# Patient Record
Sex: FEMALE | Race: WHITE | NOT HISPANIC OR LATINO | Employment: OTHER | URBAN - NONMETROPOLITAN AREA
[De-identification: names, ages, dates, MRNs, and addresses within clinical notes are randomized per-mention and may not be internally consistent; named-entity substitution may affect disease eponyms.]

---

## 2017-09-15 ENCOUNTER — OFFICE VISIT (OUTPATIENT)
Dept: INTERNAL MEDICINE | Facility: CLINIC | Age: 72
End: 2017-09-15

## 2017-09-15 VITALS
TEMPERATURE: 98.5 F | HEIGHT: 60 IN | HEART RATE: 102 BPM | RESPIRATION RATE: 18 BRPM | SYSTOLIC BLOOD PRESSURE: 124 MMHG | DIASTOLIC BLOOD PRESSURE: 72 MMHG | WEIGHT: 178.19 LBS | BODY MASS INDEX: 34.99 KG/M2 | OXYGEN SATURATION: 93 %

## 2017-09-15 DIAGNOSIS — R42 DIZZINESS: ICD-10-CM

## 2017-09-15 DIAGNOSIS — Z76.89 ENCOUNTER TO ESTABLISH CARE: Primary | ICD-10-CM

## 2017-09-15 DIAGNOSIS — Z79.4 TYPE 2 DIABETES MELLITUS WITHOUT COMPLICATION, WITH LONG-TERM CURRENT USE OF INSULIN (HCC): ICD-10-CM

## 2017-09-15 DIAGNOSIS — Z86.39 H/O THYROID NODULE: ICD-10-CM

## 2017-09-15 DIAGNOSIS — R63.5 WEIGHT GAIN: ICD-10-CM

## 2017-09-15 DIAGNOSIS — E11.9 TYPE 2 DIABETES MELLITUS WITHOUT COMPLICATION, WITH LONG-TERM CURRENT USE OF INSULIN (HCC): ICD-10-CM

## 2017-09-15 PROCEDURE — 99203 OFFICE O/P NEW LOW 30 MIN: CPT | Performed by: NURSE PRACTITIONER

## 2017-09-15 RX ORDER — INSULIN ASPART 100 [IU]/ML
INJECTION, SUSPENSION SUBCUTANEOUS
Refills: 0 | COMMUNITY
Start: 2017-09-14 | End: 2018-04-21 | Stop reason: SDUPTHER

## 2017-09-15 RX ORDER — MECLIZINE HCL 12.5 MG/1
12.5 TABLET ORAL 3 TIMES DAILY PRN
Qty: 21 TABLET | Refills: 0 | Status: SHIPPED | OUTPATIENT
Start: 2017-09-15 | End: 2017-09-22

## 2017-09-15 RX ORDER — HYDROCHLOROTHIAZIDE 12.5 MG/1
TABLET ORAL
Refills: 0 | COMMUNITY
Start: 2017-07-26 | End: 2018-01-18 | Stop reason: SINTOL

## 2017-09-15 RX ORDER — MULTIVITAMIN WITH IRON
TABLET ORAL AS NEEDED
COMMUNITY
End: 2017-10-20

## 2017-09-15 RX ORDER — LEVOTHYROXINE SODIUM 0.1 MG/1
TABLET ORAL
Refills: 0 | COMMUNITY
Start: 2017-09-13 | End: 2017-10-23 | Stop reason: SDUPTHER

## 2017-09-15 RX ORDER — CLOBETASOL PROPIONATE 0.46 MG/ML
SOLUTION TOPICAL
Refills: 0 | COMMUNITY
Start: 2017-08-26 | End: 2018-04-20

## 2017-09-15 NOTE — PROGRESS NOTES
Chief Complaint / Reason:      Chief Complaint   Patient presents with   • Establish Care     near syncope episodes, cough, SOA-onset ongoing. Bronchitis?        Subjective     HPI  Patient presents today to establish care and to discuss near syncope episodes.  She states that she has had an ongoing cough and has been short of air has a history of bronchitis.  She denies currently smoking but has in the past. Denies fever or chest pain.  Patient was seeing Dr. Freeman at [facility] and indicates that her  wanted her to change providers as he was not happy with the care or the lack of.  Patient did not have any complaints about her previous provider.Denies any recent falls but does state that she comes close to falling at times and bumps into things.  Her last vision exam was 2016.  She reports that she has loss of hearing in her left ear which is not new.  History taken from: patient    PMH/FH/Social History were reviewed and updated appropriately in the electronic medical record.     Review of Systems:   Review of Systems   Constitutional: Positive for fatigue and unexpected weight change. Negative for fever.   HENT: Positive for hearing loss.    Eyes: Positive for pain and visual disturbance.   Respiratory: Positive for cough, shortness of breath and wheezing.    Cardiovascular: Positive for palpitations.   Gastrointestinal: Positive for constipation.   Musculoskeletal: Positive for arthralgias, back pain, joint swelling and myalgias.   Neurological: Positive for dizziness, syncope and weakness.   Hematological: Negative.    Psychiatric/Behavioral: Positive for sleep disturbance.     All other systems were reviewed and are negative.  Exceptions are noted in the subjective or above.      Objective     Vital Signs  Vitals:    09/15/17 1740   BP: 124/72   Pulse: 102   Resp: 18   Temp: 98.5 °F (36.9 °C)   SpO2: 93%       Body mass index is 34.8 kg/(m^2).    Physical Exam   Constitutional: She is oriented  "to person, place, and time. She appears well-developed and well-nourished. No distress.   HENT:   Head: Normocephalic and atraumatic.   Right Ear: External ear and ear canal normal. No tenderness. Tympanic membrane is bulging.   Left Ear: External ear and ear canal normal. No tenderness. Tympanic membrane is bulging.   Mouth/Throat: Mucous membranes are dry.   Eyes: Pupils are equal, round, and reactive to light.   Neck: Normal range of motion. Thyromegaly present.   Cardiovascular: Regular rhythm, normal heart sounds and intact distal pulses.  Tachycardia present.    Pulmonary/Chest: Effort normal and breath sounds normal. She has no wheezes. She exhibits no tenderness.   Abdominal: Soft. Bowel sounds are normal. She exhibits no distension. There is no tenderness.   Lymphadenopathy:     She has no cervical adenopathy.   Neurological: She is alert and oriented to person, place, and time.   Skin: Skin is warm and dry. No rash noted. No erythema. No pallor.   Psychiatric: She has a normal mood and affect. Her behavior is normal. Judgment and thought content normal.   Nursing note and vitals reviewed.       Medication Review:     Current Outpatient Prescriptions:   •  Magnesium 250 MG tablet, Take  by mouth As Needed., Disp: , Rfl:   •  MARY CONTOUR TEST test strip, 3 (Three) Times a Day., Disp: , Rfl: 0  •  BD INSULIN SYRINGE ULTRAFINE 31G X 15/64\" 0.5 ML misc, , Disp: , Rfl: 0  •  clobetasol (TEMOVATE) 0.05 % external solution, , Disp: , Rfl: 0  •  hydrochlorothiazide (HYDRODIURIL) 12.5 MG tablet, take 1 tablet by mouth once daily, Disp: , Rfl: 0  •  levothyroxine (SYNTHROID, LEVOTHROID) 100 MCG tablet, take 1 tablet by mouth once daily, Disp: , Rfl: 0  •  meclizine (ANTIVERT) 12.5 MG tablet, Take 1 tablet by mouth 3 (Three) Times a Day As Needed for dizziness for up to 7 days., Disp: 21 tablet, Rfl: 0  •  NOVOLOG MIX 70/30 (70-30) 100 UNIT/ML injection, inject 70 units subcutaneously twice a day, Disp: , Rfl: " 0    Assessment/Plan   Marielena was seen today for establish care.    Diagnoses and all orders for this visit:    Encounter to establish care    H/O thyroid nodule  -     US Thyroid    Weight gain  -     US Thyroid   discussed dietary modifications and advised patient to eat a low-sodium diet and weigh daily.    Dizziness  -     meclizine (ANTIVERT) 12.5 MG tablet; Take 1 tablet by mouth 3 (Three) Times a Day As Needed for dizziness for up to 7 days.  Advised patient to use caution when changing positions.  Recommend patient increase water intake.  Recommend patient closely monitor blood pressure and changes in blood pressure sitting or standing and lying  Type 2 diabetes mellitus without complication, with long-term current use of insulin  Stable   Discussed health maintenance with patient.   Follow-up in 4 weeks and as needed  Phoebe Sinclair, LETICIA  09/15/2017

## 2017-09-19 ENCOUNTER — HOSPITAL ENCOUNTER (OUTPATIENT)
Dept: ULTRASOUND IMAGING | Facility: HOSPITAL | Age: 72
Discharge: HOME | End: 2017-09-19
Attending: NURSE PRACTITIONER | Admitting: NURSE PRACTITIONER

## 2017-09-19 PROBLEM — M81.0 OSTEOPOROSIS: Status: ACTIVE | Noted: 2017-09-19

## 2017-09-19 PROBLEM — E11.9 DIABETES MELLITUS (HCC): Status: ACTIVE | Noted: 2017-09-19

## 2017-09-19 PROBLEM — I10 HYPERTENSION: Status: ACTIVE | Noted: 2017-09-19

## 2017-09-19 PROCEDURE — 76536 US EXAM OF HEAD AND NECK: CPT

## 2017-10-20 ENCOUNTER — OFFICE VISIT (OUTPATIENT)
Dept: INTERNAL MEDICINE | Facility: CLINIC | Age: 72
End: 2017-10-20

## 2017-10-20 VITALS
BODY MASS INDEX: 35.73 KG/M2 | HEART RATE: 88 BPM | DIASTOLIC BLOOD PRESSURE: 80 MMHG | OXYGEN SATURATION: 94 % | WEIGHT: 182 LBS | HEIGHT: 60 IN | SYSTOLIC BLOOD PRESSURE: 142 MMHG | RESPIRATION RATE: 12 BRPM | TEMPERATURE: 98.7 F

## 2017-10-20 DIAGNOSIS — E89.0 POSTOPERATIVE HYPOTHYROIDISM: ICD-10-CM

## 2017-10-20 DIAGNOSIS — Z11.59 ENCOUNTER FOR HEPATITIS C SCREENING TEST FOR LOW RISK PATIENT: ICD-10-CM

## 2017-10-20 DIAGNOSIS — E11.9 TYPE 2 DIABETES MELLITUS WITHOUT COMPLICATION, WITH LONG-TERM CURRENT USE OF INSULIN (HCC): Primary | Chronic | ICD-10-CM

## 2017-10-20 DIAGNOSIS — R53.82 CHRONIC FATIGUE: ICD-10-CM

## 2017-10-20 DIAGNOSIS — E55.9 VITAMIN D DEFICIENCY: ICD-10-CM

## 2017-10-20 DIAGNOSIS — R63.5 WEIGHT GAIN: ICD-10-CM

## 2017-10-20 DIAGNOSIS — Z79.4 TYPE 2 DIABETES MELLITUS WITHOUT COMPLICATION, WITH LONG-TERM CURRENT USE OF INSULIN (HCC): Primary | Chronic | ICD-10-CM

## 2017-10-20 DIAGNOSIS — I10 ESSENTIAL HYPERTENSION: Chronic | ICD-10-CM

## 2017-10-20 PROCEDURE — 99214 OFFICE O/P EST MOD 30 MIN: CPT | Performed by: FAMILY MEDICINE

## 2017-10-20 NOTE — PROGRESS NOTES
"Subjective    Marielena Moeller is a 71 y.o. female here for:  Chief Complaint   Patient presents with   • Diabetes   • Hypertension     Diabetes   She presents for her follow-up diabetic visit. She has type 2 diabetes mellitus. Pertinent negatives for diabetes include no chest pain and no weight loss. Pertinent negatives for diabetic complications include no CVA or heart disease. Risk factors for coronary artery disease include diabetes mellitus, obesity, stress, sedentary lifestyle, hypertension and post-menopausal. Current diabetic treatment includes insulin injections (Patient is satisfied with 70/30 at this time.). She is compliant with treatment all of the time. Her weight is increasing rapidly (Patient feels weight is climbing outside her control.). Meal planning includes avoidance of concentrated sweets. An ACE inhibitor/angiotensin II receptor blocker is not being taken.   Hypertension   This is a chronic problem. The current episode started more than 1 year ago. The problem is unchanged. The problem is controlled. Pertinent negatives include no chest pain. Agents associated with hypertension include thyroid hormones. Risk factors for coronary artery disease include diabetes mellitus, obesity, post-menopausal state, stress and sedentary lifestyle. Past treatments include diuretics. The current treatment provides significant improvement. Compliance problems include exercise and diet.  Hypertensive end-organ damage includes a thyroid problem. There is no history of CAD/MI or CVA.      She'd like to get her vitamin D level checked. She has a history of deficiency in this. She stays tired. No known issues with vitamin B12.     She gets \"real nervous inside.\" She feels like \"Jello shaking.\"  is sick (cancer, heart issues, etc.) Daughter told her to get hormones checked but she went through menopause in her 40s. She's gained a lot of weight. Used to walk but has joint pains that keep her from going now. " Had a lot of ankle pain on right side.     The following portions of the patient's history were reviewed and updated as appropriate: allergies, current medications, past family history, past medical history, past social history, past surgical history and problem list.    Review of Systems   Constitutional: Positive for unexpected weight change. Negative for weight loss.   Respiratory: Positive for cough.    Cardiovascular: Negative for chest pain.   Musculoskeletal: Positive for myalgias (cramping with walking).       Vitals:    10/20/17 1303   BP: 142/80   Pulse: 88   Resp: 12   Temp: 98.7 °F (37.1 °C)   SpO2: 94%       Objective   Physical Exam   Constitutional: She is oriented to person, place, and time. Vital signs are normal. She appears well-developed and well-nourished. She is active. She does not have a sickly appearance. She does not appear ill.   Appears stated age. Well groomed. obese.   HENT:   Head: Normocephalic and atraumatic. Hair is normal.   Right Ear: Hearing normal.   Left Ear: Hearing normal.   Nose: Nose normal.   Eyes: EOM and lids are normal. Pupils are equal, round, and reactive to light. No scleral icterus.   Neck: Phonation normal. Neck supple.   Cardiovascular: Normal rate, regular rhythm and normal heart sounds.  Exam reveals no gallop and no friction rub.    No murmur heard.  Pulmonary/Chest: Effort normal and breath sounds normal.   Musculoskeletal: She exhibits no deformity.   Neurological: She is alert and oriented to person, place, and time. She displays no tremor. No cranial nerve deficit. Gait normal.   Skin: Skin is warm. No rash noted. She is not diaphoretic. No cyanosis. Nails show no clubbing.   Psychiatric: She has a normal mood and affect. Her speech is normal and behavior is normal. Judgment and thought content normal. Cognition and memory are normal.   Nursing note and vitals reviewed.      Assessment/Plan   Marielena was seen today for diabetes and  hypertension.    Diagnoses and all orders for this visit:    Type 2 diabetes mellitus without complication, with long-term current use of insulin  -     Hemoglobin A1c  -     Comprehensive Metabolic Panel    Essential hypertension  Comments:  Continue HCTZ.    Chronic fatigue  -     CBC & Differential  -     Vitamin B12 & Folate    Weight gain    Postoperative hypothyroidism  -     TSH    Vitamin D deficiency  -     Vitamin D 25 Hydroxy    Encounter for hepatitis C screening test for low risk patient  -     Hepatitis C Antibody               Marychuy Starr MD

## 2017-10-21 LAB
25(OH)D3+25(OH)D2 SERPL-MCNC: 21.1 NG/ML
ALBUMIN SERPL-MCNC: 3.9 G/DL (ref 3.5–5)
ALBUMIN/GLOB SERPL: 1.4 G/DL (ref 1–2)
ALP SERPL-CCNC: 84 U/L (ref 38–126)
ALT SERPL-CCNC: 26 U/L (ref 13–69)
AST SERPL-CCNC: 20 U/L (ref 15–46)
BASOPHILS # BLD AUTO: 0.05 10*3/MM3 (ref 0–0.2)
BASOPHILS NFR BLD AUTO: 0.6 % (ref 0–2.5)
BILIRUB SERPL-MCNC: 0.3 MG/DL (ref 0.2–1.3)
BUN SERPL-MCNC: 13 MG/DL (ref 7–20)
BUN/CREAT SERPL: 18.6 (ref 7.1–23.5)
CALCIUM SERPL-MCNC: 9.5 MG/DL (ref 8.4–10.2)
CHLORIDE SERPL-SCNC: 99 MMOL/L (ref 98–107)
CO2 SERPL-SCNC: 31 MMOL/L (ref 26–30)
CREAT SERPL-MCNC: 0.7 MG/DL (ref 0.6–1.3)
EOSINOPHIL # BLD AUTO: 0.18 10*3/MM3 (ref 0–0.7)
EOSINOPHIL NFR BLD AUTO: 2.3 % (ref 0–7)
ERYTHROCYTE [DISTWIDTH] IN BLOOD BY AUTOMATED COUNT: 12.3 % (ref 11.5–14.5)
FOLATE SERPL-MCNC: 8.33 NG/ML
GFR SERPLBLD CREATININE-BSD FMLA CKD-EPI: 101 ML/MIN/1.73
GFR SERPLBLD CREATININE-BSD FMLA CKD-EPI: 83 ML/MIN/1.73
GLOBULIN SER CALC-MCNC: 2.7 GM/DL
GLUCOSE SERPL-MCNC: 169 MG/DL (ref 74–98)
HBA1C MFR BLD: 9.2 %
HCT VFR BLD AUTO: 47.6 % (ref 37–47)
HCV AB S/CO SERPL IA: 0.1 S/CO RATIO (ref 0–0.9)
HGB BLD-MCNC: 16.1 G/DL (ref 12–16)
IMM GRANULOCYTES # BLD: 0.03 10*3/MM3 (ref 0–0.06)
IMM GRANULOCYTES NFR BLD: 0.4 % (ref 0–0.6)
LYMPHOCYTES # BLD AUTO: 1.93 10*3/MM3 (ref 0.6–3.4)
LYMPHOCYTES NFR BLD AUTO: 24.5 % (ref 10–50)
MCH RBC QN AUTO: 32.5 PG (ref 27–31)
MCHC RBC AUTO-ENTMCNC: 33.8 G/DL (ref 30–37)
MCV RBC AUTO: 96 FL (ref 81–99)
MONOCYTES # BLD AUTO: 0.59 10*3/MM3 (ref 0–0.9)
MONOCYTES NFR BLD AUTO: 7.5 % (ref 0–12)
NEUTROPHILS # BLD AUTO: 5.1 10*3/MM3 (ref 2–6.9)
NEUTROPHILS NFR BLD AUTO: 64.7 % (ref 37–80)
NRBC BLD AUTO-RTO: 0 /100 WBC (ref 0–0)
PLATELET # BLD AUTO: 242 10*3/MM3 (ref 130–400)
POTASSIUM SERPL-SCNC: 4.2 MMOL/L (ref 3.5–5.1)
PROT SERPL-MCNC: 6.6 G/DL (ref 6.3–8.2)
RBC # BLD AUTO: 4.96 10*6/MM3 (ref 4.2–5.4)
SODIUM SERPL-SCNC: 141 MMOL/L (ref 137–145)
TSH SERPL DL<=0.005 MIU/L-ACNC: 1.74 MIU/ML (ref 0.47–4.68)
VIT B12 SERPL-MCNC: 681 PG/ML (ref 239–931)
WBC # BLD AUTO: 7.88 10*3/MM3 (ref 4.8–10.8)

## 2017-10-23 DIAGNOSIS — Z86.39 H/O THYROID NODULE: ICD-10-CM

## 2017-10-23 RX ORDER — LEVOTHYROXINE SODIUM 0.1 MG/1
100 TABLET ORAL DAILY
Qty: 90 TABLET | Refills: 3 | Status: SHIPPED | OUTPATIENT
Start: 2017-10-23 | End: 2017-12-11 | Stop reason: SDUPTHER

## 2017-11-17 ENCOUNTER — TELEPHONE (OUTPATIENT)
Dept: INTERNAL MEDICINE | Facility: CLINIC | Age: 72
End: 2017-11-17

## 2017-11-17 DIAGNOSIS — E11.9 TYPE 2 DIABETES MELLITUS WITHOUT COMPLICATION, WITH LONG-TERM CURRENT USE OF INSULIN (HCC): ICD-10-CM

## 2017-11-17 DIAGNOSIS — Z79.4 TYPE 2 DIABETES MELLITUS WITHOUT COMPLICATION, WITH LONG-TERM CURRENT USE OF INSULIN (HCC): ICD-10-CM

## 2017-12-11 ENCOUNTER — OFFICE VISIT (OUTPATIENT)
Dept: INTERNAL MEDICINE | Facility: CLINIC | Age: 72
End: 2017-12-11

## 2017-12-11 VITALS
WEIGHT: 183 LBS | BODY MASS INDEX: 35.93 KG/M2 | HEIGHT: 60 IN | HEART RATE: 86 BPM | OXYGEN SATURATION: 95 % | SYSTOLIC BLOOD PRESSURE: 134 MMHG | RESPIRATION RATE: 14 BRPM | DIASTOLIC BLOOD PRESSURE: 82 MMHG | TEMPERATURE: 98.3 F

## 2017-12-11 DIAGNOSIS — G47.62 NOCTURNAL LEG CRAMPS: ICD-10-CM

## 2017-12-11 DIAGNOSIS — Z78.0 POSTMENOPAUSAL: ICD-10-CM

## 2017-12-11 DIAGNOSIS — R51.9 FREQUENT HEADACHES: ICD-10-CM

## 2017-12-11 DIAGNOSIS — E89.0 POSTOPERATIVE HYPOTHYROIDISM: ICD-10-CM

## 2017-12-11 DIAGNOSIS — E66.9 CLASS 2 OBESITY WITH BODY MASS INDEX (BMI) OF 35.0 TO 35.9 IN ADULT, UNSPECIFIED OBESITY TYPE, UNSPECIFIED WHETHER SERIOUS COMORBIDITY PRESENT: ICD-10-CM

## 2017-12-11 DIAGNOSIS — Z79.4 TYPE 2 DIABETES MELLITUS WITH HYPERGLYCEMIA, WITH LONG-TERM CURRENT USE OF INSULIN (HCC): ICD-10-CM

## 2017-12-11 DIAGNOSIS — M81.0 AGE-RELATED OSTEOPOROSIS WITHOUT CURRENT PATHOLOGICAL FRACTURE: ICD-10-CM

## 2017-12-11 DIAGNOSIS — Z00.00 MEDICARE ANNUAL WELLNESS VISIT, SUBSEQUENT: Primary | ICD-10-CM

## 2017-12-11 DIAGNOSIS — I10 ESSENTIAL HYPERTENSION: Chronic | ICD-10-CM

## 2017-12-11 DIAGNOSIS — E11.65 TYPE 2 DIABETES MELLITUS WITH HYPERGLYCEMIA, WITH LONG-TERM CURRENT USE OF INSULIN (HCC): ICD-10-CM

## 2017-12-11 DIAGNOSIS — R39.9 URINARY SYMPTOM OR SIGN: ICD-10-CM

## 2017-12-11 LAB
BILIRUB BLD-MCNC: NEGATIVE MG/DL
CLARITY, POC: CLEAR
COLOR UR: YELLOW
GLUCOSE UR STRIP-MCNC: ABNORMAL MG/DL
KETONES UR QL: NEGATIVE
LEUKOCYTE EST, POC: NEGATIVE
NITRITE UR-MCNC: NEGATIVE MG/ML
PH UR: 5 [PH] (ref 5–8)
POC CREATININE URINE: 50
POC MICROALBUMIN URINE: 80
PROT UR STRIP-MCNC: NEGATIVE MG/DL
RBC # UR STRIP: NEGATIVE /UL
SP GR UR: 1.01 (ref 1–1.03)
UROBILINOGEN UR QL: NORMAL

## 2017-12-11 PROCEDURE — 82044 UR ALBUMIN SEMIQUANTITATIVE: CPT | Performed by: FAMILY MEDICINE

## 2017-12-11 PROCEDURE — 81003 URINALYSIS AUTO W/O SCOPE: CPT | Performed by: FAMILY MEDICINE

## 2017-12-11 PROCEDURE — G0439 PPPS, SUBSEQ VISIT: HCPCS | Performed by: FAMILY MEDICINE

## 2017-12-11 PROCEDURE — 99397 PER PM REEVAL EST PAT 65+ YR: CPT | Performed by: FAMILY MEDICINE

## 2017-12-11 PROCEDURE — 96160 PT-FOCUSED HLTH RISK ASSMT: CPT | Performed by: FAMILY MEDICINE

## 2017-12-11 RX ORDER — LEVOTHYROXINE SODIUM 0.1 MG/1
100 TABLET ORAL DAILY
Qty: 90 TABLET | Refills: 3 | Status: SHIPPED | OUTPATIENT
Start: 2017-12-11 | End: 2018-07-26 | Stop reason: SDUPTHER

## 2017-12-11 RX ORDER — LANOLIN ALCOHOL/MO/W.PET/CERES
400 CREAM (GRAM) TOPICAL
Qty: 90 TABLET | Refills: 3 | Status: SHIPPED | OUTPATIENT
Start: 2017-12-11 | End: 2018-09-10 | Stop reason: SDUPTHER

## 2017-12-11 RX ORDER — ASPIRIN 81 MG/1
81 TABLET ORAL DAILY
COMMUNITY
End: 2018-06-04

## 2017-12-11 NOTE — PROGRESS NOTES
QUICK REFERENCE INFORMATION:  The ABCs of the Annual Wellness Visit    Subsequent Medicare Wellness Visit    HEALTH RISK ASSESSMENT    1945    Recent Hospitalizations:  No hospitalization(s) within the last year..        Current Medical Providers:  Patient Care Team:  Marychuy Starr MD as PCP - General (Family Medicine)  Denzel Mclaughlin MD as Consulting Physician (Ophthalmology)        Smoking Status:  History   Smoking Status   • Former Smoker   Smokeless Tobacco   • Never Used       Alcohol Consumption:  History   Alcohol Use No       Depression Screen:   PHQ-2/PHQ-9 Depression Screening 12/11/2017   Little interest or pleasure in doing things 0   Feeling down, depressed, or hopeless 0   Total Score 0       Health Habits and Functional and Cognitive Screening:  Functional & Cognitive Status 12/11/2017   Do you have difficulty preparing food and eating? No   Do you have difficulty bathing yourself, getting dressed or grooming yourself? No   Do you have difficulty using the toilet? No   Do you have difficulty moving around from place to place? No   Do you have trouble with steps or getting out of a bed or a chair? No   In the past year have you fallen or experienced a near fall? No   Current Diet Well Balanced Diet   Dental Exam Up to date   Eye Exam Up to date   Exercise (times per week) 0 times per week   Current Exercise Activities Include None   Do you need help using the phone?  No   Are you deaf or do you have serious difficulty hearing?  Yes   Do you need help with transportation? No   Do you need help shopping? No   Do you need help preparing meals?  No   Do you need help with housework?  No   Do you need help with laundry? No   Do you need help taking your medications? No   Do you need help managing money? No   Have you felt unusual stress, anger or loneliness in the last month? Yes   Who do you live with? Spouse   If you need help, do you have trouble finding someone available to you? No   Have  you been bothered in the last four weeks by sexual problems? No   Do you have difficulty concentrating, remembering or making decisions? No           Does the patient have evidence of cognitive impairment? No    Aspirin use counseling: Taking ASA appropriately as indicated      Recent Lab Results:  CMP:  Lab Results   Component Value Date     (H) 10/20/2017    BUN 13 10/20/2017    CREATININE 0.70 10/20/2017    EGFRIFNONA 83 10/20/2017    EGFRIFAFRI 101 10/20/2017    BCR 18.6 10/20/2017     10/20/2017    K 4.2 10/20/2017    CO2 31.0 (H) 10/20/2017    CALCIUM 9.5 10/20/2017    PROTENTOTREF 6.6 10/20/2017    ALBUMIN 3.90 10/20/2017    LABGLOBREF 2.7 10/20/2017    LABIL2 1.4 10/20/2017    BILITOT 0.3 10/20/2017    ALKPHOS 84 10/20/2017    AST 20 10/20/2017    ALT 26 10/20/2017     Lipid Panel:     HbA1c:  Lab Results   Component Value Date    HGBA1C 9.20 10/20/2017     Lab Results   Component Value Date    TSH 1.740 10/20/2017         Visual Acuity:  No exam data present    Age-appropriate Screening Schedule:  Refer to the list below for future screening recommendations based on patient's age, sex and/or medical conditions. Orders for these recommended tests are listed in the plan section. The patient has been provided with a written plan.    Health Maintenance   Topic Date Due   • TDAP/TD VACCINES (1 - Tdap) 11/19/1966   • DXA SCAN  12/31/2017 (Originally 9/18/2017)   • PNEUMOCOCCAL VACCINES (65+ LOW/MEDIUM RISK) (1 of 2 - PCV13) 02/28/2018 (Originally 11/19/2012)   • DIABETIC EYE EXAM  02/28/2018 (Originally 9/19/2017)   • HEMOGLOBIN A1C  04/20/2018   • MAMMOGRAM  10/14/2018   • DIABETIC FOOT EXAM  12/11/2018   • LIPID PANEL  12/11/2018   • URINE MICROALBUMIN  12/11/2018   • COLONOSCOPY  03/02/2026   • INFLUENZA VACCINE  Completed   • ZOSTER VACCINE  Addressed        Subjective   History of Present Illness    Marielena Moeller is a 72 y.o. female who presents for an Subsequent Wellness Visit.    Also has  "had some burning with urination for two weeks. Has improved with use of over the counter azo. Also has increased water intake. No fevers.     She wants to start exercising. Has considered going to [place] to the pool or the track.  is sick and falls easily so she can't leave him. This morning at 530 sugar was 124. Last night it was 209. She's trying to cut down on bread intake.   has agreed to go on low carb diet with her.     The following portions of the patient's history were reviewed and updated as appropriate: allergies, current medications, past family history, past medical history, past social history, past surgical history and problem list.    Outpatient Medications Prior to Visit   Medication Sig Dispense Refill   • MARY CONTOUR TEST test strip USE AS DIRECTED TO CHECK GLUCOSE 3 TIMES DAILY FOR DM E11.9 300 each 3   • BD INSULIN SYRINGE ULTRAFINE 31G X 15/64\" 0.5 ML misc USE AS DIRECTED TO INJECT INSULIN FOR DM E11.9 300 each 3   • Cholecalciferol (VITAMIN D3) 5000 units capsule capsule Take 1 capsule by mouth Daily. 90 capsule 3   • clobetasol (TEMOVATE) 0.05 % external solution   0   • hydrochlorothiazide (HYDRODIURIL) 12.5 MG tablet take 1 tablet by mouth once daily  0   • levothyroxine (SYNTHROID, LEVOTHROID) 100 MCG tablet Take 1 tablet by mouth Daily. 90 tablet 3   • NOVOLOG MIX 70/30 (70-30) 100 UNIT/ML injection inject 70 units subcutaneously twice a day  0     No facility-administered medications prior to visit.        Patient Active Problem List   Diagnosis   • Diabetes mellitus   • Hypertension   • Osteoporosis       Advance Care Planning:  has NO advance directive - information provided to the patient today    Identification of Risk Factors:  Risk factors include: weight , unhealthy diet, inactivity, chronic pain, caretaker stress and financial stress.    Review of Systems   Constitutional: Positive for fatigue.   Respiratory: Negative for shortness of breath.    Cardiovascular: " Negative for chest pain.   Gastrointestinal: Negative for abdominal pain.   Musculoskeletal: Positive for arthralgias.   Neurological: Positive for headaches.   All other systems reviewed and are negative.      Compared to one year ago, the patient feels her physical health is the same.  Compared to one year ago, the patient feels her mental health is the same.    Objective     Physical Exam   Constitutional: She is oriented to person, place, and time. Vital signs are normal. She appears well-developed and well-nourished. She is active. She does not have a sickly appearance. She does not appear ill.   Appears stated age. Well groomed.    HENT:   Head: Normocephalic and atraumatic. Hair is normal.   Right Ear: Hearing, tympanic membrane, external ear and ear canal normal.   Left Ear: Hearing, tympanic membrane, external ear and ear canal normal.   Nose: Nose normal.   Mouth/Throat: Mucous membranes are normal. Mucous membranes are not dry. She does not have dentures. No oral lesions. No trismus in the jaw.   Eyes: EOM and lids are normal. Pupils are equal, round, and reactive to light. No scleral icterus.   Neck: Phonation normal. Neck supple.   Cardiovascular: Normal rate, regular rhythm and normal heart sounds.  Exam reveals no gallop and no friction rub.    No murmur heard.  Pulmonary/Chest: Effort normal and breath sounds normal.   Musculoskeletal: She exhibits no deformity.        Right foot: There is no tenderness and no deformity.        Left foot: There is no tenderness and no deformity.    Marielena had a diabetic foot exam performed today.   During the foot exam she had a monofilament test performed.    Neurological Sensory Findings -  Unaltered sharp/dull right ankle/foot discrimination and unaltered sharp/dull left ankle/foot discrimination. No right ankle/foot altered proprioception and no left ankle/foot altered proprioception    Vascular Status -  Her exam exhibits right foot vasculature abnormal and no  "right foot edema. Her exam exhibits left foot vasculature abnormal and no left foot edema.   Skin Integrity  -  She has right heel is dry and cracked.  She hasno right foot ulcer, no right foot blister and no right foot gangrenous changes.She has left heel dry and cracked. She has no left foot ulcer, no left foot blister and no left foot gangrenous changes..  Neurological: She is alert and oriented to person, place, and time. She displays no tremor. No cranial nerve deficit. Gait normal.   Skin: Skin is warm. No rash noted. She is not diaphoretic. No cyanosis. Nails show no clubbing.   Psychiatric: She has a normal mood and affect. Her speech is normal and behavior is normal. Judgment and thought content normal. Cognition and memory are normal.   Nursing note and vitals reviewed.      Vitals:    12/11/17 1106   BP: 134/82   Pulse: 86   Resp: 14   Temp: 98.3 °F (36.8 °C)   SpO2: 95%   Weight: 83 kg (183 lb)   Height: 152.4 cm (60\")       Body mass index is 35.74 kg/(m^2).  Discussed the patient's BMI with her. BMI is above normal parameters. Follow-up plan includes:  exercise counseling and nutrition counseling.    Assessment/Plan   Patient Self-Management and Personalized Health Advice  The patient has been provided with information about: diet, exercise, weight management, designing advance directives and mental health concerns and preventive services including:   · Advance directive, Bone densitometry screening, Exercise counseling provided, Nutrition counseling provided, Pneumococcal vaccine , discussed shingrix.    Visit Diagnoses:    ICD-10-CM ICD-9-CM   1. Medicare annual wellness visit, subsequent Z00.00 V70.0   2. Type 2 diabetes mellitus with hyperglycemia, with long-term current use of insulin E11.65 250.00    Z79.4 790.29     V58.67   3. Postoperative hypothyroidism E89.0 244.0   4. Urinary symptom or sign R39.9 788.99   5. Age-related osteoporosis without current pathological fracture M81.0 733.01   6. " "Postmenopausal Z78.0 V49.81   7. Essential hypertension I10 401.9   8. Nocturnal leg cramps G47.62 327.52   9. Frequent headaches R51 784.0   10. Class 2 obesity with body mass index (BMI) of 35.0 to 35.9 in adult, unspecified obesity type, unspecified whether serious comorbidity present E66.9 278.00    Z68.35 V85.35       Orders Placed This Encounter   Procedures   • DEXA Bone Density Axial     Order Specific Question:   Reason for Exam:     Answer:   postmenopausal   • POC Urinalysis Dipstick, Automated   • POC Microalbumin       Outpatient Encounter Prescriptions as of 12/11/2017   Medication Sig Dispense Refill   • aspirin 81 MG EC tablet Take 81 mg by mouth Daily.     • MARY CONTOUR TEST test strip USE AS DIRECTED TO CHECK GLUCOSE 3 TIMES DAILY FOR DM E11.9 300 each 3   • BD INSULIN SYRINGE ULTRAFINE 31G X 15/64\" 0.5 ML misc USE AS DIRECTED TO INJECT INSULIN FOR DM E11.9 300 each 3   • Cholecalciferol (VITAMIN D3) 5000 units capsule capsule Take 1 capsule by mouth Daily. 90 capsule 3   • clobetasol (TEMOVATE) 0.05 % external solution   0   • hydrochlorothiazide (HYDRODIURIL) 12.5 MG tablet take 1 tablet by mouth once daily  0   • levothyroxine (SYNTHROID, LEVOTHROID) 100 MCG tablet Take 1 tablet by mouth Daily. 90 tablet 3   • NOVOLOG MIX 70/30 (70-30) 100 UNIT/ML injection inject 70 units subcutaneously twice a day  0   • [DISCONTINUED] levothyroxine (SYNTHROID, LEVOTHROID) 100 MCG tablet Take 1 tablet by mouth Daily. 90 tablet 3   • Magnesium Oxide 400 (240 Mg) MG tablet Take 1 tablet by mouth every night at bedtime. 90 tablet 3     No facility-administered encounter medications on file as of 12/11/2017.        Reviewed use of high risk medication in the elderly: yes  Reviewed for potential of harmful drug interactions in the elderly: yes    Follow Up:  Return in about 7 weeks (around 1/31/2018) for Diabetes follow up; 1 yr wellness.     An After Visit Summary and PPPS with all of these plans were given to " the patient.      Cynthia Peguero was seen today for annual exam.    Diagnoses and all orders for this visit:    Medicare annual wellness visit, subsequent    Type 2 diabetes mellitus with hyperglycemia, with long-term current use of insulin  -     POC Microalbumin    Postoperative hypothyroidism  -     levothyroxine (SYNTHROID, LEVOTHROID) 100 MCG tablet; Take 1 tablet by mouth Daily.    Urinary symptom or sign  -     POC Urinalysis Dipstick, Automated    Age-related osteoporosis without current pathological fracture  Comments:  Continue vitamin D.    Postmenopausal  -     DEXA Bone Density Axial    Essential hypertension  Comments:  Borderline control. Continue HCTZ.    Nocturnal leg cramps  -     Magnesium Oxide 400 (240 Mg) MG tablet; Take 1 tablet by mouth every night at bedtime.    Frequent headaches  Comments:  May warrant recheck for FLORIAN.  Orders:  -     Magnesium Oxide 400 (240 Mg) MG tablet; Take 1 tablet by mouth every night at bedtime.    Class 2 obesity with body mass index (BMI) of 35.0 to 35.9 in adult, unspecified obesity type, unspecified whether serious comorbidity present          Patient's BMI is above normal parameters. Follow-up plan includes:  exercise counseling and nutrition counseling.

## 2017-12-11 NOTE — PATIENT INSTRUCTIONS
Menopause is a normal process in which your reproductive ability comes to an end. This process happens gradually over a span of months to years, usually between the ages of 48 and 55. Menopause is complete when you have missed 12 consecutive menstrual periods.  It is important to talk with your health care provider about some of the most common conditions that affect postmenopausal women, such as heart disease, cancer, and bone loss (osteoporosis). Adopting a healthy lifestyle and getting preventive care can help to promote your health and wellness. Those actions can also lower your chances of developing some of these common conditions.  WHAT SHOULD I KNOW ABOUT MENOPAUSE?  During menopause, you may experience a number of symptoms, such as:  · Moderate-to-severe hot flashes.  · Night sweats.  · Decrease in sex drive.  · Mood swings.  · Headaches.  · Tiredness.  · Irritability.  · Memory problems.  · Insomnia.  Choosing to treat or not to treat menopausal changes is an individual decision that you make with your health care provider.  WHAT SHOULD I KNOW ABOUT HORMONE REPLACEMENT THERAPY AND SUPPLEMENTS?  Hormone therapy products are effective for treating symptoms that are associated with menopause, such as hot flashes and night sweats. Hormone replacement carries certain risks, especially as you become older. If you are thinking about using estrogen or estrogen with progestin treatments, discuss the benefits and risks with your health care provider.  WHAT SHOULD I KNOW ABOUT HEART DISEASE AND STROKE?  Heart disease, heart attack, and stroke become more likely as you age. This may be due, in part, to the hormonal changes that your body experiences during menopause. These can affect how your body processes dietary fats, triglycerides, and cholesterol. Heart attack and stroke are both medical emergencies.  There are many things that you can do to help prevent heart disease and stroke:  · Have your blood pressure  checked at least every 1-2 years. High blood pressure causes heart disease and increases the risk of stroke.  · If you are 55-79 years old, ask your health care provider if you should take aspirin to prevent a heart attack or a stroke.  · Do not use any tobacco products, including cigarettes, chewing tobacco, or electronic cigarettes. If you need help quitting, ask your health care provider.  · It is important to eat a healthy diet and maintain a healthy weight.    Be sure to include plenty of vegetables, fruits, low-fat dairy products, and lean protein.    Avoid eating foods that are high in solid fats, added sugars, or salt (sodium).  · Get regular exercise. This is one of the most important things that you can do for your health.    Try to exercise for at least 150 minutes each week. The type of exercise that you do should increase your heart rate and make you sweat. This is known as moderate-intensity exercise.    Try to do strengthening exercises at least twice each week. Do these in addition to the moderate-intensity exercise.  · Know your numbers. Ask your health care provider to check your cholesterol and your blood glucose. Continue to have your blood tested as directed by your health care provider.  WHAT SHOULD I KNOW ABOUT CANCER SCREENING?  There are several types of cancer. Take the following steps to reduce your risk and to catch any cancer development as early as possible.  Breast Cancer  · Practice breast self-awareness.    This means understanding how your breasts normally appear and feel.    It also means doing regular breast self-exams. Let your health care provider know about any changes, no matter how small.  · If you are 40 or older, have a clinician do a breast exam (clinical breast exam or CBE) every year. Depending on your age, family history, and medical history, it may be recommended that you also have a yearly breast X-ray (mammogram).  · If you have a family history of breast cancer,  talk with your health care provider about genetic screening.  · If you are at high risk for breast cancer, talk with your health care provider about having an MRI and a mammogram every year.  · Breast cancer (BRCA) gene test is recommended for women who have family members with BRCA-related cancers. Results of the assessment will determine the need for genetic counseling and BRCA1 and for BRCA2 testing. BRCA-related cancers include these types:    Breast. This occurs in males or females.    Ovarian.    Tubal. This may also be called fallopian tube cancer.    Cancer of the abdominal or pelvic lining (peritoneal cancer).    Prostate.    Pancreatic.  Cervical, Uterine, and Ovarian Cancer  Your health care provider may recommend that you be screened regularly for cancer of the pelvic organs. These include your ovaries, uterus, and vagina. This screening involves a pelvic exam, which includes checking for microscopic changes to the surface of your cervix (Pap test).  · For women ages 21-65, health care providers may recommend a pelvic exam and a Pap test every three years. For women ages 30-65, they may recommend the Pap test and pelvic exam, combined with testing for human papilloma virus (HPV), every five years. Some types of HPV increase your risk of cervical cancer. Testing for HPV may also be done on women of any age who have unclear Pap test results.  · Other health care providers may not recommend any screening for nonpregnant women who are considered low risk for pelvic cancer and have no symptoms. Ask your health care provider if a screening pelvic exam is right for you.  · If you have had past treatment for cervical cancer or a condition that could lead to cancer, you need Pap tests and screening for cancer for at least 20 years after your treatment. If Pap tests have been discontinued for you, your risk factors (such as having a new sexual partner) need to be reassessed to determine if you should start having  screenings again. Some women have medical problems that increase the chance of getting cervical cancer. In these cases, your health care provider may recommend that you have screening and Pap tests more often.  · If you have a family history of uterine cancer or ovarian cancer, talk with your health care provider about genetic screening.  · If you have vaginal bleeding after reaching menopause, tell your health care provider.  · There are currently no reliable tests available to screen for ovarian cancer.  Lung Cancer  Lung cancer screening is recommended for adults 55-80 years old who are at high risk for lung cancer because of a history of smoking. A yearly low-dose CT scan of the lungs is recommended if you:  · Currently smoke.  · Have a history of at least 30 pack-years of smoking and you currently smoke or have quit within the past 15 years. A pack-year is smoking an average of one pack of cigarettes per day for one year.  Yearly screening should:  · Continue until it has been 15 years since you quit.  · Stop if you develop a health problem that would prevent you from having lung cancer treatment.  Colorectal Cancer  · This type of cancer can be detected and can often be prevented.  · Routine colorectal cancer screening usually begins at age 50 and continues through age 75.  · If you have risk factors for colon cancer, your health care provider may recommend that you be screened at an earlier age.  · If you have a family history of colorectal cancer, talk with your health care provider about genetic screening.  · Your health care provider may also recommend using home test kits to check for hidden blood in your stool.  · A small camera at the end of a tube can be used to examine your colon directly (sigmoidoscopy or colonoscopy). This is done to check for the earliest forms of colorectal cancer.  · Direct examination of the colon should be repeated every 5-10 years until age 75. However, if early forms of  precancerous polyps or small growths are found or if you have a family history or genetic risk for colorectal cancer, you may need to be screened more often.  Skin Cancer  · Check your skin from head to toe regularly.  · Monitor any moles. Be sure to tell your health care provider:    About any new moles or changes in moles, especially if there is a change in a mole's shape or color.    If you have a mole that is larger than the size of a pencil eraser.  · If any of your family members has a history of skin cancer, especially at a young age, talk with your health care provider about genetic screening.  · Always use sunscreen. Apply sunscreen liberally and repeatedly throughout the day.  · Whenever you are outside, protect yourself by wearing long sleeves, pants, a wide-brimmed hat, and sunglasses.  WHAT SHOULD I KNOW ABOUT OSTEOPOROSIS?  Osteoporosis is a condition in which bone destruction happens more quickly than new bone creation. After menopause, you may be at an increased risk for osteoporosis. To help prevent osteoporosis or the bone fractures that can happen because of osteoporosis, the following is recommended:  · If you are 19-50 years old, get at least 1,000 mg of calcium and at least 600 mg of vitamin D per day.  · If you are older than age 50 but younger than age 70, get at least 1,200 mg of calcium and at least 600 mg of vitamin D per day.  · If you are older than age 70, get at least 1,200 mg of calcium and at least 800 mg of vitamin D per day.  Smoking and excessive alcohol intake increase the risk of osteoporosis. Eat foods that are rich in calcium and vitamin D, and do weight-bearing exercises several times each week as directed by your health care provider.  WHAT SHOULD I KNOW ABOUT HOW MENOPAUSE AFFECTS MY MENTAL HEALTH?  Depression may occur at any age, but it is more common as you become older. Common symptoms of depression include:  · Low or sad mood.  · Changes in sleep patterns.  · Changes  in appetite or eating patterns.  · Feeling an overall lack of motivation or enjoyment of activities that you previously enjoyed.  · Frequent crying spells.  Talk with your health care provider if you think that you are experiencing depression.  WHAT SHOULD I KNOW ABOUT IMMUNIZATIONS?  It is important that you get and maintain your immunizations. These include:  · Tetanus, diphtheria, and pertussis (Tdap) booster vaccine.  · Influenza every year before the flu season begins.  · Pneumonia vaccine.  · Shingles vaccine.  Your health care provider may also recommend other immunizations.     This information is not intended to replace advice given to you by your health care provider. Make sure you discuss any questions you have with your health care provider.     Document Released: 2007 Document Revised: 2016 Document Reviewed: 2015  [employer] Interactive Patient Education © [employer]    Medicare Wellness  Personal Prevention Plan of Service     Date of Office Visit:  2017  Encounter Provider:  Marychuy Starr MD  Place of Service:  [facility]  Patient Name: Marielena Moeller  :  1945    As part of the Medicare Wellness portion of your visit today, we are providing you with this personalized preventive plan of services (PPPS). This plan is based upon recommendations of the [place] Preventive Services Task Force (USPSTF) and the Advisory Committee on Immunization Practices (ACIP).    This lists the preventive care services that should be considered, and provides dates of when you are due. Items listed as completed are up-to-date and do not require any further intervention.    Health Maintenance   Topic Date Due   • TDAP/TD VACCINES (1 - Tdap) 1966   • DXA SCAN  2017   • DIABETIC FOOT EXAM  2017   • URINE MICROALBUMIN  2017   • PNEUMOCOCCAL VACCINES (65+ LOW/MEDIUM RISK) (1 of 2 - PCV13) 2018 (Originally  11/19/2012)   • DIABETIC EYE EXAM  02/28/2018 (Originally 9/19/2017)   • HEMOGLOBIN A1C  04/20/2018   • MAMMOGRAM  10/14/2018   • MEDICARE ANNUAL WELLNESS  12/11/2018   • LIPID PANEL  12/11/2018   • COLONOSCOPY  03/02/2026   • HEPATITIS C SCREENING  Completed   • INFLUENZA VACCINE  Completed   • ZOSTER VACCINE  Addressed       Orders Placed This Encounter   Procedures   • DEXA Bone Density Axial     Order Specific Question:   Reason for Exam:     Answer:   postmenopausal   • POC Urinalysis Dipstick, Automated   • POC Microalbumin       Return in about 7 weeks (around 1/31/2018) for Diabetes follow up; 1 yr wellness.          Obesity, Adult  Obesity is the condition of having too much total body fat. Being overweight or obese means that your weight is greater than what is considered healthy for your body size. Obesity is determined by a measurement called BMI. BMI is an estimate of body fat and is calculated from height and weight. For adults, a BMI of 30 or higher is considered obese.  Obesity can eventually lead to other health concerns and major illnesses, including:  · Stroke.  · Coronary artery disease (CAD).  · Type 2 diabetes.  · Some types of cancer, including cancers of the colon, breast, uterus, and gallbladder.  · Osteoarthritis.  · High blood pressure (hypertension).  · High cholesterol.  · Sleep apnea.  · Gallbladder stones.  · Infertility problems.   CAUSES  The main cause of obesity is taking in (consuming) more calories than your body uses for energy. Other factors that contribute to this condition may include:  · Being born with genes that make you more likely to become obese.  · Having a medical condition that causes obesity. These conditions include:    Hypothyroidism.    Polycystic ovarian syndrome (PCOS).    Binge-eating disorder.    Cushing syndrome.  · Taking certain medicines, such as steroids, antidepressants, and seizure medicines.  · Not being physically active (sedentary  lifestyle).  · Living where there are limited places to exercise safely or buy healthy foods.  · Not getting enough sleep.  RISK FACTORS  The following factors may increase your risk of this condition:  · Having a family history of obesity.  · Being a woman of -American descent.  · Being a man of  descent.  SYMPTOMS  Having excessive body fat is the main symptom of this condition.  DIAGNOSIS  This condition may be diagnosed based on:  · Your symptoms.  · Your medical history.  · A physical exam. Your health care provider may measure:    Your BMI. If you are an adult with a BMI between 25 and less than 30, you are considered overweight. If you are an adult with a BMI of 30 or higher, you are considered obese.    The distances around your hips and your waist (circumferences). These may be compared to each other to help diagnose your condition.    Your skinfold thickness. Your health care provider may gently pinch a fold of your skin and measure it.  TREATMENT  Treatment for this condition often includes changing your lifestyle. Treatment may include some or all of the following:  · Dietary changes. Work with your health care provider and a dietitian to set a weight-loss goal that is healthy and reasonable for you. Dietary changes may include eating:    Smaller portions. A portion size is the amount of a particular food that is healthy for you to eat at one time. This varies from person to person.    Low-calorie or low-fat options.    More whole grains, fruits, and vegetables.  · Regular physical activity. This may include aerobic activity (cardio) and strength training.  · Medicine to help you lose weight. Your health care provider may prescribe medicine if you are unable to lose 1 pound a week after 6 weeks of eating more healthily and doing more physical activity.  · Surgery. Surgical options may include gastric banding and gastric bypass. Surgery may be done if:    Other treatments have not helped to  improve your condition.    You have a BMI of 40 or higher.    You have life-threatening health problems related to obesity.  HOME CARE INSTRUCTIONS  Eating and Drinking  · Follow recommendations from your health care provider about what you eat and drink. Your health care provider may advise you to:    Limit fast foods, sweets, and processed snack foods.    Choose low-fat options, such as low-fat milk instead of whole milk.    Eat 5 or more servings of fruits or vegetables every day.    Eat at home more often. This gives you more control over what you eat.    Choose healthy foods when you eat out.    Learn what a healthy portion size is.    Keep low-fat snacks on hand.    Avoid sugary drinks, such as soda, fruit juice, iced tea sweetened with sugar, and flavored milk.    Eat a healthy breakfast.  · Drink enough water to keep your urine clear or pale yellow.  · Do not go without eating for long periods of time (do not fast) or follow a fad diet. Fasting and fad diets can be unhealthy and even dangerous.  Physical Activity  · Exercise regularly, as told by your health care provider. Ask your health care provider what types of exercise are safe for you and how often you should exercise.  · Warm up and stretch before being active.  · Cool down and stretch after being active.  · Rest between periods of activity.  Lifestyle  · Limit the time that you spend in front of your TV, computer, or video game system.  · Find ways to reward yourself that do not involve food.  · Limit alcohol intake to no more than 1 drink a day for nonpregnant women and 2 drinks a day for men. One drink equals 12 oz of beer, 5 oz of wine, or 1½ oz of hard liquor.  General Instructions  · Keep a weight loss journal to keep track of the food you eat and how much you exercise you get.  · Take over-the-counter and prescription medicines only as told by your health care provider.  · Take vitamins and supplements only as told by your health care  provider.  · Consider joining a support group. Your health care provider may be able to recommend a support group.  · Keep all follow-up visits as told by your health care provider. This is important.  SEEK MEDICAL CARE IF:  · You are unable to meet your weight loss goal after 6 weeks of dietary and lifestyle changes.     This information is not intended to replace advice given to you by your health care provider. Make sure you discuss any questions you have with your health care provider.     Document Released: 01/25/2006 Document Revised: 04/10/2017 Document Reviewed: 10/05/2016  [employer] Interactive Patient Education ©2017 [employer]

## 2017-12-15 ENCOUNTER — APPOINTMENT (OUTPATIENT)
Dept: BONE DENSITY | Facility: HOSPITAL | Age: 72
End: 2017-12-15

## 2017-12-15 PROCEDURE — 77080 DXA BONE DENSITY AXIAL: CPT

## 2018-01-16 ENCOUNTER — APPOINTMENT (OUTPATIENT)
Dept: CT IMAGING | Facility: HOSPITAL | Age: 73
End: 2018-01-16

## 2018-01-16 ENCOUNTER — TELEPHONE (OUTPATIENT)
Dept: INTERNAL MEDICINE | Facility: CLINIC | Age: 73
End: 2018-01-16

## 2018-01-16 ENCOUNTER — HOSPITAL ENCOUNTER (EMERGENCY)
Facility: HOSPITAL | Age: 73
Discharge: HOME | End: 2018-01-16
Attending: EMERGENCY MEDICINE | Admitting: EMERGENCY MEDICINE

## 2018-01-16 VITALS
TEMPERATURE: 97.7 F | WEIGHT: 180 LBS | HEIGHT: 60 IN | SYSTOLIC BLOOD PRESSURE: 130 MMHG | BODY MASS INDEX: 35.34 KG/M2 | DIASTOLIC BLOOD PRESSURE: 80 MMHG | RESPIRATION RATE: 18 BRPM | HEART RATE: 92 BPM | OXYGEN SATURATION: 93 %

## 2018-01-16 DIAGNOSIS — R11.2 NON-INTRACTABLE VOMITING WITH NAUSEA, UNSPECIFIED VOMITING TYPE: ICD-10-CM

## 2018-01-16 DIAGNOSIS — R10.13 EPIGASTRIC PAIN: Primary | ICD-10-CM

## 2018-01-16 DIAGNOSIS — J18.9 PNEUMONIA OF BOTH LOWER LOBES DUE TO INFECTIOUS ORGANISM: ICD-10-CM

## 2018-01-16 LAB
ALBUMIN SERPL-MCNC: 4.2 G/DL (ref 3.5–5)
ALBUMIN/GLOB SERPL: 1.4 G/DL (ref 1–2)
ALP SERPL-CCNC: 98 U/L (ref 38–126)
ALT SERPL W P-5'-P-CCNC: 43 U/L (ref 13–69)
ANION GAP SERPL CALCULATED.3IONS-SCNC: 12 MMOL/L
AST SERPL-CCNC: 28 U/L (ref 15–46)
BASOPHILS # BLD AUTO: 0.02 10*3/MM3 (ref 0–0.2)
BASOPHILS NFR BLD AUTO: 0.2 % (ref 0–2.5)
BILIRUB SERPL-MCNC: 0.8 MG/DL (ref 0.2–1.3)
BUN BLD-MCNC: 10 MG/DL (ref 7–20)
BUN/CREAT SERPL: 20 (ref 7.1–23.5)
CALCIUM SPEC-SCNC: 9.2 MG/DL (ref 8.4–10.2)
CHLORIDE SERPL-SCNC: 100 MMOL/L (ref 98–107)
CO2 SERPL-SCNC: 31 MMOL/L (ref 26–30)
CREAT BLD-MCNC: 0.5 MG/DL (ref 0.6–1.3)
DEPRECATED RDW RBC AUTO: 41.8 FL (ref 37–54)
EOSINOPHIL # BLD AUTO: 0.04 10*3/MM3 (ref 0–0.7)
EOSINOPHIL NFR BLD AUTO: 0.4 % (ref 0–7)
ERYTHROCYTE [DISTWIDTH] IN BLOOD BY AUTOMATED COUNT: 11.9 % (ref 11.5–14.5)
GFR SERPL CREATININE-BSD FRML MDRD: 122 ML/MIN/1.73
GLOBULIN UR ELPH-MCNC: 3 GM/DL
GLUCOSE BLD-MCNC: 258 MG/DL (ref 74–98)
HCT VFR BLD AUTO: 44.5 % (ref 37–47)
HGB BLD-MCNC: 15 G/DL (ref 12–16)
HOLD SPECIMEN: NORMAL
HOLD SPECIMEN: NORMAL
IMM GRANULOCYTES # BLD: 0.05 10*3/MM3 (ref 0–0.06)
IMM GRANULOCYTES NFR BLD: 0.4 % (ref 0–0.6)
LIPASE SERPL-CCNC: 73 U/L (ref 23–300)
LYMPHOCYTES # BLD AUTO: 0.36 10*3/MM3 (ref 0.6–3.4)
LYMPHOCYTES NFR BLD AUTO: 3.2 % (ref 10–50)
MCH RBC QN AUTO: 31.9 PG (ref 27–31)
MCHC RBC AUTO-ENTMCNC: 33.7 G/DL (ref 30–37)
MCV RBC AUTO: 94.7 FL (ref 81–99)
MONOCYTES # BLD AUTO: 0.3 10*3/MM3 (ref 0–0.9)
MONOCYTES NFR BLD AUTO: 2.7 % (ref 0–12)
NEUTROPHILS # BLD AUTO: 10.46 10*3/MM3 (ref 2–6.9)
NEUTROPHILS NFR BLD AUTO: 93.1 % (ref 37–80)
NRBC BLD MANUAL-RTO: 0 /100 WBC (ref 0–0)
PLATELET # BLD AUTO: 217 10*3/MM3 (ref 130–400)
PMV BLD AUTO: 10.3 FL (ref 6–12)
POTASSIUM BLD-SCNC: 4 MMOL/L (ref 3.5–5.1)
PROT SERPL-MCNC: 7.2 G/DL (ref 6.3–8.2)
RBC # BLD AUTO: 4.7 10*6/MM3 (ref 4.2–5.4)
SODIUM BLD-SCNC: 139 MMOL/L (ref 137–145)
TROPONIN I SERPL-MCNC: <0.012 NG/ML (ref 0–0.03)
WBC NRBC COR # BLD: 11.23 10*3/MM3 (ref 4.8–10.8)
WHOLE BLOOD HOLD SPECIMEN: NORMAL
WHOLE BLOOD HOLD SPECIMEN: NORMAL

## 2018-01-16 PROCEDURE — 74176 CT ABD & PELVIS W/O CONTRAST: CPT

## 2018-01-16 PROCEDURE — 96375 TX/PRO/DX INJ NEW DRUG ADDON: CPT

## 2018-01-16 PROCEDURE — 83690 ASSAY OF LIPASE: CPT | Performed by: EMERGENCY MEDICINE

## 2018-01-16 PROCEDURE — 93005 ELECTROCARDIOGRAM TRACING: CPT | Performed by: EMERGENCY MEDICINE

## 2018-01-16 PROCEDURE — 25010000002 ONDANSETRON PER 1 MG: Performed by: EMERGENCY MEDICINE

## 2018-01-16 PROCEDURE — 84484 ASSAY OF TROPONIN QUANT: CPT | Performed by: EMERGENCY MEDICINE

## 2018-01-16 PROCEDURE — 85025 COMPLETE CBC W/AUTO DIFF WBC: CPT | Performed by: EMERGENCY MEDICINE

## 2018-01-16 PROCEDURE — 99284 EMERGENCY DEPT VISIT MOD MDM: CPT

## 2018-01-16 PROCEDURE — 96374 THER/PROPH/DIAG INJ IV PUSH: CPT

## 2018-01-16 PROCEDURE — 80053 COMPREHEN METABOLIC PANEL: CPT | Performed by: EMERGENCY MEDICINE

## 2018-01-16 PROCEDURE — 96361 HYDRATE IV INFUSION ADD-ON: CPT

## 2018-01-16 PROCEDURE — 25010000002 MORPHINE PER 10 MG: Performed by: EMERGENCY MEDICINE

## 2018-01-16 RX ORDER — MORPHINE SULFATE 2 MG/ML
2 INJECTION, SOLUTION INTRAMUSCULAR; INTRAVENOUS ONCE
Status: COMPLETED | OUTPATIENT
Start: 2018-01-16 | End: 2018-01-16

## 2018-01-16 RX ORDER — ONDANSETRON 2 MG/ML
4 INJECTION INTRAMUSCULAR; INTRAVENOUS ONCE
Status: COMPLETED | OUTPATIENT
Start: 2018-01-16 | End: 2018-01-16

## 2018-01-16 RX ORDER — ONDANSETRON 4 MG/1
4 TABLET, FILM COATED ORAL EVERY 6 HOURS PRN
Qty: 10 TABLET | Refills: 0 | Status: SHIPPED | OUTPATIENT
Start: 2018-01-16 | End: 2018-04-21

## 2018-01-16 RX ORDER — DOXYCYCLINE 100 MG/1
100 CAPSULE ORAL 2 TIMES DAILY
Qty: 14 CAPSULE | Refills: 0 | Status: SHIPPED | OUTPATIENT
Start: 2018-01-16 | End: 2018-01-23

## 2018-01-16 RX ORDER — SODIUM CHLORIDE 0.9 % (FLUSH) 0.9 %
10 SYRINGE (ML) INJECTION AS NEEDED
Status: DISCONTINUED | OUTPATIENT
Start: 2018-01-16 | End: 2018-01-16 | Stop reason: HOSPADM

## 2018-01-16 RX ORDER — MORPHINE SULFATE 2 MG/ML
6 INJECTION, SOLUTION INTRAMUSCULAR; INTRAVENOUS ONCE
Status: DISCONTINUED | OUTPATIENT
Start: 2018-01-16 | End: 2018-01-16

## 2018-01-16 RX ORDER — HYDROCODONE BITARTRATE AND ACETAMINOPHEN 5; 325 MG/1; MG/1
1 TABLET ORAL EVERY 6 HOURS PRN
Qty: 10 TABLET | Refills: 0 | Status: SHIPPED | OUTPATIENT
Start: 2018-01-16 | End: 2018-04-21

## 2018-01-16 RX ADMIN — MORPHINE SULFATE 2 MG: 2 INJECTION, SOLUTION INTRAMUSCULAR; INTRAVENOUS at 08:48

## 2018-01-16 RX ADMIN — SODIUM CHLORIDE 1000 ML: 9 INJECTION, SOLUTION INTRAVENOUS at 08:38

## 2018-01-16 RX ADMIN — ONDANSETRON 4 MG: 2 INJECTION INTRAMUSCULAR; INTRAVENOUS at 08:41

## 2018-01-16 NOTE — TELEPHONE ENCOUNTER
PATIENT CALLED STATING THAT SHE IS NEEDING TO BE SEEN Thursday IF POSSIBLE, SHE WAS SEEN IN ER TODAY FOR BILATERAL PNEUMONIA. SHE ALSO HAS VOMITING AND ABDOMINAL PAIN.

## 2018-01-16 NOTE — ED NOTES
Patient placed on 2 liters nasal cannula. Dr. Szymanski notified.     Kasey Cook RN  01/16/18 0836

## 2018-01-16 NOTE — ED PROVIDER NOTES
Subjective   HPI Comments: 72-year-old female presenting with abdominal pain.  She states that 6 days ago she began having epigastric abdominal pain, nausea/vomiting.  Was seen the following day at [facility] and transferred to [facility] for pancreatitis.  Was discharged 3 days ago.  2 days ago began again to have nausea, vomiting and epigastric pain.  She denies any fevers, chest pain, diarrhea or other complaints.  She was trying to follow-up with her primary doctor but they were closed.      Review of Systems   Constitutional: Negative for chills and fever.   HENT: Negative for congestion, rhinorrhea and sore throat.    Eyes: Negative for pain.   Respiratory: Negative for cough and shortness of breath.    Cardiovascular: Negative for chest pain, palpitations and leg swelling.   Gastrointestinal: Positive for abdominal pain, nausea and vomiting. Negative for diarrhea.   Genitourinary: Negative for dysuria.   Musculoskeletal: Negative for arthralgias.   Skin: Negative for rash.   Neurological: Negative for weakness and numbness.   Psychiatric/Behavioral: Negative for behavioral problems.       Past Medical History:   Diagnosis Date   • Arthritis    • Bronchitis    • Colon polyp    • Diabetes mellitus    • Diverticulitis    • Hyperlipidemia    • Hypertension    • Osteoporosis    • Pancreatitis    • Thyroid disease        Allergies   Allergen Reactions   • Ciprofloxacin    • Contrast Dye    • Erythromycin    • Prednisone        Past Surgical History:   Procedure Laterality Date   • APPENDECTOMY     •  SECTION     • CHOLECYSTECTOMY     • THYROIDECTOMY, PARTIAL     • TUBAL ABDOMINAL LIGATION         Family History   Problem Relation Age of Onset   • Arthritis Mother    • Diabetes Mother    • Osteoporosis Mother    • Arthritis Father    • Diabetes Father        Social History     Social History   • Marital status:      Spouse name: N/A   • Number of children: N/A   • Years of education: N/A      Social History Main Topics   • Smoking status: Former Smoker   • Smokeless tobacco: Never Used   • Alcohol use No   • Drug use: No   • Sexual activity: Defer     Other Topics Concern   • None     Social History Narrative           Objective   Physical Exam   Constitutional: She is oriented to person, place, and time. No distress.   Chronically ill-appearing   HENT:   Head: Normocephalic and atraumatic.   Right Ear: External ear normal.   Left Ear: External ear normal.   Nose: Nose normal.   Mouth/Throat: Oropharynx is clear and moist.   Eyes: Conjunctivae and EOM are normal. Pupils are equal, round, and reactive to light.   Neck: Normal range of motion. Neck supple.   Cardiovascular: Normal rate, regular rhythm, normal heart sounds and intact distal pulses.    Pulmonary/Chest: Effort normal and breath sounds normal. No respiratory distress.   Abdominal: Soft. Bowel sounds are normal. She exhibits no distension. There is no rebound and no guarding.   Mild upper abdominal tenderness   Musculoskeletal: Normal range of motion. She exhibits no edema, tenderness or deformity.   Neurological: She is alert and oriented to person, place, and time.   Skin: Skin is warm and dry. No rash noted.   Psychiatric: She has a normal mood and affect. Her behavior is normal.   Nursing note and vitals reviewed.      Procedures         ED Course  ED Course                  MDM  Number of Diagnoses or Management Options  Epigastric pain:   Non-intractable vomiting with nausea, unspecified vomiting type:   Pneumonia of both lower lobes due to infectious organism:   Diagnosis management comments: 72-year-old female with abdominal pain.  Chronically ill-appearing obese elderly female in no distress with normal vital signs and exam as above notable for some upper abdominal tenderness.  We'll check repeat labs, CT scan.  We'll treat symptomatically.  Disposition pending workup.    DDX: Pancreatitis, obstruction, dehydration, chronic  pain    EKG: Sinus rhythm, normal rate, no acute ST/T changes    Lab work is notable for mild leukocytosis.  Her lipase is normal.  CT scan of the abdomen/pelvis is without acute findings.  They did note some patchy bibasilar opacities, could be pneumonia.  We'll treat as such.  She has remained stable throughout her stay here, she is feeling better, I think she is safe for discharge home at this time.  Encouraged her to follow up closely with her primary doctor.      Final diagnoses:   Epigastric pain   Non-intractable vomiting with nausea, unspecified vomiting type   Pneumonia of both lower lobes due to infectious organism            Dejuan Szymanski MD  01/16/18 1526

## 2018-01-16 NOTE — DISCHARGE INSTRUCTIONS
Community-Acquired Pneumonia, Adult  Pneumonia is an infection of the lungs. There are different types of pneumonia. One type can develop while a person is in a hospital. A different type, called community-acquired pneumonia, develops in people who are not, or have not recently been, in the hospital or other health care facility.  What are the causes?  Pneumonia may be caused by bacteria, viruses, or funguses. Community-acquired pneumonia is often caused by Streptococcus pneumonia bacteria. These bacteria are often passed from one person to another by breathing in droplets from the cough or sneeze of an infected person.  What increases the risk?  The condition is more likely to develop in:  · People who have chronic diseases, such as chronic obstructive pulmonary disease (COPD), asthma, congestive heart failure, cystic fibrosis, diabetes, or kidney disease.  · People who have early-stage or late-stage HIV.  · People who have sickle cell disease.  · People who have had their spleen removed (splenectomy).  · People who have poor dental hygiene.  · People who have medical conditions that increase the risk of breathing in (aspirating) secretions their own mouth and nose.  · People who have a weakened immune system (immunocompromised).  · People who smoke.  · People who travel to areas where pneumonia-causing germs commonly exist.  · People who are around animal habitats or animals that have pneumonia-causing germs, including birds, bats, rabbits, cats, and farm animals.  What are the signs or symptoms?  Symptoms of this condition include:  · A dry cough.  · A wet (productive) cough.  · Fever.  · Sweating.  · Chest pain, especially when breathing deeply or coughing.  · Rapid breathing or difficulty breathing.  · Shortness of breath.  · Shaking chills.  · Fatigue.  · Muscle aches.  How is this diagnosed?  Your health care provider will take a medical history and perform a physical exam. You may also have other tests,  including:  · Imaging studies of your chest, including X-rays.  · Tests to check your blood oxygen level and other blood gases.  · Other tests on blood, mucus (sputum), fluid around your lungs (pleural fluid), and urine.  If your pneumonia is severe, other tests may be done to identify the specific cause of your illness.  How is this treated?  The type of treatment that you receive depends on many factors, such as the cause of your pneumonia, the medicines you take, and other medical conditions that you have. For most adults, treatment and recovery from pneumonia may occur at home. In some cases, treatment must happen in a hospital. Treatment may include:  · Antibiotic medicines, if the pneumonia was caused by bacteria.  · Antiviral medicines, if the pneumonia was caused by a virus.  · Medicines that are given by mouth or through an IV tube.  · Oxygen.  · Respiratory therapy.  Although rare, treating severe pneumonia may include:  · Mechanical ventilation. This is done if you are not breathing well on your own and you cannot maintain a safe blood oxygen level.  · Thoracentesis. This procedure removes fluid around one lung or both lungs to help you breathe better.  Follow these instructions at home:  · Take over-the-counter and prescription medicines only as told by your health care provider.  ¨ Only take cough medicine if you are losing sleep. Understand that cough medicine can prevent your body’s natural ability to remove mucus from your lungs.  ¨ If you were prescribed an antibiotic medicine, take it as told by your health care provider. Do not stop taking the antibiotic even if you start to feel better.  · Sleep in a semi-upright position at night. Try sleeping in a reclining chair, or place a few pillows under your head.  · Do not use tobacco products, including cigarettes, chewing tobacco, and e-cigarettes. If you need help quitting, ask your health care provider.  · Drink enough water to keep your urine clear  or pale yellow. This will help to thin out mucus secretions in your lungs.  How is this prevented?  There are ways that you can decrease your risk of developing community-acquired pneumonia. Consider getting a pneumococcal vaccine if:  · You are older than 65 years of age.  · You are older than 19 years of age and are undergoing cancer treatment, have chronic lung disease, or have other medical conditions that affect your immune system. Ask your health care provider if this applies to you.  There are different types and schedules of pneumococcal vaccines. Ask your health care provider which vaccination option is best for you.  You may also prevent community-acquired pneumonia if you take these actions:  · Get an influenza vaccine every year. Ask your health care provider which type of influenza vaccine is best for you.  · Go to the dentist on a regular basis.  · Wash your hands often. Use hand  if soap and water are not available.  Contact a health care provider if:  · You have a fever.  · You are losing sleep because you cannot control your cough with cough medicine.  Get help right away if:  · You have worsening shortness of breath.  · You have increased chest pain.  · Your sickness becomes worse, especially if you are an older adult or have a weakened immune system.  · You cough up blood.  This information is not intended to replace advice given to you by your health care provider. Make sure you discuss any questions you have with your health care provider.  Document Released: 12/18/2006 Document Revised: 04/27/2017 Document Reviewed: 04/13/2016  [employer] Interactive Patient Education © 2017 [employer]      Abdominal Pain, Adult  Many things can cause belly (abdominal) pain. Most times, belly pain is not dangerous. Many cases of belly pain can be watched and treated at home. Sometimes belly pain is serious, though. Your doctor will try to find the cause of your belly pain.  Follow these instructions  at home:  · Take over-the-counter and prescription medicines only as told by your doctor. Do not take medicines that help you poop (laxatives) unless told to by your doctor.  · Drink enough fluid to keep your pee (urine) clear or pale yellow.  · Watch your belly pain for any changes.  · Keep all follow-up visits as told by your doctor. This is important.  Contact a doctor if:  · Your belly pain changes or gets worse.  · You are not hungry, or you lose weight without trying.  · You are having trouble pooping (constipated) or have watery poop (diarrhea) for more than 2-3 days.  · You have pain when you pee or poop.  · Your belly pain wakes you up at night.  · Your pain gets worse with meals, after eating, or with certain foods.  · You are throwing up and cannot keep anything down.  · You have a fever.  Get help right away if:  · Your pain does not go away as soon as your doctor says it should.  · You cannot stop throwing up.  · Your pain is only in areas of your belly, such as the right side or the left lower part of the belly.  · You have bloody or black poop, or poop that looks like tar.  · You have very bad pain, cramping, or bloating in your belly.  · You have signs of not having enough fluid or water in your body (dehydration), such as:  ¨ Dark pee, very little pee, or no pee.  ¨ Cracked lips.  ¨ Dry mouth.  ¨ Sunken eyes.  ¨ Sleepiness.  ¨ Weakness.  This information is not intended to replace advice given to you by your health care provider. Make sure you discuss any questions you have with your health care provider.  Document Released: 06/05/2009 Document Revised: 07/07/2017 Document Reviewed: 05/31/2017  [employer] Interactive Patient Education © 2017 [employer]

## 2018-01-18 ENCOUNTER — OFFICE VISIT (OUTPATIENT)
Dept: INTERNAL MEDICINE | Facility: CLINIC | Age: 73
End: 2018-01-18

## 2018-01-18 ENCOUNTER — HOSPITAL ENCOUNTER (OUTPATIENT)
Dept: GENERAL RADIOLOGY | Facility: HOSPITAL | Age: 73
Discharge: HOME | End: 2018-01-18
Attending: FAMILY MEDICINE | Admitting: FAMILY MEDICINE

## 2018-01-18 VITALS
DIASTOLIC BLOOD PRESSURE: 70 MMHG | HEART RATE: 90 BPM | HEIGHT: 60 IN | WEIGHT: 189 LBS | RESPIRATION RATE: 14 BRPM | BODY MASS INDEX: 37.11 KG/M2 | TEMPERATURE: 98.6 F | SYSTOLIC BLOOD PRESSURE: 122 MMHG | OXYGEN SATURATION: 94 %

## 2018-01-18 DIAGNOSIS — Z79.4 TYPE 2 DIABETES MELLITUS WITH HYPERGLYCEMIA, WITH LONG-TERM CURRENT USE OF INSULIN (HCC): ICD-10-CM

## 2018-01-18 DIAGNOSIS — R60.9 PERIPHERAL EDEMA: Primary | ICD-10-CM

## 2018-01-18 DIAGNOSIS — Z87.19 HISTORY OF PANCREATITIS: ICD-10-CM

## 2018-01-18 DIAGNOSIS — Z87.01 HISTORY OF PNEUMONIA: ICD-10-CM

## 2018-01-18 DIAGNOSIS — I10 ESSENTIAL HYPERTENSION: Chronic | ICD-10-CM

## 2018-01-18 DIAGNOSIS — R06.02 SOB (SHORTNESS OF BREATH): ICD-10-CM

## 2018-01-18 DIAGNOSIS — E11.65 TYPE 2 DIABETES MELLITUS WITH HYPERGLYCEMIA, WITH LONG-TERM CURRENT USE OF INSULIN (HCC): ICD-10-CM

## 2018-01-18 LAB — HBA1C MFR BLD: 8.7 %

## 2018-01-18 PROCEDURE — 71046 X-RAY EXAM CHEST 2 VIEWS: CPT

## 2018-01-18 PROCEDURE — 83036 HEMOGLOBIN GLYCOSYLATED A1C: CPT | Performed by: FAMILY MEDICINE

## 2018-01-18 PROCEDURE — 99214 OFFICE O/P EST MOD 30 MIN: CPT | Performed by: FAMILY MEDICINE

## 2018-01-18 RX ORDER — SPIRONOLACTONE 25 MG/1
25 TABLET ORAL DAILY
Qty: 30 TABLET | Refills: 2 | Status: SHIPPED | OUTPATIENT
Start: 2018-01-18 | End: 2018-07-24 | Stop reason: SDUPTHER

## 2018-01-18 NOTE — PROGRESS NOTES
Subjective    Marielena Moeller is a 72 y.o. female here for:  Chief Complaint   Patient presents with   • Follow-up     Follow up after ER visit dx pneumonia, has also been to ER at [facility] in [place], was sent to [facility] from there   • Pneumonia     Diabetes   She presents for her follow-up diabetic visit. She has type 2 diabetes mellitus. Associated symptoms include fatigue. Risk factors for coronary artery disease include dyslipidemia, obesity, post-menopausal and sedentary lifestyle. Current diabetic treatment includes insulin injections and diet. She is compliant with treatment most of the time. Meal planning includes avoidance of concentrated sweets.        Recently admitted due to pneumonia and pancreatitis. Was treated for pancreatitis, says lipase was >8000. Transferred from [facility] to [facility]. Treated with antibiotics for pneumonia. Breathing slowly improving. Appetite is still down. Still has some abdominal pain. No vomiting. Slightly shortness of breath at this time but better than before. No fevers since discharge. HCTZ was stopped due to possible etiology of pancreatitis. Now having more swelling. Was told to follow up here for another water pill. Has essential hypertension.         The following portions of the patient's history were reviewed and updated as appropriate: allergies, current medications, past family history, past medical history, past social history, past surgical history and problem list.    Review of Systems   Constitutional: Positive for fatigue.   Respiratory: Positive for cough.    Cardiovascular: Positive for leg swelling.   Gastrointestinal: Positive for abdominal pain.       Vitals:    01/18/18 1047   BP: 122/70   Pulse: 90   Resp: 14   Temp: 98.6 °F (37 °C)   SpO2: 94%         Objective   Physical Exam   Constitutional: She is oriented to person, place, and time. Vital signs are normal. She appears well-developed and well-nourished. She is active. She does not have  a sickly appearance. She does not appear ill.   Appears stated age. Well groomed.    HENT:   Head: Normocephalic and atraumatic. Hair is normal.   Right Ear: Hearing normal.   Left Ear: Hearing normal.   Nose: Nose normal.   Eyes: EOM and lids are normal. Pupils are equal, round, and reactive to light. No scleral icterus.   Neck: Phonation normal. Neck supple.   Cardiovascular: Normal rate, regular rhythm and normal heart sounds.  Exam reveals no gallop and no friction rub.    No murmur heard.  Pulmonary/Chest: Effort normal and breath sounds normal. Rales: questionable faint rales at bases bilaterally.   Abdominal: Soft. There is tenderness in the left upper quadrant. There is no rigidity and no rebound.   Musculoskeletal: She exhibits edema (lower extremities mildly bilaterally). She exhibits no deformity.   Neurological: She is alert and oriented to person, place, and time. She displays no tremor. No cranial nerve deficit. Gait normal.   Skin: Skin is warm. No rash noted. She is not diaphoretic. No cyanosis. Nails show no clubbing.   Psychiatric: She has a normal mood and affect. Her speech is normal and behavior is normal. Judgment and thought content normal. Cognition and memory are normal.   Nursing note and vitals reviewed.    Lab Results   Component Value Date    HGBA1C 8.7 01/18/2018       Assessment/Plan       Marielena was seen today for follow-up and pneumonia.    Diagnoses and all orders for this visit:    Peripheral edema  -     XR Chest 2 View  -     spironolactone (ALDACTONE) 25 MG tablet; Take 1 tablet by mouth Daily.    SOB (shortness of breath)  -     XR Chest 2 View    Type 2 diabetes mellitus with hyperglycemia, with long-term current use of insulin  -     POC Glycosylated Hemoglobin (Hb A1C)    Essential hypertension  -     spironolactone (ALDACTONE) 25 MG tablet; Take 1 tablet by mouth Daily.    History of pancreatitis    History of pneumonia        · When appetite improves can eat ad francisco, until  then needs to stay hydrated.  · Reviewed side effects of different diuretics, trial of spironolactone. Will have to watch potassium levels  · Encouraged compliance with diet, insulin.      Marychuy Starr MD    Please note that portions of this note were completed with a voice recognition program. Efforts were made to edit dictation, but occasionally words are mistranscribed.

## 2018-01-21 RX ORDER — TRAMADOL HYDROCHLORIDE 50 MG/1
TABLET ORAL
Refills: 0 | COMMUNITY
Start: 2018-01-14 | End: 2018-04-20

## 2018-01-25 DIAGNOSIS — IMO0002 UNCONTROLLED TYPE 2 DIABETES MELLITUS WITH COMPLICATION, WITH LONG-TERM CURRENT USE OF INSULIN: Primary | ICD-10-CM

## 2018-01-26 RX ORDER — BLOOD-GLUCOSE METER
EACH MISCELLANEOUS
Qty: 1 KIT | Refills: 0 | Status: SHIPPED | OUTPATIENT
Start: 2018-01-26 | End: 2018-04-21 | Stop reason: SDUPTHER

## 2018-01-26 RX ORDER — BLOOD-GLUCOSE METER
EACH MISCELLANEOUS
Qty: 1 KIT | Refills: 0 | Status: SHIPPED | OUTPATIENT
Start: 2018-01-26 | End: 2018-01-26 | Stop reason: SDUPTHER

## 2018-02-23 ENCOUNTER — OFFICE VISIT (OUTPATIENT)
Dept: INTERNAL MEDICINE | Facility: CLINIC | Age: 73
End: 2018-02-23

## 2018-02-23 VITALS
RESPIRATION RATE: 14 BRPM | DIASTOLIC BLOOD PRESSURE: 78 MMHG | SYSTOLIC BLOOD PRESSURE: 124 MMHG | HEIGHT: 60 IN | TEMPERATURE: 98.2 F | OXYGEN SATURATION: 93 % | HEART RATE: 73 BPM | BODY MASS INDEX: 35.14 KG/M2 | WEIGHT: 179 LBS

## 2018-02-23 DIAGNOSIS — R91.1 LUNG NODULE: ICD-10-CM

## 2018-02-23 DIAGNOSIS — E11.65 TYPE 2 DIABETES MELLITUS WITH HYPERGLYCEMIA, WITH LONG-TERM CURRENT USE OF INSULIN (HCC): ICD-10-CM

## 2018-02-23 DIAGNOSIS — IMO0002 UNCONTROLLED TYPE 2 DIABETES MELLITUS WITH COMPLICATION, WITH LONG-TERM CURRENT USE OF INSULIN: ICD-10-CM

## 2018-02-23 DIAGNOSIS — Z79.4 TYPE 2 DIABETES MELLITUS WITH HYPERGLYCEMIA, WITH LONG-TERM CURRENT USE OF INSULIN (HCC): ICD-10-CM

## 2018-02-23 DIAGNOSIS — I10 ESSENTIAL HYPERTENSION: Chronic | ICD-10-CM

## 2018-02-23 DIAGNOSIS — R06.02 SOB (SHORTNESS OF BREATH): Primary | ICD-10-CM

## 2018-02-23 LAB
BUN SERPL-MCNC: 9 MG/DL (ref 7–20)
BUN/CREAT SERPL: 15 (ref 7.1–23.5)
CALCIUM SERPL-MCNC: 9.8 MG/DL (ref 8.4–10.2)
CHLORIDE SERPL-SCNC: 100 MMOL/L (ref 98–107)
CO2 SERPL-SCNC: 31 MMOL/L (ref 26–30)
CREAT SERPL-MCNC: 0.6 MG/DL (ref 0.6–1.3)
GFR SERPLBLD CREATININE-BSD FMLA CKD-EPI: 120 ML/MIN/1.73
GFR SERPLBLD CREATININE-BSD FMLA CKD-EPI: 99 ML/MIN/1.73
GLUCOSE SERPL-MCNC: 124 MG/DL (ref 74–98)
POTASSIUM SERPL-SCNC: 4.4 MMOL/L (ref 3.5–5.1)
SODIUM SERPL-SCNC: 140 MMOL/L (ref 137–145)

## 2018-02-23 PROCEDURE — 99214 OFFICE O/P EST MOD 30 MIN: CPT | Performed by: FAMILY MEDICINE

## 2018-02-23 NOTE — PROGRESS NOTES
Subjective    Marielena Moeller is a 72 y.o. female here for:  Chief Complaint   Patient presents with   • Follow-up     2 week follow up shortness of breath and edema   • Shortness of Breath     History of Present Illness     She feels edema and shortness of breath have improved some since last visit. She continues to have a cough and sometimes it's productive. Was treated for pneumonia with doxycycline but it caused abdominal pain more severe than pancreatitis, she refuses to take that again. Has been taking spironolactone since discharge (as her previous diuretic likely contributed to pancreatitis) and it seems to be working.     The following portions of the patient's history were reviewed and updated as appropriate: allergies, current medications, past family history, past medical history, past social history, past surgical history and problem list.    Review of Systems    Vitals:    02/23/18 1127   BP: 124/78   Pulse: 73   Resp: 14   Temp: 98.2 °F (36.8 °C)   SpO2: 93%         Objective   Physical Exam   Constitutional: She is oriented to person, place, and time. Vital signs are normal. She appears well-developed and well-nourished. She is active.  Non-toxic appearance. She does not appear ill.   Appears stated age. Well groomed. Obese.   HENT:   Head: Normocephalic and atraumatic.   Right Ear: Hearing normal.   Left Ear: Hearing normal.   Eyes: EOM are normal. Pupils are equal, round, and reactive to light. No scleral icterus.   Neck: Phonation normal. Neck supple.   Cardiovascular: Normal rate and regular rhythm.    Pulmonary/Chest: Effort normal. She has no wheezes. She has rales (dry rales RUL) in the right upper field.   Neurological: She is alert and oriented to person, place, and time. Gait normal.   Skin: Skin is warm. She is not diaphoretic. No cyanosis. No pallor.   Psychiatric: She has a normal mood and affect. Her speech is normal and behavior is normal. Judgment normal.   Nursing note and vitals  reviewed.      Results for orders placed during the hospital encounter of 01/16/18   CT Abdomen Pelvis Without Contrast    Narrative   ABDOMEN: There are patchy groundglass and nodular opacities in the lung  bases which may be infectious or inflammatory in nature. The heart size  is normal. The limited noncontrast images of the liver are normal. The  spleen is normal. No adrenal masses are seen.  The pancreas has an  unremarkable unenhanced appearance.. The aorta is normal in caliber.  There is no significant free fluid or adenopathy.  There is no  nephrolithiasis. There is no hydronephrosis. Limited noncontrast images  of the bowel are unremarkable.     PELVIS: The appendix is not identified. There are colonic diverticula  without CT evidence of diverticulitis. The urinary bladder is  unremarkable. There is no significant fluid or adenopathy.           Impression 1. Unremarkable unenhanced CT of the abdomen and pelvis.  2. Patchy groundglass and nodular opacities in the lung bases which may  be infectious or inflammatory in nature. Follow-up with a noncontrast  chest CT in 3 months is recommended to ensure resolution.     This report was finalized on 1/16/2018 9:04 AM by Bryan Beltran M.D..           Assessment/Plan       Marielena was seen today for follow-up and shortness of breath.    Diagnoses and all orders for this visit:    SOB (shortness of breath)  -     CT Chest Without Contrast    Lung nodule  -     CT Chest Without Contrast    Essential hypertension  -     Basic Metabolic Panel    Type 2 diabetes mellitus with hyperglycemia, with long-term current use of insulin  -     glucose blood (ACCU-CHEK CHIO PLUS) test strip; USE AS DIRECTED TO CHECK GLUCOSE 3 TIMES DAILY FOR DM E11.9    Uncontrolled type 2 diabetes mellitus with complication, with long-term current use of insulin  -     glucose blood (ACCU-CHEK CHIO PLUS) test strip; USE AS DIRECTED TO CHECK GLUCOSE 3 TIMES DAILY FOR DM E11.9        · Due  to symptoms will not wait 3 months for scan. Patient was educated on ct lung scans and why she does not qualify (quit smoking about 40 years ago).  · Blood pressure controlled, checking potassium on labs    Return in about 8 weeks (around 4/23/2018) for Diabetes follow up.    Marychuy Starr MD    Please note that portions of this note may have been completed with a voice recognition program. Efforts were made to edit dictation, but occasionally words are mistranscribed.

## 2018-03-01 ENCOUNTER — HOSPITAL ENCOUNTER (OUTPATIENT)
Dept: CT IMAGING | Facility: HOSPITAL | Age: 73
Discharge: HOME | End: 2018-03-01
Attending: FAMILY MEDICINE | Admitting: FAMILY MEDICINE

## 2018-03-01 PROCEDURE — 71250 CT THORAX DX C-: CPT

## 2018-03-02 DIAGNOSIS — R93.89 ABNORMAL CHEST CT: Primary | ICD-10-CM

## 2018-03-02 DIAGNOSIS — R91.8 MULTIPLE LUNG NODULES ON CT: ICD-10-CM

## 2018-03-02 DIAGNOSIS — R06.02 SOB (SHORTNESS OF BREATH): ICD-10-CM

## 2018-03-09 ENCOUNTER — OFFICE VISIT (OUTPATIENT)
Dept: INTERNAL MEDICINE | Facility: CLINIC | Age: 73
End: 2018-03-09

## 2018-03-09 VITALS
WEIGHT: 179 LBS | TEMPERATURE: 97.6 F | OXYGEN SATURATION: 95 % | SYSTOLIC BLOOD PRESSURE: 124 MMHG | RESPIRATION RATE: 12 BRPM | DIASTOLIC BLOOD PRESSURE: 70 MMHG | HEIGHT: 60 IN | BODY MASS INDEX: 35.14 KG/M2 | HEART RATE: 74 BPM

## 2018-03-09 DIAGNOSIS — R93.89 ABNORMAL CHEST CT: ICD-10-CM

## 2018-03-09 DIAGNOSIS — R05.3 COUGH, PERSISTENT: ICD-10-CM

## 2018-03-09 DIAGNOSIS — R91.8 MULTIPLE LUNG NODULES ON CT: Primary | ICD-10-CM

## 2018-03-09 PROCEDURE — 99214 OFFICE O/P EST MOD 30 MIN: CPT | Performed by: FAMILY MEDICINE

## 2018-03-09 NOTE — PROGRESS NOTES
Subjective    Marielena Moeller is a 72 y.o. female here for:  Chief Complaint   Patient presents with   • discuss CT results   • Cough     History of Present Illness     Patient in today to follow up on abnormal CT scan of the lung. She'd initially asked for a lung cancer screen scan at her last visit but she quit smoking >15 years ago. Upon chart review, I found mention of a lung nodule that needed follow up hence the CT of the chest. She continues to have a cough and some shortness of breath. She denies night sweats, hemoptysis, known TB exposure, and she's not losing weight. Of note, she's down some from January but she's around her baseline on weight, she was not aware of the loss. [place] and she has had a cough most of her life. She currently lives in a 69 yo home with known mold contamination. She dreads going to basement to do laundry as it causes her to have coughing fits being down there. She wants to buy a modular home but  is hesitant. Patient is scheduled to see Dr. Sanchez in May.     The following portions of the patient's history were reviewed and updated as appropriate: allergies, current medications, past family history, past medical history, past social history, past surgical history and problem list.    Review of Systems   Constitutional: Positive for fatigue.   Respiratory: Positive for cough and shortness of breath.        Vitals:    03/09/18 1144   BP: 124/70   Pulse: 74   Resp: 12   Temp: 97.6 °F (36.4 °C)   SpO2: 95%         Objective   Physical Exam   Constitutional: She is oriented to person, place, and time. Vital signs are normal. She appears well-developed and well-nourished. She is active. She does not have a sickly appearance. She does not appear ill.   Appears stated age. Well groomed.    HENT:   Head: Normocephalic and atraumatic. Hair is normal.   Right Ear: Hearing normal.   Left Ear: Hearing normal.   Nose: Nose normal.   Eyes: EOM  and lids are normal. Pupils are equal, round, and reactive to light. No scleral icterus.   Neck: Phonation normal. Neck supple.   Cardiovascular: Normal rate, regular rhythm and normal heart sounds.  Exam reveals no gallop and no friction rub.    No murmur heard.  Pulmonary/Chest: Effort normal and breath sounds normal.   Musculoskeletal: She exhibits no deformity.   Neurological: She is alert and oriented to person, place, and time. She displays no tremor. No cranial nerve deficit. Gait normal.   Skin: Skin is warm. No rash noted. She is not diaphoretic. No cyanosis. Nails show no clubbing.   Psychiatric: She has a normal mood and affect. Her speech is normal and behavior is normal. Judgment and thought content normal. Cognition and memory are normal.   Nursing note and vitals reviewed.    Ct Abdomen Pelvis Without Contrast    Result Date: 1/16/2018  1. Unremarkable unenhanced CT of the abdomen and pelvis. 2. Patchy groundglass and nodular opacities in the lung bases which may be infectious or inflammatory in nature. Follow-up with a noncontrast chest CT in 3 months is recommended to ensure resolution.       757.25 mGy.cm. 16.7 mGy  This study was performed with techniques to keep radiation doses as low as reasonably achievable (ALARA). Individualized dose reduction techniques using automated exposure control or adjustment of mA and/or kV according to the patient size were employed.   This report was finalized on 1/16/2018 9:04 AM by Bryan Beltran M.D..    Xr Chest 2 View    Result Date: 1/18/2018  No acute disease.  D:  01/18/2018 E:  01/18/2018  This report was finalized on 1/18/2018 2:30 PM by Dr. Chris Olivares MD.      Ct Chest Without Contrast    Result Date: 3/1/2018   Markedly abnormal pulmonary parenchymal pattern is noted of multiple subcentimeter scattered nodules which are literally too numerous to count. There is no evidence of a dominant mass but the nodules are noncalcified and they are scattered  diffusely. Although this could represent a miliary postinflammatory post granulomatous or post tuberculosis pattern, early metastatic disease throughout the pulmonary parenchyma with scattered hematogenous nodules cannot be excluded.  If the patient has had an outside facility CT data set, it is essential that this be acquired for comparison.  Otherwise, there is no pleural effusion, no consolidation, and no evidence of bulky central adenopathy in the mediastinum or hilar areas.  DICTATED:     03/01/2018 EDITED    :     03/01/2018  This report was finalized on 3/1/2018 5:04 PM by Dr. Noah Harper MD.        Assessment/Plan       Marielena was seen today for discuss ct results and cough.    Diagnoses and all orders for this visit:    Multiple lung nodules on CT    Abnormal chest CT  -     QuantiFERON TB Gold  -     Uric Acid  -     Lactate Dehydrogenase    Cough, persistent        · Has been referred to Dr. Sanchez for further evaluation. I discussed differential with her which includes TB (though I have low suspicion), metastatic disease, etc.     Return for As scheduled previously.    Marychuy Starr MD    Please note that portions of this note may have been completed with a voice recognition program. Efforts were made to edit dictation, but occasionally words are mistranscribed.

## 2018-03-13 LAB
ANNOTATION COMMENT IMP: NORMAL
GAMMA INTERFERON BACKGROUND BLD IA-ACNC: 0.02 IU/ML
LDH SERPL-CCNC: 196 IU/L (ref 119–226)
M TB IFN-G BLD-IMP: NEGATIVE
M TB IFN-G CD4+ BCKGRND COR BLD-ACNC: 0.06 IU/ML
M TB IFN-G CD4+ T-CELLS BLD-ACNC: 0.08 IU/ML
MITOGEN IGNF BLD-ACNC: >10 IU/ML
QUANTIFERON INCUBATION: NORMAL
SERVICE CMNT-IMP: NORMAL
URATE SERPL-MCNC: 4.2 MG/DL (ref 2.5–7.1)

## 2018-03-24 ENCOUNTER — HOSPITAL ENCOUNTER (EMERGENCY)
Facility: HOSPITAL | Age: 73
Discharge: HOME | End: 2018-03-24
Attending: EMERGENCY MEDICINE | Admitting: EMERGENCY MEDICINE

## 2018-03-24 VITALS
BODY MASS INDEX: 34.95 KG/M2 | WEIGHT: 178 LBS | HEART RATE: 95 BPM | DIASTOLIC BLOOD PRESSURE: 78 MMHG | HEIGHT: 60 IN | TEMPERATURE: 96.6 F | RESPIRATION RATE: 18 BRPM | SYSTOLIC BLOOD PRESSURE: 164 MMHG | OXYGEN SATURATION: 94 %

## 2018-03-24 DIAGNOSIS — H43.393 VITREOUS FLOATERS OF BOTH EYES: Primary | ICD-10-CM

## 2018-03-24 PROCEDURE — 99282 EMERGENCY DEPT VISIT SF MDM: CPT

## 2018-03-24 NOTE — ED PROVIDER NOTES
"Subjective   72-year-old female presenting with floaters.  She states that prior to arrival she had onset of one heart rate floaters in both eyes.  The left was larger than the right, this lasted about 20 minutes.  Her vision was not affected.  She is asymptomatic at this time.  She denies any numbness, weakness, nausea, vomiting, headache, trouble swallowing or difficulty with speech.            Review of Systems   Constitutional: Negative for chills and fever.   HENT: Negative for congestion, rhinorrhea and sore throat.    Eyes: Positive for visual disturbance. Negative for pain.   Respiratory: Negative for cough and shortness of breath.    Cardiovascular: Negative for chest pain, palpitations and leg swelling.   Gastrointestinal: Negative for abdominal pain, diarrhea, nausea and vomiting.   Genitourinary: Negative for dysuria.   Musculoskeletal: Negative for arthralgias.   Skin: Negative for rash.   Neurological: Negative for weakness and numbness.   Psychiatric/Behavioral: Negative for behavioral problems.       Past Medical History:   Diagnosis Date   • Arthritis    • Bronchitis    • Colon polyp    • Diabetes mellitus    • Diverticulitis    • Hyperlipidemia    • Hypertension    • Osteoporosis    • Pancreatitis    • Thyroid disease        Allergies   Allergen Reactions   • Ciprofloxacin    • Contrast Dye    • Doxycycline Nausea And Vomiting     \"Pain worse than pancreatitis\"   • Erythromycin    • Prednisone        Past Surgical History:   Procedure Laterality Date   • APPENDECTOMY     •  SECTION     • CHOLECYSTECTOMY     • THYROIDECTOMY, PARTIAL     • TUBAL ABDOMINAL LIGATION         Family History   Problem Relation Age of Onset   • Arthritis Mother    • Diabetes Mother    • Osteoporosis Mother    • Arthritis Father    • Diabetes Father        Social History     Social History   • Marital status:      Social History Main Topics   • Smoking status: Former Smoker   • Smokeless tobacco: Never Used "   • Alcohol use No   • Drug use: No   • Sexual activity: Defer     Other Topics Concern   • Not on file           Objective   Physical Exam   Constitutional: She is oriented to person, place, and time. She appears well-developed and well-nourished. No distress.   HENT:   Head: Normocephalic and atraumatic.   Right Ear: External ear normal.   Left Ear: External ear normal.   Nose: Nose normal.   Mouth/Throat: Oropharynx is clear and moist.   Eyes: Conjunctivae and EOM are normal. Pupils are equal, round, and reactive to light. Right eye exhibits no discharge. Left eye exhibits no discharge. No scleral icterus.   Pupils equal, round and reactive, funduscopic exam grossly normal bilaterally   Neck: Normal range of motion. Neck supple.   Cardiovascular: Normal rate, regular rhythm, normal heart sounds and intact distal pulses.    Pulmonary/Chest: Effort normal and breath sounds normal. No respiratory distress.   Abdominal: Soft. Bowel sounds are normal. She exhibits no distension. There is no tenderness. There is no rebound and no guarding.   Musculoskeletal: Normal range of motion. She exhibits no edema, tenderness or deformity.   Neurological: She is alert and oriented to person, place, and time.   Normal strength and sensation bilateral upper and lower extremities, cranial nerves intact   Skin: Skin is warm and dry. No rash noted.   Psychiatric: She has a normal mood and affect. Her behavior is normal.   Nursing note and vitals reviewed.      Procedures         ED Course  ED Course                  MDM  Number of Diagnoses or Management Options  Vitreous floaters of both eyes:   Diagnosis management comments: 72-year-old female with transient I floaters.  Well-developed, well-nourished female in no distress with normal vital signs and exam as above.  Sounds like she had a floaters without any red flag signs or symptoms.  Her symptoms have resolved at this time.  We discussed very strict return precautions, otherwise  encouraged to follow-up with ophthalmology in the next week.  Patient and  are comfortable with and understanding of the plan.    DDX: Floaters      Final diagnoses:   Vitreous floaters of both eyes            Dejuan Szymanski MD  03/24/18 0144

## 2018-04-20 ENCOUNTER — OFFICE VISIT (OUTPATIENT)
Dept: INTERNAL MEDICINE | Facility: CLINIC | Age: 73
End: 2018-04-20

## 2018-04-20 VITALS
BODY MASS INDEX: 35.53 KG/M2 | WEIGHT: 181 LBS | OXYGEN SATURATION: 95 % | TEMPERATURE: 99 F | HEIGHT: 60 IN | HEART RATE: 84 BPM | SYSTOLIC BLOOD PRESSURE: 124 MMHG | DIASTOLIC BLOOD PRESSURE: 78 MMHG | RESPIRATION RATE: 14 BRPM

## 2018-04-20 DIAGNOSIS — Z79.4 TYPE 2 DIABETES MELLITUS WITH HYPERGLYCEMIA, WITH LONG-TERM CURRENT USE OF INSULIN (HCC): Primary | ICD-10-CM

## 2018-04-20 DIAGNOSIS — E66.9 CLASS 2 OBESITY WITH BODY MASS INDEX (BMI) OF 35.0 TO 35.9 IN ADULT, UNSPECIFIED OBESITY TYPE, UNSPECIFIED WHETHER SERIOUS COMORBIDITY PRESENT: ICD-10-CM

## 2018-04-20 DIAGNOSIS — J34.89 NASAL LESION: ICD-10-CM

## 2018-04-20 DIAGNOSIS — R91.8 MULTIPLE LUNG NODULES ON CT: ICD-10-CM

## 2018-04-20 DIAGNOSIS — R10.84 ABDOMINAL PAIN, GENERALIZED: ICD-10-CM

## 2018-04-20 DIAGNOSIS — E11.65 TYPE 2 DIABETES MELLITUS WITH HYPERGLYCEMIA, WITH LONG-TERM CURRENT USE OF INSULIN (HCC): Primary | ICD-10-CM

## 2018-04-20 DIAGNOSIS — I10 ESSENTIAL HYPERTENSION: Chronic | ICD-10-CM

## 2018-04-20 LAB — HBA1C MFR BLD: 9.2 %

## 2018-04-20 PROCEDURE — 83036 HEMOGLOBIN GLYCOSYLATED A1C: CPT | Performed by: FAMILY MEDICINE

## 2018-04-20 PROCEDURE — 99214 OFFICE O/P EST MOD 30 MIN: CPT | Performed by: FAMILY MEDICINE

## 2018-04-20 NOTE — PROGRESS NOTES
Subjective    Marielena Moeller is a 72 y.o. female here for:  Chief Complaint   Patient presents with   • Diabetes   • Hypertension     History of Present Illness   Having abdominal and back pain  On both sides of lower back, more so on right. Waxes and wanes, has happened for years but more frequent. She has a spot on the anterior left abdomen, that when she turns a certain way, she'd feel a catch. Massage to the area improved discomfort.  Again, present for years. She feels abdomen is enlarging, family members have had abdominal tumors (grandmother and father). She feels shortness of breath is worse at times due to abdomen.     She continues to cough. She feels aching under the shoulder blades. Motrin and aleve at times for discomfort. She's scheduled to see pulmonary in May. Former smoker, diffuse abnormalities on previous CT scan lungs. Cough is non-productive. She's read that inhalers can worsen sugars so she's hesitant to try those. Knows steroids can elevate sugars. Has been treated with antibiotics previously.    Has cut down on trips to grocery due to breathing and back pain. Decreased activity overall.    Diabetes mellitus, chronic, not controlled. Insulin dependent. She's not as active and she feels that's a big contributed to her elevated A1C. She wishes to work on lifestyle before adjusting insulin. Patient has had pancreatitis earlier this year, Januvia or similar not a good option for her.     She also has a sore on the inside of her nose on right. She's putting triple antibiotic on it but it seems not not go away. Has been present for a year at least.     The following portions of the patient's history were reviewed and updated as appropriate: allergies, current medications, past family history, past medical history, past social history, past surgical history and problem list.    Review of Systems   Constitutional: Positive for activity change, fatigue and unexpected weight gain.   Respiratory:  "Positive for cough and shortness of breath.    Gastrointestinal: Positive for abdominal pain. Negative for blood in stool.   Musculoskeletal: Positive for arthralgias and back pain.       Vitals:    04/20/18 1109   BP: 124/78   Pulse: 84   Resp: 14   Temp: 99 °F (37.2 °C)   SpO2: 95%   Weight: 82.1 kg (181 lb)   Height: 152.4 cm (60\")         Objective   Physical Exam   Constitutional: She is oriented to person, place, and time. Vital signs are normal. She appears well-developed and well-nourished. She is active.  Non-toxic appearance. She does not appear ill. No distress. She is obese.  HENT:   Head: Normocephalic and atraumatic.   Right Ear: Hearing normal.   Left Ear: Hearing normal.   Nose:       Mouth/Throat: Mucous membranes are not dry.   Eyes: EOM and lids are normal. No scleral icterus.   Neck: Phonation normal. Neck supple.   Cardiovascular: Normal rate, regular rhythm and normal heart sounds.    Pulmonary/Chest: Effort normal. She has wheezes. She has no rhonchi. She has no rales.   Abdominal: Soft. Bowel sounds are normal. She exhibits no distension and no mass. There is no hepatosplenomegaly. There is generalized tenderness. There is no rigidity, no rebound and no guarding.   Diastasis recti   Neurological: She is alert and oriented to person, place, and time. No cranial nerve deficit.   Skin: Skin is warm. Turgor is normal. No rash noted. She is not diaphoretic. No cyanosis.   Psychiatric: She has a normal mood and affect. Her speech is normal and behavior is normal. Judgment and thought content normal. Cognition and memory are normal.   Nursing note and vitals reviewed.       Lab Results   Component Value Date    HGBA1C 9.2 04/20/2018   A1C was 8.7 on 1/18/18, 9.2 on 10/20/17    I reviewed her CT scan abdomen/pelvis from January 2018. I called radiology at [facility] and discussed imaging with radiologist. Portion of stomach is located under liver on some of the cuts, appears like a " "cystic area but is normal per radiology, secondary to cholecystectomy status.     Assessment/Plan     Problem List Items Addressed This Visit        Cardiovascular and Mediastinum    Essential hypertension (Chronic)    Overview     · No HCTZ due to pancreatitis early 2018.         Current Assessment & Plan     Hypertension is improving with treatment.  Continue current treatment regimen.  Dietary sodium restriction.  Weight loss.  Blood pressure will be reassessed at the next regular appointment.            Digestive    Class 2 obesity with body mass index (BMI) of 35.0 to 35.9 in adult    Current Assessment & Plan     Patient's Body mass index is 35.35 kg/m². BMI is above normal parameters. Follow-up plan includes:  exercise counseling.              Endocrine    Type 2 diabetes mellitus with hyperglycemia, with long-term current use of insulin - Primary    Current Assessment & Plan     Diabetes is worsening.   Dietary recommendations for ADA diet.  Regular aerobic exercise.  Reminded to get yearly retinal exam.  Diabetes will be reassessed in 3 months.         Relevant Medications    BD INSULIN SYRINGE ULTRAFINE 31G X 15/64\" 0.5 ML misc    NOVOLOG MIX 70/30 (70-30) 100 UNIT/ML injection    glucose blood (ACCU-CHEK CHIO PLUS) test strip    Blood Glucose Monitoring Suppl (ACCU-CHEK CHIO PLUS) w/Device kit    Other Relevant Orders    POC Glycosylated Hemoglobin (Hb A1C) (Completed)      Other Visit Diagnoses     Nasal lesion        Monitor. Try Vaseline as barrier. Likely will need ENT referral but will wait until lungs have been evaluated.    Abdominal pain, generalized        Monitor. Work on weight loss with exercise as tolerated, diet.     Multiple lung nodules on CT        Follow up with pulmonary as scheduled. Likely will have repeat imaging ordered.              ·     Return in about 3 months (around 7/20/2018) for Diabetes follow up.    Marychuy Starr MD    Please note that portions of this note may " have been completed with a voice recognition program. Efforts were made to edit dictation, but occasionally words are mistranscribed.

## 2018-04-21 PROBLEM — E66.9 CLASS 2 OBESITY WITH BODY MASS INDEX (BMI) OF 35.0 TO 35.9 IN ADULT: Status: ACTIVE | Noted: 2018-04-21

## 2018-04-21 PROBLEM — E66.812 CLASS 2 OBESITY WITH BODY MASS INDEX (BMI) OF 35.0 TO 35.9 IN ADULT: Status: ACTIVE | Noted: 2018-04-21

## 2018-04-21 RX ORDER — INSULIN ASPART 100 [IU]/ML
70 INJECTION, SUSPENSION SUBCUTANEOUS 2 TIMES DAILY WITH MEALS
Qty: 126 ML | Refills: 0
Start: 2018-04-21 | End: 2018-07-09 | Stop reason: SDUPTHER

## 2018-04-21 RX ORDER — BLOOD-GLUCOSE METER
EACH MISCELLANEOUS
Qty: 1 KIT | Refills: 0
Start: 2018-04-21

## 2018-04-21 NOTE — ASSESSMENT & PLAN NOTE
Diabetes is worsening.   Dietary recommendations for ADA diet.  Regular aerobic exercise.  Reminded to get yearly retinal exam.  Diabetes will be reassessed in 3 months.

## 2018-04-21 NOTE — ASSESSMENT & PLAN NOTE
Hypertension is improving with treatment.  Continue current treatment regimen.  Dietary sodium restriction.  Weight loss.  Blood pressure will be reassessed at the next regular appointment.

## 2018-04-21 NOTE — ASSESSMENT & PLAN NOTE
Patient's Body mass index is 35.35 kg/m². BMI is above normal parameters. Follow-up plan includes:  exercise counseling.

## 2018-05-07 ENCOUNTER — TELEPHONE (OUTPATIENT)
Dept: INTERNAL MEDICINE | Facility: CLINIC | Age: 73
End: 2018-05-07

## 2018-05-09 ENCOUNTER — OFFICE VISIT (OUTPATIENT)
Dept: PULMONOLOGY | Facility: CLINIC | Age: 73
End: 2018-05-09

## 2018-05-09 VITALS
RESPIRATION RATE: 17 BRPM | WEIGHT: 180 LBS | BODY MASS INDEX: 35.34 KG/M2 | HEART RATE: 89 BPM | DIASTOLIC BLOOD PRESSURE: 90 MMHG | HEIGHT: 60 IN | OXYGEN SATURATION: 93 % | SYSTOLIC BLOOD PRESSURE: 150 MMHG

## 2018-05-09 DIAGNOSIS — J45.40 MODERATE PERSISTENT ASTHMA WITHOUT COMPLICATION: ICD-10-CM

## 2018-05-09 DIAGNOSIS — J30.89 OTHER ALLERGIC RHINITIS: ICD-10-CM

## 2018-05-09 DIAGNOSIS — G47.33 OBSTRUCTIVE SLEEP APNEA: ICD-10-CM

## 2018-05-09 DIAGNOSIS — R06.02 SHORTNESS OF BREATH: Primary | ICD-10-CM

## 2018-05-09 DIAGNOSIS — R06.83 SNORING: ICD-10-CM

## 2018-05-09 DIAGNOSIS — R09.02 HYPOXIA: ICD-10-CM

## 2018-05-09 DIAGNOSIS — G47.19 EXCESSIVE DAYTIME SLEEPINESS: ICD-10-CM

## 2018-05-09 DIAGNOSIS — Z87.891 PERSONAL HISTORY OF TOBACCO USE, PRESENTING HAZARDS TO HEALTH: ICD-10-CM

## 2018-05-09 DIAGNOSIS — R06.02 SOB (SHORTNESS OF BREATH): Primary | ICD-10-CM

## 2018-05-09 PROCEDURE — 94060 EVALUATION OF WHEEZING: CPT | Performed by: INTERNAL MEDICINE

## 2018-05-09 PROCEDURE — 99205 OFFICE O/P NEW HI 60 MIN: CPT | Performed by: INTERNAL MEDICINE

## 2018-05-09 PROCEDURE — 94726 PLETHYSMOGRAPHY LUNG VOLUMES: CPT | Performed by: INTERNAL MEDICINE

## 2018-05-09 PROCEDURE — 95012 NITRIC OXIDE EXP GAS DETER: CPT | Performed by: INTERNAL MEDICINE

## 2018-05-09 PROCEDURE — 94729 DIFFUSING CAPACITY: CPT | Performed by: INTERNAL MEDICINE

## 2018-05-09 RX ORDER — ALBUTEROL SULFATE 90 UG/1
2 AEROSOL, METERED RESPIRATORY (INHALATION) EVERY 4 HOURS PRN
Qty: 1 INHALER | Refills: 5 | Status: SHIPPED | OUTPATIENT
Start: 2018-05-09 | End: 2019-11-11 | Stop reason: SDUPTHER

## 2018-05-09 NOTE — PROGRESS NOTES
CONSULT NOTE    Requested by:   Marychuy Starr MD Tabitha Dawn Culver, MD      Chief Complaint   Patient presents with   • Consult     Abnormal CT, Lung Nodules       Subjective   Marielena Moeller is a 72 y.o. female.     History of Present Illness   Patient comes in today for consultation on shortness of breath and abnormal CT of the chest.    The patient says that she's had shortness of breath for years which apparently has worsened over the past 2 years.  The patient reports cough and wheezing with exertion and also has had multiple episodes at night where she wakes up wheezing and coughing.    The patient does have a history of smoking but quit more than 40 years ago.  When she was a smoker, she used to smoke one to 2 packs per day for 15 years.    The patient does have a positive family history of lung cancer and COPD in an uncle.    The patient also mentions the fact that she used to raise chickens and about 2 years ago and has found significant mold in the house.  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    Upon questioning she is also complaining of sleep disturbance. Patient says that for the past few years, she has had trouble with snoring. Patient says that she feels tired in the morning after waking up. She is also complaining of occasionally falling asleep while watching TV and sometimes while reading a book.  She feels that her sleep is restless mostly because of pain.  Upon further questioning she mentions a positive family history of sleep apnea and her son.    She consumes at least 2-3 caffeinated drinks per day    The following portions of the patient's history were reviewed and updated as appropriate: allergies, current medications, past family history, past medical history, past social history and past surgical history.    Review of Systems   Constitutional: Positive for fatigue. Negative for chills and fever.   HENT: Positive for rhinorrhea. Negative for sinus  "pressure, sneezing, sore throat, trouble swallowing and voice change.    Respiratory: Positive for cough, shortness of breath and wheezing. Negative for chest tightness.    Cardiovascular: Positive for chest pain, palpitations and leg swelling.   Psychiatric/Behavioral: Positive for sleep disturbance.   All other systems reviewed and are negative.      Past Medical History:   Diagnosis Date   • Arthritis    • Bronchitis    • Colon polyp    • Diabetes mellitus    • Diverticulitis    • Hyperlipidemia    • Hypertension    • Osteoporosis    • Pancreatitis    • Thyroid disease        Social History   Substance Use Topics   • Smoking status: Former Smoker     Packs/day: 2.00     Years: 15.00     Types: Cigarettes   • Smokeless tobacco: Never Used   • Alcohol use No         Objective   Visit Vitals  /90   Pulse 89   Resp 17   Ht 152.4 cm (60\")   Wt 81.6 kg (180 lb)   SpO2 93% Comment: RESTING ON ROOM AIR   BMI 35.15 kg/m²       Physical Exam   Constitutional: She is oriented to person, place, and time. She appears well-developed and well-nourished.   HENT:   Head: Atraumatic.   Crowded Oropharynx.   Nasal erythema noted.    Eyes: EOM are normal. Pupils are equal, round, and reactive to light.   Neck: No JVD present. No tracheal deviation present. No thyromegaly present.   Increased adipose tissue.    Cardiovascular: Normal rate and regular rhythm.    Pulmonary/Chest: Effort normal. No respiratory distress. She has wheezes.   Musculoskeletal: Normal range of motion. She exhibits no edema.   Neurological: She is alert and oriented to person, place, and time.   Skin: Skin is warm and dry.   Psychiatric: She has a normal mood and affect. Her behavior is normal.   Vitals reviewed.        Assessment/Plan   Marielena was seen today for consult.    Diagnoses and all orders for this visit:    Shortness of breath  -     Pulmonary Function Test  -     Alpha - 1 - Antitrypsin Phenotype; Future    Moderate persistent asthma without " complication  -     Pulmonary Function Test  -     Nitric Oxide  -     Alpha - 1 - Antitrypsin Phenotype; Future    Obstructive sleep apnea  -     Polysomnography 4 or More Parameters; Future    Snoring  -     Polysomnography 4 or More Parameters; Future    Personal history of tobacco use, presenting hazards to health    Excessive daytime sleepiness  -     Polysomnography 4 or More Parameters; Future    Hypoxia  -     Polysomnography 4 or More Parameters; Future    Other allergic rhinitis  -     IgE; Future  -     Allergens, Zone 8; Future    Other orders  -     Fluticasone Furoate-Vilanterol (BREO ELLIPTA) 200-25 MCG/INH aerosol powder ; Inhale 1 puff Daily. Rinse mouth with water after use.  -     albuterol (VENTOLIN HFA) 108 (90 Base) MCG/ACT inhaler; Inhale 2 puffs Every 4 (Four) Hours As Needed for Wheezing or Shortness of Air.           Return in about 10 weeks (around 7/18/2018) for Recheck, Labs, PFTs, Sleep study, Give pt sleep questionairre.    DISCUSSION(if any):  I have reviewed the patient's imaging studies and shared them with her.  Her CT of the chest performed on 1 March 2018 showed markedly abnormal pulmonary parenchyma with multiple nodular areas.  There was no significant consolidation noted.    Laboratory workup was also reviewed which showed hemoglobin level of 15 end carbon dioxide level 31.    FeNO level was 6    PFTs were reviewed with the patient.  They're suggestive of severe COPD.    ===========================  ===========================    Orders as above.    I have told the patient, that in my view, shortness of breath is most likely from underlying chronic obstructive lung disease.     I will initially start her on Breo given the fact that some of her symptoms are suggestive of chronic bronchitis and possibly hypersensitivity pneumonitis.    I told the patient that she has any significant issues with agreeable, then we can consider Qvar and Spiriva.    Patient was given education and  demonstration on how to use the medicine.     Side effects, of prescribed medicines, discussed.    Patient was also instructed on compliance and adherence with instructions.     Patient was given reading material about COPD.     Patient was asked to call with any concerns.     Sleep questionnaire was provided to the patient    The pathophysiology of sleep apnea was discussed, with the patient.     We will encourage the patient to schedule the sleep study soon.     The patient was made aware of the limitation of the home sleep study, whereby it may underestimate the true AHI and also carries a low sensitivity.  The patient was also told that even if the home sleep study is negative, we may suggest an in lab sleep study to completely and definitively rule out/in sleep apnea.  The patient has understood.  This was communicated to the patient, in case home study is to be requested.    The patient is agreeable to try CPAP/BiPAP, if needed.     Patient was educated on good sleep hygiene measures and voiced understanding of the same.     Patient was given reading material regarding sleep apnea    Patient will be followed clinically to assess for response to treatment and further recommendations will be made, based on response.    Dictated utilizing Dragon dictation.    This document was electronically signed by Oniel Sanchez MD May 9, 2018  11:55 AM

## 2018-05-12 ENCOUNTER — HOSPITAL ENCOUNTER (EMERGENCY)
Facility: HOSPITAL | Age: 73
Discharge: HOME | End: 2018-05-12
Attending: EMERGENCY MEDICINE | Admitting: EMERGENCY MEDICINE

## 2018-05-12 ENCOUNTER — APPOINTMENT (OUTPATIENT)
Dept: CT IMAGING | Facility: HOSPITAL | Age: 73
End: 2018-05-12

## 2018-05-12 VITALS
SYSTOLIC BLOOD PRESSURE: 162 MMHG | WEIGHT: 181 LBS | RESPIRATION RATE: 18 BRPM | HEART RATE: 87 BPM | HEIGHT: 60 IN | DIASTOLIC BLOOD PRESSURE: 81 MMHG | OXYGEN SATURATION: 91 % | BODY MASS INDEX: 35.53 KG/M2 | TEMPERATURE: 98.8 F

## 2018-05-12 VITALS
RESPIRATION RATE: 22 BRPM | TEMPERATURE: 97.8 F | WEIGHT: 179.8 LBS | BODY MASS INDEX: 36.25 KG/M2 | OXYGEN SATURATION: 92 % | HEIGHT: 59 IN | DIASTOLIC BLOOD PRESSURE: 88 MMHG | SYSTOLIC BLOOD PRESSURE: 145 MMHG | HEART RATE: 95 BPM

## 2018-05-12 DIAGNOSIS — K57.93 DIVERTICULITIS OF INTESTINE WITH BLEEDING, UNSPECIFIED COMPLICATION STATUS, UNSPECIFIED PART OF INTESTINAL TRACT: ICD-10-CM

## 2018-05-12 DIAGNOSIS — K57.93 DIVERTICULITIS OF INTESTINE WITHOUT PERFORATION OR ABSCESS WITH BLEEDING, UNSPECIFIED PART OF INTESTINAL TRACT: Primary | ICD-10-CM

## 2018-05-12 DIAGNOSIS — K92.2 LOWER GI BLEED: Primary | ICD-10-CM

## 2018-05-12 LAB
ALBUMIN SERPL-MCNC: 3.9 G/DL (ref 3.5–5)
ALBUMIN SERPL-MCNC: 4.6 G/DL (ref 3.5–5)
ALBUMIN/GLOB SERPL: 1.3 G/DL (ref 1–2)
ALBUMIN/GLOB SERPL: 1.3 G/DL (ref 1–2)
ALP SERPL-CCNC: 77 U/L (ref 38–126)
ALP SERPL-CCNC: 89 U/L (ref 38–126)
ALT SERPL W P-5'-P-CCNC: 30 U/L (ref 13–69)
ALT SERPL W P-5'-P-CCNC: 31 U/L (ref 13–69)
ANION GAP SERPL CALCULATED.3IONS-SCNC: 14.4 MMOL/L (ref 10–20)
ANION GAP SERPL CALCULATED.3IONS-SCNC: 15.1 MMOL/L (ref 10–20)
APTT PPP: 29.9 SECONDS (ref 25–36)
AST SERPL-CCNC: 26 U/L (ref 15–46)
AST SERPL-CCNC: 30 U/L (ref 15–46)
BACTERIA UR QL AUTO: ABNORMAL /HPF
BASOPHILS # BLD AUTO: 0.04 10*3/MM3 (ref 0–0.2)
BASOPHILS # BLD AUTO: 0.06 10*3/MM3 (ref 0–0.2)
BASOPHILS NFR BLD AUTO: 0.5 % (ref 0–2.5)
BASOPHILS NFR BLD AUTO: 0.7 % (ref 0–2.5)
BILIRUB SERPL-MCNC: 0.4 MG/DL (ref 0.2–1.3)
BILIRUB SERPL-MCNC: 0.5 MG/DL (ref 0.2–1.3)
BILIRUB UR QL STRIP: NEGATIVE
BUN BLD-MCNC: 12 MG/DL (ref 7–20)
BUN BLD-MCNC: 16 MG/DL (ref 7–20)
BUN/CREAT SERPL: 20 (ref 7.1–23.5)
BUN/CREAT SERPL: 26.7 (ref 7.1–23.5)
CALCIUM SPEC-SCNC: 9 MG/DL (ref 8.4–10.2)
CALCIUM SPEC-SCNC: 9.8 MG/DL (ref 8.4–10.2)
CHLORIDE SERPL-SCNC: 102 MMOL/L (ref 98–107)
CHLORIDE SERPL-SCNC: 98 MMOL/L (ref 98–107)
CLARITY UR: CLEAR
CO2 SERPL-SCNC: 29 MMOL/L (ref 26–30)
CO2 SERPL-SCNC: 32 MMOL/L (ref 26–30)
COLOR UR: YELLOW
CREAT BLD-MCNC: 0.6 MG/DL (ref 0.6–1.3)
CREAT BLD-MCNC: 0.6 MG/DL (ref 0.6–1.3)
D-LACTATE SERPL-SCNC: 1.8 MMOL/L (ref 0.5–2)
DEPRECATED RDW RBC AUTO: 43.9 FL (ref 37–54)
DEPRECATED RDW RBC AUTO: 44.7 FL (ref 37–54)
EOSINOPHIL # BLD AUTO: 0.1 10*3/MM3 (ref 0–0.7)
EOSINOPHIL # BLD AUTO: 0.24 10*3/MM3 (ref 0–0.7)
EOSINOPHIL NFR BLD AUTO: 1.1 % (ref 0–7)
EOSINOPHIL NFR BLD AUTO: 2.9 % (ref 0–7)
ERYTHROCYTE [DISTWIDTH] IN BLOOD BY AUTOMATED COUNT: 12.7 % (ref 11.5–14.5)
ERYTHROCYTE [DISTWIDTH] IN BLOOD BY AUTOMATED COUNT: 12.7 % (ref 11.5–14.5)
GFR SERPL CREATININE-BSD FRML MDRD: 99 ML/MIN/1.73
GFR SERPL CREATININE-BSD FRML MDRD: 99 ML/MIN/1.73
GLOBULIN UR ELPH-MCNC: 3.1 GM/DL
GLOBULIN UR ELPH-MCNC: 3.6 GM/DL
GLUCOSE BLD-MCNC: 121 MG/DL (ref 74–98)
GLUCOSE BLD-MCNC: 228 MG/DL (ref 74–98)
GLUCOSE UR STRIP-MCNC: NEGATIVE MG/DL
HCT VFR BLD AUTO: 44.4 % (ref 37–47)
HCT VFR BLD AUTO: 48 % (ref 37–47)
HEMOCCULT STL QL: POSITIVE
HGB BLD-MCNC: 15.1 G/DL (ref 12–16)
HGB BLD-MCNC: 16.5 G/DL (ref 12–16)
HGB UR QL STRIP.AUTO: ABNORMAL
HOLD SPECIMEN: NORMAL
HOLD SPECIMEN: NORMAL
HYALINE CASTS UR QL AUTO: ABNORMAL /LPF
IMM GRANULOCYTES # BLD: 0.03 10*3/MM3 (ref 0–0.06)
IMM GRANULOCYTES # BLD: 0.05 10*3/MM3 (ref 0–0.06)
IMM GRANULOCYTES NFR BLD: 0.4 % (ref 0–0.6)
IMM GRANULOCYTES NFR BLD: 0.6 % (ref 0–0.6)
INR PPP: 0.96 (ref 0.9–1.1)
KETONES UR QL STRIP: NEGATIVE
LEUKOCYTE ESTERASE UR QL STRIP.AUTO: NEGATIVE
LIPASE SERPL-CCNC: 53 U/L (ref 23–300)
LYMPHOCYTES # BLD AUTO: 1.36 10*3/MM3 (ref 0.6–3.4)
LYMPHOCYTES # BLD AUTO: 1.87 10*3/MM3 (ref 0.6–3.4)
LYMPHOCYTES NFR BLD AUTO: 15.6 % (ref 10–50)
LYMPHOCYTES NFR BLD AUTO: 22.9 % (ref 10–50)
MCH RBC QN AUTO: 32.3 PG (ref 27–31)
MCH RBC QN AUTO: 32.9 PG (ref 27–31)
MCHC RBC AUTO-ENTMCNC: 34 G/DL (ref 30–37)
MCHC RBC AUTO-ENTMCNC: 34.4 G/DL (ref 30–37)
MCV RBC AUTO: 94.9 FL (ref 81–99)
MCV RBC AUTO: 95.8 FL (ref 81–99)
MONOCYTES # BLD AUTO: 0.47 10*3/MM3 (ref 0–0.9)
MONOCYTES # BLD AUTO: 0.61 10*3/MM3 (ref 0–0.9)
MONOCYTES NFR BLD AUTO: 5.4 % (ref 0–12)
MONOCYTES NFR BLD AUTO: 7.5 % (ref 0–12)
NEUTROPHILS # BLD AUTO: 5.34 10*3/MM3 (ref 2–6.9)
NEUTROPHILS # BLD AUTO: 6.71 10*3/MM3 (ref 2–6.9)
NEUTROPHILS NFR BLD AUTO: 65.6 % (ref 37–80)
NEUTROPHILS NFR BLD AUTO: 76.8 % (ref 37–80)
NITRITE UR QL STRIP: NEGATIVE
NRBC BLD MANUAL-RTO: 0 /100 WBC (ref 0–0)
NRBC BLD MANUAL-RTO: 0 /100 WBC (ref 0–0)
PH UR STRIP.AUTO: 6.5 [PH] (ref 5–8)
PLATELET # BLD AUTO: 241 10*3/MM3 (ref 130–400)
PLATELET # BLD AUTO: 255 10*3/MM3 (ref 130–400)
PMV BLD AUTO: 10 FL (ref 6–12)
PMV BLD AUTO: 9.8 FL (ref 6–12)
POTASSIUM BLD-SCNC: 4.1 MMOL/L (ref 3.5–5.1)
POTASSIUM BLD-SCNC: 4.4 MMOL/L (ref 3.5–5.1)
PROT SERPL-MCNC: 7 G/DL (ref 6.3–8.2)
PROT SERPL-MCNC: 8.2 G/DL (ref 6.3–8.2)
PROT UR QL STRIP: ABNORMAL
PROTHROMBIN TIME: 10.7 SECONDS (ref 9.3–12.1)
RBC # BLD AUTO: 4.68 10*6/MM3 (ref 4.2–5.4)
RBC # BLD AUTO: 5.01 10*6/MM3 (ref 4.2–5.4)
RBC # UR: ABNORMAL /HPF
REF LAB TEST METHOD: ABNORMAL
SODIUM BLD-SCNC: 141 MMOL/L (ref 137–145)
SODIUM BLD-SCNC: 141 MMOL/L (ref 137–145)
SP GR UR STRIP: 1.01 (ref 1–1.03)
SQUAMOUS #/AREA URNS HPF: ABNORMAL /HPF
UROBILINOGEN UR QL STRIP: ABNORMAL
WBC NRBC COR # BLD: 8.15 10*3/MM3 (ref 4.8–10.8)
WBC NRBC COR # BLD: 8.73 10*3/MM3 (ref 4.8–10.8)
WBC UR QL AUTO: ABNORMAL /HPF
WHOLE BLOOD HOLD SPECIMEN: NORMAL
WHOLE BLOOD HOLD SPECIMEN: NORMAL

## 2018-05-12 PROCEDURE — 80053 COMPREHEN METABOLIC PANEL: CPT | Performed by: EMERGENCY MEDICINE

## 2018-05-12 PROCEDURE — 99284 EMERGENCY DEPT VISIT MOD MDM: CPT

## 2018-05-12 PROCEDURE — 85025 COMPLETE CBC W/AUTO DIFF WBC: CPT | Performed by: EMERGENCY MEDICINE

## 2018-05-12 PROCEDURE — 74176 CT ABD & PELVIS W/O CONTRAST: CPT

## 2018-05-12 PROCEDURE — 96360 HYDRATION IV INFUSION INIT: CPT

## 2018-05-12 PROCEDURE — 83690 ASSAY OF LIPASE: CPT | Performed by: EMERGENCY MEDICINE

## 2018-05-12 PROCEDURE — 81001 URINALYSIS AUTO W/SCOPE: CPT | Performed by: EMERGENCY MEDICINE

## 2018-05-12 PROCEDURE — 82272 OCCULT BLD FECES 1-3 TESTS: CPT | Performed by: EMERGENCY MEDICINE

## 2018-05-12 PROCEDURE — 83605 ASSAY OF LACTIC ACID: CPT | Performed by: EMERGENCY MEDICINE

## 2018-05-12 PROCEDURE — 85610 PROTHROMBIN TIME: CPT | Performed by: EMERGENCY MEDICINE

## 2018-05-12 PROCEDURE — 85730 THROMBOPLASTIN TIME PARTIAL: CPT | Performed by: EMERGENCY MEDICINE

## 2018-05-12 RX ORDER — PANTOPRAZOLE SODIUM 40 MG/1
40 TABLET, DELAYED RELEASE ORAL ONCE
Status: COMPLETED | OUTPATIENT
Start: 2018-05-12 | End: 2018-05-12

## 2018-05-12 RX ORDER — AMOXICILLIN AND CLAVULANATE POTASSIUM 250; 125 MG/1; MG/1
1 TABLET, FILM COATED ORAL ONCE
Status: COMPLETED | OUTPATIENT
Start: 2018-05-12 | End: 2018-05-12

## 2018-05-12 RX ORDER — METRONIDAZOLE 500 MG/1
500 TABLET ORAL 3 TIMES DAILY
Qty: 24 TABLET | Refills: 0 | Status: SHIPPED | OUTPATIENT
Start: 2018-05-12 | End: 2018-05-20

## 2018-05-12 RX ORDER — SODIUM CHLORIDE 0.9 % (FLUSH) 0.9 %
10 SYRINGE (ML) INJECTION AS NEEDED
Status: DISCONTINUED | OUTPATIENT
Start: 2018-05-12 | End: 2018-05-12 | Stop reason: HOSPADM

## 2018-05-12 RX ORDER — METRONIDAZOLE 500 MG/1
500 TABLET ORAL ONCE
Status: COMPLETED | OUTPATIENT
Start: 2018-05-12 | End: 2018-05-12

## 2018-05-12 RX ORDER — AMOXICILLIN AND CLAVULANATE POTASSIUM 875; 125 MG/1; MG/1
1 TABLET, FILM COATED ORAL 2 TIMES DAILY
Qty: 16 TABLET | Refills: 0 | Status: SHIPPED | OUTPATIENT
Start: 2018-05-12 | End: 2018-05-20

## 2018-05-12 RX ADMIN — METRONIDAZOLE 500 MG: 500 TABLET ORAL at 02:09

## 2018-05-12 RX ADMIN — SODIUM CHLORIDE 1000 ML: 9 INJECTION, SOLUTION INTRAVENOUS at 01:11

## 2018-05-12 RX ADMIN — PANTOPRAZOLE SODIUM 40 MG: 40 TABLET, DELAYED RELEASE ORAL at 00:55

## 2018-05-12 RX ADMIN — AMOXICILLIN AND CLAVULANATE POTASSIUM 1 TABLET: 250; 125 TABLET, FILM COATED ORAL at 02:09

## 2018-05-12 NOTE — ED PROVIDER NOTES
Subjective   72-year-old female presents emergency Department with complaints of rectal bleeding.  Patient states that she was just seen in the emergency department a few hours ago for similar complaint.  Patient states that she had a full workup done and was diagnosed with diverticulitis.  She was started on Augmentin and Flagyl.  Patient states that she did take her first dose of antibiotics this morning.  She states that she is continuing to have rectal bleeding and is concerned.  Patient states that she has significant bleeding when she goes to the bathroom.  She states that otherwise there is no bleeding.  Patient states that she has had episodes of diverticulitis in the past with similar rectal bleeding.  Patient denies being on any blood thinners.  She denies any nausea, vomiting, diarrhea, constipation, abdominal pain.        History provided by:  Patient   used: No    GI Bleeding   Location:  Rectal bleeding  Quality:  Bright red blood  Severity:  Moderate  Onset quality:  Sudden  Duration:  1 day  Timing:  Intermittent  Progression:  Unchanged  Chronicity:  Recurrent  Context:  Bright red blood with defecation  Relieved by:  None  Worsened by:  None  Ineffective treatments:  None  Associated symptoms: no abdominal pain, no chest pain, no congestion, no cough, no diarrhea, no fever, no myalgias, no nausea, no rash, no rhinorrhea, no shortness of breath, no sore throat, no vomiting and no wheezing        Review of Systems   Constitutional: Negative for chills and fever.   HENT: Negative for congestion, rhinorrhea, sore throat and trouble swallowing.    Eyes: Negative for discharge and visual disturbance.   Respiratory: Negative for cough, chest tightness, shortness of breath and wheezing.    Cardiovascular: Negative for chest pain, palpitations and leg swelling.   Gastrointestinal: Positive for blood in stool. Negative for abdominal pain, constipation, diarrhea, nausea and vomiting.  "  Genitourinary: Negative for dysuria, flank pain and hematuria.   Musculoskeletal: Negative for back pain, myalgias and neck pain.   Skin: Negative for color change and rash.   Neurological: Negative for dizziness, weakness and numbness.   Psychiatric/Behavioral: Negative for self-injury and suicidal ideas.       Past Medical History:   Diagnosis Date   • Arthritis    • Bronchitis    • Colon polyp    • Diabetes mellitus    • Diverticulitis    • Hyperlipidemia    • Hypertension    • Osteoporosis    • Pancreatitis    • Thyroid disease        Allergies   Allergen Reactions   • Ciprofloxacin    • Contrast Dye    • Doxycycline Nausea And Vomiting     \"Pain worse than pancreatitis\"   • Erythromycin    • Prednisone        Past Surgical History:   Procedure Laterality Date   • APPENDECTOMY     •  SECTION     • CHOLECYSTECTOMY     • THYROIDECTOMY, PARTIAL     • TUBAL ABDOMINAL LIGATION         Family History   Problem Relation Age of Onset   • Arthritis Mother    • Diabetes Mother    • Osteoporosis Mother    • Arthritis Father    • Diabetes Father        Social History     Social History   • Marital status:      Social History Main Topics   • Smoking status: Former Smoker     Packs/day: 2.00     Years: 15.00     Types: Cigarettes   • Smokeless tobacco: Never Used   • Alcohol use No   • Drug use: No   • Sexual activity: Defer     Other Topics Concern   • Not on file           Objective   Physical Exam   Constitutional: She is oriented to person, place, and time. She appears well-developed and well-nourished.   HENT:   Head: Normocephalic and atraumatic.   Nose: Nose normal.   Mouth/Throat: Oropharynx is clear and moist.   Eyes: Conjunctivae and EOM are normal. Pupils are equal, round, and reactive to light.   Neck: Normal range of motion. Neck supple.   Cardiovascular: Normal rate, regular rhythm, normal heart sounds and intact distal pulses.    Pulmonary/Chest: Effort normal and breath sounds normal. No " respiratory distress. She has no wheezes. She exhibits no tenderness.   Abdominal: Soft. Bowel sounds are normal. There is no tenderness. There is no rebound and no guarding.   Genitourinary: Rectal exam shows guaiac positive stool.   Genitourinary Comments: No hemorrhoid or anal fissure. Bright red blood appreciated on exam.    Musculoskeletal: Normal range of motion. She exhibits no edema, tenderness or deformity.   Neurological: She is alert and oriented to person, place, and time. No cranial nerve deficit. Coordination normal.   Skin: Skin is warm and dry. No rash noted. No erythema. No pallor.   Psychiatric: She has a normal mood and affect. Her behavior is normal. Judgment and thought content normal.   Nursing note and vitals reviewed.      Procedures           ED Course  ED Course                  MDM  Number of Diagnoses or Management Options  Diverticulitis of intestine with bleeding, unspecified complication status, unspecified part of intestinal tract:   Lower GI bleed:   Diagnosis management comments: Previous CT shows diverticulosis with possible inflammation to suggest diverticulitis. Patient denies abdominal pain, nausea, vomiting, lightheadedness, chest pain.  Labs obtained. Hemoglobin dropped from 16.5 to 15.1 in the last 7 hours.  Discussed transfer with the patient as we do not have GI coverage. Told her that her blood levels will need to be checked to see if she continues dropping. Patient refusing transfer. Says that she will just follow up with her PCP on Monday and have lab work done at that time. Discussed risks of leaving with the patient. I told her her condition may worsen, she may loose too much blood, she may die. Patient understands the risks of refusing transfer for continued care. Patient told to return immediately if she has lightheadedness, dizziness, chest pain, abdominal pain, worsening bleeding, or she decides she does want transfer to [place] for further evaluation.         Amount and/or Complexity of Data Reviewed  Clinical lab tests: reviewed          Final diagnoses:   Lower GI bleed   Diverticulitis of intestine with bleeding, unspecified complication status, unspecified part of intestinal tract            Moon Morris MD  05/12/18 8284

## 2018-05-12 NOTE — ED PROVIDER NOTES
"Subjective   History of Present Illness  TRIAGE CHIEF COMPLAINT:   Chief Complaint   Patient presents with   • Rectal Bleeding         HPI: Marielena Moeller   is a 72 y.o. female   who presents to the emergency department complaining of Rectal bleeding.  Patient with a history of diabetes, hypertension, hypothyroidism, dyslipidemia, cholecystectomy, appendectomy, diverticulitis.  Patient describes onset of bright red blood per rectum earlier this evening.  Has some associated abdominal cramping.  Patient states the last time she expressed rectal bleeding she believes she was diagnosed with diverticulitis however she is uncertain.  Denies any recent illness or trauma.  Denies fever, chills, chest pain, cough, nausea, vomiting, dysuria.  She denies constipation or diarrhea and states she is mostly passing blood which she sits on the toilet.  She does not take blood thinners.           Review of Systems   All other systems reviewed and are negative.      Past Medical History:   Diagnosis Date   • Arthritis    • Bronchitis    • Colon polyp    • Diabetes mellitus    • Diverticulitis    • Hyperlipidemia    • Hypertension    • Osteoporosis    • Pancreatitis    • Thyroid disease        Allergies   Allergen Reactions   • Ciprofloxacin    • Contrast Dye    • Doxycycline Nausea And Vomiting     \"Pain worse than pancreatitis\"   • Erythromycin    • Prednisone        Past Surgical History:   Procedure Laterality Date   • APPENDECTOMY     •  SECTION     • CHOLECYSTECTOMY     • THYROIDECTOMY, PARTIAL     • TUBAL ABDOMINAL LIGATION         Family History   Problem Relation Age of Onset   • Arthritis Mother    • Diabetes Mother    • Osteoporosis Mother    • Arthritis Father    • Diabetes Father        Social History     Social History   • Marital status:      Social History Main Topics   • Smoking status: Former Smoker     Packs/day: 2.00     Years: 15.00     Types: Cigarettes   • Smokeless tobacco: Never Used   • " Alcohol use No   • Drug use: No   • Sexual activity: Defer     Other Topics Concern   • Not on file           Objective   Physical Exam          CONSTITUTIONAL: Awake, oriented, appears non-toxic   HENT: Atraumatic, normocephalic, oral mucosa pink and moist, airway patent. Nares patent without drainage. External ears normal.   EYES: Conjunctiva clear, EOMI, PERRL   NECK: Trachea midline, non-tender, supple   CARDIOVASCULAR: Normal heart rate, Normal rhythm, No murmurs, rubs, gallops   PULMONARY/CHEST: Clear to auscultation, no rhonchi, wheezes, or rales. Symmetrical breath sounds. Non-tender.   ABDOMINAL: Non-distended, soft, mild to moderate diffuse abdominal tenderness - no rebound or guarding. BS normal.  Rectal exam noteworthy for bright red blood   NEUROLOGIC: Non-focal, moving all four extremities, no gross sensory or motor deficits.   EXTREMITIES: No clubbing, cyanosis, or edema   SKIN: Warm, Dry, No erythema, No rash     CT Abdomen Pelvis Without Contrast    (Results Pending)           EKG:         Procedures           ED Course  ED Course        ED COURSE / MEDICAL DECISION MAKING:   Nursing notes reviewed.    Workup demonstrates acute diverticulitis with associated rectal bleeding.  Patient is nontoxic and would like to go home.  States she is unable to tolerate fluoroquinolones but has been treated with Augmentin for her diverticulitis in the past.  Plan to supplement this with Flagyl.  Close PCP follow-up on Monday versus return if worse.    DECISION TO DISCHARGE/ADMIT: see ED care timeline       Electronically signed by: Juan Antonio Aivla MD, 5/12/2018 2:10 AM                [facility]  Final diagnoses:   Diverticulitis of intestine without perforation or abscess with bleeding, unspecified part of intestinal tract            Juan Antonio Avila MD  05/12/18 0211

## 2018-05-14 ENCOUNTER — APPOINTMENT (OUTPATIENT)
Dept: LAB | Facility: HOSPITAL | Age: 73
End: 2018-05-14
Attending: INTERNAL MEDICINE

## 2018-05-14 ENCOUNTER — OFFICE VISIT (OUTPATIENT)
Dept: INTERNAL MEDICINE | Facility: CLINIC | Age: 73
End: 2018-05-14

## 2018-05-14 ENCOUNTER — RESULTS ENCOUNTER (OUTPATIENT)
Dept: PULMONOLOGY | Facility: CLINIC | Age: 73
End: 2018-05-14

## 2018-05-14 VITALS
BODY MASS INDEX: 35.68 KG/M2 | WEIGHT: 177 LBS | HEIGHT: 59 IN | DIASTOLIC BLOOD PRESSURE: 80 MMHG | OXYGEN SATURATION: 86 % | TEMPERATURE: 97.9 F | SYSTOLIC BLOOD PRESSURE: 140 MMHG | HEART RATE: 95 BPM

## 2018-05-14 DIAGNOSIS — K62.5 RECTAL BLEED: Primary | ICD-10-CM

## 2018-05-14 DIAGNOSIS — J30.89 OTHER ALLERGIC RHINITIS: ICD-10-CM

## 2018-05-14 DIAGNOSIS — R91.8 LUNG NODULES: ICD-10-CM

## 2018-05-14 DIAGNOSIS — R06.02 SHORTNESS OF BREATH: ICD-10-CM

## 2018-05-14 DIAGNOSIS — J45.40 MODERATE PERSISTENT ASTHMA WITHOUT COMPLICATION: ICD-10-CM

## 2018-05-14 PROBLEM — K57.92 DIVERTICULITIS: Status: ACTIVE | Noted: 2018-05-14

## 2018-05-14 PROCEDURE — 86003 ALLG SPEC IGE CRUDE XTRC EA: CPT | Performed by: INTERNAL MEDICINE

## 2018-05-14 PROCEDURE — 82785 ASSAY OF IGE: CPT | Performed by: INTERNAL MEDICINE

## 2018-05-14 PROCEDURE — 99214 OFFICE O/P EST MOD 30 MIN: CPT | Performed by: INTERNAL MEDICINE

## 2018-05-14 PROCEDURE — 82103 ALPHA-1-ANTITRYPSIN TOTAL: CPT | Performed by: INTERNAL MEDICINE

## 2018-05-14 PROCEDURE — 82104 ALPHA-1-ANTITRYPSIN PHENO: CPT | Performed by: INTERNAL MEDICINE

## 2018-05-14 PROCEDURE — 36415 COLL VENOUS BLD VENIPUNCTURE: CPT | Performed by: INTERNAL MEDICINE

## 2018-05-14 NOTE — PROGRESS NOTES
Subjective  Marielena Moeller is a 72 y.o. female    HPI coming in for evaluation recent ER visit for rectal bleeding on 3 separate occasions quantifies it as small amounts of up to 10 mL at a time. No melena. Denies associated lightheadedness dizziness. Workup included a CT of her abdomen and pelvis with evidence of mild colitis. She was placed empirically on Augmentin along with Flagyl. She is doing somewhat better has not had further bleeding denies significant abdominal pain no fever or chills.  Incidental finding noted on CT of her abdomen is the presence of lung nodules. Has had a history of tobacco use in the past no recent hemoptysis denies change in her respiratory status    The following portions of the patient's history were reviewed and updated as appropriate: allergies, current medications, past family history, past medical history, past social history, past surgical history, and problem list.     Review of Systems   Constitutional: Positive for fatigue. Negative for activity change, appetite change and fever.   HENT: Negative for congestion, ear discharge, ear pain and trouble swallowing.    Eyes: Negative for photophobia and visual disturbance.   Respiratory: Positive for shortness of breath. Negative for cough.    Cardiovascular: Negative for chest pain and palpitations.   Gastrointestinal: Positive for blood in stool. Negative for abdominal distention, abdominal pain, constipation, diarrhea, nausea and vomiting.   Endocrine: Negative.    Genitourinary: Negative for dysuria, hematuria and urgency.   Musculoskeletal: Positive for arthralgias. Negative for back pain, joint swelling and myalgias.   Skin: Negative for color change and rash.   Allergic/Immunologic: Negative.    Neurological: Negative for dizziness, weakness, light-headedness and headaches.   Hematological: Negative for adenopathy. Does not bruise/bleed easily.   Psychiatric/Behavioral: Negative for agitation, confusion and dysphoric  "mood. The patient is not nervous/anxious.        Visit Vitals  /80   Pulse 95   Temp 97.9 °F (36.6 °C)   Ht 149.9 cm (59\")   Wt 80.3 kg (177 lb)   SpO2 (!) 86% Comment: WALKING 2 MIN   BMI 35.75 kg/m²       Objective  Physical Exam   Constitutional: She is oriented to person, place, and time. She appears well-developed and well-nourished. No distress.   HENT:   Nose: Nose normal.   Mouth/Throat: Oropharynx is clear and moist.   Eyes: Conjunctivae and EOM are normal. No scleral icterus.   Neck: No tracheal deviation present. No thyromegaly present.   Cardiovascular: Normal rate and regular rhythm.  Exam reveals no friction rub.    No murmur heard.  Pulmonary/Chest: No respiratory distress. She has no wheezes. She has no rales.   Abdominal: Soft. She exhibits no distension and no mass. There is no tenderness. There is no guarding.   Ventral hernia   Musculoskeletal: Normal range of motion. She exhibits no deformity.   Lymphadenopathy:     She has no cervical adenopathy.   Neurological: She is alert and oriented to person, place, and time. She has normal reflexes. No cranial nerve deficit. Coordination normal.   Skin: Skin is warm and dry. No rash noted. No erythema.   Psychiatric: She has a normal mood and affect. Her behavior is normal. Judgment and thought content normal.       Diagnoses and all orders for this visit:    Rectal bleed appears to have resolved over the last 3 days. On antibiotic therapy for suspected colitis. Advised to complete course of her medication. We will need colonoscopy once her symptoms resolve and she is stable. Reviewed lab work from ER appeared to be stable.  Will set up referral with gastroenterology  Pulmonary nodules. Reviewed prior CT presence of nodules noted then also. She is following up with pulmonary medicine.      "

## 2018-05-17 ENCOUNTER — TELEPHONE (OUTPATIENT)
Dept: PULMONOLOGY | Facility: CLINIC | Age: 73
End: 2018-05-17

## 2018-05-17 NOTE — TELEPHONE ENCOUNTER
CALLED AND LEFT MESSAGE FOR PATIENT IF SHE HAD ANY QUESTIONS TO GIVE US A CALL BACK.    ----- Message from Oniel Sanchez MD sent at 5/17/2018 12:17 PM EDT -----  As of now, she DOES NOT qualify for oxygen.    If needed, we may do a walk test when she comes back.     Thanks.     ----- Message -----  From: Carolee Zavala MA  Sent: 5/14/2018   1:30 PM  To: Oniel Sanchez MD    PATIENT SAID THAT SHE CANNOT TAKE ANY STEROIDS AND THAT SHE HAS FELT BETTER NOT TAKING THE BREO. SHE IS ALSO PRETTY ADAMENT THAT YOU WANTED HER TO HAVE OXYGEN, SHE SAID THAT YOU HAD MENTIONED IT AND THAT SHE NEEDED IT.  THANKS   ----- Message -----  From: Oniel Sanchez MD  Sent: 5/12/2018  12:11 PM  To: Carolee Zavala MA    Tell her to NOT to take Breo and we will see.     ----- Message -----  From: Carolee Zavala MA  Sent: 5/10/2018   1:58 PM  To: Oniel Sanchez MD    SHE WAS SEEN YESTERDAY SHE CALLED STATING THAT SHE HAS HAD A HEADACHE AND HAD JERKING MOVEMENTS IN HER HANDS FROM SHE SAID SHE JUST TOOK THE ONE DOSE OF HER BREO 200 AND IT CAUSED THESE SHE SAID SHE WANTS ANOTHER INHALER SHE SAID SHE NEEDS SOMETHING OF THE LOWEST DOSE TO TAKE AS SHE DOES NOT HAVE NORMAL REACTIONS TO MEDICATIONS.    THANKS

## 2018-05-18 LAB
A ALTERNATA IGE QN: <0.1 KU/L
A FUMIGATUS IGE QN: <0.1 KU/L
A1AT SERPL-MCNC: 97 MG/DL (ref 90–200)
AMER ROACH IGE QN: 0.25 KU/L
BAHIA GRASS IGE QN: <0.1 KU/L
BERMUDA GRASS IGE QN: <0.1 KU/L
BOXELDER IGE QN: <0.1 KU/L
C HERBARUM IGE QN: <0.1 KU/L
CAT DANDER IGG QN: <0.1 KU/L
CMN PIGWEED IGE QN: <0.1 KU/L
COMMON RAGWEED IGE QN: <0.1 KU/L
CONV CLASS DESCRIPTION: ABNORMAL
D FARINAE IGE QN: 0.26 KU/L
D PTERONYSS IGE QN: 0.33 KU/L
DOG DANDER IGE QN: <0.1 KU/L
ENGL PLANTAIN IGE QN: <0.1 KU/L
HAZELNUT POLN IGE QN: <0.1 KU/L
JOHNSON GRASS IGE QN: <0.1 KU/L
KENT BLUE GRASS IGE QN: <0.1 KU/L
M RACEMOSUS IGE QN: <0.1 KU/L
MT JUNIPER IGE QN: 0.11 KU/L
MUGWORT IGE QN: <0.1 KU/L
NETTLE IGE QN: <0.1 KU/L
P NOTATUM IGE QN: <0.1 KU/L
PHENOTYPE: NORMAL
S BOTRYOSUM IGE QN: <0.1 KU/L
SHEEP SORREL IGE QN: <0.1 KU/L
SWEET GUM IGE QN: <0.1 KU/L
T011-IGE MAPLE LEAF SYCAMORE: <0.1 KU/L
WHITE ELM IGE QN: <0.1 KU/L
WHITE HICKORY IGE QN: <0.1 KU/L
WHITE MULBERRY IGE QN: <0.1 KU/L
WHITE OAK IGE QN: <0.1 KU/L

## 2018-05-19 LAB — TOTAL IGE SMQN RAST: 26 IU/ML (ref 0–100)

## 2018-05-31 ENCOUNTER — APPOINTMENT (OUTPATIENT)
Dept: SLEEP MEDICINE | Facility: HOSPITAL | Age: 73
End: 2018-05-31
Attending: INTERNAL MEDICINE

## 2018-06-04 ENCOUNTER — OFFICE VISIT (OUTPATIENT)
Dept: INTERNAL MEDICINE | Facility: CLINIC | Age: 73
End: 2018-06-04

## 2018-06-04 VITALS
TEMPERATURE: 98.9 F | HEART RATE: 82 BPM | HEIGHT: 59 IN | SYSTOLIC BLOOD PRESSURE: 122 MMHG | DIASTOLIC BLOOD PRESSURE: 74 MMHG | BODY MASS INDEX: 36.08 KG/M2 | WEIGHT: 179 LBS | OXYGEN SATURATION: 94 % | RESPIRATION RATE: 14 BRPM

## 2018-06-04 DIAGNOSIS — M25.551 PAIN OF RIGHT HIP JOINT: ICD-10-CM

## 2018-06-04 DIAGNOSIS — R06.09 DYSPNEA ON EXERTION: ICD-10-CM

## 2018-06-04 DIAGNOSIS — I10 ESSENTIAL HYPERTENSION: Chronic | ICD-10-CM

## 2018-06-04 DIAGNOSIS — R09.02 HYPOXIA: ICD-10-CM

## 2018-06-04 DIAGNOSIS — R91.8 LUNG NODULES: ICD-10-CM

## 2018-06-04 DIAGNOSIS — K62.5 BRBPR (BRIGHT RED BLOOD PER RECTUM): Primary | ICD-10-CM

## 2018-06-04 PROCEDURE — 99214 OFFICE O/P EST MOD 30 MIN: CPT | Performed by: FAMILY MEDICINE

## 2018-06-04 NOTE — PROGRESS NOTES
"Subjective    Marielena Moeller is a 72 y.o. female here for:  Chief Complaint   Patient presents with   • Back Pain   • Hip Pain   • Discuss test results     History of Present Illness   Seen by Dr. Sanchez. Recommended she get out of house with mold. He mentioned oxygen use 24/7. She took a breathing treatment, she felt \"sick inside\" until later that evening after the treatment. Jittery, sick feeing. She felt the same with Breo. She's not tried the rescue inhaler. She continues to be winded easily with little exertion. She was walked in office last time and oxygen dropped to 86%. Unfortunately, they did not put oxygen on after walking.    Has had gastrointestinal bleeding since last visit. Has had two ER visits for bright red blood per rectum that was pouring out. She could \"feel it coming out\".  It continued after the ER visits but tapered off. Currently no bleeding. She had a colonoscopy in 2014 with Dr. Urban, two polyps removed. Scheduled to see GI on 7/5/18.    Having more pain in back and hip. \"Killing me\". She previously got Toradol shots and it helped greatly. She was not aware NSAIDs could increase bleeding risk. Taking aleve and tylenol at home.     Blood pressure remains controlled on current medicine, therapy changed after bout of pancreatitis due to HCTZ.    The following portions of the patient's history were reviewed and updated as appropriate: allergies, current medications, past family history, past medical history, past social history, past surgical history and problem list.    Review of Systems   Constitutional: Positive for fatigue.   Respiratory: Positive for shortness of breath.    Musculoskeletal: Positive for arthralgias and gait problem.       Vitals:    06/04/18 1453   BP: 122/74   Pulse: 82   Resp: 14   Temp: 98.9 °F (37.2 °C)   SpO2: 94%   Weight: 81.2 kg (179 lb)   Height: 149.9 cm (59\")         Objective   Physical Exam   Constitutional: She is oriented to person, place, and time. " Vital signs are normal. She appears well-developed and well-nourished. She is active.  Non-toxic appearance. She does not appear ill. No distress. She is obese.  HENT:   Head: Normocephalic and atraumatic.   Right Ear: Hearing normal.   Left Ear: Hearing normal.   Mouth/Throat: Mucous membranes are not dry.   Eyes: EOM are normal. No scleral icterus.   Neck: Neck supple.   Pulmonary/Chest: Effort normal.   Neurological: She is alert and oriented to person, place, and time. No cranial nerve deficit.   Skin: Skin is warm. She is not diaphoretic.   Psychiatric: She has a normal mood and affect. Her behavior is normal.   Nursing note and vitals reviewed.      Reviewed ct abdomen/pelvis report 5/12/18 as well as labs. Hgb normal range last check.    Assessment/Plan     Problem List Items Addressed This Visit        Cardiovascular and Mediastinum    Essential hypertension (Chronic)    Overview     · No HCTZ due to pancreatitis early 2018.         Current Assessment & Plan     Hypertension is improving with treatment.  Continue current treatment regimen.  Blood pressure will be reassessed at the next regular appointment.           Other Visit Diagnoses     BRBPR (bright red blood per rectum)    -  Primary    Hypoxia        Relevant Orders    Oxygen Therapy    Dyspnea on exertion        Relevant Orders    Oxygen Therapy    Lung nodules        Relevant Orders    Oxygen Therapy    Pain of right hip joint              · Follow up with GI as scheduled, likely needs repeat colonoscopy and/or EGD.   · Follow up with pulmonary as well, discussed inhalers. She declines further inhalers at this time  · 6 minute walk completed  Oxygen Saturation at rest on room air 94%, initially, then 91% prior to walking (had been sitting in room at rest)  Walked on room air 2.5  minutes oxygen saturation dropped to 88%  Applied oxygen at 2L/min   At  2 minutes with oxygen at 2L/min oxygen saturation up to 98%. She felt better with oxygen therapy  and clearly would benefit from supplemental oxygen.  · Need to consider repeat labs if bleeding resumes  · Discouraged use of NSAIDs at this time due to bleeding risk    Return for As scheduled previously.    Marychuy Starr MD    Please note that portions of this note may have been completed with a voice recognition program. Efforts were made to edit dictation, but occasionally words are mistranscribed.

## 2018-07-05 ENCOUNTER — RESULTS ENCOUNTER (OUTPATIENT)
Dept: GASTROENTEROLOGY | Facility: CLINIC | Age: 73
End: 2018-07-05

## 2018-07-05 ENCOUNTER — OFFICE VISIT (OUTPATIENT)
Dept: GASTROENTEROLOGY | Facility: CLINIC | Age: 73
End: 2018-07-05

## 2018-07-05 ENCOUNTER — PREP FOR SURGERY (OUTPATIENT)
Dept: OTHER | Facility: HOSPITAL | Age: 73
End: 2018-07-05

## 2018-07-05 VITALS
RESPIRATION RATE: 18 BRPM | HEART RATE: 89 BPM | DIASTOLIC BLOOD PRESSURE: 84 MMHG | HEIGHT: 59 IN | SYSTOLIC BLOOD PRESSURE: 161 MMHG | TEMPERATURE: 98.6 F | BODY MASS INDEX: 35.48 KG/M2 | WEIGHT: 176 LBS

## 2018-07-05 DIAGNOSIS — K92.1 HEMATOCHEZIA: ICD-10-CM

## 2018-07-05 DIAGNOSIS — R10.13 EPIGASTRIC PAIN: ICD-10-CM

## 2018-07-05 DIAGNOSIS — K59.00 CONSTIPATION: ICD-10-CM

## 2018-07-05 DIAGNOSIS — Z86.010 HISTORY OF COLON POLYPS: ICD-10-CM

## 2018-07-05 DIAGNOSIS — Z12.11 COLON CANCER SCREENING: ICD-10-CM

## 2018-07-05 DIAGNOSIS — K62.5 BRBPR (BRIGHT RED BLOOD PER RECTUM): Primary | ICD-10-CM

## 2018-07-05 DIAGNOSIS — K43.9 VENTRAL HERNIA WITHOUT OBSTRUCTION OR GANGRENE: ICD-10-CM

## 2018-07-05 DIAGNOSIS — K92.1 HEMATOCHEZIA: Primary | ICD-10-CM

## 2018-07-05 PROCEDURE — 99204 OFFICE O/P NEW MOD 45 MIN: CPT | Performed by: INTERNAL MEDICINE

## 2018-07-05 NOTE — PATIENT INSTRUCTIONS
Check CBC.  Colonoscopy: Description of the procedure, risks benefits alternatives and options including non-operative options were discussed with the patient in detail.  The patient understands and wishes to proceed.  Upper endoscopy (EGD) counseling:  Description of the procedure, risks, benefits, alternatives and options including nonoperative options were discussed with the patient in detail.  The patient understands and wishes to proceed.

## 2018-07-05 NOTE — PROGRESS NOTES
"Chief Complaint   Patient presents with   • Hematochezia     History of Present Illness     The patient has history of bright red blood per rectum in May 2018.  This is described as severe.  Quantity being a 1/2 a cup at a time.  Frequency being several times a day. The symptoms lasted for about 2 days. There is no history of associated dizziness.  The patient denies anorectal pain.  There was no associated abdominal pain. The patient presented to [facility] emergency room. However later the patient was discharged. She denies recurrence of bright red blood per rectum. The patient denies associated diarrhea. She denies taking blood thinners.  The patient has a history of constipation off and on for the last 3-4 years. Severity is mild. This is described as firm stools perhaps one bowel movement every other day. The patient takes occasional stool softeners and laxatives to have a bowel movement. There is no associated rectal pain.    There is history of epigastric abdominal pain off and on for the last 2-3 months.  The pain is gradual in sudden in onset when she touches the patient encounter against the abdominal wall,  pain is mild to moderate moderate in severity and aching and at times sharp  in character.  Frequency being 2-3 times a day.  The pain may last for several minutes.  There is no radiation of abdominal pain.  Eating worsens the abdominal pain.  No definite relieving factors of abdominal pain.  There is no associated nausea or vomiting.  The patient denies hematemesis or melena. There is no reflux. She denies dysphagia or odynophagia. She denies recent weight loss.  The patient has history of \"diverticulitis off and on in the past over several years\".  The patient had a colonoscopy in 2014 by Dr. Urban from surgical service. She claims that she was found to have couple of polyps.     There is no family history of colon cancer, liver or inflammatory bowel disease.  There is history of acute " pancreatitis in 2018. She was told that cause being hydrochlorothiazide. This was discontinued.     Review of Systems   Constitutional: Negative for appetite change, chills, fatigue, fever and unexpected weight change.   HENT: Negative for mouth sores, nosebleeds and trouble swallowing.    Eyes: Negative for discharge and redness.   Respiratory: Negative for apnea, cough and shortness of breath.    Cardiovascular: Positive for palpitations. Negative for chest pain and leg swelling.   Gastrointestinal: Positive for blood in stool and constipation. Negative for abdominal distention, abdominal pain, anal bleeding, diarrhea, nausea and vomiting.   Endocrine: Negative for cold intolerance, heat intolerance and polydipsia.   Genitourinary: Negative for dysuria, hematuria and urgency.   Musculoskeletal: Positive for arthralgias and back pain. Negative for joint swelling and myalgias.   Skin: Negative for rash.   Allergic/Immunologic: Negative for food allergies and immunocompromised state.   Neurological: Positive for headaches. Negative for dizziness, seizures and syncope.   Hematological: Negative for adenopathy. Bruises/bleeds easily.   Psychiatric/Behavioral: Negative for dysphoric mood. The patient is not nervous/anxious and is not hyperactive.      Patient Active Problem List   Diagnosis   • Type 2 diabetes mellitus with hyperglycemia, with long-term current use of insulin (CMS/[facility])   • Essential hypertension   • Osteoporosis   • Class 2 obesity with body mass index (BMI) of 35.0 to 35.9 in adult   • Diverticulitis     Past Medical History:   Diagnosis Date   • Arthritis    • Asthma    • Bronchitis    • Colon polyp    • Diabetes mellitus (CMS/HCC)    • Diverticulitis    • Hyperlipidemia    • Hypertension    • Leaky heart valve    • Osteoporosis    • Palpitations    • Pancreatitis    • Thyroid disease      Past Surgical History:   Procedure Laterality Date   • APPENDECTOMY     •  SECTION     •  "CHOLECYSTECTOMY  2004   • THYROIDECTOMY, PARTIAL Left 2011   • TUBAL ABDOMINAL LIGATION  1980     Family History   Problem Relation Age of Onset   • Arthritis Mother    • Diabetes Mother    • Osteoporosis Mother    • Arthritis Father    • Diabetes Father      Social History   Substance Use Topics   • Smoking status: Former Smoker     Packs/day: 2.00     Years: 15.00     Types: Cigarettes     Quit date: 1975   • Smokeless tobacco: Never Used   • Alcohol use No       Current Outpatient Prescriptions:   •  albuterol (VENTOLIN HFA) 108 (90 Base) MCG/ACT inhaler, Inhale 2 puffs Every 4 (Four) Hours As Needed for Wheezing or Shortness of Air., Disp: 1 inhaler, Rfl: 5  •  aspirin 81 MG tablet, Take 81 mg by mouth. Every other day, Disp: , Rfl:   •  BD INSULIN SYRINGE ULTRAFINE 31G X 15/64\" 0.5 ML misc, USE AS DIRECTED TO INJECT INSULIN FOR DM E11.9, Disp: 300 each, Rfl: 3  •  Blood Glucose Monitoring Suppl (ACCU-CHEK CHIO PLUS) w/Device kit, USE AS DIRECTED TO CHECK GLUCOSE 3 TIMES DAILY FOR DM E11.65, Disp: 1 kit, Rfl: 0  •  Cholecalciferol (VITAMIN D3) 5000 units capsule capsule, Take 1 capsule by mouth Daily., Disp: 90 capsule, Rfl: 3  •  glucose blood (ACCU-CHEK CHIO PLUS) test strip, USE AS DIRECTED TO CHECK GLUCOSE 3 TIMES DAILY FOR DM E11.9, Disp: 100 each, Rfl: 12  •  levothyroxine (SYNTHROID, LEVOTHROID) 100 MCG tablet, Take 1 tablet by mouth Daily., Disp: 90 tablet, Rfl: 3  •  Magnesium Oxide 400 (240 Mg) MG tablet, Take 1 tablet by mouth every night at bedtime., Disp: 90 tablet, Rfl: 3  •  NOVOLOG MIX 70/30 (70-30) 100 UNIT/ML injection, Inject 70 Units under the skin 2 (Two) Times a Day With Meals., Disp: 126 mL, Rfl: 0  •  O2 (OXYGEN), Inhale 2 L/min As Needed., Disp: , Rfl:   •  spironolactone (ALDACTONE) 25 MG tablet, Take 1 tablet by mouth Daily., Disp: 30 tablet, Rfl: 2    Allergies   Allergen Reactions   • Ciprofloxacin Hives   • Contrast Dye Unknown (See Comments)     Don't remember-been a long time " "  • Doxycycline Nausea And Vomiting     \"Pain worse than pancreatitis\"   • Prednisone Mental Status Change   • Erythromycin Hives and Rash       Blood pressure 161/84, pulse 89, temperature 98.6 °F (37 °C), resp. rate 18, height 149.9 cm (59.02\"), weight 79.8 kg (176 lb).    Physical Exam   Constitutional: She is oriented to person, place, and time. She appears well-developed and well-nourished. No distress.   HENT:   Head: Normocephalic and atraumatic.   Right Ear: Hearing and external ear normal.   Left Ear: Hearing and external ear normal.   Nose: Nose normal.   Mouth/Throat: Oropharynx is clear and moist and mucous membranes are normal. Mucous membranes are not pale, not dry and not cyanotic. No oral lesions. No oropharyngeal exudate.   Eyes: Conjunctivae and EOM are normal. Right eye exhibits no discharge. Left eye exhibits no discharge. No scleral icterus.   Neck: Trachea normal. Neck supple. No JVD present. No edema present. No thyroid mass and no thyromegaly present.   Cardiovascular: Normal rate, regular rhythm, S2 normal and normal heart sounds.  Exam reveals no gallop, no S3 and no friction rub.    No murmur heard.  Pulmonary/Chest: Effort normal and breath sounds normal. No respiratory distress. She has no wheezes. She has no rales. She exhibits no tenderness.   Abdominal: Soft. Normal appearance and bowel sounds are normal. She exhibits no distension, no ascites and no mass. There is no splenomegaly or hepatomegaly. There is tenderness in the epigastric area. There is no rigidity, no rebound and no guarding. A hernia is present. Hernia confirmed positive in the ventral area.   Musculoskeletal: She exhibits no tenderness or deformity.     Vascular Status -  Her right foot exhibits no edema. Her left foot exhibits no edema.  Lymphadenopathy:     She has no cervical adenopathy.        Left: No supraclavicular adenopathy present.   Neurological: She is alert and oriented to person, place, and time. She has " normal strength. No cranial nerve deficit or sensory deficit. She exhibits normal muscle tone. Coordination normal.   Skin: No rash noted. She is not diaphoretic. No cyanosis. No pallor. Nails show no clubbing.   Psychiatric: She has a normal mood and affect. Her behavior is normal. Judgment and thought content normal.   Nursing note and vitals reviewed.  Stigmata of chronic liver disease:  None.  Asterixis:  None.    Assessment:      ICD-10-CM ICD-9-CM   1. Hematochezia K92.1 578.1   2. Epigastric pain R10.13 789.06   3. Ventral hernia without obstruction or gangrene K43.9 553.20         Discussion:  1. Differential includes diverticular bleed, bleeding from vascular ectasia and less likely ischemic colitis. Concerns regarding underlying colonic neoplastic disease remain.    Plan/  Patient Instructions   Check CBC.  Colonoscopy: Description of the procedure, risks benefits alternatives and options including non-operative options were discussed with the patient in detail.  The patient understands and wishes to proceed.  Upper endoscopy (EGD) counseling:  Description of the procedure, risks, benefits, alternatives and options including nonoperative options were discussed with the patient in detail.  The patient understands and wishes to proceed.       Glenn Modi MD

## 2018-07-09 ENCOUNTER — APPOINTMENT (OUTPATIENT)
Dept: LAB | Facility: HOSPITAL | Age: 73
End: 2018-07-09
Attending: INTERNAL MEDICINE

## 2018-07-09 DIAGNOSIS — E11.65 TYPE 2 DIABETES MELLITUS WITH HYPERGLYCEMIA, WITH LONG-TERM CURRENT USE OF INSULIN (HCC): ICD-10-CM

## 2018-07-09 DIAGNOSIS — Z79.4 TYPE 2 DIABETES MELLITUS WITH HYPERGLYCEMIA, WITH LONG-TERM CURRENT USE OF INSULIN (HCC): ICD-10-CM

## 2018-07-09 LAB
BASOPHILS # BLD AUTO: 0.05 10*3/MM3 (ref 0–0.2)
BASOPHILS NFR BLD AUTO: 0.6 % (ref 0–2.5)
DEPRECATED RDW RBC AUTO: 42.1 FL (ref 37–54)
EOSINOPHIL # BLD AUTO: 0.15 10*3/MM3 (ref 0–0.7)
EOSINOPHIL NFR BLD AUTO: 1.9 % (ref 0–7)
ERYTHROCYTE [DISTWIDTH] IN BLOOD BY AUTOMATED COUNT: 11.9 % (ref 11.5–14.5)
HCT VFR BLD AUTO: 45.6 % (ref 37–47)
HGB BLD-MCNC: 15.2 G/DL (ref 12–16)
IMM GRANULOCYTES # BLD: 0.05 10*3/MM3 (ref 0–0.06)
IMM GRANULOCYTES NFR BLD: 0.6 % (ref 0–0.6)
LYMPHOCYTES # BLD AUTO: 1.48 10*3/MM3 (ref 0.6–3.4)
LYMPHOCYTES NFR BLD AUTO: 18.5 % (ref 10–50)
MCH RBC QN AUTO: 31.9 PG (ref 27–31)
MCHC RBC AUTO-ENTMCNC: 33.3 G/DL (ref 30–37)
MCV RBC AUTO: 95.6 FL (ref 81–99)
MONOCYTES # BLD AUTO: 0.48 10*3/MM3 (ref 0–0.9)
MONOCYTES NFR BLD AUTO: 6 % (ref 0–12)
NEUTROPHILS # BLD AUTO: 5.78 10*3/MM3 (ref 2–6.9)
NEUTROPHILS NFR BLD AUTO: 72.4 % (ref 37–80)
NRBC BLD MANUAL-RTO: 0 /100 WBC (ref 0–0)
PLATELET # BLD AUTO: 244 10*3/MM3 (ref 130–400)
PMV BLD AUTO: 10.2 FL (ref 6–12)
RBC # BLD AUTO: 4.77 10*6/MM3 (ref 4.2–5.4)
WBC NRBC COR # BLD: 7.99 10*3/MM3 (ref 4.8–10.8)

## 2018-07-09 PROCEDURE — 36415 COLL VENOUS BLD VENIPUNCTURE: CPT | Performed by: INTERNAL MEDICINE

## 2018-07-09 PROCEDURE — 85025 COMPLETE CBC W/AUTO DIFF WBC: CPT | Performed by: INTERNAL MEDICINE

## 2018-07-09 RX ORDER — INSULIN ASPART 100 [IU]/ML
70 INJECTION, SUSPENSION SUBCUTANEOUS 2 TIMES DAILY WITH MEALS
Qty: 126 ML | Refills: 3 | Status: SHIPPED | OUTPATIENT
Start: 2018-07-09 | End: 2018-09-10 | Stop reason: SDUPTHER

## 2018-07-17 ENCOUNTER — TELEPHONE (OUTPATIENT)
Dept: INTERNAL MEDICINE | Facility: CLINIC | Age: 73
End: 2018-07-17

## 2018-07-24 ENCOUNTER — OFFICE VISIT (OUTPATIENT)
Dept: INTERNAL MEDICINE | Facility: CLINIC | Age: 73
End: 2018-07-24

## 2018-07-24 VITALS
SYSTOLIC BLOOD PRESSURE: 130 MMHG | OXYGEN SATURATION: 95 % | BODY MASS INDEX: 35.58 KG/M2 | HEART RATE: 78 BPM | TEMPERATURE: 98 F | DIASTOLIC BLOOD PRESSURE: 80 MMHG | HEIGHT: 59 IN | WEIGHT: 176.5 LBS

## 2018-07-24 DIAGNOSIS — E03.9 ACQUIRED HYPOTHYROIDISM: Primary | ICD-10-CM

## 2018-07-24 DIAGNOSIS — R60.9 PERIPHERAL EDEMA: ICD-10-CM

## 2018-07-24 DIAGNOSIS — Z79.4 TYPE 2 DIABETES MELLITUS WITH HYPERGLYCEMIA, WITH LONG-TERM CURRENT USE OF INSULIN (HCC): ICD-10-CM

## 2018-07-24 DIAGNOSIS — R06.09 DYSPNEA ON EXERTION: ICD-10-CM

## 2018-07-24 DIAGNOSIS — I10 ESSENTIAL HYPERTENSION: Chronic | ICD-10-CM

## 2018-07-24 DIAGNOSIS — E55.9 VITAMIN D DEFICIENCY: ICD-10-CM

## 2018-07-24 DIAGNOSIS — E11.65 TYPE 2 DIABETES MELLITUS WITH HYPERGLYCEMIA, WITH LONG-TERM CURRENT USE OF INSULIN (HCC): ICD-10-CM

## 2018-07-24 LAB
25(OH)D3+25(OH)D2 SERPL-MCNC: 32.7 NG/ML
ALBUMIN SERPL-MCNC: 4 G/DL (ref 3.5–5)
ALBUMIN/GLOB SERPL: 1.4 G/DL (ref 1–2)
ALP SERPL-CCNC: 84 U/L (ref 38–126)
ALT SERPL-CCNC: 29 U/L (ref 13–69)
AST SERPL-CCNC: 23 U/L (ref 15–46)
BASOPHILS # BLD AUTO: 0.06 10*3/MM3 (ref 0–0.2)
BASOPHILS NFR BLD AUTO: 0.7 % (ref 0–2.5)
BILIRUB SERPL-MCNC: 0.3 MG/DL (ref 0.2–1.3)
BUN SERPL-MCNC: 15 MG/DL (ref 7–20)
BUN/CREAT SERPL: 21.4 (ref 7.1–23.5)
CALCIUM SERPL-MCNC: 10.3 MG/DL (ref 8.4–10.2)
CHLORIDE SERPL-SCNC: 99 MMOL/L (ref 98–107)
CO2 SERPL-SCNC: 34 MMOL/L (ref 26–30)
CREAT SERPL-MCNC: 0.7 MG/DL (ref 0.6–1.3)
EOSINOPHIL # BLD AUTO: 0.14 10*3/MM3 (ref 0–0.7)
EOSINOPHIL NFR BLD AUTO: 1.6 % (ref 0–7)
ERYTHROCYTE [DISTWIDTH] IN BLOOD BY AUTOMATED COUNT: 12.1 % (ref 11.5–14.5)
GLOBULIN SER CALC-MCNC: 2.9 GM/DL
GLUCOSE SERPL-MCNC: 220 MG/DL (ref 74–98)
HBA1C MFR BLD: 8.9 %
HCT VFR BLD AUTO: 46.1 % (ref 37–47)
HGB BLD-MCNC: 15.4 G/DL (ref 12–16)
IMM GRANULOCYTES # BLD: 0.05 10*3/MM3 (ref 0–0.06)
IMM GRANULOCYTES NFR BLD: 0.6 % (ref 0–0.6)
LYMPHOCYTES # BLD AUTO: 1.95 10*3/MM3 (ref 0.6–3.4)
LYMPHOCYTES NFR BLD AUTO: 21.8 % (ref 10–50)
MCH RBC QN AUTO: 31.9 PG (ref 27–31)
MCHC RBC AUTO-ENTMCNC: 33.4 G/DL (ref 30–37)
MCV RBC AUTO: 95.4 FL (ref 81–99)
MONOCYTES # BLD AUTO: 0.53 10*3/MM3 (ref 0–0.9)
MONOCYTES NFR BLD AUTO: 5.9 % (ref 0–12)
NEUTROPHILS # BLD AUTO: 6.22 10*3/MM3 (ref 2–6.9)
NEUTROPHILS NFR BLD AUTO: 69.4 % (ref 37–80)
NRBC BLD AUTO-RTO: 0 /100 WBC (ref 0–0)
PLATELET # BLD AUTO: 246 10*3/MM3 (ref 130–400)
POTASSIUM SERPL-SCNC: 4.4 MMOL/L (ref 3.5–5.1)
PROT SERPL-MCNC: 6.9 G/DL (ref 6.3–8.2)
RBC # BLD AUTO: 4.83 10*6/MM3 (ref 4.2–5.4)
SODIUM SERPL-SCNC: 138 MMOL/L (ref 137–145)
T4 FREE SERPL-MCNC: 1.45 NG/DL (ref 0.78–2.19)
TSH SERPL DL<=0.005 MIU/L-ACNC: 2.53 MIU/ML (ref 0.47–4.68)
WBC # BLD AUTO: 8.95 10*3/MM3 (ref 4.8–10.8)

## 2018-07-24 PROCEDURE — 99214 OFFICE O/P EST MOD 30 MIN: CPT | Performed by: FAMILY MEDICINE

## 2018-07-24 RX ORDER — SPIRONOLACTONE 25 MG/1
25 TABLET ORAL DAILY
Qty: 90 TABLET | Refills: 3 | Status: SHIPPED | OUTPATIENT
Start: 2018-07-24 | End: 2019-08-02 | Stop reason: SINTOL

## 2018-07-24 NOTE — PROGRESS NOTES
Subjective    Marielena Moeller is a 72 y.o. female here for:  Chief Complaint   Patient presents with   • Diabetes     3 month follow up for DM and HTN   • Hypertension     Diabetes   She presents for her follow-up diabetic visit. She has type 2 diabetes mellitus. Her disease course has been fluctuating. Associated symptoms include fatigue. Pertinent negatives for diabetes include no chest pain. Pertinent negatives for diabetic complications include no CVA. Risk factors for coronary artery disease include stress, post-menopausal, sedentary lifestyle, hypertension, obesity and diabetes mellitus. Current diabetic treatment includes insulin injections. She is compliant with treatment most of the time. An ACE inhibitor/angiotensin II receptor blocker is not being taken.   Hypertension   This is a chronic problem. The current episode started more than 1 year ago. The problem is unchanged. The problem is controlled. Associated symptoms include peripheral edema and shortness of breath. Pertinent negatives include no chest pain. Agents associated with hypertension include thyroid hormones. Risk factors for coronary artery disease include stress, sedentary lifestyle, smoking/tobacco exposure, obesity, post-menopausal state and diabetes mellitus. Past treatments include diuretics. Current antihypertension treatment includes diuretics (Changed to spironolactone after pancreatitis bout likely stemmed from HCTZ.). The current treatment provides significant improvement. Compliance problems include psychosocial issues and exercise.  There is no history of CAD/MI or CVA. Identifiable causes of hypertension include a thyroid problem.   Hypothyroidism   This is a chronic problem. The current episode started more than 1 year ago. The problem occurs daily. Associated symptoms include fatigue. Pertinent negatives include no chest pain. Treatments tried: levothyoxine. The treatment provided significant relief.   Shortness of Breath  "  This is a recurrent problem. The current episode started more than 1 month ago. The problem occurs daily. The problem has been waxing and waning. Associated symptoms include leg swelling. Pertinent negatives include no chest pain. The symptoms are aggravated by any activity. She has tried beta agonist inhalers and rest (oxygen supplementation) for the symptoms. The treatment provided significant relief. Her past medical history is significant for chronic lung disease. There is no history of PE or a recent surgery.      Scheduled for colonoscopy and eye doctor.     Has not yet moved.  Working on it with .         The following portions of the patient's history were reviewed and updated as appropriate: allergies, current medications, past family history, past medical history, past social history, past surgical history and problem list.    Review of Systems   Constitutional: Positive for fatigue. Negative for activity change.   Respiratory: Positive for shortness of breath.    Cardiovascular: Positive for leg swelling. Negative for chest pain.   Skin: Positive for dry skin (scalp rash, she's going to contact dermatology).       Vitals:    07/24/18 1133   BP: 130/80   Pulse: 78   Temp: 98 °F (36.7 °C)   SpO2: 95%   Weight: 80.1 kg (176 lb 8 oz)   Height: 149.9 cm (59.02\")         Objective   Physical Exam   Constitutional: She is oriented to person, place, and time. Vital signs are normal. She appears well-developed and well-nourished. She is active.  Non-toxic appearance. She does not appear ill. No distress. She is obese.  HENT:   Head: Normocephalic and atraumatic.   Right Ear: Hearing normal.   Left Ear: Hearing normal.   Mouth/Throat: Mucous membranes are not dry.   Eyes: EOM are normal. No scleral icterus.   Neck: Neck supple.   Cardiovascular: Normal rate and regular rhythm.    Pulmonary/Chest: Effort normal and breath sounds normal.   Neurological: She is alert and oriented to person, place, and time. " No cranial nerve deficit.   Skin: Skin is warm. She is not diaphoretic.   Psychiatric: She has a normal mood and affect. Her behavior is normal.   Nursing note and vitals reviewed.      Lab Results   Component Value Date    HGBA1C 8.90 07/24/2018   last A1C was 9.2 on 4/20/18      Assessment/Plan     Problem List Items Addressed This Visit        Cardiovascular and Mediastinum    Essential hypertension (Chronic)    Overview     · No HCTZ due to pancreatitis early 2018.         Current Assessment & Plan     Hypertension is improving with treatment.  Continue current treatment regimen.  Dietary sodium restriction.  Weight loss.  Blood pressure will be reassessed at the next regular appointment.         Relevant Medications    spironolactone (ALDACTONE) 25 MG tablet    Other Relevant Orders    CBC & Differential (Completed)       Digestive    Vitamin D deficiency    Relevant Orders    Vitamin D 25 Hydroxy (Completed)       Endocrine    Type 2 diabetes mellitus with hyperglycemia, with long-term current use of insulin (CMS/[facility])    Current Assessment & Plan     Diabetes is improving with treatment.   Continue current treatment regimen.  Reminded to get yearly retinal exam.  Diabetes will be reassessed in 3 months.         Relevant Orders    Comprehensive Metabolic Panel (Completed)    Hemoglobin A1c (Completed)    Acquired hypothyroidism - Primary    Relevant Orders    TSH (Completed)    T4, Free (Completed)      Other Visit Diagnoses     Peripheral edema        Relevant Medications    spironolactone (ALDACTONE) 25 MG tablet    Dyspnea on exertion              · See pulmonology as previously recommended.    Return in about 15 weeks (around 11/6/2018) for Diabetes follow up.    Marychuy Starr MD    Please note that portions of this note may have been completed with a voice recognition program. Efforts were made to edit dictation, but occasionally words are mistranscribed.

## 2018-07-26 DIAGNOSIS — E89.0 POSTOPERATIVE HYPOTHYROIDISM: ICD-10-CM

## 2018-07-26 RX ORDER — SODIUM CHLORIDE 9 MG/ML
70 INJECTION, SOLUTION INTRAVENOUS CONTINUOUS PRN
Status: CANCELLED | OUTPATIENT
Start: 2018-07-26 | End: 2018-08-09

## 2018-07-26 RX ORDER — LEVOTHYROXINE SODIUM 0.1 MG/1
100 TABLET ORAL DAILY
Qty: 90 TABLET | Refills: 3 | Status: SHIPPED | OUTPATIENT
Start: 2018-07-26 | End: 2018-07-27 | Stop reason: SDUPTHER

## 2018-07-26 RX ORDER — LEVOTHYROXINE SODIUM 0.1 MG/1
100 TABLET ORAL DAILY
Qty: 90 TABLET | Refills: 3 | Status: SHIPPED | OUTPATIENT
Start: 2018-07-26 | End: 2018-07-26 | Stop reason: SDUPTHER

## 2018-07-26 NOTE — ASSESSMENT & PLAN NOTE
Diabetes is improving with treatment.   Continue current treatment regimen.  Reminded to get yearly retinal exam.  Diabetes will be reassessed in 3 months.

## 2018-07-27 DIAGNOSIS — E89.0 POSTOPERATIVE HYPOTHYROIDISM: ICD-10-CM

## 2018-07-27 PROBLEM — K92.1 HEMATOCHEZIA: Status: ACTIVE | Noted: 2018-07-27

## 2018-07-27 PROBLEM — Z86.010 HISTORY OF COLON POLYPS: Status: ACTIVE | Noted: 2018-07-27

## 2018-07-27 PROBLEM — Z12.11 COLON CANCER SCREENING: Status: ACTIVE | Noted: 2018-07-27

## 2018-07-27 PROBLEM — Z86.0100 HISTORY OF COLON POLYPS: Status: ACTIVE | Noted: 2018-07-27

## 2018-07-27 PROBLEM — K59.00 CONSTIPATION: Status: ACTIVE | Noted: 2018-07-27

## 2018-07-27 PROBLEM — K62.5 BRBPR (BRIGHT RED BLOOD PER RECTUM): Status: ACTIVE | Noted: 2018-07-27

## 2018-07-27 PROBLEM — R10.13 EPIGASTRIC PAIN: Status: ACTIVE | Noted: 2018-07-27

## 2018-07-27 RX ORDER — LEVOTHYROXINE SODIUM 0.1 MG/1
100 TABLET ORAL DAILY
Qty: 90 TABLET | Refills: 3 | Status: SHIPPED | OUTPATIENT
Start: 2018-07-27 | End: 2019-07-19 | Stop reason: DRUGHIGH

## 2018-08-10 ENCOUNTER — ANESTHESIA EVENT (OUTPATIENT)
Dept: GASTROENTEROLOGY | Facility: HOSPITAL | Age: 73
End: 2018-08-10

## 2018-08-10 ENCOUNTER — HOSPITAL ENCOUNTER (OUTPATIENT)
Facility: HOSPITAL | Age: 73
Setting detail: HOSPITAL OUTPATIENT SURGERY
Discharge: HOME | End: 2018-08-10
Attending: INTERNAL MEDICINE | Admitting: INTERNAL MEDICINE

## 2018-08-10 ENCOUNTER — ANESTHESIA (OUTPATIENT)
Dept: GASTROENTEROLOGY | Facility: HOSPITAL | Age: 73
End: 2018-08-10

## 2018-08-10 VITALS
WEIGHT: 176 LBS | TEMPERATURE: 97.3 F | HEART RATE: 63 BPM | OXYGEN SATURATION: 96 % | RESPIRATION RATE: 18 BRPM | BODY MASS INDEX: 35.48 KG/M2 | SYSTOLIC BLOOD PRESSURE: 112 MMHG | DIASTOLIC BLOOD PRESSURE: 61 MMHG | HEIGHT: 59 IN

## 2018-08-10 DIAGNOSIS — Z86.010 HISTORY OF COLON POLYPS: ICD-10-CM

## 2018-08-10 DIAGNOSIS — K62.5 BRBPR (BRIGHT RED BLOOD PER RECTUM): ICD-10-CM

## 2018-08-10 DIAGNOSIS — R10.13 EPIGASTRIC PAIN: ICD-10-CM

## 2018-08-10 DIAGNOSIS — Z12.11 COLON CANCER SCREENING: ICD-10-CM

## 2018-08-10 DIAGNOSIS — K92.1 HEMATOCHEZIA: ICD-10-CM

## 2018-08-10 DIAGNOSIS — K59.00 CONSTIPATION: ICD-10-CM

## 2018-08-10 LAB — GLUCOSE BLDC GLUCOMTR-MCNC: 220 MG/DL (ref 70–130)

## 2018-08-10 PROCEDURE — 45380 COLONOSCOPY AND BIOPSY: CPT | Performed by: INTERNAL MEDICINE

## 2018-08-10 PROCEDURE — 25010000002 PROPOFOL 10 MG/ML EMULSION: Performed by: NURSE ANESTHETIST, CERTIFIED REGISTERED

## 2018-08-10 PROCEDURE — 45385 COLONOSCOPY W/LESION REMOVAL: CPT | Performed by: INTERNAL MEDICINE

## 2018-08-10 PROCEDURE — 25010000002 ONDANSETRON PER 1 MG: Performed by: NURSE ANESTHETIST, CERTIFIED REGISTERED

## 2018-08-10 PROCEDURE — 25010000002 PROPOFOL 1000 MG/ML EMULSION: Performed by: NURSE ANESTHETIST, CERTIFIED REGISTERED

## 2018-08-10 PROCEDURE — 82962 GLUCOSE BLOOD TEST: CPT

## 2018-08-10 PROCEDURE — S0260 H&P FOR SURGERY: HCPCS | Performed by: INTERNAL MEDICINE

## 2018-08-10 DEVICE — CLIPPING DEVICE
Type: IMPLANTABLE DEVICE | Site: TRANSVERSE COLON | Status: FUNCTIONAL
Brand: RESOLUTION CLIP

## 2018-08-10 RX ORDER — POLYETHYLENE GLYCOL 3350 17 G/17G
POWDER, FOR SOLUTION ORAL
Qty: 510 G | Refills: 1 | Status: SHIPPED | OUTPATIENT
Start: 2018-08-10 | End: 2020-07-30 | Stop reason: SDUPTHER

## 2018-08-10 RX ORDER — PROPOFOL 10 MG/ML
VIAL (ML) INTRAVENOUS AS NEEDED
Status: DISCONTINUED | OUTPATIENT
Start: 2018-08-10 | End: 2018-08-10 | Stop reason: SURG

## 2018-08-10 RX ORDER — ONDANSETRON 2 MG/ML
INJECTION INTRAMUSCULAR; INTRAVENOUS AS NEEDED
Status: DISCONTINUED | OUTPATIENT
Start: 2018-08-10 | End: 2018-08-10 | Stop reason: SURG

## 2018-08-10 RX ORDER — SODIUM CHLORIDE 9 MG/ML
70 INJECTION, SOLUTION INTRAVENOUS CONTINUOUS PRN
Status: DISCONTINUED | OUTPATIENT
Start: 2018-08-10 | End: 2018-08-10 | Stop reason: HOSPADM

## 2018-08-10 RX ADMIN — SODIUM CHLORIDE 70 ML/HR: 9 INJECTION, SOLUTION INTRAVENOUS at 08:54

## 2018-08-10 RX ADMIN — LIDOCAINE HYDROCHLORIDE 80 MG: 20 INJECTION, SOLUTION INTRAVENOUS at 09:40

## 2018-08-10 RX ADMIN — ONDANSETRON 4 MG: 2 INJECTION INTRAMUSCULAR; INTRAVENOUS at 09:22

## 2018-08-10 RX ADMIN — PROPOFOL 50 MG: 10 INJECTION, EMULSION INTRAVENOUS at 10:34

## 2018-08-10 RX ADMIN — PROPOFOL 40 MG: 10 INJECTION, EMULSION INTRAVENOUS at 09:40

## 2018-08-10 RX ADMIN — SODIUM CHLORIDE: 9 INJECTION, SOLUTION INTRAVENOUS at 09:19

## 2018-08-10 RX ADMIN — PROPOFOL 75 MCG/KG/MIN: 10 INJECTION, EMULSION INTRAVENOUS at 09:41

## 2018-08-10 NOTE — ANESTHESIA PREPROCEDURE EVALUATION
Anesthesia Evaluation     Patient summary reviewed and Nursing notes reviewed   no history of anesthetic complications:  NPO Solid Status: > 8 hours  NPO Liquid Status: > 8 hours           Airway   Mallampati: II  TM distance: <3 FB  Neck ROM: full  Possible difficult intubation  Dental - normal exam     Pulmonary - normal exam   (+) a smoker Former, asthma,     ROS comment: Home O2 HS and PRN  Cardiovascular - normal exam    PT is on anticoagulation therapy  Rhythm: regular  Rate: normal    (+) hypertension well controlled,       Neuro/Psych  (+) headaches,     GI/Hepatic/Renal/Endo    (+) obesity,  GERD well controlled,  diabetes mellitus type 2 using insulin, hypothyroidism,     Musculoskeletal     (+) back pain, chronic pain,   Abdominal  - normal exam  (+) obese,     Abdomen: soft.  Bowel sounds: normal.   Substance History - negative use     OB/GYN negative ob/gyn ROS         Other   (+) arthritis                     Anesthesia Plan    ASA 3     MAC   (Risks and benefits discussed including risk of aspiration, recall and dental damage. All patient questions answered. Will continue with POC.)  intravenous induction   Anesthetic plan and risks discussed with patient.

## 2018-08-10 NOTE — DISCHARGE INSTRUCTIONS
To assist you in voiding:  Drink plenty of fluids  Listen to running water while attempting to void.    If you are unable to urinate and you have an uncomfortable urge to void or it has been   6 hours since you were discharged, return to the Emergency Room    Postprocedure instructions:  Nothing by mouth total fully alert.  Bedrest until fully alert.  Once fully alert may have clear liquids.  Avoid soda beverages.  Advance diet as tolerated.  Vital signs as routine.    Diet:   Low Fat Diet.   [place]'s All Bran-Bran Buds 1/4 cup daily.  May add Honey Bunches of Oats with Almonds 1/4 cup for taste.  Use skim, 1, 2 % or soy milk.  Drink 5-6 glasses of water daily.    Blood Thinner Directions:     Avoid Aspirin & other NSAIDS for _7__ days.  Tylenol is okay.    Treatments:     MiraLAX powder (polyethylene glycol).  Add 17 g-1 capful in a glass of water and take orally once a day daily.    Call [facility] at [phone] or come to the Emergency Department if you experience the following: Chest pain, abdominal pain, bleeding (vomiting of blood or coffee colored material, black stools or ed blood in stools), fever/chills, nausea and vomiting or dizziness.      Follow-up:  DR. ROSY DOCKERY in 4 weeks.Office phone # [phone].    Notes to the patient and family:    Findings:    1. Pan-diverticulosis more pronounced in the left colon as compared to the transverse colon and right colon.  2. Colon polyps. 8 were removed 3-10 mm in size.  3. Internal hemorrhoids.  4.   No colon cancer.    RECOMMENDATIONS:     1. Dietary instructions.  2. Follow biopsies.    3. Follow-up:  3-4 weeks.    4. Followup colonoscopy:  3 years with extended preparation in view of low prep score.

## 2018-08-10 NOTE — H&P
Chief complaint:  BRBPR    History of present illness: Colon Cancer Screen, Last Colonoscopy , H/O Colon polyps, Constipation, BRBPR, Epigastric Abdominal Pain       Past medical history:   Past Medical History:   Diagnosis Date   • Abdominal hernia    • Abdominal pain    • Arthritis    • Asthma    • Blood in stool    • Body piercing     EARS ONLY   • Bronchitis    • Bulging of lumbar intervertebral disc    • Colon polyp    • Cough    • Deaf, left    • Diabetes mellitus (CMS/HCC)    • Diverticulitis    • GERD (gastroesophageal reflux disease)    • Headache    • Hyperlipidemia    • Hypertension    • Leaky heart valve    • Lung nodules    • Osteoporosis    • Palpitations    • Pancreatitis    • Supplemental oxygen dependent     NIGHTLY AND WHEN EXERTED   • Thyroid disease     LEFT SIDE REMOVED   • Tinnitus, left        Surgical history:    Past Surgical History:   Procedure Laterality Date   • APPENDECTOMY     •  SECTION     • CHOLECYSTECTOMY     • THYROIDECTOMY, PARTIAL Left    • TUBAL ABDOMINAL LIGATION         Social history:  Social History     Social History   • Marital status:      Spouse name: N/A   • Number of children: N/A   • Years of education: N/A     Occupational History   • Not on file.     Social History Main Topics   • Smoking status: Former Smoker     Packs/day: 2.00     Years: 15.00     Types: Cigarettes     Quit date:    • Smokeless tobacco: Never Used   • Alcohol use No   • Drug use: No   • Sexual activity: Defer     Other Topics Concern   • Not on file     Social History Narrative   • No narrative on file       Allergies:  Doxycycline; Ciprofloxacin; Contrast dye; Erythromycin; and Prednisone  Latex allergy: None  Contrast allergy: YES    Medications:  Prescriptions Prior to Admission   Medication Sig Dispense Refill Last Dose   • albuterol (VENTOLIN HFA) 108 (90 Base) MCG/ACT inhaler Inhale 2 puffs Every 4 (Four) Hours As Needed for Wheezing or Shortness of  "Air. 1 inhaler 5 Taking   • aspirin 81 MG tablet Take 81 mg by mouth. Every other day   8/8/2018   • Cholecalciferol (VITAMIN D3) 5000 units capsule capsule Take 1 capsule by mouth Daily. 90 capsule 3 8/9/2018 at 1100   • levothyroxine (SYNTHROID, LEVOTHROID) 100 MCG tablet Take 1 tablet by mouth Daily. 90 tablet 3 8/10/2018 at 0700   • Magnesium Oxide 400 (240 Mg) MG tablet Take 1 tablet by mouth every night at bedtime. (Patient taking differently: Take 400 mg by mouth 2 (Two) Times a Day.) 90 tablet 3 8/9/2018 at 2000   • NOVOLOG MIX 70/30 (70-30) 100 UNIT/ML injection Inject 70 Units under the skin 2 (Two) Times a Day With Meals. (Patient taking differently: Inject 70 Units under the skin into the appropriate area as directed 2 (Two) Times a Day With Meals. IF BLOOD SUGAR IS LOWER THAN 200 ONLY TAKES ABOUT 20 UNITS) 126 mL 3 8/10/2018 at 0700   • spironolactone (ALDACTONE) 25 MG tablet Take 1 tablet by mouth Daily. 90 tablet 3 8/9/2018 at 1100   • BD INSULIN SYRINGE ULTRAFINE 31G X 15/64\" 0.5 ML misc USE AS DIRECTED TO INJECT INSULIN FOR DM E11.9 300 each 3 Unknown at Unknown time   • Blood Glucose Monitoring Suppl (ACCU-CHEK CHIO PLUS) w/Device kit USE AS DIRECTED TO CHECK GLUCOSE 3 TIMES DAILY FOR DM E11.65 1 kit 0 Unknown at Unknown time   • glucose blood (ACCU-CHEK CHIO PLUS) test strip USE AS DIRECTED TO CHECK GLUCOSE 3 TIMES DAILY FOR DM E11.9 100 each 12 Unknown at Unknown time   • O2 (OXYGEN) Inhale 2 L/min As Needed.   Unknown at Unknown time       Review of systems:   Constitutional: No recent: Fever, Weight loss  Respiratory: No recent: SOB, Cough  Cardiovascular: No recent: Chest Pains, congestive heart failure or arrhythmias.   Neurological: No recent: Seizures, CVA, TIA.   Genitourinary: No recent: Renal Failure, UTI.  Endocrine: No recent: Worsening of diabetes or thyroid disease.  Musculoskeletal: No recent: Joint swelling.  Hem. Oncology: No recent: Anemia or bleeding.  Psychiatric: No recent: " "Worsening of depression or anxiety.     VITAL SIGNS:    Blood pressure 159/84, pulse 87, temperature 98.6 °F (37 °C), temperature source Temporal Artery , resp. rate 20, height 149.9 cm (59\"), weight 79.8 kg (176 lb), SpO2 95 %.    PHYSICAL EXAMINATION:   HEENT: Normal.   Lungs: Clear to auscultation.  Heart: No S3, no murmur.    Abdomen: Soft.  BS+ ND, NT  Extremities: No edema.  No cyanosis.  Neuro: Alert X 3. No focal deficit.    Assessment: Colon Cancer Screen, Last Colonoscopy 2014, H/O Colon polyps, Constipation, BRBPR, Epigastric Abdominal Pain       Plan:   Colonoscopy    Risks/Benefits:  The potential benefits, risk and/or side effects of the procedure and alternatives have been discussed with the patient/authorized representative and questions were answered.    "

## 2018-08-10 NOTE — OP NOTE
PROCEDURE:  Colonoscopy to the terminal ileum with 3 hot snare, 1 cold snare and 4 cold biopsy polypectomies as well as placement of one resolution clip to coapt the margins.     DATE OF PROCEDURE:  August 10, 2018    REFERRING PROVIDER:  Marychuy Starr MD     INSTRUMENT USED:  Olympus PCF H 190 DL videocolonoscope.    INDICATIONS OF THE PROCEDURE:   This is a 72-year-old white female with history of bright red blood per rectum, and constipation. Currently undergoing colonoscopy for further evaluation and colon cancer screening. There is no family history of colon cancer.    PREVIOUS COLONOSCOPY: 2014. Polyps.    BIOPSIES:  Ascending colon: 3 hot snare and 1 cold snare polypectomies. Transverse colon: 2 cold biopsy polypectomies. Descending colon: 1 cold biopsy polypectomy. Rectum: 1 cold biopsy polypectomy.      PHOTOGRAPHS:  Photographs were included in the medical records.     MEDICATIONS:  MAC.       CONSENT/PREPROCEDURE EVALUATION:  Risks, benefits, alternatives and options of the procedure including risks of sedation/anesthesia were discussed with the patient and informed consent was obtained prior to the procedure.  History and physical examination were performed and nothing precluded the test.      REPORT:  The patient was placed in left lateral decubitus position and a digital examination was performed.  Once under the influence of IV sedation, the instrument was inserted into the rectum and advanced under direct vision to cecum which was identified by the ileocecal valve, triradiate folds and appendiceal orifice. The scope was then maneuvered into the terminal ileum.        FINDINGS:      Digital rectal examination:  Good anal tone.  No perianal pathology.  No mass.        Terminal ileum:  7-8 cm.  Normal.     Cecum and ascending colon: Diverticulosis. 3, 6-10 mm sessile polyps were noted in the ascending colon. These were removed with hot snare. One 5 mm sessile polyp in the ascending colon was  removed with cold snare. A larger polypectomy site margins were coapt using a resolution clip.       Hepatic flexure, transverse colon, splenic flexure:  Diagnosis was noted. 2, 3 mm sessile polyps in the transverse colon were removed with cold biopsy forceps.         Descending colon, sigmoid colon and rectum:  Diverticulosis. A 3 mm sessile polyp in the rectum was removed with cold biopsy forceps. A retroflex examination within the rectum revealed internal hemorrhoids.        The scope was then straightened, the lower GI tract was decompressed, and the scope was pulled out of the patient.  The patient tolerated the procedure well.  There were no immediate complications and the patient was transferred in stable condition for post procedure observation.      TECHNICAL DATA:   1. Weld prep score: 5 (2+2+1).  The patient had extended preparation and received enema in the same-day surgery as well. Extensive irrigation of the colon was undertaken using warm normal saline.   2. Anus to cecal time: 11 minutes.  3. Difficulty of examination:  Average.    4. Withdrawal time: 20 minutes.  5. Procedure time: 53 minutes  6. Retroflex examination in right colon: Yes.    7. Second look Rectum to cecum with decompression: Yes.    DIAGNOSES:    1. Pandiverticulosis more pronounced in the left colon as compared to the transverse colon and right colon.  2. Colon polyps. 8 were removed 3-10 mm in size.  3. Internal hemorrhoids.      RECOMMENDATIONS:     1. Dietary instructions.  2. Follow biopsies.    3. Follow-up:  3-4 weeks.    4. Followup colonoscopy:  3 years with extended preparation in view of low prep score.          Thank you very much for letting me participate in the care of this patient. Please do not hesitate to call me if you have any questions.

## 2018-08-10 NOTE — ANESTHESIA POSTPROCEDURE EVALUATION
Patient: Marielena Moeller    Procedure Summary     Date:  08/10/18 Room / Location:  [facility] ENDOSCOPY 2 / [facility] ENDOSCOPY    Anesthesia Start:  0919 Anesthesia Stop:      Procedure:  COLONOSCOPY WITH HOT SNARE POLYPECTOMY X 3; COLD SNARE POLYPECTOMY, COLD BIOPSY POLYPECTOMY X 4; CLIP PLACEMENT X 1 (N/A Anus) Diagnosis:       Hematochezia      Epigastric pain      Constipation      BRBPR (bright red blood per rectum)      Colon cancer screening      History of colon polyps      (Hematochezia [K92.1])      (Epigastric pain [R10.13])      (Constipation [K59.00])      (BRBPR (bright red blood per rectum) [K62.5])      (Colon cancer screening [Z12.11])      (History of colon polyps [Z86.010])    Surgeon:  Glenn Modi MD Provider:  Naseem Graham CRNA    Anesthesia Type:  MAC ASA Status:  3          Anesthesia Type: MAC  Last vitals  BP   100/54   Temp   98   Pulse   78   Resp   20     SpO2   96     Post Anesthesia Care and Evaluation    Patient location during evaluation: bedside  Patient participation: complete - patient participated  Level of consciousness: awake and alert  Pain score: 0  Pain management: adequate  Airway patency: patent  Anesthetic complications: No anesthetic complications  PONV Status: none  Cardiovascular status: acceptable  Respiratory status: acceptable and nasal cannula  Hydration status: acceptable

## 2018-08-15 LAB
LAB AP CASE REPORT: NORMAL
PATH REPORT.FINAL DX SPEC: NORMAL

## 2018-09-10 DIAGNOSIS — Z79.4 TYPE 2 DIABETES MELLITUS WITH HYPERGLYCEMIA, WITH LONG-TERM CURRENT USE OF INSULIN (HCC): ICD-10-CM

## 2018-09-10 DIAGNOSIS — E11.65 TYPE 2 DIABETES MELLITUS WITH HYPERGLYCEMIA, WITH LONG-TERM CURRENT USE OF INSULIN (HCC): ICD-10-CM

## 2018-09-10 DIAGNOSIS — G47.62 NOCTURNAL LEG CRAMPS: ICD-10-CM

## 2018-09-10 DIAGNOSIS — R51.9 FREQUENT HEADACHES: ICD-10-CM

## 2018-09-10 RX ORDER — INSULIN ASPART 100 [IU]/ML
70 INJECTION, SUSPENSION SUBCUTANEOUS 2 TIMES DAILY WITH MEALS
Qty: 126 ML | Refills: 3 | Status: SHIPPED | OUTPATIENT
Start: 2018-09-10 | End: 2019-11-17 | Stop reason: SDUPTHER

## 2018-09-10 RX ORDER — LANOLIN ALCOHOL/MO/W.PET/CERES
400 CREAM (GRAM) TOPICAL
Qty: 90 TABLET | Refills: 3 | Status: SHIPPED | OUTPATIENT
Start: 2018-09-10 | End: 2019-01-24

## 2018-10-01 ENCOUNTER — OFFICE VISIT (OUTPATIENT)
Dept: GASTROENTEROLOGY | Facility: CLINIC | Age: 73
End: 2018-10-01

## 2018-10-01 VITALS — WEIGHT: 177 LBS | HEIGHT: 59 IN | RESPIRATION RATE: 18 BRPM | BODY MASS INDEX: 35.68 KG/M2 | TEMPERATURE: 97.7 F

## 2018-10-01 DIAGNOSIS — K57.30 DIVERTICULOSIS OF COLON WITHOUT HEMORRHAGE: ICD-10-CM

## 2018-10-01 DIAGNOSIS — D12.2 ADENOMATOUS POLYP OF ASCENDING COLON: ICD-10-CM

## 2018-10-01 DIAGNOSIS — R10.10 PAIN OF UPPER ABDOMEN: Primary | ICD-10-CM

## 2018-10-01 DIAGNOSIS — K59.00 CONSTIPATION, UNSPECIFIED CONSTIPATION TYPE: ICD-10-CM

## 2018-10-01 PROCEDURE — 99214 OFFICE O/P EST MOD 30 MIN: CPT | Performed by: INTERNAL MEDICINE

## 2018-10-01 NOTE — PATIENT INSTRUCTIONS
1. Low-fat high-fiber diet with liberal water intake.  2. MiraLAX powder (polyethylene glycol).  Add 17 g-1 capful in a glass of water and take orally once a day daily.  3. Weight reduction.  4. Follow-up colonoscopy in August 2021(tubular adenomata. Low prep score).  5. Discussed with the patient in detail. Opportunity was given to ask questions.

## 2018-10-01 NOTE — PROGRESS NOTES
Chief Complaint   Patient presents with   • Abdominal Pain     History of Present Illness     The patient has some abdominal discomfort in the left and right lower ribs as an adjacent areas for the last several weeks. The pain is mild in intensity, aching and at times cramping in nature. Frequency being 3-4 times a week. The abdominal discomfort may last for a few minutes. Abdominal discomfort is generally associated with bowel movement. The patient has bloating and gassy feeling at times.    Her epigastric abdominal pain has improved. She denies further bright red blood per rectum. There is no history of nausea or vomiting. Her constipation has improved. Currently the patient has been having one bowel movement on daily basis and occasionally every other day. She denies reflux. There is no dysphagia or odynophagia. There is no history of recent weight loss.     Review of Systems   Constitutional: Negative for appetite change, chills, fatigue, fever and unexpected weight change.   HENT: Negative for mouth sores, nosebleeds and trouble swallowing.    Eyes: Negative for discharge and redness.   Respiratory: Negative for apnea, cough and shortness of breath.    Cardiovascular: Positive for palpitations. Negative for chest pain and leg swelling.   Gastrointestinal: Negative for abdominal distention, abdominal pain, anal bleeding, blood in stool, constipation, diarrhea, nausea and vomiting.   Endocrine: Negative for cold intolerance, heat intolerance and polydipsia.   Genitourinary: Negative for dysuria, hematuria and urgency.   Musculoskeletal: Positive for arthralgias. Negative for joint swelling and myalgias.   Skin: Negative for rash.   Allergic/Immunologic: Negative for food allergies and immunocompromised state.   Neurological: Negative for dizziness, seizures, syncope and headaches.   Hematological: Negative for adenopathy. Bruises/bleeds easily.   Psychiatric/Behavioral: Negative for dysphoric mood. The patient is  not nervous/anxious and is not hyperactive.      Patient Active Problem List   Diagnosis   • Type 2 diabetes mellitus with hyperglycemia, with long-term current use of insulin (CMS/HCC)   • Essential hypertension   • Osteoporosis   • Class 2 obesity with body mass index (BMI) of 35.0 to 35.9 in adult   • Diverticulitis   • Acquired hypothyroidism   • Vitamin D deficiency   • Hematochezia   • Epigastric pain   • Constipation   • BRBPR (bright red blood per rectum)   • Colon cancer screening   • History of colon polyps     Past Medical History:   Diagnosis Date   • Abdominal hernia    • Abdominal pain    • Arthritis    • Asthma    • Blood in stool    • Body piercing     EARS ONLY   • Bronchitis    • Bulging of lumbar intervertebral disc    • Colon polyp    • Cough    • Deaf, left    • Diabetes mellitus (CMS/HCC)    • Diverticulitis    • GERD (gastroesophageal reflux disease)    • Headache    • Hyperlipidemia    • Hypertension    • Leaky heart valve    • Lung nodules    • Osteoporosis    • Palpitations    • Pancreatitis    • Supplemental oxygen dependent     NIGHTLY AND WHEN EXERTED   • Thyroid disease     LEFT SIDE REMOVED   • Tinnitus, left      Past Surgical History:   Procedure Laterality Date   • APPENDECTOMY     •  SECTION     • CHOLECYSTECTOMY     • COLONOSCOPY N/A 8/10/2018    Procedure: COLONOSCOPY WITH HOT SNARE POLYPECTOMY X 3; COLD SNARE POLYPECTOMY, COLD BIOPSY POLYPECTOMY X 4; CLIP PLACEMENT X 1;  Surgeon: Glenn Modi MD;  Location: [facility] ENDOSCOPY;  Service: Gastroenterology   • THYROIDECTOMY, PARTIAL Left    • TUBAL ABDOMINAL LIGATION       Family History   Problem Relation Age of Onset   • Arthritis Mother    • Diabetes Mother    • Osteoporosis Mother    • Arthritis Father    • Diabetes Father      Social History   Substance Use Topics   • Smoking status: Former Smoker     Packs/day: 2.00     Years: 15.00     Types: Cigarettes     Quit date:    • Smokeless tobacco:  "Never Used   • Alcohol use No       Current Outpatient Prescriptions:   •  albuterol (VENTOLIN HFA) 108 (90 Base) MCG/ACT inhaler, Inhale 2 puffs Every 4 (Four) Hours As Needed for Wheezing or Shortness of Air., Disp: 1 inhaler, Rfl: 5  •  aspirin 81 MG tablet, Take 81 mg by mouth. Every other day, Disp: , Rfl:   •  BD INSULIN SYRINGE ULTRAFINE 31G X 15/64\" 0.5 ML misc, USE AS DIRECTED TO INJECT INSULIN FOR DM E11.9, Disp: 300 each, Rfl: 3  •  Blood Glucose Monitoring Suppl (ACCU-CHEK CHIO PLUS) w/Device kit, USE AS DIRECTED TO CHECK GLUCOSE 3 TIMES DAILY FOR DM E11.65, Disp: 1 kit, Rfl: 0  •  Cholecalciferol (VITAMIN D3) 5000 units capsule capsule, Take 1 capsule by mouth Daily., Disp: 90 capsule, Rfl: 3  •  glucose blood (ACCU-CHEK CHIO PLUS) test strip, USE AS DIRECTED TO CHECK GLUCOSE 3 TIMES DAILY FOR DM E11.9, Disp: 100 each, Rfl: 12  •  levothyroxine (SYNTHROID, LEVOTHROID) 100 MCG tablet, Take 1 tablet by mouth Daily., Disp: 90 tablet, Rfl: 3  •  Magnesium Oxide 400 (240 Mg) MG tablet, Take 1 tablet by mouth every night at bedtime., Disp: 90 tablet, Rfl: 3  •  NOVOLOG MIX 70/30 (70-30) 100 UNIT/ML injection, Inject 70 Units under the skin into the appropriate area as directed 2 (Two) Times a Day With Meals., Disp: 126 mL, Rfl: 3  •  O2 (OXYGEN), Inhale 2 L/min As Needed., Disp: , Rfl:   •  polyethylene glycol (MIRALAX) powder, Mix 17 g (1 cap full) in 8 oz. Of non-carbonated beverage and drink daily., Disp: 510 g, Rfl: 1  •  spironolactone (ALDACTONE) 25 MG tablet, Take 1 tablet by mouth Daily., Disp: 90 tablet, Rfl: 3    Allergies   Allergen Reactions   • Doxycycline Nausea And Vomiting     \"Pain worse than pancreatitis\"   • Ciprofloxacin Hives   • Contrast Dye Unknown (See Comments)     Don't remember-been a long time   • Erythromycin Hives and Rash   • Prednisone Mental Status Change     ITCHING AND NERVOUS       Temperature 97.7 °F (36.5 °C), resp. rate 18, height 149.9 cm (59.02\"), weight 80.3 kg (177 " lb).    Physical Exam   Constitutional: She is oriented to person, place, and time. She appears well-developed and well-nourished. No distress.   HENT:   Head: Normocephalic and atraumatic.   Right Ear: Hearing and external ear normal.   Left Ear: Hearing and external ear normal.   Nose: Nose normal.   Mouth/Throat: Oropharynx is clear and moist and mucous membranes are normal. Mucous membranes are not pale, not dry and not cyanotic. No oral lesions. No oropharyngeal exudate.   Eyes: Conjunctivae and EOM are normal. Right eye exhibits no discharge. Left eye exhibits no discharge. No scleral icterus.   Neck: Trachea normal. Neck supple. No JVD present. No edema present. No thyroid mass and no thyromegaly present.   Cardiovascular: Normal rate, regular rhythm, S2 normal and normal heart sounds.  Exam reveals no gallop, no S3 and no friction rub.    No murmur heard.  Pulmonary/Chest: Effort normal and breath sounds normal. No respiratory distress. She has no wheezes. She has no rales. She exhibits no tenderness.   Abdominal: Soft. Normal appearance and bowel sounds are normal. She exhibits no distension, no ascites and no mass. There is no splenomegaly or hepatomegaly. There is no tenderness. There is no rigidity, no rebound and no guarding. A hernia is present. Hernia confirmed positive in the ventral area.   Musculoskeletal: She exhibits no tenderness or deformity.     Vascular Status -  Her right foot exhibits no edema. Her left foot exhibits no edema.  Lymphadenopathy:     She has no cervical adenopathy.        Left: No supraclavicular adenopathy present.   Neurological: She is alert and oriented to person, place, and time. She has normal strength. No cranial nerve deficit or sensory deficit. She exhibits normal muscle tone. Coordination normal.   Skin: No rash noted. She is not diaphoretic. No cyanosis. No pallor. Nails show no clubbing.   Psychiatric: She has a normal mood and affect. Her behavior is normal.  Judgment and thought content normal.   Nursing note and vitals reviewed.  Stigmata of chronic liver disease:  None.  Asterixis:  None.    Laboratory Testing:  Upon review of medical records:      Dated January 16, 2018 sodium 139 potassium 4.0 chloride 100 CO2 31 BUN 10 serum creatinine 0.50 glucose 258.  Calcium 9.2.  Total protein 7.2.  Albumin 4.20.  T bili 0.8 AST 28 ALT 43 alkaline phosphatase 98.  Lipase 73.  WBC 11.23 hemoglobin 15.0 hematocrit 44.5 MCV 94.7 and platelet count 217.    Dated May 12, 2018 at 0110 am: Sodium 141 potassium 4.1 chloride 98 CO2 32 BUN 16 serum creatinine 0.60 glucose 121.  Calcium 9.8.  Total protein 8.2.  Albumin 4.60.  T bili 0.4 AST 30 ALT 30 alkaline phosphatase 89. Lipase 53. PT 10.7.  INR 0.96.  PTT 29.9.  WBC 8.15 hemoglobin 16.5 hematocrit 40.0 MCV 95.8 platelet count 255.    Dated July 24, 2018 sodium 138 potassium 4.4 chloride 99 CO2 34 BUN 15 serum creatinine 0.70 glucose 220.  Calcium 10.3.  Total protein 6.9.  Albumin 4.00.  T bili 0.3 AST 23 ALT 29 alkaline phosphatase 84.  TSH 2.530.  Free T4 level 1.45.  WBC 8.95 hemoglobin 15.4 hematocrit 46.1 MCV 95.4 platelet count 246.    Abdominal Imaging:  Upon review of records:    Dated May 12, 2018 the patient underwent a CT of the abdomen and pelvis without contrast which revealed: Scattered subcentimeter nodular densities seen within the lung bases, they may be potentially infectious or inflammatory.  Follow-up CT chest is recommended.  Limited noncontrast images of the liver are normal.  Gallbladder is absent.  Spleen is normal.  No adrenal masses are seen.  Pancreas has an unremarkable unenhanced appearance.  No evidence of an obstructive uropathy.  No hydronephrosis.  Aorta is atherosclerotic but normal in caliber.  No significant free fluid or adenopathy.  No evidence of bowel obstruction.  Diverticulosis is noted of the sigmoid colon.  Questionable mild mucosal thickening, this could represent an inflammatory  component.  No perforation or abscess.  Appendix is not identified.  Urinary bladder is unremarkable.  No significant fluid or adenopathy.  The osseous structures are intact.    Procedures:  Upon review of medical records:      Dated August 10, 2018 the patient underwent a colonoscopy to the terminal ileum which revealed: Pandiverticulosis more pronounced in the left colon as compared to the transverse colon and right colon.  Colon polyps.  8 were removed 3-10 MM in size.  Internal hemorrhoids.  Ascending colon polyps, biopsy revealed tubular adenoma.  Fragments of benign colonic mucosa with mild trauma/prolapse-related changes.  Negative for high-grade dysplasia or malignancy.  Descending colon polyp, biopsy revealed tubular adenoma.  Negative for high-grade dysplasia or malignancy.  Ascending colon polyp, jar #2, biopsy revealed tubular adenoma with trauma/prolapse related changes.  Negative for high-grade dysplasia or malignancy.  Rectal polyp, biopsy revealed hyperplastic polyp.  Negative for dysplasia or malignancy.  Transverse colon polyps, biopsy revealed benign colonic mucosa with an intramucosal lymphoid aggregate and trauma/prolapse related changes.  Negative for dysplasia or malignancy.    Assessment:      ICD-10-CM ICD-9-CM   1. Pain of upper abdomen R10.10 789.09   2. Constipation, unspecified constipation type K59.00 564.00   3. Adenomatous polyp of ascending colon D12.2 211.3   4. Diverticulosis of colon without hemorrhage K57.30 562.10         Discussion: The patient has an element of underlying adhesions.    Plan/  Patient Instructions   1. Low-fat high-fiber diet with liberal water intake.  2. MiraLAX powder (polyethylene glycol).  Add 17 g-1 capful in a glass of water and take orally once a day daily.  3. Weight reduction.  4. Follow-up colonoscopy in August 2021(tubular adenomata. Low prep score).  5. Discussed with the patient in detail. Opportunity was given to ask questions.         Glenn RANDOLPH  MD Rian

## 2018-11-06 ENCOUNTER — OFFICE VISIT (OUTPATIENT)
Dept: INTERNAL MEDICINE | Facility: CLINIC | Age: 73
End: 2018-11-06

## 2018-11-06 VITALS
HEART RATE: 83 BPM | SYSTOLIC BLOOD PRESSURE: 130 MMHG | WEIGHT: 180 LBS | HEIGHT: 59 IN | OXYGEN SATURATION: 90 % | DIASTOLIC BLOOD PRESSURE: 80 MMHG | BODY MASS INDEX: 36.29 KG/M2 | TEMPERATURE: 98.2 F

## 2018-11-06 DIAGNOSIS — E11.65 TYPE 2 DIABETES MELLITUS WITH HYPERGLYCEMIA, WITH LONG-TERM CURRENT USE OF INSULIN (HCC): Primary | ICD-10-CM

## 2018-11-06 DIAGNOSIS — Z28.21 PNEUMOCOCCAL VACCINATION DECLINED BY PATIENT: ICD-10-CM

## 2018-11-06 DIAGNOSIS — I10 ESSENTIAL HYPERTENSION: Chronic | ICD-10-CM

## 2018-11-06 DIAGNOSIS — J44.9 CHRONIC OBSTRUCTIVE PULMONARY DISEASE, UNSPECIFIED COPD TYPE (HCC): ICD-10-CM

## 2018-11-06 DIAGNOSIS — E03.9 ACQUIRED HYPOTHYROIDISM: ICD-10-CM

## 2018-11-06 DIAGNOSIS — Z79.4 TYPE 2 DIABETES MELLITUS WITH HYPERGLYCEMIA, WITH LONG-TERM CURRENT USE OF INSULIN (HCC): Primary | ICD-10-CM

## 2018-11-06 LAB — HBA1C MFR BLD: 8.6 %

## 2018-11-06 PROCEDURE — 99214 OFFICE O/P EST MOD 30 MIN: CPT | Performed by: FAMILY MEDICINE

## 2018-11-06 PROCEDURE — 83036 HEMOGLOBIN GLYCOSYLATED A1C: CPT | Performed by: FAMILY MEDICINE

## 2018-11-06 RX ORDER — FLASH GLUCOSE SENSOR
1 KIT MISCELLANEOUS
Qty: 2 EACH | Refills: 6 | Status: SHIPPED | OUTPATIENT
Start: 2018-11-06 | End: 2019-01-24

## 2018-11-06 RX ORDER — FLASH GLUCOSE SENSOR
1 KIT MISCELLANEOUS ONCE
Qty: 1 EACH | Refills: 0 | Status: SHIPPED | OUTPATIENT
Start: 2018-11-06 | End: 2018-11-06

## 2018-11-06 NOTE — PROGRESS NOTES
Subjective    Marielena Moeller is a 72 y.o. female here for:  Chief Complaint   Patient presents with   • Diabetes     3 month follow up for diabetes. Patient would like to discuss getting an order for the freestyle asif sensor and reader.     Diabetes   She presents for her follow-up diabetic visit. She has type 2 diabetes mellitus. Her disease course has been fluctuating. Associated symptoms include fatigue. Pertinent negatives for diabetes include no chest pain. Pertinent negatives for diabetic complications include no CVA. Risk factors for coronary artery disease include stress, post-menopausal, sedentary lifestyle, hypertension, obesity and diabetes mellitus. Current diabetic treatment includes insulin injections. She is compliant with treatment most of the time. Meal planning includes avoidance of concentrated sweets. An ACE inhibitor/angiotensin II receptor blocker is not being taken.   Hypertension   This is a chronic problem. The current episode started more than 1 year ago. The problem is controlled. Associated symptoms include peripheral edema and shortness of breath. Pertinent negatives include no chest pain. Agents associated with hypertension include thyroid hormones. Risk factors for coronary artery disease include stress, sedentary lifestyle, smoking/tobacco exposure, obesity, post-menopausal state and diabetes mellitus. Past treatments include diuretics. Current antihypertension treatment includes diuretics (Changed to spironolactone after pancreatitis bout likely stemmed from HCTZ.). The current treatment provides significant improvement. Compliance problems include psychosocial issues and exercise.  There is no history of CAD/MI or CVA. Identifiable causes of hypertension include sleep apnea and a thyroid problem.   Hypothyroidism   This is a chronic problem. The current episode started more than 1 year ago. The problem occurs daily. Associated symptoms include arthralgias and fatigue.  "Pertinent negatives include no chest pain. Treatments tried: levothyoxine. The treatment provided significant relief.   Shortness of Breath   This is a recurrent problem. The current episode started more than 1 month ago. The problem occurs daily. The problem has been waxing and waning. Associated symptoms include leg swelling. Pertinent negatives include no chest pain. The symptoms are aggravated by any activity. She has tried beta agonist inhalers and rest (oxygen supplementation) for the symptoms. The treatment provided significant relief. Her past medical history is significant for chronic lung disease (followed by Dr. Sanchez, pulmonology). There is no history of PE or a recent surgery.          The following portions of the patient's history were reviewed and updated as appropriate: allergies, current medications, past family history, past medical history, past social history, past surgical history and problem list.    Review of Systems   Constitutional: Positive for fatigue.   Respiratory: Positive for shortness of breath.    Cardiovascular: Positive for leg swelling. Negative for chest pain.   Musculoskeletal: Positive for arthralgias and back pain.   Psychiatric/Behavioral: Positive for sleep disturbance.       Vitals:    11/06/18 1112   BP: 130/80   Pulse: 83   Temp: 98.2 °F (36.8 °C)   SpO2: 90%   Weight: 81.6 kg (180 lb)   Height: 149.9 cm (59.02\")         Objective   Physical Exam   Constitutional: She is oriented to person, place, and time. Vital signs are normal. She appears well-developed and well-nourished. She is active.  Non-toxic appearance. She does not appear ill. No distress. She is obese.  HENT:   Head: Normocephalic and atraumatic.   Right Ear: Hearing normal.   Left Ear: Hearing normal.   Mouth/Throat: Mucous membranes are not dry.   Eyes: EOM are normal. No scleral icterus.   Neck: Phonation normal. Neck supple.   Cardiovascular: Normal rate and regular rhythm.    Pulmonary/Chest: Effort " normal and breath sounds normal. No stridor.   Neurological: She is alert and oriented to person, place, and time. No cranial nerve deficit.   Skin: Skin is warm. No rash noted. She is not diaphoretic. No cyanosis.   Psychiatric: She has a normal mood and affect. Her behavior is normal.   Nursing note and vitals reviewed.      Lab Results   Component Value Date    HGBA1C 8.6 11/06/2018     Last A1C was 8.9 on 7/24/18    Assessment/Plan     Problem List Items Addressed This Visit        Cardiovascular and Mediastinum    Essential hypertension (Chronic)    Overview     · No HCTZ due to pancreatitis early 2018.         Current Assessment & Plan     Hypertension is improving with treatment.  Continue current treatment regimen.  Dietary sodium restriction.  Weight loss.  Blood pressure will be reassessed at the next regular appointment.            Respiratory    COPD (chronic obstructive pulmonary disease) (CMS/[facility])    Current Assessment & Plan     COPD is unchanged.  Follow up with pulmonary.                Endocrine    Type 2 diabetes mellitus with hyperglycemia, with long-term current use of insulin (CMS/[facility]) - Primary    Overview     · History of pancreatitis         Current Assessment & Plan     Diabetes is improving with treatment. Slight improvement in A1C.  Continue current treatment regimen.  Diabetic diet.  Diabetes will be reassessed in 3 months.         Relevant Medications    Continuous Blood Gluc Sensor (FREESTYLE WENDY SENSOR SYSTEM) misc    Other Relevant Orders    POC Glycosylated Hemoglobin (Hb A1C) (Completed)    Acquired hypothyroidism    Current Assessment & Plan     Lab Results   Component Value Date    TSH 2.530 07/24/2018     Will likely check TSH next visit. Continue synthroid at current dose.             Other    Pneumococcal vaccination declined by patient          ·     Return in about 3 months (around 2/6/2019) for Diabetes follow up.    Marychuy Starr MD

## 2018-11-08 PROBLEM — J44.9 COPD (CHRONIC OBSTRUCTIVE PULMONARY DISEASE): Status: ACTIVE | Noted: 2018-11-08

## 2018-11-08 PROBLEM — Z12.11 COLON CANCER SCREENING: Status: RESOLVED | Noted: 2018-07-27 | Resolved: 2018-11-08

## 2018-11-08 PROBLEM — Z28.21 PNEUMOCOCCAL VACCINATION DECLINED BY PATIENT: Status: ACTIVE | Noted: 2018-11-08

## 2018-11-09 NOTE — ASSESSMENT & PLAN NOTE
Diabetes is improving with treatment. Slight improvement in A1C.  Continue current treatment regimen.  Diabetic diet.  Diabetes will be reassessed in 3 months.

## 2018-11-09 NOTE — ASSESSMENT & PLAN NOTE
Lab Results   Component Value Date    TSH 2.530 07/24/2018     Will likely check TSH next visit. Continue synthroid at current dose.

## 2018-11-14 ENCOUNTER — TELEPHONE (OUTPATIENT)
Dept: INTERNAL MEDICINE | Facility: CLINIC | Age: 73
End: 2018-11-14

## 2018-11-14 NOTE — TELEPHONE ENCOUNTER
Dejuan from [facility] [employer] portable oxygen called regarding Mrs. Moeller. He states that he faxed over paperwork on the patient for portable oxygen, but have not received the paperwork back.    Do you have anything for this patient?

## 2018-11-16 ENCOUNTER — TELEPHONE (OUTPATIENT)
Dept: INTERNAL MEDICINE | Facility: CLINIC | Age: 73
End: 2018-11-16

## 2018-11-16 NOTE — TELEPHONE ENCOUNTER
Dejuan called again and states he needs this patients oxygen order before he leaves today. He states that the pt has already paid for it. His phone number is [phone].

## 2018-11-16 NOTE — TELEPHONE ENCOUNTER
[facility]juan from [facility] called stating that he has faxed over paperwork for the doctor to sign. He states that he has been trying to get the paperwork back for a week now and has been unable to reach anyone. He is requesting that that be sent back asap or if he could get a call back at [phone]. Please advise.

## 2018-11-16 NOTE — TELEPHONE ENCOUNTER
Attempted to contact loi. We do not have any paperwork regarding orders for portable oxygen for the patient.

## 2018-12-13 ENCOUNTER — OFFICE VISIT (OUTPATIENT)
Dept: INTERNAL MEDICINE | Facility: CLINIC | Age: 73
End: 2018-12-13

## 2018-12-13 VITALS
HEART RATE: 91 BPM | BODY MASS INDEX: 36.13 KG/M2 | RESPIRATION RATE: 16 BRPM | DIASTOLIC BLOOD PRESSURE: 85 MMHG | SYSTOLIC BLOOD PRESSURE: 158 MMHG | WEIGHT: 179 LBS | OXYGEN SATURATION: 98 % | TEMPERATURE: 98 F

## 2018-12-13 DIAGNOSIS — Z79.4 TYPE 2 DIABETES MELLITUS WITH HYPERGLYCEMIA, WITH LONG-TERM CURRENT USE OF INSULIN (HCC): ICD-10-CM

## 2018-12-13 DIAGNOSIS — E11.65 TYPE 2 DIABETES MELLITUS WITH HYPERGLYCEMIA, WITH LONG-TERM CURRENT USE OF INSULIN (HCC): ICD-10-CM

## 2018-12-13 DIAGNOSIS — R30.0 DYSURIA: Primary | ICD-10-CM

## 2018-12-13 DIAGNOSIS — I10 ESSENTIAL HYPERTENSION: ICD-10-CM

## 2018-12-13 LAB
BILIRUB BLD-MCNC: NEGATIVE MG/DL
CLARITY, POC: ABNORMAL
COLOR UR: YELLOW
GLUCOSE UR STRIP-MCNC: ABNORMAL MG/DL
KETONES UR QL: ABNORMAL
LEUKOCYTE EST, POC: ABNORMAL
NITRITE UR-MCNC: NEGATIVE MG/ML
PH UR: 5 [PH] (ref 5–8)
POC CREATININE URINE: 100
POC MICROALBUMIN URINE: 30
PROT UR STRIP-MCNC: ABNORMAL MG/DL
RBC # UR STRIP: NEGATIVE /UL
SP GR UR: 1.01 (ref 1–1.03)
UROBILINOGEN UR QL: NORMAL

## 2018-12-13 PROCEDURE — 82044 UR ALBUMIN SEMIQUANTITATIVE: CPT | Performed by: NURSE PRACTITIONER

## 2018-12-13 PROCEDURE — 99214 OFFICE O/P EST MOD 30 MIN: CPT | Performed by: NURSE PRACTITIONER

## 2018-12-13 PROCEDURE — 81003 URINALYSIS AUTO W/O SCOPE: CPT | Performed by: NURSE PRACTITIONER

## 2018-12-13 PROCEDURE — 82570 ASSAY OF URINE CREATININE: CPT | Performed by: NURSE PRACTITIONER

## 2018-12-16 LAB
BACTERIA UR CULT: NORMAL
BACTERIA UR CULT: NORMAL

## 2019-01-24 ENCOUNTER — OFFICE VISIT (OUTPATIENT)
Dept: INTERNAL MEDICINE | Facility: CLINIC | Age: 74
End: 2019-01-24

## 2019-01-24 ENCOUNTER — HOSPITAL ENCOUNTER (OUTPATIENT)
Dept: GENERAL RADIOLOGY | Facility: HOSPITAL | Age: 74
Discharge: HOME | End: 2019-01-24
Attending: FAMILY MEDICINE | Admitting: FAMILY MEDICINE

## 2019-01-24 VITALS
RESPIRATION RATE: 16 BRPM | DIASTOLIC BLOOD PRESSURE: 80 MMHG | TEMPERATURE: 98.8 F | OXYGEN SATURATION: 91 % | HEART RATE: 82 BPM | BODY MASS INDEX: 35.96 KG/M2 | WEIGHT: 178.38 LBS | HEIGHT: 59 IN | SYSTOLIC BLOOD PRESSURE: 150 MMHG

## 2019-01-24 DIAGNOSIS — J44.9 CHRONIC OBSTRUCTIVE PULMONARY DISEASE, UNSPECIFIED COPD TYPE (HCC): ICD-10-CM

## 2019-01-24 DIAGNOSIS — G47.62 NOCTURNAL LEG CRAMPS: ICD-10-CM

## 2019-01-24 DIAGNOSIS — Z79.4 TYPE 2 DIABETES MELLITUS WITH HYPERGLYCEMIA, WITH LONG-TERM CURRENT USE OF INSULIN (HCC): ICD-10-CM

## 2019-01-24 DIAGNOSIS — Z87.19 HISTORY OF ACUTE PANCREATITIS: ICD-10-CM

## 2019-01-24 DIAGNOSIS — Z87.440 HISTORY OF URINARY TRACT INFECTION: ICD-10-CM

## 2019-01-24 DIAGNOSIS — R25.1 SHAKING: Primary | ICD-10-CM

## 2019-01-24 DIAGNOSIS — R05.8 PRODUCTIVE COUGH: ICD-10-CM

## 2019-01-24 DIAGNOSIS — I10 ESSENTIAL HYPERTENSION: Chronic | ICD-10-CM

## 2019-01-24 DIAGNOSIS — E11.65 TYPE 2 DIABETES MELLITUS WITH HYPERGLYCEMIA, WITH LONG-TERM CURRENT USE OF INSULIN (HCC): ICD-10-CM

## 2019-01-24 DIAGNOSIS — R06.02 SHORTNESS OF BREATH: ICD-10-CM

## 2019-01-24 DIAGNOSIS — E03.9 ACQUIRED HYPOTHYROIDISM: ICD-10-CM

## 2019-01-24 DIAGNOSIS — R82.90 ABNORMAL FINDING IN URINE: ICD-10-CM

## 2019-01-24 PROCEDURE — 71046 X-RAY EXAM CHEST 2 VIEWS: CPT

## 2019-01-24 PROCEDURE — 99214 OFFICE O/P EST MOD 30 MIN: CPT | Performed by: FAMILY MEDICINE

## 2019-01-24 NOTE — PROGRESS NOTES
"Subjective    Marielena Moeller is a 73 y.o. female here for:  Chief Complaint   Patient presents with   • Diabetes     f/u    • Hypertension     f/u    • Difficulty Urinating     f/u    • Hypothyroidism     f/u    • Shaking     \"shaking\" inside her daughter states it is usually in the morning. it comes and goes        Diabetes   She presents for her follow-up diabetic visit. She has type 2 diabetes mellitus. Her disease course has been fluctuating. Associated symptoms include fatigue. Pertinent negatives for diabetes include no chest pain. Pertinent negatives for diabetic complications include no CVA. Risk factors for coronary artery disease include stress, post-menopausal, sedentary lifestyle, hypertension, obesity and diabetes mellitus. Current diabetic treatment includes insulin injections. She is compliant with treatment most of the time. Meal planning includes avoidance of concentrated sweets. Her overall blood glucose range is 140-180 mg/dl. An ACE inhibitor/angiotensin II receptor blocker is not being taken.   Hypertension   This is a chronic problem. The current episode started more than 1 year ago. The problem is controlled. Associated symptoms include peripheral edema and shortness of breath. Pertinent negatives include no chest pain. Agents associated with hypertension include thyroid hormones. Risk factors for coronary artery disease include stress, sedentary lifestyle, smoking/tobacco exposure, obesity, post-menopausal state and diabetes mellitus. Past treatments include diuretics. Current antihypertension treatment includes diuretics (Changed to spironolactone after pancreatitis bout likely stemmed from HCTZ.). The current treatment provides significant improvement. Compliance problems include psychosocial issues and exercise.  There is no history of CAD/MI or CVA. Identifiable causes of hypertension include sleep apnea and a thyroid problem.   Hypothyroidism   This is a chronic problem. The " "current episode started more than 1 year ago. The problem occurs daily. Associated symptoms include arthralgias and fatigue. Pertinent negatives include no chest pain. Treatments tried: levothyoxine. The treatment provided moderate (she does not feel brand synthroid has made much of a difference from generic.) relief.   Shortness of Breath   This is a recurrent problem. The current episode started more than 1 month ago. The problem occurs daily. The problem has been gradually worsening. Associated symptoms include leg swelling. Pertinent negatives include no chest pain. The symptoms are aggravated by any activity. She has tried beta agonist inhalers and rest (oxygen supplementation) for the symptoms. The treatment provided mild relief. Her past medical history is significant for chronic lung disease (followed by Dr. Sanchez, pulmonology). There is no history of PE or a recent surgery.        and was buried yesterday.    She notes she continues to feel shaky inside. Not \"vomity\" sick but she feels her inside is quivering. Daughter hs been staying with her this week, seems to have this in the mornings and sugar and blood pressure are good when these episodes happen. Sometimes happens in the evening, sometimes wakes her up. Does not seem to correlate with stress, worry.     Finished treatment for urinary tract infection. Unsure if cleared completely.    The following portions of the patient's history were reviewed and updated as appropriate: allergies, current medications, past family history, past medical history, past social history, past surgical history and problem list.    Health Maintenance   Topic Date Due   • TDAP/TD VACCINES (1 - Tdap) 1966   • PNEUMOCOCCAL VACCINES (65+ LOW/MEDIUM RISK) (1 of 2 - PCV13) 2012   • ZOSTER VACCINE (2 of 2) 2018   • MEDICARE ANNUAL WELLNESS  2018   • DIABETIC FOOT EXAM  2018   • LIPID PANEL  2018   • HEMOGLOBIN A1C  2019   • " "DIABETIC EYE EXAM  11/06/2019   • URINE MICROALBUMIN  12/13/2019   • DXA SCAN  12/15/2019   • MAMMOGRAM  11/06/2020   • COLONOSCOPY  08/10/2028   • HEPATITIS C SCREENING  Completed   • INFLUENZA VACCINE  Completed       Review of Systems   Constitutional: Positive for fatigue.   Respiratory: Positive for shortness of breath.    Cardiovascular: Positive for leg swelling. Negative for chest pain.   Endocrine:        No night sweats.   Genitourinary: Positive for dysuria.   Musculoskeletal: Positive for arthralgias.   Psychiatric/Behavioral: Positive for stress.       Vitals:    01/24/19 0919   BP: 150/80   Pulse: 82   Resp: 16   Temp: 98.8 °F (37.1 °C)   TempSrc: Temporal   SpO2: 91%   Weight: 80.9 kg (178 lb 6 oz)   Height: 149.9 cm (59.02\")         Objective   Physical Exam   Constitutional: She is oriented to person, place, and time. Vital signs are normal. She appears well-developed and well-nourished. She is active.  Non-toxic appearance. She does not appear ill. No distress. She is obese.  HENT:   Head: Normocephalic and atraumatic.   Right Ear: Hearing normal.   Left Ear: Hearing normal.   Mouth/Throat: Mucous membranes are not dry.   Eyes: EOM are normal. No scleral icterus.   Neck: Phonation normal. Neck supple.   Pulmonary/Chest: Effort normal. No stridor. She has decreased breath sounds.   Neurological: She is alert and oriented to person, place, and time. She displays no tremor. No cranial nerve deficit.   Skin: Skin is warm. No rash noted. She is not diaphoretic. No pallor.   Psychiatric: Her speech is normal and behavior is normal. Judgment and thought content normal. Cognition and memory are normal. She exhibits a depressed mood.   Nursing note and vitals reviewed.    Lab Results   Component Value Date    HGBA1C 8.6 11/06/2018     Lab Results   Component Value Date    TSH 2.530 07/24/2018     Lab Results   Component Value Date    FREET4 1.45 07/24/2018         Assessment/Plan     Problem List Items " Addressed This Visit        Cardiovascular and Mediastinum    Essential hypertension (Chronic)    Overview     · No HCTZ due to pancreatitis early 2018.         Relevant Orders    T4, Free    TSH    CBC & Differential    Magnesium       Respiratory    COPD (chronic obstructive pulmonary disease) (CMS/[facility])    Relevant Orders    TSH       Endocrine    Type 2 diabetes mellitus with hyperglycemia, with long-term current use of insulin (CMS/[facility])    Overview     · History of pancreatitis         Relevant Orders    Comprehensive Metabolic Panel    Acquired hypothyroidism    Relevant Orders    T4, Free    TSH      Other Visit Diagnoses     Shaking    -  Primary    Relevant Orders    Cortisol - AM    ACTH    Metanephrines, Pheochromocyt - Urine, Clean Catch    Urine Culture - Urine, Urine, Clean Catch    Shortness of breath        Relevant Orders    CBC & Differential    XR Chest PA & Lateral (Completed)    History of urinary tract infection        Relevant Orders    Urine Culture - Urine, Urine, Clean Catch    Productive cough        Relevant Orders    CBC & Differential    XR Chest PA & Lateral (Completed)    History of acute pancreatitis        Relevant Orders    Lipase    Abnormal finding in urine         Relevant Orders    Urine Culture - Urine, Urine, Clean Catch    Nocturnal leg cramps        Relevant Medications    magnesium gluconate (MAGONATE) 30 MG tablet    Other Relevant Orders    Magnesium          · Borderline blood pressure control, but under stress. Monitor at home. Continue current medicine  · Not yet due for A1C for her diabetes mellitus  · Follow up with pulmonology regarding breathing issues  · Trial different magnesium at family member's request.    Return for As scheduled previously.    Marychuy Starr MD

## 2019-01-30 LAB
ACTH PLAS-MCNC: 14.5 PG/ML (ref 7.2–63.3)
ALBUMIN SERPL-MCNC: 3.9 G/DL (ref 3.5–5)
ALBUMIN/GLOB SERPL: 1.5 G/DL (ref 1–2)
ALP SERPL-CCNC: 73 U/L (ref 38–126)
ALT SERPL-CCNC: 30 U/L (ref 13–69)
AST SERPL-CCNC: 20 U/L (ref 15–46)
BACTERIA UR CULT: NORMAL
BACTERIA UR CULT: NORMAL
BASOPHILS # BLD AUTO: 0.05 10*3/MM3 (ref 0–0.2)
BASOPHILS NFR BLD AUTO: 0.8 % (ref 0–2.5)
BILIRUB SERPL-MCNC: 0.5 MG/DL (ref 0.2–1.3)
BUN SERPL-MCNC: 10 MG/DL (ref 7–20)
BUN/CREAT SERPL: 20 (ref 7.1–23.5)
CALCIUM SERPL-MCNC: 9.4 MG/DL (ref 8.4–10.2)
CHLORIDE SERPL-SCNC: 98 MMOL/L (ref 98–107)
CO2 SERPL-SCNC: 32 MMOL/L (ref 26–30)
CORTIS AM PEAK SERPL-MCNC: 10.9 UG/DL (ref 6.2–19.4)
CREAT SERPL-MCNC: 0.5 MG/DL (ref 0.6–1.3)
CREAT UR-MCNC: 72.7 MG/DL
EOSINOPHIL # BLD AUTO: 0.17 10*3/MM3 (ref 0–0.7)
EOSINOPHIL NFR BLD AUTO: 2.9 % (ref 0–7)
ERYTHROCYTE [DISTWIDTH] IN BLOOD BY AUTOMATED COUNT: 12.3 % (ref 11.5–14.5)
GLOBULIN SER CALC-MCNC: 2.6 GM/DL
GLUCOSE SERPL-MCNC: 240 MG/DL (ref 74–98)
HCT VFR BLD AUTO: 42.4 % (ref 37–47)
HGB BLD-MCNC: 13.7 G/DL (ref 12–16)
IMM GRANULOCYTES # BLD AUTO: 0.01 10*3/MM3 (ref 0–0.06)
IMM GRANULOCYTES NFR BLD AUTO: 0.2 % (ref 0–0.6)
LIPASE SERPL-CCNC: 96 U/L (ref 23–300)
LYMPHOCYTES # BLD AUTO: 1.16 10*3/MM3 (ref 0.6–3.4)
LYMPHOCYTES NFR BLD AUTO: 19.7 % (ref 10–50)
MAGNESIUM SERPL-MCNC: 1.8 MG/DL (ref 1.6–2.3)
MCH RBC QN AUTO: 32.2 PG (ref 27–31)
MCHC RBC AUTO-ENTMCNC: 32.3 G/DL (ref 30–37)
MCV RBC AUTO: 99.5 FL (ref 81–99)
METANEPHS 24H UR-MCNC: 54 UG/L
METANEPHS/CREAT UR: 0.3 UG/MG CREAT (ref 0–1)
MONOCYTES # BLD AUTO: 0.42 10*3/MM3 (ref 0–0.9)
MONOCYTES NFR BLD AUTO: 7.1 % (ref 0–12)
NEUTROPHILS # BLD AUTO: 4.09 10*3/MM3 (ref 2–6.9)
NEUTROPHILS NFR BLD AUTO: 69.3 % (ref 37–80)
NORMETANEPHRINE 24H UR-MCNC: 142 UG/L
NRBC BLD AUTO-RTO: 0 /100 WBC (ref 0–0)
PLATELET # BLD AUTO: 240 10*3/MM3 (ref 130–400)
POTASSIUM SERPL-SCNC: 4.9 MMOL/L (ref 3.5–5.1)
PROT SERPL-MCNC: 6.5 G/DL (ref 6.3–8.2)
RBC # BLD AUTO: 4.26 10*6/MM3 (ref 4.2–5.4)
SODIUM SERPL-SCNC: 136 MMOL/L (ref 137–145)
T4 FREE SERPL-MCNC: 1.7 NG/DL (ref 0.78–2.19)
TSH SERPL DL<=0.005 MIU/L-ACNC: 1.19 MIU/ML (ref 0.47–4.68)
WBC # BLD AUTO: 5.9 10*3/MM3 (ref 4.8–10.8)

## 2019-01-31 ENCOUNTER — TELEPHONE (OUTPATIENT)
Dept: INTERNAL MEDICINE | Facility: CLINIC | Age: 74
End: 2019-01-31

## 2019-02-06 ENCOUNTER — OFFICE VISIT (OUTPATIENT)
Dept: INTERNAL MEDICINE | Facility: CLINIC | Age: 74
End: 2019-02-06

## 2019-02-06 VITALS
TEMPERATURE: 98.9 F | HEIGHT: 59 IN | DIASTOLIC BLOOD PRESSURE: 80 MMHG | BODY MASS INDEX: 35.58 KG/M2 | SYSTOLIC BLOOD PRESSURE: 158 MMHG | HEART RATE: 89 BPM | WEIGHT: 176.5 LBS | RESPIRATION RATE: 16 BRPM | OXYGEN SATURATION: 97 %

## 2019-02-06 DIAGNOSIS — K43.9 ABDOMINAL WALL HERNIA: Primary | ICD-10-CM

## 2019-02-06 DIAGNOSIS — R10.13 EPIGASTRIC PAIN: ICD-10-CM

## 2019-02-06 DIAGNOSIS — E11.65 TYPE 2 DIABETES MELLITUS WITH HYPERGLYCEMIA, WITH LONG-TERM CURRENT USE OF INSULIN (HCC): ICD-10-CM

## 2019-02-06 DIAGNOSIS — I10 ESSENTIAL HYPERTENSION: Chronic | ICD-10-CM

## 2019-02-06 DIAGNOSIS — J44.9 CHRONIC OBSTRUCTIVE PULMONARY DISEASE, UNSPECIFIED COPD TYPE (HCC): ICD-10-CM

## 2019-02-06 DIAGNOSIS — Z79.4 TYPE 2 DIABETES MELLITUS WITH HYPERGLYCEMIA, WITH LONG-TERM CURRENT USE OF INSULIN (HCC): ICD-10-CM

## 2019-02-06 DIAGNOSIS — E03.9 ACQUIRED HYPOTHYROIDISM: ICD-10-CM

## 2019-02-06 DIAGNOSIS — E66.01 CLASS 2 SEVERE OBESITY DUE TO EXCESS CALORIES WITH SERIOUS COMORBIDITY AND BODY MASS INDEX (BMI) OF 35.0 TO 35.9 IN ADULT (HCC): ICD-10-CM

## 2019-02-06 DIAGNOSIS — F43.21 MOURNING: ICD-10-CM

## 2019-02-06 LAB — HBA1C MFR BLD: 8.6 %

## 2019-02-06 PROCEDURE — 99214 OFFICE O/P EST MOD 30 MIN: CPT | Performed by: FAMILY MEDICINE

## 2019-02-06 PROCEDURE — 83036 HEMOGLOBIN GLYCOSYLATED A1C: CPT | Performed by: FAMILY MEDICINE

## 2019-02-06 NOTE — ASSESSMENT & PLAN NOTE
COPD is unchanged.  continue current treatments including oxygen supplementation, follow up pulmonary

## 2019-02-06 NOTE — PROGRESS NOTES
Subjective    Marielena Moeller is a 73 y.o. female here for:  Chief Complaint   Patient presents with   • Diabetes     3 mo f/u    • Hypertension     3 mo f/u    • Hypothyroidism     3 mo f/u    • COPD     3 mo f/u    • Hernia     would like to discuss hernia      Diabetes   She presents for her follow-up diabetic visit. She has type 2 diabetes mellitus. Her disease course has been fluctuating. Associated symptoms include fatigue. Pertinent negatives for diabetes include no chest pain. Pertinent negatives for diabetic complications include no CVA. Risk factors for coronary artery disease include stress, post-menopausal, sedentary lifestyle, hypertension, obesity and diabetes mellitus. Current diabetic treatment includes insulin injections. She is compliant with treatment most of the time. Meal planning includes avoidance of concentrated sweets. An ACE inhibitor/angiotensin II receptor blocker is not being taken.   Hypertension   This is a chronic problem. The current episode started more than 1 year ago. The problem is uncontrolled. Associated symptoms include peripheral edema and shortness of breath. Pertinent negatives include no chest pain. Agents associated with hypertension include thyroid hormones. Risk factors for coronary artery disease include stress, sedentary lifestyle, smoking/tobacco exposure, obesity, post-menopausal state and diabetes mellitus. Past treatments include diuretics. Current antihypertension treatment includes diuretics (Changed to spironolactone after pancreatitis bout likely stemmed from HCTZ.). The current treatment provides significant improvement. Compliance problems include psychosocial issues and exercise.  There is no history of CAD/MI or CVA. Identifiable causes of hypertension include sleep apnea and a thyroid problem.   Hypothyroidism   This is a chronic problem. The current episode started more than 1 year ago. The problem occurs daily. Associated symptoms include  "arthralgias and fatigue. Pertinent negatives include no chest pain. Treatments tried: levothyoxine. The treatment provided moderate (she does not feel brand synthroid has made much of a difference from generic.) relief.   Shortness of Breath   This is a recurrent problem. The current episode started more than 1 month ago. The problem occurs daily. The problem has been gradually worsening. Associated symptoms include leg swelling. Pertinent negatives include no chest pain. The symptoms are aggravated by any activity. She has tried beta agonist inhalers and rest (oxygen supplementation) for the symptoms. The treatment provided mild relief. Her past medical history is significant for chronic lung disease (followed by Dr. Sanchez, pulmonology). There is no history of PE or a recent surgery.      Reports having abdominal wall hernia which has become tender with pressure.  She discussed it previously with her gastroenterologist.  She has not tried an abdominal binder though she has looked into those.    Patient suspects A1c will be higher due to the stress in her life the last 3 months with health issues, living, and her 's passing.    The following portions of the patient's history were reviewed and updated as appropriate: allergies, current medications, past family history, past medical history, past social history, past surgical history and problem list.    Review of Systems   Constitutional: Positive for fatigue.   Respiratory: Positive for shortness of breath.    Cardiovascular: Positive for leg swelling. Negative for chest pain.   Musculoskeletal: Positive for arthralgias.       Vitals:    02/06/19 1115   BP: 158/80   Pulse: 89   Resp: 16   Temp: 98.9 °F (37.2 °C)   TempSrc: Temporal   SpO2: 97%   Weight: 80.1 kg (176 lb 8 oz)   Height: 149.9 cm (59.02\")         Objective   Physical Exam   Constitutional: She is oriented to person, place, and time. Vital signs are normal. She appears well-developed and " well-nourished. She is active.  Non-toxic appearance. She does not appear ill. No distress. She is obese.  HENT:   Head: Normocephalic and atraumatic.   Right Ear: Hearing normal.   Left Ear: Hearing normal.   Mouth/Throat: Mucous membranes are not dry.   Eyes: EOM are normal. No scleral icterus.   Neck: Phonation normal. Neck supple.   Pulmonary/Chest: Effort normal. No stridor. She has decreased breath sounds.   Neurological: She is alert and oriented to person, place, and time. She displays no tremor. No cranial nerve deficit.   Skin: Skin is warm. No rash noted. She is not diaphoretic. No pallor.   Psychiatric: Her speech is normal and behavior is normal. Judgment and thought content normal. Cognition and memory are normal. She exhibits a depressed mood.   Nursing note and vitals reviewed.      Lab Results   Component Value Date    HGBA1C 8.6 02/06/2019           Assessment/Plan     Problem List Items Addressed This Visit        Cardiovascular and Mediastinum    Essential hypertension (Chronic)    Overview     · No HCTZ due to pancreatitis early 2018.            Respiratory    COPD (chronic obstructive pulmonary disease) (CMS/[facility])    Current Assessment & Plan     COPD is unchanged.  continue current treatments including oxygen supplementation, follow up pulmonary                Digestive    Class 2 obesity with body mass index (BMI) of 35.0 to 35.9 in adult       Endocrine    Type 2 diabetes mellitus with hyperglycemia, with long-term current use of insulin (CMS/[facility])    Overview     · History of pancreatitis         Current Assessment & Plan     Diabetes is unchanged.   Continue current treatment regimen.  Diabetes will be reassessed in 3 months.         Relevant Orders    POC Glycosylated Hemoglobin (Hb A1C) (Completed)    Acquired hypothyroidism    Current Assessment & Plan     Stable, continue levothyroxine            Nervous and Auditory    Epigastric pain    Overview     Added automatically from request for  surgery [id number]           Other Visit Diagnoses     Abdominal wall hernia    -  Primary    Mourning              · Discussed need to work on weight, weight loss.  Surgeons generally recommend weight loss prior to any surgery for hernias.  · I reviewed the CT images of the abdomen pelvis from May 2018 which show her abdominal wall hernia.  Likely not a surgical candidate.  We discussed an abdominal binder.  · Follow up with all specialists as scheduled.  · Suspect A1c will be better no neck 3 months as this last 3 months has been very hard on her.  · Patient's Body mass index is 35.63 kg/m². BMI is above normal parameters. Recommendations include: nutrition counseling.  ·     Return in about 3 months (around 5/13/2019) for Medicare Wellness, Diabetes follow up.    Marychuy Starr MD

## 2019-02-06 NOTE — ASSESSMENT & PLAN NOTE
Diabetes is unchanged.   Continue current treatment regimen.  Diabetes will be reassessed in 3 months.

## 2019-04-24 DIAGNOSIS — E11.65 TYPE 2 DIABETES MELLITUS WITH HYPERGLYCEMIA, WITH LONG-TERM CURRENT USE OF INSULIN (HCC): ICD-10-CM

## 2019-04-24 DIAGNOSIS — Z79.4 TYPE 2 DIABETES MELLITUS WITH HYPERGLYCEMIA, WITH LONG-TERM CURRENT USE OF INSULIN (HCC): ICD-10-CM

## 2019-05-08 ENCOUNTER — OFFICE VISIT (OUTPATIENT)
Dept: INTERNAL MEDICINE | Facility: CLINIC | Age: 74
End: 2019-05-08

## 2019-05-08 VITALS
HEART RATE: 92 BPM | OXYGEN SATURATION: 95 % | SYSTOLIC BLOOD PRESSURE: 164 MMHG | BODY MASS INDEX: 35.53 KG/M2 | WEIGHT: 176.25 LBS | TEMPERATURE: 98.9 F | RESPIRATION RATE: 16 BRPM | DIASTOLIC BLOOD PRESSURE: 76 MMHG | HEIGHT: 59 IN

## 2019-05-08 DIAGNOSIS — F41.9 ANXIETY: ICD-10-CM

## 2019-05-08 DIAGNOSIS — M54.2 NECK PAIN: ICD-10-CM

## 2019-05-08 DIAGNOSIS — Z00.00 MEDICARE ANNUAL WELLNESS VISIT, SUBSEQUENT: Primary | ICD-10-CM

## 2019-05-08 DIAGNOSIS — I38 HEART VALVE DISORDER: ICD-10-CM

## 2019-05-08 DIAGNOSIS — E11.65 TYPE 2 DIABETES MELLITUS WITH HYPERGLYCEMIA, WITH LONG-TERM CURRENT USE OF INSULIN (HCC): ICD-10-CM

## 2019-05-08 DIAGNOSIS — Z28.21 PNEUMOCOCCAL VACCINATION DECLINED BY PATIENT: ICD-10-CM

## 2019-05-08 DIAGNOSIS — J44.9 CHRONIC OBSTRUCTIVE PULMONARY DISEASE, UNSPECIFIED COPD TYPE (HCC): ICD-10-CM

## 2019-05-08 DIAGNOSIS — E03.9 ACQUIRED HYPOTHYROIDISM: ICD-10-CM

## 2019-05-08 DIAGNOSIS — Z79.4 TYPE 2 DIABETES MELLITUS WITH HYPERGLYCEMIA, WITH LONG-TERM CURRENT USE OF INSULIN (HCC): ICD-10-CM

## 2019-05-08 DIAGNOSIS — Z79.4 TYPE 2 DIABETES MELLITUS WITH OTHER CIRCULATORY COMPLICATION, WITH LONG-TERM CURRENT USE OF INSULIN (HCC): ICD-10-CM

## 2019-05-08 DIAGNOSIS — E11.59 TYPE 2 DIABETES MELLITUS WITH OTHER CIRCULATORY COMPLICATION, WITH LONG-TERM CURRENT USE OF INSULIN (HCC): ICD-10-CM

## 2019-05-08 DIAGNOSIS — E11.59 HYPERTENSION ASSOCIATED WITH DIABETES (HCC): ICD-10-CM

## 2019-05-08 DIAGNOSIS — E66.01 CLASS 2 SEVERE OBESITY DUE TO EXCESS CALORIES WITH SERIOUS COMORBIDITY AND BODY MASS INDEX (BMI) OF 35.0 TO 35.9 IN ADULT (HCC): ICD-10-CM

## 2019-05-08 DIAGNOSIS — I15.2 HYPERTENSION ASSOCIATED WITH DIABETES (HCC): ICD-10-CM

## 2019-05-08 PROBLEM — K57.92 DIVERTICULITIS: Status: RESOLVED | Noted: 2018-05-14 | Resolved: 2019-05-08

## 2019-05-08 PROBLEM — R10.13 EPIGASTRIC PAIN: Status: RESOLVED | Noted: 2018-07-27 | Resolved: 2019-05-08

## 2019-05-08 LAB — HBA1C MFR BLD: 8.8 %

## 2019-05-08 PROCEDURE — 96160 PT-FOCUSED HLTH RISK ASSMT: CPT | Performed by: FAMILY MEDICINE

## 2019-05-08 PROCEDURE — 83036 HEMOGLOBIN GLYCOSYLATED A1C: CPT | Performed by: FAMILY MEDICINE

## 2019-05-08 PROCEDURE — 99397 PER PM REEVAL EST PAT 65+ YR: CPT | Performed by: FAMILY MEDICINE

## 2019-05-08 PROCEDURE — G0439 PPPS, SUBSEQ VISIT: HCPCS | Performed by: FAMILY MEDICINE

## 2019-05-08 RX ORDER — LORAZEPAM 0.5 MG/1
0.5 TABLET ORAL EVERY 8 HOURS PRN
Qty: 45 TABLET | Refills: 1 | Status: SHIPPED | OUTPATIENT
Start: 2019-05-08 | End: 2019-12-16 | Stop reason: SDUPTHER

## 2019-05-08 NOTE — PROGRESS NOTES
Subsequent Medicare Wellness Visit   The ABC's of the Annual Wellness Visit    Chief Complaint   Patient presents with   • Medicare Wellness-subsequent     following up on DM and HTN. pt states that she is having a lot of anxiety since her  passed away        HPI:  Marielena Moeller, -1945, is a 73 y.o. female who presents for a Subsequent Medicare Wellness Visit.    Blood pressure 140/70 or so. Associated with diabetes mellitus, insulin dependent. Uncontrolled, elevated A1C.     Anxiety worsened, would like prn medicine.  passed, she's moving.    Worries thyroid is getting bad again, pain in lower neck. She had a cyst pushing on trachea in the past. Would like to have thyroid imaged.    Using oxygen for COPD, dyspnea upon exertion. Moving out of home with mold to new double wide.     Recent Hospitalizations:  No hospitalization(s) within the last year..    Current Medical Providers:  Patient Care Team:  Marychuy Starr MD as PCP - General (Family Medicine)    Health Habits and Functional and Cognitive Screening and Depression Screening:  Functional & Cognitive Status 2019   Do you have difficulty preparing food and eating? No   Do you have difficulty bathing yourself, getting dressed or grooming yourself? No   Do you have difficulty using the toilet? Yes   Do you have difficulty moving around from place to place? Yes   Do you have trouble with steps or getting out of a bed or a chair? Yes   In the past year have you fallen or experienced a near fall? Yes   Current Diet Well Balanced Diet   Dental Exam Up to date   Eye Exam Up to date   Exercise (times per week) 0 times per week   Current Exercise Activities Include None   Do you need help using the phone?  No   Are you deaf or do you have serious difficulty hearing?  No   Do you need help with transportation? Yes   Do you need help shopping? Yes   Do you need help preparing meals?  No   Do you need help with housework?  Yes   Do  "you need help with laundry? Yes   Do you need help taking your medications? No   Do you need help managing money? No   Do you ever drive or ride in a car without wearing a seat belt? No   Have you felt unusual stress, anger or loneliness in the last month? -   Who do you live with? -   If you need help, do you have trouble finding someone available to you? -   Have you been bothered in the last four weeks by sexual problems? -   Do you have difficulty concentrating, remembering or making decisions? -       Compared to one year ago, the patient feels her physical health is the same and her mental health is worse.    Depression Screen:  PHQ-2/PHQ-9 Depression Screening 5/8/2019   Little interest or pleasure in doing things 0   Feeling down, depressed, or hopeless 1   Total Score 1         Past Medical/Family/Social History:  The following portions of the patient's history were reviewed and updated as appropriate: allergies, current medications, past family history, past medical history, past social history, past surgical history and problem list.    Allergies   Allergen Reactions   • Doxycycline Nausea And Vomiting     \"Pain worse than pancreatitis\"   • Ciprofloxacin Hives   • Contrast Dye Unknown (See Comments)     Don't remember-been a long time   • Erythromycin Hives and Rash   • Prednisone Mental Status Change     ITCHING AND NERVOUS         Current Outpatient Medications:   •  albuterol (VENTOLIN HFA) 108 (90 Base) MCG/ACT inhaler, Inhale 2 puffs Every 4 (Four) Hours As Needed for Wheezing or Shortness of Air., Disp: 1 inhaler, Rfl: 5  •  aspirin 81 MG tablet, Take 81 mg by mouth. Every other day, Disp: , Rfl:   •  BD INSULIN SYRINGE ULTRAFINE 31G X 15/64\" 0.5 ML misc, USE AS DIRECTED TO INJECT INSULIN FOR DM E11.9, Disp: 300 each, Rfl: 3  •  Blood Glucose Monitoring Suppl (ACCU-CHEK CHIO PLUS) w/Device kit, USE AS DIRECTED TO CHECK GLUCOSE 3 TIMES DAILY FOR DM E11.65, Disp: 1 kit, Rfl: 0  •  Cholecalciferol " (VITAMIN D3) 5000 units capsule capsule, Take 1 capsule by mouth Daily., Disp: 90 capsule, Rfl: 3  •  glucose blood (ACCU-CHEK CHIO PLUS) test strip, USE AS DIRECTED TO CHECK GLUCOSE 3 TIMES DAILY FOR DM E11.9, Disp: 100 each, Rfl: 3  •  levothyroxine (SYNTHROID, LEVOTHROID) 100 MCG tablet, Take 1 tablet by mouth Daily., Disp: 90 tablet, Rfl: 3  •  magnesium gluconate (MAGONATE) 30 MG tablet, Take 1 tablet by mouth 2 (Two) Times a Day., Disp: 180 tablet, Rfl: 4  •  mupirocin (BACTROBAN) 2 % ointment, Apply  topically to the appropriate area as directed 2 (Two) Times a Day., Disp: 15 g, Rfl: 0  •  NOVOLOG MIX 70/30 (70-30) 100 UNIT/ML injection, Inject 70 Units under the skin into the appropriate area as directed 2 (Two) Times a Day With Meals., Disp: 126 mL, Rfl: 3  •  O2 (OXYGEN), Inhale 2 L/min As Needed., Disp: , Rfl:   •  polyethylene glycol (MIRALAX) powder, Mix 17 g (1 cap full) in 8 oz. Of non-carbonated beverage and drink daily., Disp: 510 g, Rfl: 1  •  spironolactone (ALDACTONE) 25 MG tablet, Take 1 tablet by mouth Daily. (Patient taking differently: Take 12.5 mg by mouth Daily.), Disp: 90 tablet, Rfl: 3  •  LORazepam (ATIVAN) 0.5 MG tablet, Take 1 tablet by mouth Every 8 (Eight) Hours As Needed for Anxiety., Disp: 45 tablet, Rfl: 1    Aspirin use counseling: Taking ASA appropriately as indicated    Current medication list contains no high risk medications.  No harmful drug interactions have been identified.     Family History   Problem Relation Age of Onset   • Arthritis Mother    • Diabetes Mother    • Osteoporosis Mother    • Arthritis Father    • Diabetes Father        Social History     Tobacco Use   • Smoking status: Former Smoker     Packs/day: 2.00     Years: 15.00     Pack years: 30.00     Types: Cigarettes     Last attempt to quit: 1975     Years since quittin.3   • Smokeless tobacco: Never Used   Substance Use Topics   • Alcohol use: No       Past Surgical History:   Procedure Laterality  "Date   • APPENDECTOMY     •  SECTION     • CHOLECYSTECTOMY     • COLONOSCOPY N/A 8/10/2018    Procedure: COLONOSCOPY WITH HOT SNARE POLYPECTOMY X 3; COLD SNARE POLYPECTOMY, COLD BIOPSY POLYPECTOMY X 4; CLIP PLACEMENT X 1;  Surgeon: Glenn Modi MD;  Location: [facility] ENDOSCOPY;  Service: Gastroenterology   • THYROIDECTOMY, PARTIAL Left    • TUBAL ABDOMINAL LIGATION         Patient Active Problem List   Diagnosis   • Type 2 diabetes mellitus with hyperglycemia, with long-term current use of insulin (CMS/[facility])   • Hypertension associated with diabetes (CMS/[facility])   • Osteoporosis   • Class 2 obesity with body mass index (BMI) of 35.0 to 35.9 in adult   • Acquired hypothyroidism   • Vitamin D deficiency   • Hematochezia   • Constipation   • BRBPR (bright red blood per rectum)   • History of colon polyps   • COPD (chronic obstructive pulmonary disease) (CMS/[facility])   • Pneumococcal vaccination declined by patient   • Anxiety   • Type 2 diabetes mellitus with circulatory disorder, with long-term current use of insulin (CMS/[facility])       Review of Systems   Constitutional: Positive for activity change and fatigue.   HENT: Positive for trouble swallowing.    Respiratory: Positive for shortness of breath.    Gastrointestinal:        Indigestion   Musculoskeletal: Positive for neck pain.   Psychiatric/Behavioral: The patient is nervous/anxious.    All other systems reviewed and are negative.      Objective     Vitals:    19 1050   BP: 164/76   Pulse: 92   Resp: 16   Temp: 98.9 °F (37.2 °C)   TempSrc: Temporal   SpO2: 95%   Weight: 79.9 kg (176 lb 4 oz)   Height: 149.9 cm (59.02\")   PainSc:   6   PainLoc: Back       Patient's Body mass index is 35.58 kg/m². BMI is above normal parameters. Recommendations include: educational material.      No exam data present    The patient has no evidence of cognitve impairment.     Physical Exam   Constitutional: She is oriented to person, place, and time. She " appears well-developed and well-nourished. She is active.  Non-toxic appearance. She has a sickly appearance. She does not appear ill. No distress. She is obese.  HENT:   Head: Normocephalic and atraumatic.   Right Ear: Hearing, tympanic membrane, external ear and ear canal normal.   Left Ear: Hearing, tympanic membrane, external ear and ear canal normal.   Nose: Nose normal.   Mouth/Throat: Oropharynx is clear and moist. Mucous membranes are not dry.   Eyes: Conjunctivae, EOM and lids are normal. No scleral icterus.   Neck: Phonation normal. Neck supple. No neck rigidity.   Cardiovascular: Normal rate and regular rhythm.   Murmur heard.  Pulmonary/Chest: Effort normal. No stridor. She has decreased breath sounds.   Wearing supplemental oxygen   Abdominal: Soft. Bowel sounds are normal. There is no tenderness.   Neurological: She is alert and oriented to person, place, and time. She displays no tremor. No cranial nerve deficit. Gait abnormal.   Skin: Skin is warm and intact. No rash noted. She is not diaphoretic. No cyanosis. No pallor.   Psychiatric: Her speech is normal and behavior is normal. Judgment and thought content normal. Cognition and memory are normal. She exhibits a depressed mood.   Nursing note and vitals reviewed.      Recent Lab Results:  Lab Results   Component Value Date     (H) 01/28/2019     Lab Results   Component Value Date    HGBA1C 8.8 05/08/2019    HGBA1C 8.6 02/06/2019    HGBA1C 8.6 11/06/2018     Lab Results   Component Value Date    TSH 1.190 01/28/2019     Lab Results   Component Value Date    FREET4 1.70 01/28/2019       Chemistry        Component Value Date/Time     (L) 01/28/2019 0822     05/12/2018 0836    K 4.9 01/28/2019 0822    K 4.4 05/12/2018 0836    CL 98 01/28/2019 0822     05/12/2018 0836    CO2 32.0 (H) 01/28/2019 0822    CO2 29.0 05/12/2018 0836    BUN 10 01/28/2019 0822    BUN 12 05/12/2018 0836    CREATININE 0.50 (L) 01/28/2019 0822    CREATININE  0.60 05/12/2018 0836     (H) 01/28/2019 0822        Component Value Date/Time    CALCIUM 9.4 01/28/2019 0822    CALCIUM 9.0 05/12/2018 0836    ALKPHOS 73 01/28/2019 0822    ALKPHOS 77 05/12/2018 0836    AST 20 01/28/2019 0822    AST 26 05/12/2018 0836    ALT 30 01/28/2019 0822    ALT 31 05/12/2018 0836    BILITOT 0.5 01/28/2019 0822    BILITOT 0.5 05/12/2018 0836               Assessment/Plan   Age-appropriate Screening Schedule:  Refer to the list below for future screening recommendations based on patient's age, sex and/or medical conditions.      Health Maintenance   Topic Date Due   • TDAP/TD VACCINES (1 - Tdap) 11/19/1966   • PNEUMOCOCCAL VACCINES (65+ LOW/MEDIUM RISK) (1 of 2 - PCV13) 11/19/2012   • DIABETIC FOOT EXAM  12/11/2018   • LIPID PANEL  12/11/2018   • ZOSTER VACCINE (2 of 2) 05/16/2020 (Originally 2/5/2018)   • INFLUENZA VACCINE  08/01/2019   • DIABETIC EYE EXAM  11/06/2019   • HEMOGLOBIN A1C  11/08/2019   • URINE MICROALBUMIN  12/13/2019   • DXA SCAN  12/15/2019   • MAMMOGRAM  11/06/2020   • COLONOSCOPY  08/10/2028       Medicare Risks and Personalized Health Plan:  weight , cardiovascular risk, inactivity and depression      CMS-Preventive Services Quick Reference  Medicare Preventive Services Addressed:  Advance directive, Diabetes screening, see lab orders, shingrix    Advance Care Planning:  Patient does not have an advance directive - information provided to the patient today    Diagnoses and all orders for this visit:    1. Medicare annual wellness visit, subsequent (Primary)    2. Type 2 diabetes mellitus with hyperglycemia, with long-term current use of insulin (CMS/[facility])  -     POC Glycosylated Hemoglobin (Hb A1C)    3. Type 2 diabetes mellitus with other circulatory complication, with long-term current use of insulin (CMS/[facility])    4. Hypertension associated with diabetes (CMS/[facility])    5. Chronic obstructive pulmonary disease, unspecified COPD type (CMS/[facility])    6. Class 2 severe obesity  due to excess calories with serious comorbidity and body mass index (BMI) of 35.0 to 35.9 in adult (CMS/HCC)    7. Acquired hypothyroidism  -     US Thyroid    8. Neck pain  -     US Thyroid    9. Anxiety  -     LORazepam (ATIVAN) 0.5 MG tablet; Take 1 tablet by mouth Every 8 (Eight) Hours As Needed for Anxiety.  Dispense: 45 tablet; Refill: 1    10. Heart valve disorder  -     Adult Transthoracic Echo Complete W/ Cont if Necessary Per Protocol    11. Pneumococcal vaccination declined by patient      No other medicine changes at this time. Follow up with pulmonary as scheduled.     An After Visit Summary and PPPS with all of these plans were given to the patient.      Follow Up:  Return in about 3 months (around 8/19/2019) for Diabetes follow up.

## 2019-05-10 ENCOUNTER — HOSPITAL ENCOUNTER (OUTPATIENT)
Dept: ULTRASOUND IMAGING | Facility: HOSPITAL | Age: 74
Discharge: HOME | End: 2019-05-10
Admitting: FAMILY MEDICINE

## 2019-05-10 PROCEDURE — 76536 US EXAM OF HEAD AND NECK: CPT

## 2019-05-13 PROBLEM — Z79.4 TYPE 2 DIABETES MELLITUS WITH CIRCULATORY DISORDER, WITH LONG-TERM CURRENT USE OF INSULIN (HCC): Status: ACTIVE | Noted: 2019-05-13

## 2019-05-13 PROBLEM — E11.59 TYPE 2 DIABETES MELLITUS WITH CIRCULATORY DISORDER, WITH LONG-TERM CURRENT USE OF INSULIN (HCC): Status: ACTIVE | Noted: 2019-05-13

## 2019-05-13 NOTE — PATIENT INSTRUCTIONS
Medicare Wellness  Personal Prevention Plan of Service     Date of Office Visit:  2019  Encounter Provider:  Marychuy Starr MD  Place of Service:  [facility] PRIMARY CARE  Patient Name: Marielena Moeller  :  1945    As part of the Medicare Wellness portion of your visit today, we are providing you with this personalized preventive plan of services (PPPS). This plan is based upon recommendations of the [place] Preventive Services Task Force (USPSTF) and the Advisory Committee on Immunization Practices (ACIP).    This lists the preventive care services that should be considered, and provides dates of when you are due. Items listed as completed are up-to-date and do not require any further intervention.    Health Maintenance   Topic Date Due   • TDAP/TD VACCINES (1 - Tdap) 1966   • PNEUMOCOCCAL VACCINES (65+ LOW/MEDIUM RISK) (1 of 2 - PCV13) 2012   • DIABETIC FOOT EXAM  2018   • LIPID PANEL  2018   • ZOSTER VACCINE (2 of 2) 2020 (Originally 2018)   • INFLUENZA VACCINE  2019   • DIABETIC EYE EXAM  2019   • HEMOGLOBIN A1C  2019   • URINE MICROALBUMIN  2019   • DXA SCAN  12/15/2019   • MEDICARE ANNUAL WELLNESS  2020   • MAMMOGRAM  2020   • COLONOSCOPY  08/10/2028   • HEPATITIS C SCREENING  Completed       Orders Placed This Encounter   Procedures   • US Thyroid     Order Specific Question:   Reason for Exam:     Answer:   neck pain, history thyroid lesion   • POC Glycosylated Hemoglobin (Hb A1C)   • Adult Transthoracic Echo Complete W/ Cont if Necessary Per Protocol     Order Specific Question:   Reason for exam?     Answer:   Murmur or Click     Order Specific Question:   Murmur or Click specification?     Answer:   Suspicion of Valvular Heart Disease       Return in about 3 months (around 2019) for Diabetes follow up.

## 2019-05-20 ENCOUNTER — HOSPITAL ENCOUNTER (OUTPATIENT)
Dept: CARDIOLOGY | Facility: HOSPITAL | Age: 74
Discharge: HOME | End: 2019-05-20
Admitting: FAMILY MEDICINE

## 2019-05-20 VITALS
BODY MASS INDEX: 35.11 KG/M2 | HEIGHT: 59 IN | SYSTOLIC BLOOD PRESSURE: 160 MMHG | WEIGHT: 174.16 LBS | DIASTOLIC BLOOD PRESSURE: 74 MMHG

## 2019-05-20 LAB
BH CV ECHO MEAS - BSA(HAYCOCK): 1.9 M^2
BH CV ECHO MEAS - BSA: 1.7 M^2
BH CV ECHO MEAS - BZI_BMI: 35.5 KILOGRAMS/M^2
BH CV ECHO MEAS - BZI_METRIC_HEIGHT: 149.9 CM
BH CV ECHO MEAS - BZI_METRIC_WEIGHT: 79.8 KG
BH CV ECHO MEAS - EDV(CUBED): 57.5 ML
BH CV ECHO MEAS - EDV(MOD-SP2): 47 ML
BH CV ECHO MEAS - EDV(MOD-SP4): 63 ML
BH CV ECHO MEAS - EDV(TEICH): 64.3 ML
BH CV ECHO MEAS - EF(CUBED): 59.3 %
BH CV ECHO MEAS - EF(MOD-BP): 55 %
BH CV ECHO MEAS - EF(MOD-SP2): 55.3 %
BH CV ECHO MEAS - EF(MOD-SP4): 55.6 %
BH CV ECHO MEAS - EF(TEICH): 51.6 %
BH CV ECHO MEAS - ESV(CUBED): 23.4 ML
BH CV ECHO MEAS - ESV(MOD-SP2): 21 ML
BH CV ECHO MEAS - ESV(MOD-SP4): 28 ML
BH CV ECHO MEAS - ESV(TEICH): 31.1 ML
BH CV ECHO MEAS - FS: 25.9 %
BH CV ECHO MEAS - IVS/LVPW: 1
BH CV ECHO MEAS - IVSD: 0.86 CM
BH CV ECHO MEAS - LA DIMENSION: 3.1 CM
BH CV ECHO MEAS - LAD MAJOR: 4.4 CM
BH CV ECHO MEAS - LAT PEAK E' VEL: 6.8 CM/SEC
BH CV ECHO MEAS - LATERAL E/E' RATIO: 9.6
BH CV ECHO MEAS - LV DIASTOLIC VOL/BSA (35-75): 36.1 ML/M^2
BH CV ECHO MEAS - LV MASS(C)D: 97.2 GRAMS
BH CV ECHO MEAS - LV MASS(C)DI: 55.7 GRAMS/M^2
BH CV ECHO MEAS - LV MAX PG: 2.7 MMHG
BH CV ECHO MEAS - LV MEAN PG: 1 MMHG
BH CV ECHO MEAS - LV SYSTOLIC VOL/BSA (12-30): 16 ML/M^2
BH CV ECHO MEAS - LV V1 MAX: 82 CM/SEC
BH CV ECHO MEAS - LV V1 MEAN: 50.3 CM/SEC
BH CV ECHO MEAS - LV V1 VTI: 15.8 CM
BH CV ECHO MEAS - LVIDD: 3.9 CM
BH CV ECHO MEAS - LVIDS: 2.9 CM
BH CV ECHO MEAS - LVLD AP2: 5.9 CM
BH CV ECHO MEAS - LVLD AP4: 6.3 CM
BH CV ECHO MEAS - LVLS AP2: 4.8 CM
BH CV ECHO MEAS - LVLS AP4: 4.5 CM
BH CV ECHO MEAS - LVOT AREA (M): 2.5 CM^2
BH CV ECHO MEAS - LVOT AREA: 2.5 CM^2
BH CV ECHO MEAS - LVOT DIAM: 1.8 CM
BH CV ECHO MEAS - LVPWD: 0.86 CM
BH CV ECHO MEAS - MED PEAK E' VEL: 6.2 CM/SEC
BH CV ECHO MEAS - MEDIAL E/E' RATIO: 10.5
BH CV ECHO MEAS - MV A MAX VEL: 102 CM/SEC
BH CV ECHO MEAS - MV DEC SLOPE: 263 CM/SEC^2
BH CV ECHO MEAS - MV DEC TIME: 0.26 SEC
BH CV ECHO MEAS - MV E MAX VEL: 65.2 CM/SEC
BH CV ECHO MEAS - MV E/A: 0.64
BH CV ECHO MEAS - MV P1/2T MAX VEL: 76 CM/SEC
BH CV ECHO MEAS - MV P1/2T: 84.6 MSEC
BH CV ECHO MEAS - MVA P1/2T LCG: 2.9 CM^2
BH CV ECHO MEAS - MVA(P1/2T): 2.6 CM^2
BH CV ECHO MEAS - RAP SYSTOLE: 3 MMHG
BH CV ECHO MEAS - RVSP: 8 MMHG
BH CV ECHO MEAS - SI(CUBED): 19.5 ML/M^2
BH CV ECHO MEAS - SI(LVOT): 23 ML/M^2
BH CV ECHO MEAS - SI(MOD-SP2): 14.9 ML/M^2
BH CV ECHO MEAS - SI(MOD-SP4): 20 ML/M^2
BH CV ECHO MEAS - SI(TEICH): 19 ML/M^2
BH CV ECHO MEAS - SV(CUBED): 34.1 ML
BH CV ECHO MEAS - SV(LVOT): 40.2 ML
BH CV ECHO MEAS - SV(MOD-SP2): 26 ML
BH CV ECHO MEAS - SV(MOD-SP4): 35 ML
BH CV ECHO MEAS - SV(TEICH): 33.2 ML
BH CV ECHO MEAS - TR MAX PG: 5 MMHG
BH CV ECHO MEAS - TR MAX VEL: 117 CM/SEC
BH CV ECHO MEASUREMENTS AVERAGE E/E' RATIO: 10.03
BH CV VAS BP LEFT ARM: NORMAL MMHG
BH CV XLRA - RV BASE: 2.9 CM
BH CV XLRA - RV LENGTH: 5.3 CM
BH CV XLRA - RV MID: 2.6 CM
LEFT ATRIUM VOLUME INDEX: 24 ML/M^2
LEFT ATRIUM VOLUME: 42 ML
LV EF 2D ECHO EST: 55 %

## 2019-05-20 PROCEDURE — 93306 TTE W/DOPPLER COMPLETE: CPT

## 2019-05-20 PROCEDURE — 93306 TTE W/DOPPLER COMPLETE: CPT | Performed by: INTERNAL MEDICINE

## 2019-05-30 DIAGNOSIS — E11.65 TYPE 2 DIABETES MELLITUS WITH HYPERGLYCEMIA, WITH LONG-TERM CURRENT USE OF INSULIN (HCC): ICD-10-CM

## 2019-05-30 DIAGNOSIS — Z79.4 TYPE 2 DIABETES MELLITUS WITH HYPERGLYCEMIA, WITH LONG-TERM CURRENT USE OF INSULIN (HCC): ICD-10-CM

## 2019-05-31 RX ORDER — SYRING-NEEDL,DISP,INSUL,0.3 ML 31GX15/64"
SYRINGE, EMPTY DISPOSABLE MISCELLANEOUS
Qty: 100 EACH | Refills: 2 | Status: SHIPPED | OUTPATIENT
Start: 2019-05-31 | End: 2019-10-28 | Stop reason: SDUPTHER

## 2019-06-06 ENCOUNTER — TELEPHONE (OUTPATIENT)
Dept: INTERNAL MEDICINE | Facility: CLINIC | Age: 74
End: 2019-06-06

## 2019-06-06 NOTE — TELEPHONE ENCOUNTER
[facility] ([phone] in [place] to verify pt's last diabetic eye exam - Sept 2018 - records requested.

## 2019-07-05 ENCOUNTER — HOSPITAL ENCOUNTER (EMERGENCY)
Facility: HOSPITAL | Age: 74
Discharge: HOME | End: 2019-07-05
Attending: EMERGENCY MEDICINE | Admitting: EMERGENCY MEDICINE

## 2019-07-05 ENCOUNTER — APPOINTMENT (OUTPATIENT)
Dept: CT IMAGING | Facility: HOSPITAL | Age: 74
End: 2019-07-05

## 2019-07-05 ENCOUNTER — APPOINTMENT (OUTPATIENT)
Dept: GENERAL RADIOLOGY | Facility: HOSPITAL | Age: 74
End: 2019-07-05

## 2019-07-05 VITALS
HEIGHT: 60 IN | OXYGEN SATURATION: 96 % | HEART RATE: 80 BPM | RESPIRATION RATE: 16 BRPM | SYSTOLIC BLOOD PRESSURE: 145 MMHG | TEMPERATURE: 97.8 F | WEIGHT: 180 LBS | BODY MASS INDEX: 35.34 KG/M2 | DIASTOLIC BLOOD PRESSURE: 78 MMHG

## 2019-07-05 DIAGNOSIS — K57.92 DIVERTICULITIS: Primary | ICD-10-CM

## 2019-07-05 LAB
ALBUMIN SERPL-MCNC: 4.2 G/DL (ref 3.5–5)
ALBUMIN/GLOB SERPL: 1.3 G/DL (ref 1–2)
ALP SERPL-CCNC: 89 U/L (ref 38–126)
ALT SERPL W P-5'-P-CCNC: 30 U/L (ref 13–69)
ANION GAP SERPL CALCULATED.3IONS-SCNC: 10.3 MMOL/L (ref 10–20)
AST SERPL-CCNC: 25 U/L (ref 15–46)
BACTERIA UR QL AUTO: ABNORMAL /HPF
BASOPHILS # BLD AUTO: 0.05 10*3/MM3 (ref 0–0.2)
BASOPHILS NFR BLD AUTO: 0.5 % (ref 0–1.5)
BILIRUB SERPL-MCNC: 0.6 MG/DL (ref 0.2–1.3)
BILIRUB UR QL STRIP: NEGATIVE
BUN BLD-MCNC: 12 MG/DL (ref 7–20)
BUN/CREAT SERPL: 24 (ref 7.1–23.5)
CALCIUM SPEC-SCNC: 9.4 MG/DL (ref 8.4–10.2)
CHLORIDE SERPL-SCNC: 94 MMOL/L (ref 98–107)
CLARITY UR: CLEAR
CO2 SERPL-SCNC: 35 MMOL/L (ref 26–30)
COLOR UR: YELLOW
CREAT BLD-MCNC: 0.5 MG/DL (ref 0.6–1.3)
DEPRECATED RDW RBC AUTO: 42 FL (ref 37–54)
EOSINOPHIL # BLD AUTO: 0.08 10*3/MM3 (ref 0–0.4)
EOSINOPHIL NFR BLD AUTO: 0.7 % (ref 0.3–6.2)
ERYTHROCYTE [DISTWIDTH] IN BLOOD BY AUTOMATED COUNT: 12.1 % (ref 12.3–15.4)
GFR SERPL CREATININE-BSD FRML MDRD: 122 ML/MIN/1.73
GLOBULIN UR ELPH-MCNC: 3.3 GM/DL
GLUCOSE BLD-MCNC: 277 MG/DL (ref 74–98)
GLUCOSE UR STRIP-MCNC: ABNORMAL MG/DL
HCT VFR BLD AUTO: 40.3 % (ref 34–46.6)
HGB BLD-MCNC: 13.6 G/DL (ref 12–15.9)
HGB UR QL STRIP.AUTO: NEGATIVE
HYALINE CASTS UR QL AUTO: ABNORMAL /LPF
IMM GRANULOCYTES # BLD AUTO: 0.05 10*3/MM3 (ref 0–0.05)
IMM GRANULOCYTES NFR BLD AUTO: 0.5 % (ref 0–0.5)
KETONES UR QL STRIP: ABNORMAL
LEUKOCYTE ESTERASE UR QL STRIP.AUTO: ABNORMAL
LIPASE SERPL-CCNC: 46 U/L (ref 23–300)
LYMPHOCYTES # BLD AUTO: 1.19 10*3/MM3 (ref 0.7–3.1)
LYMPHOCYTES NFR BLD AUTO: 10.9 % (ref 19.6–45.3)
MCH RBC QN AUTO: 31.7 PG (ref 26.6–33)
MCHC RBC AUTO-ENTMCNC: 33.7 G/DL (ref 31.5–35.7)
MCV RBC AUTO: 93.9 FL (ref 79–97)
MONOCYTES # BLD AUTO: 0.7 10*3/MM3 (ref 0.1–0.9)
MONOCYTES NFR BLD AUTO: 6.4 % (ref 5–12)
NEUTROPHILS # BLD AUTO: 8.84 10*3/MM3 (ref 1.7–7)
NEUTROPHILS NFR BLD AUTO: 81 % (ref 42.7–76)
NITRITE UR QL STRIP: NEGATIVE
NRBC BLD AUTO-RTO: 0 /100 WBC (ref 0–0.2)
PH UR STRIP.AUTO: 6 [PH] (ref 5–8)
PLATELET # BLD AUTO: 218 10*3/MM3 (ref 140–450)
PMV BLD AUTO: 9.8 FL (ref 6–12)
POTASSIUM BLD-SCNC: 4.3 MMOL/L (ref 3.5–5.1)
PROT SERPL-MCNC: 7.5 G/DL (ref 6.3–8.2)
PROT UR QL STRIP: NEGATIVE
RBC # BLD AUTO: 4.29 10*6/MM3 (ref 3.77–5.28)
RBC # UR: ABNORMAL /HPF
REF LAB TEST METHOD: ABNORMAL
SODIUM BLD-SCNC: 135 MMOL/L (ref 137–145)
SP GR UR STRIP: 1.02 (ref 1–1.03)
SQUAMOUS #/AREA URNS HPF: ABNORMAL /HPF
TROPONIN I SERPL-MCNC: <0.012 NG/ML (ref 0–0.03)
UROBILINOGEN UR QL STRIP: ABNORMAL
WBC NRBC COR # BLD: 10.91 10*3/MM3 (ref 3.4–10.8)
WBC UR QL AUTO: ABNORMAL /HPF

## 2019-07-05 PROCEDURE — 99283 EMERGENCY DEPT VISIT LOW MDM: CPT

## 2019-07-05 PROCEDURE — 81001 URINALYSIS AUTO W/SCOPE: CPT | Performed by: PHYSICIAN ASSISTANT

## 2019-07-05 PROCEDURE — 85025 COMPLETE CBC W/AUTO DIFF WBC: CPT | Performed by: PHYSICIAN ASSISTANT

## 2019-07-05 PROCEDURE — 74176 CT ABD & PELVIS W/O CONTRAST: CPT

## 2019-07-05 PROCEDURE — 25010000002 ONDANSETRON PER 1 MG: Performed by: EMERGENCY MEDICINE

## 2019-07-05 PROCEDURE — 93005 ELECTROCARDIOGRAM TRACING: CPT | Performed by: PHYSICIAN ASSISTANT

## 2019-07-05 PROCEDURE — 84484 ASSAY OF TROPONIN QUANT: CPT | Performed by: PHYSICIAN ASSISTANT

## 2019-07-05 PROCEDURE — 80053 COMPREHEN METABOLIC PANEL: CPT | Performed by: PHYSICIAN ASSISTANT

## 2019-07-05 PROCEDURE — 96375 TX/PRO/DX INJ NEW DRUG ADDON: CPT

## 2019-07-05 PROCEDURE — 71045 X-RAY EXAM CHEST 1 VIEW: CPT

## 2019-07-05 PROCEDURE — 96365 THER/PROPH/DIAG IV INF INIT: CPT

## 2019-07-05 PROCEDURE — 83690 ASSAY OF LIPASE: CPT | Performed by: PHYSICIAN ASSISTANT

## 2019-07-05 PROCEDURE — 25010000002 PIPERACILLIN SOD-TAZOBACTAM PER 1 G: Performed by: PHYSICIAN ASSISTANT

## 2019-07-05 PROCEDURE — 25010000002 MORPHINE PER 10 MG: Performed by: EMERGENCY MEDICINE

## 2019-07-05 RX ORDER — ONDANSETRON 4 MG/1
4 TABLET, ORALLY DISINTEGRATING ORAL EVERY 8 HOURS PRN
Qty: 8 TABLET | Refills: 0 | Status: SHIPPED | OUTPATIENT
Start: 2019-07-05 | End: 2020-04-10 | Stop reason: SDUPTHER

## 2019-07-05 RX ORDER — ONDANSETRON 2 MG/ML
4 INJECTION INTRAMUSCULAR; INTRAVENOUS ONCE
Status: COMPLETED | OUTPATIENT
Start: 2019-07-05 | End: 2019-07-05

## 2019-07-05 RX ORDER — FAMOTIDINE 20 MG/1
20 TABLET, FILM COATED ORAL NIGHTLY
Qty: 10 TABLET | Refills: 0 | Status: SHIPPED | OUTPATIENT
Start: 2019-07-05 | End: 2019-10-22

## 2019-07-05 RX ORDER — AMOXICILLIN AND CLAVULANATE POTASSIUM 500; 125 MG/1; MG/1
1 TABLET, FILM COATED ORAL 2 TIMES DAILY
Qty: 14 TABLET | Refills: 0 | Status: SHIPPED | OUTPATIENT
Start: 2019-07-05 | End: 2019-07-12

## 2019-07-05 RX ORDER — SODIUM CHLORIDE 0.9 % (FLUSH) 0.9 %
10 SYRINGE (ML) INJECTION AS NEEDED
Status: DISCONTINUED | OUTPATIENT
Start: 2019-07-05 | End: 2019-07-05 | Stop reason: HOSPADM

## 2019-07-05 RX ORDER — MORPHINE SULFATE 2 MG/ML
2 INJECTION, SOLUTION INTRAMUSCULAR; INTRAVENOUS ONCE
Status: COMPLETED | OUTPATIENT
Start: 2019-07-05 | End: 2019-07-05

## 2019-07-05 RX ADMIN — SODIUM CHLORIDE 1000 ML: 9 INJECTION, SOLUTION INTRAVENOUS at 10:51

## 2019-07-05 RX ADMIN — ONDANSETRON 4 MG: 2 INJECTION INTRAMUSCULAR; INTRAVENOUS at 10:54

## 2019-07-05 RX ADMIN — MORPHINE SULFATE 2 MG: 2 INJECTION, SOLUTION INTRAMUSCULAR; INTRAVENOUS at 10:52

## 2019-07-05 RX ADMIN — TAZOBACTAM SODIUM AND PIPERACILLIN SODIUM 3.38 G: 375; 3 INJECTION, SOLUTION INTRAVENOUS at 11:44

## 2019-07-05 NOTE — ED PROVIDER NOTES
"Subjective   The patient is here with complaint of some pain left lower quadrant for the past 3 days approximately concerned she may have relapse of some diverticulitis which she states she has had in the past no fevers no chest pain no shortness of breath patient does wear oxygen, has history of some lung nodules, asthma arthritis abdominal hernia.. No nausea or vomiting or diarrhea reported last known bowel movement was yesterday normal presents here for further evaluation        History provided by:  Patient      Review of Systems   Constitutional: Negative.  Negative for chills and fever.   HENT: Negative.    Eyes: Negative.    Respiratory: Positive for cough.         Occasional cough chronic   Cardiovascular: Negative.  Negative for chest pain and leg swelling.   Gastrointestinal: Positive for abdominal pain. Negative for vomiting.   Genitourinary: Negative.    Musculoskeletal: Positive for arthralgias.   Skin: Negative.    Neurological: Negative.    Psychiatric/Behavioral: Negative.    All other systems reviewed and are negative.      Past Medical History:   Diagnosis Date   • Abdominal hernia    • Abdominal pain    • Arthritis    • Asthma    • Blood in stool    • Body piercing     EARS ONLY   • Bronchitis    • Bulging of lumbar intervertebral disc    • Colon polyp    • Cough    • Deaf, left    • Diabetes mellitus (CMS/HCC)    • Diverticulitis    • GERD (gastroesophageal reflux disease)    • Headache    • Hyperlipidemia    • Hypertension    • Leaky heart valve    • Lung nodules    • Osteoporosis    • Palpitations    • Pancreatitis    • Supplemental oxygen dependent     NIGHTLY AND WHEN EXERTED   • Thyroid disease     LEFT SIDE REMOVED   • Tinnitus, left        Allergies   Allergen Reactions   • Doxycycline Nausea And Vomiting     \"Pain worse than pancreatitis\"   • Ciprofloxacin Hives   • Contrast Dye Unknown (See Comments)     Don't remember-been a long time   • Erythromycin Hives and Rash   • Prednisone " Mental Status Change     ITCHING AND NERVOUS       Past Surgical History:   Procedure Laterality Date   • APPENDECTOMY     •  SECTION     • CHOLECYSTECTOMY     • COLONOSCOPY N/A 8/10/2018    Procedure: COLONOSCOPY WITH HOT SNARE POLYPECTOMY X 3; COLD SNARE POLYPECTOMY, COLD BIOPSY POLYPECTOMY X 4; CLIP PLACEMENT X 1;  Surgeon: Glenn Modi MD;  Location: [facility] ENDOSCOPY;  Service: Gastroenterology   • THYROIDECTOMY, PARTIAL Left    • TUBAL ABDOMINAL LIGATION         Family History   Problem Relation Age of Onset   • Arthritis Mother    • Diabetes Mother    • Osteoporosis Mother    • Arthritis Father    • Diabetes Father        Social History     Socioeconomic History   • Marital status:      Spouse name: Not on file   • Number of children: Not on file   • Years of education: Not on file   • Highest education level: Not on file   Tobacco Use   • Smoking status: Former Smoker     Packs/day: 2.00     Years: 15.00     Pack years: 30.00     Types: Cigarettes     Last attempt to quit:      Years since quittin.5   • Smokeless tobacco: Never Used   Substance and Sexual Activity   • Alcohol use: No   • Drug use: No   • Sexual activity: Defer           Objective   Physical Exam   Constitutional: She is oriented to person, place, and time. She appears well-developed and well-nourished.   HENT:   Head: Normocephalic and atraumatic.   Mouth/Throat: Oropharynx is clear and moist.   Eyes: Conjunctivae and EOM are normal. Pupils are equal, round, and reactive to light.   Neck: Normal range of motion. Neck supple.   Cardiovascular: Normal rate, regular rhythm and intact distal pulses.   Pulmonary/Chest: Effort normal and breath sounds normal.   Abdominal: Soft. Bowel sounds are normal. She exhibits no mass. There is tenderness. There is no rebound.   Mild tenderness left lower quadrant no rebound or guarding   Musculoskeletal: Normal range of motion.   Neurological: She is alert and  oriented to person, place, and time. No cranial nerve deficit or sensory deficit. She exhibits normal muscle tone. Coordination normal.   Skin: Skin is warm and dry. Capillary refill takes less than 2 seconds. No rash noted. No erythema.   Psychiatric: She has a normal mood and affect. Her behavior is normal. Judgment and thought content normal.   Nursing note and vitals reviewed.      Procedures           ED Course  ED Course as of Jul 05 1302   Fri Jul 05, 2019   1113 EKG interpreted by me.  Sinus rhythm.  Rate of 90.  No ST segment depression or elevation.  No T wave abnormalities.  Low voltage.  Normal EKG  [CG]   1115 Patient resting no acute distress  [SC]   1151 Patient case and management reviewed with Dr. Fox  [SC]   1205 She continues resting comfortably no distress  [SC]   1253 She states she feels better... no distress we will plan on discharge home follow-up with PCP, also recommend follow-up with Dr. Urban, strict return to care precautions any worsening symptoms pain fever vomiting to return here she is understanding and agreeable with plan  [SC]      ED Course User Index  [CG] Van Fox, DO  [SC] Luis Miguel Myers, AMELIE                  MDM  Number of Diagnoses or Management Options     Amount and/or Complexity of Data Reviewed  Clinical lab tests: ordered  Tests in the radiology section of CPT®: ordered  Tests in the medicine section of CPT®: ordered  Review and summarize past medical records: yes  Discuss the patient with other providers: yes    Risk of Complications, Morbidity, and/or Mortality  Presenting problems: moderate  Diagnostic procedures: low  Management options: moderate    Patient Progress  Patient progress: stable        Final diagnoses:   Diverticulitis            Luis Miguel Myers PA-C  07/05/19 1302

## 2019-07-19 ENCOUNTER — OFFICE VISIT (OUTPATIENT)
Dept: INTERNAL MEDICINE | Facility: CLINIC | Age: 74
End: 2019-07-19

## 2019-07-19 VITALS
OXYGEN SATURATION: 96 % | TEMPERATURE: 98.1 F | DIASTOLIC BLOOD PRESSURE: 92 MMHG | SYSTOLIC BLOOD PRESSURE: 160 MMHG | BODY MASS INDEX: 35.34 KG/M2 | HEART RATE: 101 BPM | HEIGHT: 60 IN | WEIGHT: 180 LBS

## 2019-07-19 DIAGNOSIS — E11.65 TYPE 2 DIABETES MELLITUS WITH HYPERGLYCEMIA, WITH LONG-TERM CURRENT USE OF INSULIN (HCC): ICD-10-CM

## 2019-07-19 DIAGNOSIS — I49.8 FLUTTERING HEART: ICD-10-CM

## 2019-07-19 DIAGNOSIS — Z79.4 TYPE 2 DIABETES MELLITUS WITH HYPERGLYCEMIA, WITH LONG-TERM CURRENT USE OF INSULIN (HCC): ICD-10-CM

## 2019-07-19 DIAGNOSIS — E83.42 HYPOMAGNESEMIA: ICD-10-CM

## 2019-07-19 DIAGNOSIS — R06.02 SHORTNESS OF BREATH: ICD-10-CM

## 2019-07-19 DIAGNOSIS — I15.2 HYPERTENSION ASSOCIATED WITH DIABETES (HCC): Primary | ICD-10-CM

## 2019-07-19 DIAGNOSIS — E03.9 ACQUIRED HYPOTHYROIDISM: ICD-10-CM

## 2019-07-19 DIAGNOSIS — K43.9 EPIGASTRIC HERNIA: ICD-10-CM

## 2019-07-19 DIAGNOSIS — E11.59 HYPERTENSION ASSOCIATED WITH DIABETES (HCC): Primary | ICD-10-CM

## 2019-07-19 PROCEDURE — 99214 OFFICE O/P EST MOD 30 MIN: CPT | Performed by: NURSE PRACTITIONER

## 2019-07-19 PROCEDURE — 93225 XTRNL ECG REC<48 HRS REC: CPT | Performed by: NURSE PRACTITIONER

## 2019-07-19 RX ORDER — LEVOTHYROXINE SODIUM 112 UG/1
112 TABLET ORAL DAILY
Qty: 90 TABLET | Refills: 1 | Status: SHIPPED | OUTPATIENT
Start: 2019-07-19 | End: 2020-01-23 | Stop reason: SDUPTHER

## 2019-07-19 NOTE — PROGRESS NOTES
"Date: 2019    Name: Marielena Moeller  : 1945    Chief Complaint:   Chief Complaint   Patient presents with   • Follow-up     ER       HPI:  Ms. Marielena Moeller is a 73 y.o. female presents for follow up of ED visit at [facility] on 19, after feeling her heart flutter with associated nausea.  She's been feeling her heart \"flutter\" x 2 years, off and on.  It occasionally is accompanied by SOA, dizziness, lightheadedness.  It is increasing in frequency and severity.  Upon admission to the ED, Marielena's BP was elevated at 195/97.  IVF were given, labs were drawn (see scanned documents), EKG was normal CXR indicated ambiguous interstitial infiltrates. Magnesium 1.6, TSH was 4.5, blood glucose 229. She received 2 gm magnesium IV.   BP at discharge was 119/86.  Heart rate remained consistent in 80's - 92.   Since discharge, she has felt heart flutter but occurs less frequently.  She feels it is related to a hernia that has grown in size in the past few months, and hurts when she leans over chairs, counters.    She monitors BP at home, reports BP within normal range.  Has not monitored blood glucose since discharge.      History:  The following portions of the patient's history were reviewed and updated as appropriate: allergies, current medications, past medical history, family history, surgical history, social history and problem list.      ROS:  Review of Systems   Constitutional: Positive for fatigue. Negative for appetite change, chills, fever and unexpected weight gain.   Respiratory: Positive for cough and wheezing.    Cardiovascular: Positive for palpitations and leg swelling. Negative for chest pain.   Gastrointestinal: Negative for constipation, diarrhea and nausea.   Neurological: Negative for syncope, speech difficulty, weakness and headache.       VS:  Vitals:    19 1602   BP: 160/92   Pulse: 101   Temp: 98.1 °F (36.7 °C)   TempSrc: Temporal   SpO2: 96%   Weight: 81.6 kg " "(180 lb)   Height: 152.4 cm (60\")       PE:  Physical Exam   Constitutional: She is oriented to person, place, and time. She appears well-developed and well-nourished. She is obese.  Eyes: Conjunctivae are normal.   Neck: Normal range of motion. Neck supple. No thyromegaly present.   Cardiovascular: Normal rate, regular rhythm and intact distal pulses.   Murmur heard.  Pulmonary/Chest: Effort normal. She has decreased breath sounds. She has no wheezes. She has no rhonchi. She has no rales.   On 2 L O2 per nasal cannula   Abdominal: Bowel sounds are normal.       Neurological: She is alert and oriented to person, place, and time.   Skin: Skin is warm. Capillary refill takes less than 2 seconds.       Assessment/Plan:  Marielena was seen today for follow-up.    Diagnoses and all orders for this visit:    Hypertension associated with diabetes (CMS/HCC)        - Monitor BP at home, keep logs and bring to next appointment        - Decrease sodium < 1500/day, less processed food        - Drink plenty of clear, decaffeinated fluids, as tolerated.    Fluttering heart  -     Cardiac Event Monitor  Shortness of breath  -     Cardiac Event Monitor  - Continue using O2 as prescribed  Acquired hypothyroidism  -     levothyroxine (SYNTHROID) 112 MCG tablet; Take 1 tablet by mouth Daily.  Epigastric hernia  -     Ambulatory Referral to General Surgery  Type 2 diabetes mellitus with hyperglycemia, with long-term current use of insulin (CMS/HCC)        - Monitor blood glucose once daily, keep logs, bring to next appointment         - Decrease carb intake        - Increase physical activity, as tolerated  Hypomagnesemia         - Will monitor with labs at next appointment      Return in about 2 weeks (around 8/2/2019) for Recheck.        "

## 2019-07-22 ENCOUNTER — CLINICAL SUPPORT (OUTPATIENT)
Dept: INTERNAL MEDICINE | Facility: CLINIC | Age: 74
End: 2019-07-22

## 2019-07-22 DIAGNOSIS — R00.2 PALPITATIONS: ICD-10-CM

## 2019-07-22 PROCEDURE — 93227 XTRNL ECG REC<48 HR R&I: CPT | Performed by: NURSE PRACTITIONER

## 2019-07-25 ENCOUNTER — TELEPHONE (OUTPATIENT)
Dept: INTERNAL MEDICINE | Facility: CLINIC | Age: 74
End: 2019-07-25

## 2019-07-25 DIAGNOSIS — I48.91 NEW ONSET ATRIAL FIBRILLATION (HCC): Primary | ICD-10-CM

## 2019-07-25 DIAGNOSIS — E03.9 ACQUIRED HYPOTHYROIDISM: ICD-10-CM

## 2019-07-25 LAB
ALBUMIN SERPL-MCNC: 3.9 G/DL (ref 3.5–5)
ALBUMIN/GLOB SERPL: 1.4 G/DL (ref 1–2)
ALP SERPL-CCNC: 78 U/L (ref 38–126)
ALT SERPL-CCNC: 29 U/L (ref 13–69)
AST SERPL-CCNC: 22 U/L (ref 15–46)
BASOPHILS # BLD AUTO: 0.04 10*3/MM3 (ref 0–0.2)
BASOPHILS NFR BLD AUTO: 0.5 % (ref 0–1.5)
BILIRUB SERPL-MCNC: 0.3 MG/DL (ref 0.2–1.3)
BUN SERPL-MCNC: 15 MG/DL (ref 7–20)
BUN/CREAT SERPL: 30 (ref 7.1–23.5)
CALCIUM SERPL-MCNC: 9.6 MG/DL (ref 8.4–10.2)
CHLORIDE SERPL-SCNC: 97 MMOL/L (ref 98–107)
CO2 SERPL-SCNC: 33 MMOL/L (ref 26–30)
CREAT SERPL-MCNC: 0.5 MG/DL (ref 0.6–1.3)
EOSINOPHIL # BLD AUTO: 0.16 10*3/MM3 (ref 0–0.4)
EOSINOPHIL NFR BLD AUTO: 2 % (ref 0.3–6.2)
ERYTHROCYTE [DISTWIDTH] IN BLOOD BY AUTOMATED COUNT: 12.2 % (ref 12.3–15.4)
GLOBULIN SER CALC-MCNC: 2.8 GM/DL
GLUCOSE SERPL-MCNC: 261 MG/DL (ref 74–98)
HCT VFR BLD AUTO: 41.1 % (ref 34–46.6)
HGB BLD-MCNC: 13.3 G/DL (ref 12–15.9)
IMM GRANULOCYTES # BLD AUTO: 0.03 10*3/MM3 (ref 0–0.05)
IMM GRANULOCYTES NFR BLD AUTO: 0.4 % (ref 0–0.5)
LYMPHOCYTES # BLD AUTO: 1.33 10*3/MM3 (ref 0.7–3.1)
LYMPHOCYTES NFR BLD AUTO: 16.5 % (ref 19.6–45.3)
MAGNESIUM SERPL-MCNC: 1.7 MG/DL (ref 1.6–2.3)
MCH RBC QN AUTO: 31 PG (ref 26.6–33)
MCHC RBC AUTO-ENTMCNC: 32.4 G/DL (ref 31.5–35.7)
MCV RBC AUTO: 95.8 FL (ref 79–97)
MONOCYTES # BLD AUTO: 0.51 10*3/MM3 (ref 0.1–0.9)
MONOCYTES NFR BLD AUTO: 6.3 % (ref 5–12)
NEUTROPHILS # BLD AUTO: 5.98 10*3/MM3 (ref 1.7–7)
NEUTROPHILS NFR BLD AUTO: 74.3 % (ref 42.7–76)
NRBC BLD AUTO-RTO: 0 /100 WBC (ref 0–0.2)
PLATELET # BLD AUTO: 250 10*3/MM3 (ref 140–450)
POTASSIUM SERPL-SCNC: 4.6 MMOL/L (ref 3.5–5.1)
PROT SERPL-MCNC: 6.7 G/DL (ref 6.3–8.2)
RBC # BLD AUTO: 4.29 10*6/MM3 (ref 3.77–5.28)
SODIUM SERPL-SCNC: 136 MMOL/L (ref 137–145)
T4 FREE SERPL-MCNC: 1.46 NG/DL (ref 0.78–2.19)
TSH SERPL DL<=0.005 MIU/L-ACNC: 1 MIU/ML (ref 0.47–4.68)
WBC # BLD AUTO: 8.05 10*3/MM3 (ref 3.4–10.8)

## 2019-07-29 ENCOUNTER — TELEPHONE (OUTPATIENT)
Dept: INTERNAL MEDICINE | Facility: CLINIC | Age: 74
End: 2019-07-29

## 2019-07-29 DIAGNOSIS — I48.91 NEW ONSET ATRIAL FIBRILLATION (HCC): Primary | ICD-10-CM

## 2019-07-29 NOTE — TELEPHONE ENCOUNTER
Patient called stating that she can not take the eliquis due to it causing her headaches since she started it. She was wondering if their were other options. Please advise

## 2019-07-30 NOTE — TELEPHONE ENCOUNTER
Patient called back and said she has done research and would like to be on coumadin instead because her  used to take it. I did tell her that she would have to come in monthly for PTINRs and she knew she would have to. I did also tell her that there was other medications safer and she refused. She states she has a hx of diverticulitis and internal hemorrhoids. She wishes to be switched to a low dose coumadin. Please advise. Patient does have appt with sadiq on Friday

## 2019-07-30 NOTE — TELEPHONE ENCOUNTER
Sent Xarelto which is 20 mg once a day. Stop Eliquis when she starts this. I've had other patients mention headache to me before with this medicine. Medicine is important to take, though, to lower risk of stroke from Afib. I can squeeze her in this week or even today if she really wants to be seen (rather than just new rx).

## 2019-07-31 ENCOUNTER — APPOINTMENT (OUTPATIENT)
Dept: CT IMAGING | Facility: HOSPITAL | Age: 74
End: 2019-07-31

## 2019-07-31 ENCOUNTER — APPOINTMENT (OUTPATIENT)
Dept: GENERAL RADIOLOGY | Facility: HOSPITAL | Age: 74
End: 2019-07-31

## 2019-07-31 ENCOUNTER — HOSPITAL ENCOUNTER (EMERGENCY)
Facility: HOSPITAL | Age: 74
Discharge: HOME | End: 2019-07-31
Attending: EMERGENCY MEDICINE | Admitting: EMERGENCY MEDICINE

## 2019-07-31 VITALS
HEIGHT: 60 IN | BODY MASS INDEX: 35.34 KG/M2 | WEIGHT: 180 LBS | DIASTOLIC BLOOD PRESSURE: 75 MMHG | HEART RATE: 86 BPM | SYSTOLIC BLOOD PRESSURE: 150 MMHG | RESPIRATION RATE: 21 BRPM | OXYGEN SATURATION: 98 % | TEMPERATURE: 97.3 F

## 2019-07-31 DIAGNOSIS — R10.10 PAIN OF UPPER ABDOMEN: Primary | ICD-10-CM

## 2019-07-31 LAB
ALBUMIN SERPL-MCNC: 4.3 G/DL (ref 3.5–5)
ALBUMIN/GLOB SERPL: 1.3 G/DL (ref 1–2)
ALP SERPL-CCNC: 67 U/L (ref 38–126)
ALT SERPL W P-5'-P-CCNC: 22 U/L (ref 13–69)
ANION GAP SERPL CALCULATED.3IONS-SCNC: 12.2 MMOL/L (ref 10–20)
AST SERPL-CCNC: 30 U/L (ref 15–46)
BASOPHILS # BLD AUTO: 0.05 10*3/MM3 (ref 0–0.2)
BASOPHILS NFR BLD AUTO: 0.6 % (ref 0–1.5)
BILIRUB SERPL-MCNC: 0.3 MG/DL (ref 0.2–1.3)
BILIRUB UR QL STRIP: NEGATIVE
BUN BLD-MCNC: 16 MG/DL (ref 7–20)
BUN/CREAT SERPL: 26.7 (ref 7.1–23.5)
CALCIUM SPEC-SCNC: 9.9 MG/DL (ref 8.4–10.2)
CHLORIDE SERPL-SCNC: 95 MMOL/L (ref 98–107)
CLARITY UR: CLEAR
CO2 SERPL-SCNC: 34 MMOL/L (ref 26–30)
COLOR UR: YELLOW
CREAT BLD-MCNC: 0.6 MG/DL (ref 0.6–1.3)
DEPRECATED RDW RBC AUTO: 41.9 FL (ref 37–54)
EOSINOPHIL # BLD AUTO: 0.17 10*3/MM3 (ref 0–0.4)
EOSINOPHIL NFR BLD AUTO: 2.2 % (ref 0.3–6.2)
ERYTHROCYTE [DISTWIDTH] IN BLOOD BY AUTOMATED COUNT: 11.9 % (ref 12.3–15.4)
GFR SERPL CREATININE-BSD FRML MDRD: 99 ML/MIN/1.73
GLOBULIN UR ELPH-MCNC: 3.2 GM/DL
GLUCOSE BLD-MCNC: 217 MG/DL (ref 74–98)
GLUCOSE UR STRIP-MCNC: ABNORMAL MG/DL
HCT VFR BLD AUTO: 41 % (ref 34–46.6)
HGB BLD-MCNC: 13.4 G/DL (ref 12–15.9)
HGB UR QL STRIP.AUTO: NEGATIVE
HOLD SPECIMEN: NORMAL
IMM GRANULOCYTES # BLD AUTO: 0.04 10*3/MM3 (ref 0–0.05)
IMM GRANULOCYTES NFR BLD AUTO: 0.5 % (ref 0–0.5)
KETONES UR QL STRIP: NEGATIVE
LEUKOCYTE ESTERASE UR QL STRIP.AUTO: NEGATIVE
LIPASE SERPL-CCNC: 77 U/L (ref 23–300)
LYMPHOCYTES # BLD AUTO: 1.52 10*3/MM3 (ref 0.7–3.1)
LYMPHOCYTES NFR BLD AUTO: 19.5 % (ref 19.6–45.3)
MCH RBC QN AUTO: 31.1 PG (ref 26.6–33)
MCHC RBC AUTO-ENTMCNC: 32.7 G/DL (ref 31.5–35.7)
MCV RBC AUTO: 95.1 FL (ref 79–97)
MONOCYTES # BLD AUTO: 0.59 10*3/MM3 (ref 0.1–0.9)
MONOCYTES NFR BLD AUTO: 7.6 % (ref 5–12)
NEUTROPHILS # BLD AUTO: 5.42 10*3/MM3 (ref 1.7–7)
NEUTROPHILS NFR BLD AUTO: 69.6 % (ref 42.7–76)
NITRITE UR QL STRIP: NEGATIVE
NRBC BLD AUTO-RTO: 0 /100 WBC (ref 0–0.2)
PH UR STRIP.AUTO: 7.5 [PH] (ref 5–8)
PLATELET # BLD AUTO: 215 10*3/MM3 (ref 140–450)
PMV BLD AUTO: 9.6 FL (ref 6–12)
POTASSIUM BLD-SCNC: 4.2 MMOL/L (ref 3.5–5.1)
PROT SERPL-MCNC: 7.5 G/DL (ref 6.3–8.2)
PROT UR QL STRIP: NEGATIVE
RBC # BLD AUTO: 4.31 10*6/MM3 (ref 3.77–5.28)
SODIUM BLD-SCNC: 137 MMOL/L (ref 137–145)
SP GR UR STRIP: 1.02 (ref 1–1.03)
TROPONIN I SERPL-MCNC: <0.012 NG/ML (ref 0–0.03)
UROBILINOGEN UR QL STRIP: ABNORMAL
WBC NRBC COR # BLD: 7.79 10*3/MM3 (ref 3.4–10.8)
WHOLE BLOOD HOLD SPECIMEN: NORMAL
WHOLE BLOOD HOLD SPECIMEN: NORMAL

## 2019-07-31 PROCEDURE — 84484 ASSAY OF TROPONIN QUANT: CPT | Performed by: EMERGENCY MEDICINE

## 2019-07-31 PROCEDURE — 25010000002 FENTANYL CITRATE (PF) 100 MCG/2ML SOLUTION: Performed by: NURSE PRACTITIONER

## 2019-07-31 PROCEDURE — 81003 URINALYSIS AUTO W/O SCOPE: CPT | Performed by: EMERGENCY MEDICINE

## 2019-07-31 PROCEDURE — 96375 TX/PRO/DX INJ NEW DRUG ADDON: CPT

## 2019-07-31 PROCEDURE — 71046 X-RAY EXAM CHEST 2 VIEWS: CPT

## 2019-07-31 PROCEDURE — 96374 THER/PROPH/DIAG INJ IV PUSH: CPT

## 2019-07-31 PROCEDURE — 93005 ELECTROCARDIOGRAM TRACING: CPT | Performed by: EMERGENCY MEDICINE

## 2019-07-31 PROCEDURE — 99283 EMERGENCY DEPT VISIT LOW MDM: CPT

## 2019-07-31 PROCEDURE — 83690 ASSAY OF LIPASE: CPT | Performed by: EMERGENCY MEDICINE

## 2019-07-31 PROCEDURE — 85025 COMPLETE CBC W/AUTO DIFF WBC: CPT | Performed by: EMERGENCY MEDICINE

## 2019-07-31 PROCEDURE — 25010000002 ONDANSETRON PER 1 MG: Performed by: NURSE PRACTITIONER

## 2019-07-31 PROCEDURE — 74176 CT ABD & PELVIS W/O CONTRAST: CPT

## 2019-07-31 PROCEDURE — 80053 COMPREHEN METABOLIC PANEL: CPT | Performed by: EMERGENCY MEDICINE

## 2019-07-31 RX ORDER — ONDANSETRON 2 MG/ML
4 INJECTION INTRAMUSCULAR; INTRAVENOUS ONCE
Status: COMPLETED | OUTPATIENT
Start: 2019-07-31 | End: 2019-07-31

## 2019-07-31 RX ORDER — SODIUM CHLORIDE 0.9 % (FLUSH) 0.9 %
10 SYRINGE (ML) INJECTION AS NEEDED
Status: DISCONTINUED | OUTPATIENT
Start: 2019-07-31 | End: 2019-07-31 | Stop reason: HOSPADM

## 2019-07-31 RX ORDER — FENTANYL CITRATE 50 UG/ML
25 INJECTION, SOLUTION INTRAMUSCULAR; INTRAVENOUS ONCE
Status: COMPLETED | OUTPATIENT
Start: 2019-07-31 | End: 2019-07-31

## 2019-07-31 RX ADMIN — SODIUM CHLORIDE 1000 ML: 9 INJECTION, SOLUTION INTRAVENOUS at 20:42

## 2019-07-31 RX ADMIN — FENTANYL CITRATE 25 MCG: 50 INJECTION INTRAMUSCULAR; INTRAVENOUS at 20:45

## 2019-07-31 RX ADMIN — ONDANSETRON 4 MG: 2 INJECTION INTRAMUSCULAR; INTRAVENOUS at 20:43

## 2019-07-31 NOTE — TELEPHONE ENCOUNTER
Patient states she does not feel comfortable taking new medications and was wondering if she could just take the aspirin 81mg until her appt with sadiq if she cannot get the coumadin

## 2019-07-31 NOTE — TELEPHONE ENCOUNTER
Can she take the other medicine until she comes to see sadiq? If she wants to do warfarin she'll have to start with Lovenox injections as well. Overall studies show the new medicines to be safer than warfarin, and I would suggest using them instead. Can discuss at her visit Friday.

## 2019-08-01 NOTE — ED PROVIDER NOTES
"Subjective   History of Present Illness  Is a 73-year-old female who comes in today complaining of abdominal pain that started this morning and is progressively gotten worse.  She states that the pain is more on her left upper quadrant.  She reports she has a history of pancreatitis and also a history of diverticulitis.  She also reports a history of an abdominal hernia that she is concerned that may be incarcerated.  She complains of nausea without vomiting.  She denies any fever chills or diarrhea.  Review of Systems   Constitutional: Negative.    HENT: Negative.    Eyes: Negative.    Respiratory: Negative.    Cardiovascular: Negative.    Gastrointestinal: Positive for abdominal pain and nausea.   Endocrine: Negative.    Genitourinary: Negative.    Musculoskeletal: Negative.    Skin: Negative.    Allergic/Immunologic: Negative.    Neurological: Negative.    Hematological: Negative.    Psychiatric/Behavioral: Negative.    All other systems reviewed and are negative.      Past Medical History:   Diagnosis Date   • Abdominal hernia    • Abdominal pain    • Arthritis    • Asthma    • Blood in stool    • Body piercing     EARS ONLY   • Bronchitis    • Bulging of lumbar intervertebral disc    • Colon polyp    • Cough    • Deaf, left    • Diabetes mellitus (CMS/HCC)    • Diverticulitis    • GERD (gastroesophageal reflux disease)    • Headache    • Hyperlipidemia    • Hypertension    • Leaky heart valve    • Lung nodules    • Osteoporosis    • Palpitations    • Pancreatitis    • Supplemental oxygen dependent     NIGHTLY AND WHEN EXERTED   • Thyroid disease     LEFT SIDE REMOVED   • Tinnitus, left        Allergies   Allergen Reactions   • Doxycycline Nausea And Vomiting     \"Pain worse than pancreatitis\"   • Ciprofloxacin Hives   • Contrast Dye Unknown (See Comments)     Don't remember-been a long time   • Erythromycin Hives and Rash   • Prednisone Mental Status Change     ITCHING AND NERVOUS       Past Surgical History: "   Procedure Laterality Date   • APPENDECTOMY     •  SECTION     • CHOLECYSTECTOMY     • COLONOSCOPY N/A 8/10/2018    Procedure: COLONOSCOPY WITH HOT SNARE POLYPECTOMY X 3; COLD SNARE POLYPECTOMY, COLD BIOPSY POLYPECTOMY X 4; CLIP PLACEMENT X 1;  Surgeon: Glenn Modi MD;  Location: [facility] ENDOSCOPY;  Service: Gastroenterology   • THYROIDECTOMY, PARTIAL Left    • TUBAL ABDOMINAL LIGATION         Family History   Problem Relation Age of Onset   • Arthritis Mother    • Diabetes Mother    • Osteoporosis Mother    • Arthritis Father    • Diabetes Father        Social History     Socioeconomic History   • Marital status:      Spouse name: Not on file   • Number of children: Not on file   • Years of education: Not on file   • Highest education level: Not on file   Tobacco Use   • Smoking status: Former Smoker     Packs/day: 2.00     Years: 15.00     Pack years: 30.00     Types: Cigarettes     Last attempt to quit:      Years since quittin.6   • Smokeless tobacco: Never Used   Substance and Sexual Activity   • Alcohol use: No   • Drug use: No   • Sexual activity: Defer           Objective   Physical Exam   Constitutional: She appears well-developed and well-nourished.   Nursing note and vitals reviewed.  GEN: No acute distress  Head: Normocephalic, atraumatic  Eyes: Pupils equal round reactive to light  ENT: Posterior pharynx normal in appearance, oral mucosa is moist  Chest: Nontender to palpation  Cardiovascular: Regular rate  Lungs: Clear to auscultation bilaterally  Abdomen: Soft, tender left upper quadrant and left lower quadrant, nondistended, no peritoneal signs  Extremities: No edema, normal appearance  Neuro: GCS 15  Psych: Mood and affect are appropriate      Procedures           ED Course  ED Course as of  EKG interpreted by me reveals sinus rhythm rate of 81 with ectopy or obvious ischemia.  Similar morphology compared to 2019.   [PF]   2115 Feeling better after medications.  I have advised the patient to follow-up with her GI specialist for further evaluation.  Have also advised her to follow-up with her primary care provider.  I given her strict return to care instructions and she is agreeable with this plan of care.  [TW]   2121 She reports she has medications at home for nausea and can not take pain meds.   [TW]      ED Course User Index  [PF] Ervin Osei W, DO  [TW] Jessa Morales, APRN                  MDM  Number of Diagnoses or Management Options     Amount and/or Complexity of Data Reviewed  Clinical lab tests: reviewed and ordered  Tests in the radiology section of CPT®: ordered and reviewed  Review and summarize past medical records: yes  Discuss the patient with other providers: yes  Independent visualization of images, tracings, or specimens: yes    Risk of Complications, Morbidity, and/or Mortality  Presenting problems: moderate  Diagnostic procedures: moderate  Management options: moderate          Final diagnoses:   Pain of upper abdomen            Jessa Morales APRN  07/31/19 2122

## 2019-08-02 ENCOUNTER — OFFICE VISIT (OUTPATIENT)
Dept: INTERNAL MEDICINE | Facility: CLINIC | Age: 74
End: 2019-08-02

## 2019-08-02 VITALS
HEIGHT: 60 IN | TEMPERATURE: 97.8 F | WEIGHT: 180 LBS | SYSTOLIC BLOOD PRESSURE: 130 MMHG | HEART RATE: 93 BPM | DIASTOLIC BLOOD PRESSURE: 76 MMHG | OXYGEN SATURATION: 98 % | BODY MASS INDEX: 35.34 KG/M2

## 2019-08-02 DIAGNOSIS — I48.91 NEW ONSET ATRIAL FIBRILLATION (HCC): Primary | ICD-10-CM

## 2019-08-02 LAB — INR PPP: 1 (ref 0.9–1.1)

## 2019-08-02 PROCEDURE — 99213 OFFICE O/P EST LOW 20 MIN: CPT | Performed by: NURSE PRACTITIONER

## 2019-08-02 PROCEDURE — 85610 PROTHROMBIN TIME: CPT | Performed by: NURSE PRACTITIONER

## 2019-08-02 RX ORDER — WARFARIN SODIUM 2 MG/1
TABLET ORAL
Qty: 10 TABLET | Refills: 0 | Status: SHIPPED | OUTPATIENT
Start: 2019-08-02 | End: 2019-08-06

## 2019-08-02 NOTE — PROGRESS NOTES
"Date: 2019    Name: Marielena Moeller  : 1945    Chief Complaint:   Chief Complaint   Patient presents with   • Atrial Fibrillation       HPI:Ms. Marielena Moeller is a 73 y.o. female, presents for follow up of new onset A Fib, reported by cardiac event monitor 19. Prior to diagnosis, she was seen in ED at [facility] on 19, admitted with palpitations and nausea; magnesium low (1.6).  She has stopped taking spironolactone, as she was told it might be causing low magnesium.  She is hoping this will resolve a fib; initially, no palpitations after discontinuing spironolactone.  In the past week, she has felt a few \"heart flutters\".  When she was contacted about abnormal events on cardiac monitor, she was prescribed Eliquis.  She was unable to take it due to intolerable GI side effects.  Xarelto was prescribed, she has not taken it.  She is now declining anything other than coumadin to thin blood, as she is concerned about reversing medication effects and medications are expensive.  Denies syncope, nausea, diaphoresis, chest pain in the past week.       History:  The following portions of the patient's history were reviewed and updated as appropriate: allergies, current medications, past medical history, family history, surgical history, social history and problem list.      ROS:  Review of Systems   Constitutional: Positive for fatigue. Negative for appetite change and unexpected weight gain.   Respiratory: Positive for shortness of breath. Negative for cough and wheezing.    Cardiovascular: Negative for leg swelling.   Neurological: Negative for dizziness and headache.       VS:  Vitals:    19 1506   BP: 130/76   Pulse: 93   Temp: 97.8 °F (36.6 °C)   TempSrc: Temporal   SpO2: 98%   Weight: 81.6 kg (180 lb)   Height: 152.4 cm (60\")       PE:  Physical Exam   Constitutional: She is oriented to person, place, and time. She appears well-developed. She is obese.  HENT:   Mouth/Throat: " Oropharynx is clear and moist.   Eyes: Conjunctivae are normal.   Cardiovascular: Normal rate, regular rhythm, normal heart sounds and intact distal pulses.   No murmur heard.  Pulmonary/Chest: Effort normal. She has wheezes.   2L O2 per nasal cannula   Neurological: She is alert and oriented to person, place, and time.   Psychiatric: She has a normal mood and affect. Her behavior is normal.       Results Review:   Office Visit on 08/02/2019   Component Date Value Ref Range Status   • INR 08/02/2019 1.00  0.9 - 1.1 Final       Assessment/Plan:  Marielena was seen today for atrial fibrillation.    Diagnoses and all orders for this visit:    New onset atrial fibrillation (CMS/HCC)  -     Ambulatory Referral to Cardiology  -     POC INR  -     Protime-INR; Standing  -     enoxaparin (LOVENOX) 100 MG/ML solution syringe; Inject 0.8 mL under the skin into the appropriate area as directed Every 12 (Twelve) Hours for 7 days. Advised she will have to take this for 2 days after INR is within desired range  - Initially refused lovenox; was prescribed coumadin 2 mg PO daily; called and advised patient she should not take coumadin without first taking lovenox, due to increased coagulopathy risk; she stopped coumadin and started lovenox the next day.    Hypertension associated with diabetes         Follow heart healthy/low salt diet.  Avoid processed foods.         Monitor blood pressure as discussed.  Exercise as tolerated up to 30 minutes 5 days per week.  Take medications as prescribed.             Follow up on 8/26/19.

## 2019-08-05 ENCOUNTER — TELEPHONE (OUTPATIENT)
Dept: INTERNAL MEDICINE | Facility: CLINIC | Age: 74
End: 2019-08-05

## 2019-08-05 NOTE — TELEPHONE ENCOUNTER
Called pt and informed her to  Lovenox injections today and start them. She has had warfarin today. I told her to take them twice a day, and to come back Thursday to have PTINR drawn. I will cancel her appointment on Friday.

## 2019-08-06 ENCOUNTER — RESULTS ENCOUNTER (OUTPATIENT)
Dept: INTERNAL MEDICINE | Facility: CLINIC | Age: 74
End: 2019-08-06

## 2019-08-06 DIAGNOSIS — I48.91 NEW ONSET ATRIAL FIBRILLATION (HCC): ICD-10-CM

## 2019-08-09 ENCOUNTER — RESULTS ENCOUNTER (OUTPATIENT)
Dept: INTERNAL MEDICINE | Facility: CLINIC | Age: 74
End: 2019-08-09

## 2019-08-09 DIAGNOSIS — I48.91 NEW ONSET ATRIAL FIBRILLATION (HCC): ICD-10-CM

## 2019-08-09 LAB
INR PPP: 0.91 (ref 0.9–1.1)
INR PPP: 0.91 (ref 2–3)
PROTHROMBIN TIME: 12.5 SECONDS (ref 12–15.1)

## 2019-08-13 ENCOUNTER — TELEPHONE (OUTPATIENT)
Dept: INTERNAL MEDICINE | Facility: CLINIC | Age: 74
End: 2019-08-13

## 2019-08-13 ENCOUNTER — RESULTS ENCOUNTER (OUTPATIENT)
Dept: INTERNAL MEDICINE | Facility: CLINIC | Age: 74
End: 2019-08-13

## 2019-08-13 DIAGNOSIS — I48.91 NEW ONSET ATRIAL FIBRILLATION (HCC): ICD-10-CM

## 2019-08-13 NOTE — TELEPHONE ENCOUNTER
Patient saw Leslye last week but she did call and state that she was finished with her Lovenox injections and was wondering what her next step was and if she needed to start her coumadin and if she did, she needed a refill. Please advise

## 2019-08-14 ENCOUNTER — ANTICOAGULATION VISIT (OUTPATIENT)
Dept: INTERNAL MEDICINE | Facility: CLINIC | Age: 74
End: 2019-08-14

## 2019-08-14 ENCOUNTER — TELEPHONE (OUTPATIENT)
Dept: INTERNAL MEDICINE | Facility: CLINIC | Age: 74
End: 2019-08-14

## 2019-08-14 DIAGNOSIS — I49.8 FLUTTERING HEART: ICD-10-CM

## 2019-08-14 DIAGNOSIS — I48.91 NEW ONSET ATRIAL FIBRILLATION (HCC): Primary | ICD-10-CM

## 2019-08-14 PROCEDURE — 85610 PROTHROMBIN TIME: CPT | Performed by: NURSE PRACTITIONER

## 2019-08-14 NOTE — TELEPHONE ENCOUNTER
After consultation with Dr Starr, I called Mrs. Moeller back.  She, again, declines medication other than Coumadin for anticoagulation due to concern about cost.  Advised her lab costs and adjustments to coumadin dose can be comparable to medications that don't require lab monitoring and titration; she verbalized understanding.  I will send prescription for Lovenox, she has 2 mg coumadin at home.  Instructions given to take coumadin while taking lovenox; INR to be drawn every 2-3 days until therapeutic level reached.  She repeated the instructions back to me on the phone, no questions about instructions at this time.  She previously verbalized the dietary restrictions associated with warfarin, the need to stop it taking prior to procedures, s/s of overanticoagulation/bleeding; verbalized them again today.   Encouraged to call office with any questions immediately.  Advised urgent questions will be addressed quickly; non-urgent questions may take up to 72 hours for reply.  We did discuss using [employer] to send messages, but she doesn't feel comfortable using the tata, states the tata and website are hard for her to see well.

## 2019-08-14 NOTE — TELEPHONE ENCOUNTER
Patient saw Leslye last week and  is confused on what to do next now that the Lovenox is finished, and was she supposed to stay on the Coumadin while she was on the Lovenox?   Please advise. Phone number verified.  Thank you.

## 2019-08-14 NOTE — TELEPHONE ENCOUNTER
Called Mrs Moeller back. She is very confused about her coumadin and lovenox.  She stopped taking the coumadin while she was taking the lovenox.  Last lovenox dose was yesterday, and she wants to know what the next step is for her coumadin.  She is very upset no one called her back yesterday.  I told her I would discuss this with Dr Starr, and get back to her this afternoon.  She does not want to return to the office today.

## 2019-08-16 ENCOUNTER — RESULTS ENCOUNTER (OUTPATIENT)
Dept: INTERNAL MEDICINE | Facility: CLINIC | Age: 74
End: 2019-08-16

## 2019-08-16 DIAGNOSIS — Z79.4 TYPE 2 DIABETES MELLITUS WITH HYPERGLYCEMIA, WITH LONG-TERM CURRENT USE OF INSULIN (HCC): ICD-10-CM

## 2019-08-16 DIAGNOSIS — I48.91 NEW ONSET ATRIAL FIBRILLATION (HCC): ICD-10-CM

## 2019-08-16 DIAGNOSIS — E11.65 TYPE 2 DIABETES MELLITUS WITH HYPERGLYCEMIA, WITH LONG-TERM CURRENT USE OF INSULIN (HCC): ICD-10-CM

## 2019-08-16 LAB
INR PPP: 1 (ref 0.9–1.1)
PROTHROMBIN TIME: 13.5 SECONDS (ref 12–15.1)

## 2019-08-20 ENCOUNTER — CONSULT (OUTPATIENT)
Dept: CARDIOLOGY | Facility: CLINIC | Age: 74
End: 2019-08-20

## 2019-08-20 ENCOUNTER — RESULTS ENCOUNTER (OUTPATIENT)
Dept: INTERNAL MEDICINE | Facility: CLINIC | Age: 74
End: 2019-08-20

## 2019-08-20 VITALS
OXYGEN SATURATION: 98 % | WEIGHT: 180 LBS | HEART RATE: 95 BPM | BODY MASS INDEX: 35.34 KG/M2 | DIASTOLIC BLOOD PRESSURE: 80 MMHG | SYSTOLIC BLOOD PRESSURE: 160 MMHG | HEIGHT: 60 IN

## 2019-08-20 DIAGNOSIS — I48.91 NEW ONSET ATRIAL FIBRILLATION (HCC): ICD-10-CM

## 2019-08-20 DIAGNOSIS — I15.2 HYPERTENSION ASSOCIATED WITH DIABETES (HCC): ICD-10-CM

## 2019-08-20 DIAGNOSIS — E11.59 HYPERTENSION ASSOCIATED WITH DIABETES (HCC): ICD-10-CM

## 2019-08-20 DIAGNOSIS — E66.09 CLASS 2 OBESITY DUE TO EXCESS CALORIES WITHOUT SERIOUS COMORBIDITY WITH BODY MASS INDEX (BMI) OF 35.0 TO 35.9 IN ADULT: ICD-10-CM

## 2019-08-20 DIAGNOSIS — Z79.4 TYPE 2 DIABETES MELLITUS WITH DIABETIC PERIPHERAL ANGIOPATHY WITHOUT GANGRENE, WITH LONG-TERM CURRENT USE OF INSULIN (HCC): ICD-10-CM

## 2019-08-20 DIAGNOSIS — J44.9 CHRONIC OBSTRUCTIVE PULMONARY DISEASE, UNSPECIFIED COPD TYPE (HCC): ICD-10-CM

## 2019-08-20 DIAGNOSIS — E11.51 TYPE 2 DIABETES MELLITUS WITH DIABETIC PERIPHERAL ANGIOPATHY WITHOUT GANGRENE, WITH LONG-TERM CURRENT USE OF INSULIN (HCC): ICD-10-CM

## 2019-08-20 DIAGNOSIS — I48.0 PAROXYSMAL ATRIAL FIBRILLATION (HCC): ICD-10-CM

## 2019-08-20 DIAGNOSIS — R00.2 PALPITATIONS: Primary | ICD-10-CM

## 2019-08-20 PROCEDURE — 99204 OFFICE O/P NEW MOD 45 MIN: CPT | Performed by: INTERNAL MEDICINE

## 2019-08-20 RX ORDER — WARFARIN SODIUM 2 MG/1
2 TABLET ORAL
COMMUNITY
End: 2019-08-22 | Stop reason: SDUPTHER

## 2019-08-20 NOTE — PROGRESS NOTES
"     [facility] Cardiology OP Consult Note    Marielena Moeller  [id number]  2019    Referred By: Phoebe Dc, *    Chief Complaint: Presents for evaluation and management of possible atrial fibrillation    History of Present Illness:   Mrs. Marielena Moeller is a 73 y.o. female who presents to the Cardiology Clinic for further management of possible atrial fibrillation.  She has a past medical history which includes hypertension, type 2 diabetes mellitus, COPD with prior tobacco use, chronic hypoxia requiring supplemental O2, and obesity.  Her cardiac history is significant for recent presentation to [facility] in [place] for evaluation of palpitations.  She reports initially going to the emergency department because she was experiencing palpitations and due to hypertension.  She describes her palpitations as intermittent episodes of \"a fluttering heart beat.\"  She notes her episodes of palpitation are chronic, been present for several years.  The episodes are intermittent, with no clear alleviating or aggravating factors.  She denies any associated dizziness, lightheadedness, presyncope, or syncope.  No recent or prior exertional chest pain or chest discomfort.  She does report exertional dyspnea, which is chronic.  At the time of her evaluation in the emergency department, she reports having hypomagnesia but otherwise no significant lab abnormalities.  A TSH was recently found to be within normal limits.  Following her emergency department visit, she underwent a 4-hour outpatient cardiac monitoring.  She was subsequently notified that she had atrial fibrillation, and was started on warfarin.  She reports then being called and told she had to additionally take Lovenox as a bridge to therapeutic INR.  Currently, she reports she remains on Lovenox given her most recent INR was subtherapeutic being 1.0 on 2019.    Past Cardiac Testin. Last Coronary Angio: " None  2. Prior Stress Testing: None  3. Last Echo: 2019   -LVEF 55%   -Grade 2 diastolic dysfunction   -No significant valvular abnormalities  4. Prior Holter Monitor: None    Review of Systems:   Review of Systems   Constitutional: Negative for activity change, appetite change, chills, diaphoresis, fatigue, fever, unexpected weight gain and unexpected weight loss.   Respiratory: Positive for shortness of breath. Negative for cough, chest tightness and wheezing.    Cardiovascular: Positive for palpitations. Negative for chest pain and leg swelling.   Gastrointestinal: Negative for abdominal pain, anal bleeding, blood in stool and GERD.   Endocrine: Negative for cold intolerance and heat intolerance.   Genitourinary: Negative for hematuria.   Neurological: Negative for dizziness, syncope and light-headedness.   Hematological: Does not bruise/bleed easily.   Psychiatric/Behavioral: Negative for depressed mood and stress. The patient is not nervous/anxious.        Past Medical History:   Past Medical History:   Diagnosis Date   • Abdominal hernia    • Abdominal pain    • Arthritis    • Asthma    • Blood in stool    • Body piercing     EARS ONLY   • Bronchitis    • Bulging of lumbar intervertebral disc    • Colon polyp    • Cough    • Deaf, left    • Diabetes mellitus (CMS/HCC)    • Diverticulitis    • GERD (gastroesophageal reflux disease)    • Headache    • Heart murmur    • Hyperlipidemia    • Hypertension    • Leaky heart valve    • Lung nodules    • Osteoporosis    • Palpitations    • Pancreatitis    • Supplemental oxygen dependent     NIGHTLY AND WHEN EXERTED   • Thyroid disease     LEFT SIDE REMOVED   • Tinnitus, left        Past Surgical History:   Past Surgical History:   Procedure Laterality Date   • APPENDECTOMY     •  SECTION     • CHOLECYSTECTOMY     • COLONOSCOPY N/A 8/10/2018    Procedure: COLONOSCOPY WITH HOT SNARE POLYPECTOMY X 3; COLD SNARE POLYPECTOMY, COLD BIOPSY POLYPECTOMY X 4;  "CLIP PLACEMENT X 1;  Surgeon: Glenn Modi MD;  Location: [facility] ENDOSCOPY;  Service: Gastroenterology   • THYROIDECTOMY, PARTIAL Left    • TUBAL ABDOMINAL LIGATION         Family History:   Family History   Problem Relation Age of Onset   • Arthritis Mother    • Diabetes Mother    • Osteoporosis Mother    • Arthritis Father    • Diabetes Father    • Heart attack Father    • Hyperlipidemia Father    • Diabetes Sister    • Thyroid disease Sister    • Diabetes Brother    • Diabetes Maternal Grandmother    • Diabetes Maternal Grandfather    • Diabetes Paternal Grandmother    • Diabetes Paternal Grandfather        Social History:   Social History     Socioeconomic History   • Marital status:      Spouse name: Not on file   • Number of children: Not on file   • Years of education: Not on file   • Highest education level: Not on file   Tobacco Use   • Smoking status: Former Smoker     Packs/day: 2.00     Years: 15.00     Pack years: 30.00     Types: Cigarettes     Last attempt to quit:      Years since quittin.6   • Smokeless tobacco: Never Used   Substance and Sexual Activity   • Alcohol use: No   • Drug use: No   • Sexual activity: Defer       Medications:     Current Outpatient Medications:   •  albuterol (VENTOLIN HFA) 108 (90 Base) MCG/ACT inhaler, Inhale 2 puffs Every 4 (Four) Hours As Needed for Wheezing or Shortness of Air., Disp: 1 inhaler, Rfl: 5  •  BD VEO INSULIN SYRINGE U/F 31G X 15/64\" 0.5 ML misc, USE AS DIRECTED, Disp: 100 each, Rfl: 2  •  Blood Glucose Monitoring Suppl (ACCU-CHEK CHIO PLUS) w/Device kit, USE AS DIRECTED TO CHECK GLUCOSE 3 TIMES DAILY FOR DM E11.65, Disp: 1 kit, Rfl: 0  •  Cholecalciferol (VITAMIN D3) 5000 units capsule capsule, Take 1 capsule by mouth Daily., Disp: 90 capsule, Rfl: 3  •  glucose blood (ACCU-CHEK CHIO PLUS) test strip, USE AS DIRECTED TO CHECK GLUCOSE THREE TIMES DAILY, Disp: 100 each, Rfl: 3  •  levothyroxine (SYNTHROID) 112 MCG tablet, Take 1 " "tablet by mouth Daily., Disp: 90 tablet, Rfl: 1  •  LORazepam (ATIVAN) 0.5 MG tablet, Take 1 tablet by mouth Every 8 (Eight) Hours As Needed for Anxiety., Disp: 45 tablet, Rfl: 1  •  MAGNESIUM-OXIDE 400 (241.3 Mg) MG tablet tablet, TK 1 T PO QHS, Disp: , Rfl: 3  •  NOVOLOG MIX 70/30 (70-30) 100 UNIT/ML injection, Inject 70 Units under the skin into the appropriate area as directed 2 (Two) Times a Day With Meals., Disp: 126 mL, Rfl: 3  •  O2 (OXYGEN), Inhale 2 L/min As Needed., Disp: , Rfl:   •  ondansetron ODT (ZOFRAN-ODT) 4 MG disintegrating tablet, Take 1 tablet by mouth Every 8 (Eight) Hours As Needed for Nausea or Vomiting., Disp: 8 tablet, Rfl: 0  •  polyethylene glycol (MIRALAX) powder, Mix 17 g (1 cap full) in 8 oz. Of non-carbonated beverage and drink daily., Disp: 510 g, Rfl: 1  •  warfarin (COUMADIN) 2 MG tablet, Take 2 mg by mouth Daily., Disp: , Rfl:   •  aspirin 81 MG tablet, Take 81 mg by mouth. Every other day, Disp: , Rfl:   •  famotidine (PEPCID) 20 MG tablet, Take 1 tablet by mouth Every Night., Disp: 10 tablet, Rfl: 0  •  magnesium gluconate (MAGONATE) 30 MG tablet, Take 1 tablet by mouth 2 (Two) Times a Day., Disp: 180 tablet, Rfl: 4  •  mupirocin (BACTROBAN) 2 % ointment, Apply  topically to the appropriate area as directed 2 (Two) Times a Day., Disp: 15 g, Rfl: 0    Allergies:   Allergies   Allergen Reactions   • Doxycycline Nausea And Vomiting     \"Pain worse than pancreatitis\"   • Hydrochlorothiazide Unknown (See Comments)           • Ciprofloxacin Hives   • Contrast Dye Unknown (See Comments)     Don't remember-been a long time   • Erythromycin Hives and Rash   • Prednisone Mental Status Change     ITCHING AND NERVOUS       Physical Exam:  Vital Signs:   Vitals:    08/20/19 1401 08/20/19 1414 08/20/19 1415   BP: 140/72 140/74 160/80   BP Location: Right arm Left arm Right arm   Patient Position: Sitting Sitting Standing   Cuff Size: Adult Adult Adult   Pulse: 95     SpO2: 98%     Weight: " "81.6 kg (180 lb)     Height: 152.4 cm (60\")         Physical Exam   Constitutional: She is oriented to person, place, and time. She appears well-developed and well-nourished. No distress.   Obese female in no acute distress.   HENT:   Head: Normocephalic and atraumatic.   Moist Mucous Membranes.    Eyes: EOM are normal. Pupils are equal, round, and reactive to light. No scleral icterus.   Neck: No tracheal deviation present.   Cardiovascular: Normal rate, regular rhythm, normal heart sounds and intact distal pulses. Exam reveals no gallop and no friction rub.   No murmur heard.  Normal JVD.     Pulmonary/Chest: Effort normal and breath sounds normal. No stridor. No respiratory distress. She has no wheezes. She has no rales. She exhibits no tenderness.   Using O2 via nasal cannula.   Abdominal: Soft. Bowel sounds are normal. She exhibits distension. There is no tenderness. There is no rebound and no guarding.   Obese abdomen.   Musculoskeletal: Normal range of motion. She exhibits no edema.   Lymphadenopathy:     She has no cervical adenopathy.   Neurological: She is alert and oriented to person, place, and time.   Skin: Skin is warm and dry. She is not diaphoretic. No erythema.   Psychiatric: She has a normal mood and affect. Her behavior is normal.   Nursing note and vitals reviewed.      Results Review:   I reviewed the patient's new clinical results.  Labs reviewed from 7/25/2019:  1.  TSH: 1.46  2.  CMP: Creatinine 0.5, potassium 4.6, sodium 136, glucose 261  3.  Magnesium: 1.7  4.  CBC: WBC 7.79, hemoglobin 13.4, platelets 215  5.  INR 8/16/2019: 1.0      Assessment / Plan:   Marielena was seen today for advice only, atrial fibrillation, palpitations and dizziness.    Diagnoses and all orders for this visit:    1.  Suspected paroxysmal atrial fibrillation   --Recently diagnosed with paroxysmal atrial fibrillation based on 24-hour cardiac monitoring as an outpatient  --Started on Lovenox as bridge to therapeutic " INR  --Reviewed ECGs from her recent emergency department visit, which shows sinus rhythm with no evidence of atrial fibrillation  --Reviewed 24-hour Holter monitor results, the episodes reported as atrial fibrillation appeared to be sinus rhythm with frequent premature atrial complexes.  No clear evidence of atrial fibrillation.  --Given atrial fibrillation is not clearly been documented, will order 14-day Holter monitor to re-evaluate for underlying paroxysmal atrial fibrillation  --Given elevated CHADS2-VASc score of 4, continue Coumadin for now with dosing and INR checks per PCP  --No need for Lovenox bridge, will discontinue Lovenox  --If the 14-day Holter monitor shows evidence of paroxysmal atrial fibrillation, will continue warfarin for CVA prophylaxis  --If no evidence of paroxysmal atrial fibrillation, will then discontinue anticoagulation given history of recurrent diverticulitis with GI bleeding  --Will schedule follow-up in 1 month, to review results of her Holter monitoring    2.  Hypertension  --Hypertensive today with a systolic BP in the 140s  --We will consider antihypertensives if she remains hypertensive upon follow-up    3.  Type 2 diabetes mellitus  --Management per PCP    4. Chronic obstructive pulmonary disease  --On continuous supplemental O2 as an outpatient    5.  Obesity  --Counseled regarding the importance of weight loss through diet and exercise      Preventative Cardiology:   Tobacco Cessation: N/A  Obstructive Sleep Apnea Screening: Deffered  AAA Screening: N/A  Peripheral Arterial Disease Screening: N/A    Follow Up:   Return in about 1 month (around 9/20/2019).    Thank you for allowing me to participate in the care of your patient. Please to not hesitate to contact me with additional questions or concerns.     Please note, this document was produced using voice recognition software.     CRUZ Swan MD  Interventional Cardiology   08/20/2019  2:23 PM

## 2019-08-22 RX ORDER — WARFARIN SODIUM 2 MG/1
2 TABLET ORAL
Qty: 90 TABLET | Refills: 0 | Status: SHIPPED | OUTPATIENT
Start: 2019-08-22 | End: 2019-09-13 | Stop reason: SDUPTHER

## 2019-08-23 ENCOUNTER — RESULTS ENCOUNTER (OUTPATIENT)
Dept: INTERNAL MEDICINE | Facility: CLINIC | Age: 74
End: 2019-08-23

## 2019-08-23 DIAGNOSIS — I48.91 NEW ONSET ATRIAL FIBRILLATION (HCC): ICD-10-CM

## 2019-08-23 LAB
INR PPP: 0.89 (ref 0.9–1.1)
PROTHROMBIN TIME: 12.4 SECONDS (ref 12–15.1)

## 2019-08-26 ENCOUNTER — OFFICE VISIT (OUTPATIENT)
Dept: INTERNAL MEDICINE | Facility: CLINIC | Age: 74
End: 2019-08-26

## 2019-08-26 VITALS
DIASTOLIC BLOOD PRESSURE: 67 MMHG | HEIGHT: 60 IN | OXYGEN SATURATION: 98 % | HEART RATE: 87 BPM | WEIGHT: 182 LBS | BODY MASS INDEX: 35.73 KG/M2 | TEMPERATURE: 98.3 F | SYSTOLIC BLOOD PRESSURE: 151 MMHG

## 2019-08-26 DIAGNOSIS — Z79.4 TYPE 2 DIABETES MELLITUS WITH HYPERGLYCEMIA, WITH LONG-TERM CURRENT USE OF INSULIN (HCC): ICD-10-CM

## 2019-08-26 DIAGNOSIS — E03.9 ACQUIRED HYPOTHYROIDISM: ICD-10-CM

## 2019-08-26 DIAGNOSIS — E55.9 VITAMIN D DEFICIENCY: ICD-10-CM

## 2019-08-26 DIAGNOSIS — Z13.0 SCREENING FOR DISORDER OF BLOOD AND BLOOD-FORMING ORGANS: ICD-10-CM

## 2019-08-26 DIAGNOSIS — E83.42 HYPOMAGNESEMIA: ICD-10-CM

## 2019-08-26 DIAGNOSIS — E78.2 MIXED HYPERLIPIDEMIA: ICD-10-CM

## 2019-08-26 DIAGNOSIS — I15.2 HYPERTENSION ASSOCIATED WITH DIABETES (HCC): ICD-10-CM

## 2019-08-26 DIAGNOSIS — Z13.220 SCREENING FOR LIPID DISORDERS: ICD-10-CM

## 2019-08-26 DIAGNOSIS — E11.65 TYPE 2 DIABETES MELLITUS WITH HYPERGLYCEMIA, WITH LONG-TERM CURRENT USE OF INSULIN (HCC): ICD-10-CM

## 2019-08-26 DIAGNOSIS — R00.2 PALPITATIONS: Primary | ICD-10-CM

## 2019-08-26 DIAGNOSIS — E11.59 HYPERTENSION ASSOCIATED WITH DIABETES (HCC): ICD-10-CM

## 2019-08-26 LAB
INR PPP: 0.94 (ref 0.9–1.1)
PROTHROMBIN TIME: 12.8 SECONDS (ref 12–15.1)

## 2019-08-26 PROCEDURE — 99214 OFFICE O/P EST MOD 30 MIN: CPT | Performed by: NURSE PRACTITIONER

## 2019-08-26 NOTE — PROGRESS NOTES
"Date: 2019    Name: Marielena Moeller  : 1945    Chief Complaint:   Chief Complaint   Patient presents with   • Follow-up       HPI: Mrs. Marielena Moeller is a 73 y.o. female presents for follow up of palpitations. A 24 hour Holter monitor indicated she was in intermittent A Fib, coumadin with bridge of lovenox started.  She was seen by cardiology on 19, and it was determined that further evaluation of cardiac rhythm is needed.  Lovenox discontinued, she is taking 4 mg coumadin daily. She is wearing a 14 day cardiac monitor, scheduled to see cardiology on 19.  She continues to have palpitations.  Has had nosebleeds upon awakening for the past 2-3 days.  She wears O2 per nasal cannula, currently at 2 L.  Denies chest pain, light-headedness, cough, increased SOA, syncope, diaphoresis, slurred speech, GI upset, dark or sticky stool.   She has not noticed any change in blood glucose readings.  Continues to eat low carb, low fat, low salt diet.  She does not exercise.  Patient denies foot ulcerations, hyperglycemia, hypoglycemia, nausea, polydipsia, polyuria, polyphagia, visual disturbances and weight loss.  Does not monitor BP at home, denies headaches, weakness, visual disturbances or confusion.      History:  The following portions of the patient's history were reviewed and updated as appropriate: allergies, current medications, past medical history, family history, surgical history, social history and problem list.      ROS:  Review of Systems   Constitutional: Positive for fatigue.   HENT: Negative for mouth sores and postnasal drip.    Respiratory: Negative for wheezing.    Neurological: Negative for dizziness, facial asymmetry, headache and confusion.   Hematological: Does not bruise/bleed easily.       VS:  Vitals:    19 1420   BP: 151/67   Pulse: 87   Temp: 98.3 °F (36.8 °C)   TempSrc: Temporal   SpO2: 98%   Weight: 82.6 kg (182 lb)   Height: 152.4 cm (60\") "       PE:  Physical Exam   Constitutional: She is oriented to person, place, and time. She appears well-developed and well-nourished. She is obese.  HENT:   Head: Normocephalic.   Nose: Nose normal.   Mouth/Throat: Oropharynx is clear and moist.   Eyes: Conjunctivae are normal.   Cardiovascular: Normal rate, regular rhythm, normal heart sounds and intact distal pulses.   Pulmonary/Chest: Effort normal and breath sounds normal.   Neurological: She is alert and oriented to person, place, and time.   Skin: Skin is warm and dry. Capillary refill takes less than 2 seconds.       Assessment/Plan:  Marielena was seen today for follow-up.    Diagnoses and all orders for this visit:    Palpitations        - Follow up with cardiology, scheduled 9/24/19  Hypomagnesemia  -     Magnesium  Vitamin D deficiency  -     Vitamin D 25 Hydroxy  Type 2 diabetes mellitus with hyperglycemia, with long-term current use of insulin (CMS/[facility])        - Follow diabetic diet       - Monitor blood sugars as discussed       - See eye doctor annually or as discussed       - Wear protective foot wear/no bare feet       - Check feet regularly for calluses or ulcers       - Discussed risk of poorly controlled diabetes and long-term complications       - Exercise as tolerated most days       - Take all medications as prescribed  -     Comprehensive Metabolic Panel  Hypertension associated with diabetes (CMS/HCC)  -     Comprehensive Metabolic Panel  - Follow heart healthy/low salt diet.  Avoid processed foods.  -     Monitor blood pressure as discussed.    - Take medications as prescribed.  Screening for disorder of blood and blood-forming organs  -     CBC Auto Differential  Screening for lipid disorders        -     Lipid Panel  Acquired hypothyroidism  -     TSH  Advised to moisturize nares with vaseline HS, monitor for effectiveness.  Monitor for s/s overanticoagulation, such as bruising, dark or sticky stool or coffee ground appearance of stool.     Return in about 3 months (around 11/26/2019) for Next scheduled follow up.

## 2019-08-27 ENCOUNTER — RESULTS ENCOUNTER (OUTPATIENT)
Dept: INTERNAL MEDICINE | Facility: CLINIC | Age: 74
End: 2019-08-27

## 2019-08-27 DIAGNOSIS — I48.91 NEW ONSET ATRIAL FIBRILLATION (HCC): ICD-10-CM

## 2019-08-30 ENCOUNTER — RESULTS ENCOUNTER (OUTPATIENT)
Dept: INTERNAL MEDICINE | Facility: CLINIC | Age: 74
End: 2019-08-30

## 2019-08-30 DIAGNOSIS — I48.91 NEW ONSET ATRIAL FIBRILLATION (HCC): ICD-10-CM

## 2019-08-30 LAB
INR PPP: 1.22 (ref 0.9–1.1)
PROTHROMBIN TIME: 15.8 SECONDS (ref 12–15.1)

## 2019-09-03 ENCOUNTER — RESULTS ENCOUNTER (OUTPATIENT)
Dept: INTERNAL MEDICINE | Facility: CLINIC | Age: 74
End: 2019-09-03

## 2019-09-03 DIAGNOSIS — I48.91 NEW ONSET ATRIAL FIBRILLATION (HCC): ICD-10-CM

## 2019-09-06 ENCOUNTER — RESULTS ENCOUNTER (OUTPATIENT)
Dept: INTERNAL MEDICINE | Facility: CLINIC | Age: 74
End: 2019-09-06

## 2019-09-06 DIAGNOSIS — I48.91 NEW ONSET ATRIAL FIBRILLATION (HCC): ICD-10-CM

## 2019-09-10 ENCOUNTER — APPOINTMENT (OUTPATIENT)
Dept: GENERAL RADIOLOGY | Facility: HOSPITAL | Age: 74
End: 2019-09-10

## 2019-09-10 ENCOUNTER — HOSPITAL ENCOUNTER (EMERGENCY)
Facility: HOSPITAL | Age: 74
Discharge: HOME | End: 2019-09-10
Attending: STUDENT IN AN ORGANIZED HEALTH CARE EDUCATION/TRAINING PROGRAM | Admitting: STUDENT IN AN ORGANIZED HEALTH CARE EDUCATION/TRAINING PROGRAM

## 2019-09-10 VITALS
WEIGHT: 180 LBS | DIASTOLIC BLOOD PRESSURE: 77 MMHG | HEART RATE: 91 BPM | RESPIRATION RATE: 18 BRPM | HEIGHT: 60 IN | OXYGEN SATURATION: 96 % | TEMPERATURE: 98.1 F | BODY MASS INDEX: 35.34 KG/M2 | SYSTOLIC BLOOD PRESSURE: 139 MMHG

## 2019-09-10 DIAGNOSIS — R00.2 PALPITATIONS: Primary | ICD-10-CM

## 2019-09-10 DIAGNOSIS — R42 SPELL OF DIZZINESS: ICD-10-CM

## 2019-09-10 LAB
ALBUMIN SERPL-MCNC: 4.1 G/DL (ref 3.5–5.2)
ALBUMIN/GLOB SERPL: 1.2 G/DL
ALP SERPL-CCNC: 80 U/L (ref 39–117)
ALT SERPL W P-5'-P-CCNC: 21 U/L (ref 1–33)
ANION GAP SERPL CALCULATED.3IONS-SCNC: 13.5 MMOL/L (ref 5–15)
AST SERPL-CCNC: 19 U/L (ref 1–32)
BASOPHILS # BLD AUTO: 0.04 10*3/MM3 (ref 0–0.2)
BASOPHILS NFR BLD AUTO: 0.4 % (ref 0–1.5)
BILIRUB SERPL-MCNC: 0.2 MG/DL (ref 0.2–1.2)
BUN BLD-MCNC: 12 MG/DL (ref 8–23)
BUN/CREAT SERPL: 18.8 (ref 7–25)
CALCIUM SPEC-SCNC: 9.6 MG/DL (ref 8.6–10.5)
CHLORIDE SERPL-SCNC: 98 MMOL/L (ref 98–107)
CO2 SERPL-SCNC: 29.5 MMOL/L (ref 22–29)
CREAT BLD-MCNC: 0.64 MG/DL (ref 0.57–1)
DEPRECATED RDW RBC AUTO: 42.8 FL (ref 37–54)
EOSINOPHIL # BLD AUTO: 0.24 10*3/MM3 (ref 0–0.4)
EOSINOPHIL NFR BLD AUTO: 2.7 % (ref 0.3–6.2)
ERYTHROCYTE [DISTWIDTH] IN BLOOD BY AUTOMATED COUNT: 12.2 % (ref 12.3–15.4)
GFR SERPL CREATININE-BSD FRML MDRD: 91 ML/MIN/1.73
GLOBULIN UR ELPH-MCNC: 3.3 GM/DL
GLUCOSE BLD-MCNC: 224 MG/DL (ref 65–99)
HCT VFR BLD AUTO: 42.5 % (ref 34–46.6)
HGB BLD-MCNC: 13.8 G/DL (ref 12–15.9)
HOLD SPECIMEN: NORMAL
IMM GRANULOCYTES # BLD AUTO: 0.04 10*3/MM3 (ref 0–0.05)
IMM GRANULOCYTES NFR BLD AUTO: 0.4 % (ref 0–0.5)
INR PPP: 1.87 (ref 0.9–1.1)
LYMPHOCYTES # BLD AUTO: 2.06 10*3/MM3 (ref 0.7–3.1)
LYMPHOCYTES NFR BLD AUTO: 22.8 % (ref 19.6–45.3)
MCH RBC QN AUTO: 31 PG (ref 26.6–33)
MCHC RBC AUTO-ENTMCNC: 32.5 G/DL (ref 31.5–35.7)
MCV RBC AUTO: 95.5 FL (ref 79–97)
MONOCYTES # BLD AUTO: 0.72 10*3/MM3 (ref 0.1–0.9)
MONOCYTES NFR BLD AUTO: 8 % (ref 5–12)
NEUTROPHILS # BLD AUTO: 5.92 10*3/MM3 (ref 1.7–7)
NEUTROPHILS NFR BLD AUTO: 65.7 % (ref 42.7–76)
NRBC BLD AUTO-RTO: 0 /100 WBC (ref 0–0.2)
PLATELET # BLD AUTO: 248 10*3/MM3 (ref 140–450)
PMV BLD AUTO: 9.5 FL (ref 6–12)
POTASSIUM BLD-SCNC: 4.2 MMOL/L (ref 3.5–5.2)
PROT SERPL-MCNC: 7.4 G/DL (ref 6–8.5)
PROTHROMBIN TIME: 22 SECONDS (ref 12–15.1)
RBC # BLD AUTO: 4.45 10*6/MM3 (ref 3.77–5.28)
SODIUM BLD-SCNC: 141 MMOL/L (ref 136–145)
TROPONIN T SERPL-MCNC: <0.01 NG/ML (ref 0–0.03)
WBC NRBC COR # BLD: 9.02 10*3/MM3 (ref 3.4–10.8)
WHOLE BLOOD HOLD SPECIMEN: NORMAL
WHOLE BLOOD HOLD SPECIMEN: NORMAL

## 2019-09-10 PROCEDURE — 99283 EMERGENCY DEPT VISIT LOW MDM: CPT

## 2019-09-10 PROCEDURE — 85610 PROTHROMBIN TIME: CPT | Performed by: PHYSICIAN ASSISTANT

## 2019-09-10 PROCEDURE — 93005 ELECTROCARDIOGRAM TRACING: CPT | Performed by: STUDENT IN AN ORGANIZED HEALTH CARE EDUCATION/TRAINING PROGRAM

## 2019-09-10 PROCEDURE — 71046 X-RAY EXAM CHEST 2 VIEWS: CPT

## 2019-09-10 PROCEDURE — 80053 COMPREHEN METABOLIC PANEL: CPT | Performed by: PHYSICIAN ASSISTANT

## 2019-09-10 PROCEDURE — 84484 ASSAY OF TROPONIN QUANT: CPT | Performed by: PHYSICIAN ASSISTANT

## 2019-09-10 PROCEDURE — 85025 COMPLETE CBC W/AUTO DIFF WBC: CPT | Performed by: PHYSICIAN ASSISTANT

## 2019-09-10 RX ORDER — SODIUM CHLORIDE 0.9 % (FLUSH) 0.9 %
10 SYRINGE (ML) INJECTION AS NEEDED
Status: DISCONTINUED | OUTPATIENT
Start: 2019-09-10 | End: 2019-09-10 | Stop reason: HOSPADM

## 2019-09-10 NOTE — ED PROVIDER NOTES
Subjective   Patient is a 73-year-old female with a history of abdominal hernia, asthma, bronchitis, colon polyps, diabetes, GERD, heart murmur, hyperlipidemia, hypertension, lung nodules on 2 L nasal cannula at home presenting to the ER for evaluation of a rapid heart rate.  It appears patient has been dealing with heart palpitations and dizziness for some time now was evaluated by cardiologist Dr. Swan on 8/22/2019 and placed on a Holter monitor.  Her PCP had initiated anticoagulation with Lovenox bridge onto warfarin which patient has been taking.  Patient states that around 2 PM today she began feeling heart palpitations and dizziness.  She states she fell to the floor but denies any syncope or head trauma.  She states her son was there who helped to catch her fall.  Per nursing note, EMS obtained an EKG that showed atrial fibrillation with RVR, rate in the 170s.  Patient denies any chest pain, shortness of breath, cough, recent fever, chills, headache, vision loss or changes, abdominal pain, nausea, emesis, diarrhea, new leg pain or swelling, or any other symptoms.  She states she just finished her 2-week Holter monitor with Dr. Swan and turned it in yesterday.  She has no other complaints at this time            Review of Systems   Constitutional: Negative for chills, fatigue and fever.   HENT: Negative.    Eyes: Negative for visual disturbance.   Respiratory: Negative for cough, chest tightness and shortness of breath.    Cardiovascular: Positive for palpitations. Negative for chest pain and leg swelling.   Gastrointestinal: Negative.    Genitourinary: Negative.    Musculoskeletal: Negative.    Skin: Negative.    Neurological: Positive for dizziness. Negative for syncope and headaches.   Psychiatric/Behavioral: Negative.        Past Medical History:   Diagnosis Date   • Abdominal hernia    • Abdominal pain    • Arthritis    • Asthma    • Blood in stool    • Body piercing     EARS ONLY   • Bronchitis    •  "Bulging of lumbar intervertebral disc    • Colon polyp    • Cough    • Deaf, left    • Diabetes mellitus (CMS/HCC)    • Diverticulitis    • GERD (gastroesophageal reflux disease)    • Headache    • Heart murmur    • Hyperlipidemia    • Hypertension    • Leaky heart valve    • Lung nodules    • Osteoporosis    • Palpitations    • Pancreatitis    • Supplemental oxygen dependent     NIGHTLY AND WHEN EXERTED   • Thyroid disease     LEFT SIDE REMOVED   • Tinnitus, left        Allergies   Allergen Reactions   • Doxycycline Nausea And Vomiting     \"Pain worse than pancreatitis\"   • Hydrochlorothiazide Unknown (See Comments)           • Ciprofloxacin Hives   • Contrast Dye Unknown (See Comments)     Don't remember-been a long time   • Erythromycin Hives and Rash   • Prednisone Mental Status Change     ITCHING AND NERVOUS       Past Surgical History:   Procedure Laterality Date   • APPENDECTOMY     •  SECTION     • CHOLECYSTECTOMY     • COLONOSCOPY N/A 8/10/2018    Procedure: COLONOSCOPY WITH HOT SNARE POLYPECTOMY X 3; COLD SNARE POLYPECTOMY, COLD BIOPSY POLYPECTOMY X 4; CLIP PLACEMENT X 1;  Surgeon: Glenn Modi MD;  Location: [facility] ENDOSCOPY;  Service: Gastroenterology   • THYROIDECTOMY, PARTIAL Left    • TUBAL ABDOMINAL LIGATION         Family History   Problem Relation Age of Onset   • Arthritis Mother    • Diabetes Mother    • Osteoporosis Mother    • Arthritis Father    • Diabetes Father    • Heart attack Father    • Hyperlipidemia Father    • Diabetes Sister    • Thyroid disease Sister    • Diabetes Brother    • Diabetes Maternal Grandmother    • Diabetes Maternal Grandfather    • Diabetes Paternal Grandmother    • Diabetes Paternal Grandfather        Social History     Socioeconomic History   • Marital status:      Spouse name: Not on file   • Number of children: Not on file   • Years of education: Not on file   • Highest education level: Not on file   Tobacco Use   • Smoking " status: Former Smoker     Packs/day: 2.00     Years: 15.00     Pack years: 30.00     Types: Cigarettes     Last attempt to quit: 1975     Years since quittin.7   • Smokeless tobacco: Never Used   Substance and Sexual Activity   • Alcohol use: No   • Drug use: No   • Sexual activity: Defer           Objective   Physical Exam   Nursing note and vitals reviewed.    GEN: No acute distress, sitting upright in stretcher.  She is wearing a nasal cannula.  She does not appear toxic.  She is speaking in full sentences.  Head: Normocephalic, atraumatic  Eyes: Pupils equal round reactive to light, EOM intact  ENT: Posterior pharynx normal in appearance, oral mucosa is moist, tongue midline.   Chest: Nontender to palpation  Cardiovascular: Rate is tachycardic but rhythm is regular  Lungs: Clear to auscultation bilaterally without adventitious sounds,.  Abdomen: Soft, nontender, nondistended, no peritoneal signs or guarding  Extremities: No edema, normal appearance, full ROM.  Neuro: GCS 15  Psych: Mood and affect are appropriate    Procedures           ED Course  ED Course as of Sep 11 0121   Tue Sep 10, 2019   181 EKG shows sinus tachycardia with a rate of 103.  No significant ST segments.  Normal EKG.  Interpreted by me.  [DT]   1834 INR: (!) 1.87 [LA]   1838 Glucose: (!) 224 [LA]   1838 BUN: 12 [LA]   1838 Creatinine: 0.64 [LA]   1838 Sodium: 141 [LA]   1838 Potassium: 4.2 [LA]   1838 Chloride: 98 [LA]   1838 ALT (SGPT): 21 [LA]   1838 AST (SGOT): 19 [LA]   1838 Alkaline Phosphatase: 80 [LA]   1838 Total Bilirubin: 0.2 [LA]    Had a long discussion with patient and family.  Discussed the case with Dr. Szymanski.  Patient is almost to her therapeutic INR.  She has been in the sinus rhythm throughout her entire stay here.  Per Dr. Swan's note, he had not talked about starting any kind of rate control at this time.  Believe that she can be discharged with close follow-up with him and we discussed strict return precautions  as well. Patient and family are in agreement.  [LA]   2003 Reviewed x-ray with Dr. Szymanski.  Do not see any acute cardiopulmonary abnormalities    [LA]      ED Course User Index  [DT] Lopez Telles MD  [LA] Claudia Reich PA-C                  MDM  Number of Diagnoses or Management Options  Palpitations:   Spell of dizziness:   Diagnosis management comments: On arrival, patient is afebrile, normotensive, heart rate of 101, 96% on her normal 2 L nasal cannula.  It appears as if her atrial fibrillation is paroxysmal in nature.  EKG here showing a sinus tachycardia.  She is denying any symptoms at this time.  We will obtain basic labs including a troponin and INR.  We will also obtain a chest x-ray.  Per Dr. Sawn's note on 8/23/2019 if her monitor showed atrial fibrillation he would continue anticoagulation.  He did not mention any kind of rate control.    Lab work unremarkable here, troponin negative.  Chest x-ray does not appear to have any acute cardiopulmonary abnormality.  Patient has been in a sinus rhythm in our ER, no episodes of atrial fibrillation with RVR.  Rate has been in the 90s.  Her INR is almost therapeutic at 1.87. Discussed with Dr. Szymanski.  Believe patient be discharged home with close cardiology follow-up and strict return precautions.  We will defer any rate control to the cardiologist given her a fib seems to be paroxysmal in nature. She verbalized understanding was in agreement with this plan of care.       Amount and/or Complexity of Data Reviewed  Clinical lab tests: reviewed and ordered  Tests in the radiology section of CPT®: reviewed and ordered  Discussion of test results with the performing providers: yes  Review and summarize past medical records: yes  Discuss the patient with other providers: yes  Independent visualization of images, tracings, or specimens: yes    Risk of Complications, Morbidity, and/or Mortality  Presenting problems: moderate  Diagnostic procedures:  moderate  Management options: low    Patient Progress  Patient progress: stable      Final diagnoses:   Palpitations   Spell of dizziness              Claudia Reich PA-C  09/11/19 0124

## 2019-09-11 NOTE — DISCHARGE INSTRUCTIONS
Continue taking all home medications as directed including your blood thinner.  Follow-up with your primary care provider as early as possible to reevaluate your symptoms.  Continue to get your INR checked as scheduled.  You will need to contact your cardiologist Dr. Swan to obtain earliest available follow-up.  Return to the ER for any change, worsening of symptoms, or any additional concerns including not likely worsening palpitations, dizziness, shortness of breath, chest pain.

## 2019-09-13 ENCOUNTER — OFFICE VISIT (OUTPATIENT)
Dept: CARDIOLOGY | Facility: CLINIC | Age: 74
End: 2019-09-13

## 2019-09-13 VITALS
DIASTOLIC BLOOD PRESSURE: 70 MMHG | SYSTOLIC BLOOD PRESSURE: 150 MMHG | OXYGEN SATURATION: 95 % | HEIGHT: 60 IN | WEIGHT: 181 LBS | BODY MASS INDEX: 35.53 KG/M2 | HEART RATE: 92 BPM

## 2019-09-13 DIAGNOSIS — E11.51 TYPE 2 DIABETES MELLITUS WITH DIABETIC PERIPHERAL ANGIOPATHY WITHOUT GANGRENE, WITH LONG-TERM CURRENT USE OF INSULIN (HCC): ICD-10-CM

## 2019-09-13 DIAGNOSIS — I48.0 PAROXYSMAL ATRIAL FIBRILLATION (HCC): Primary | ICD-10-CM

## 2019-09-13 DIAGNOSIS — E11.59 HYPERTENSION ASSOCIATED WITH DIABETES (HCC): ICD-10-CM

## 2019-09-13 DIAGNOSIS — E66.09 CLASS 2 OBESITY DUE TO EXCESS CALORIES WITHOUT SERIOUS COMORBIDITY WITH BODY MASS INDEX (BMI) OF 35.0 TO 35.9 IN ADULT: ICD-10-CM

## 2019-09-13 DIAGNOSIS — Z79.4 TYPE 2 DIABETES MELLITUS WITH DIABETIC PERIPHERAL ANGIOPATHY WITHOUT GANGRENE, WITH LONG-TERM CURRENT USE OF INSULIN (HCC): ICD-10-CM

## 2019-09-13 DIAGNOSIS — I15.2 HYPERTENSION ASSOCIATED WITH DIABETES (HCC): ICD-10-CM

## 2019-09-13 DIAGNOSIS — J44.9 CHRONIC OBSTRUCTIVE PULMONARY DISEASE, UNSPECIFIED COPD TYPE (HCC): ICD-10-CM

## 2019-09-13 LAB
INR PPP: 1.99 (ref 0.9–1.1)
PROTHROMBIN TIME: 23.1 SECONDS (ref 12–15.1)

## 2019-09-13 PROCEDURE — 99214 OFFICE O/P EST MOD 30 MIN: CPT | Performed by: INTERNAL MEDICINE

## 2019-09-13 RX ORDER — DILTIAZEM HYDROCHLORIDE 120 MG/1
120 CAPSULE, COATED, EXTENDED RELEASE ORAL DAILY
Qty: 30 CAPSULE | Refills: 3 | Status: SHIPPED | OUTPATIENT
Start: 2019-09-13 | End: 2019-11-26 | Stop reason: SINTOL

## 2019-09-13 RX ORDER — WARFARIN SODIUM 2 MG/1
8 TABLET ORAL
Qty: 90 TABLET | Refills: 2 | Status: SHIPPED | OUTPATIENT
Start: 2019-09-13 | End: 2019-11-11 | Stop reason: SDUPTHER

## 2019-09-13 NOTE — PROGRESS NOTES
"             [facility] Cardiology Office Follow Up Note    Marielena Moeller  [id number]  2019    Referred By: Phoebe Dc, *    Chief Complaint: Palpitations    History of Present Illness:   Mrs. Marielena Moeller is a 73 y.o. female who presents to the Cardiology Clinic for follow up of paroxysmal atrial fibrillation.  She has a past medical history which includes hypertension, type 2 diabetes mellitus, COPD with prior tobacco use, chronic hypoxia requiring supplemental O2, and obesity.  Her cardiac history is significant for previously undergoing a 48-hour Holter monitor and being diagnosed with suspected paroxysmal atrial fibrillation.  On review of her prior Holter monitor, it appeared she had frequent supraventricular ectopy but no clear evidence of paroxysmal atrial fibrillation.  She underwent repeat outpatient Holter monitoring and , which revealed paroxysmal atrial fibrillation, as well as frequent supraventricular ectopy.  Her recent medical history significant for presentation to the emergency department on 2019 for evaluation of recurrent palpitations.  At that time, she was found to be in sinus rhythm, and was subsequently discharged home.  She presents today for follow-up after her emergency department visit.  Since her last appointment, he reports continued episodes of a \"fluttering\" heart rate.  Fluttering is intermittently associated with dizziness and lightheadedness.  She has not had any syncopal episodes.  She denies any chest pain or exertional chest discomfort.  She continues to report chronic exertional dyspnea related to her underlying COPD.  She is tolerating anticoagulation with Coumadin, without any significant bleeding events.  Her INR on last check was nearly therapeutic.  Her next INR check is today.  She has no other new complaints at this time.    Past Cardiac Testin. Last Coronary Angio: None  2. Prior Stress Testing: None  3. Last " "Echo: 5/8/2019              -LVEF 55%              -Grade 2 diastolic dysfunction              -No significant valvular abnormalities  4. Prior Holter Monitor:  14-day Holter 8/26/2019   -The predominant rhythm is sinus rhythm, average heart rate 82 bpm, minimum 65   -Paroxysmal atrial fibrillation noted, 1.3% burden, max heart rate 109 bpm   -Frequent supraventricular ectopy with isolated and pairs of PVCs, 1.9% burden   -Occasional isolated PVCs   -Symptoms of dizziness, fluttering, lightheadedness associated with supraventricular ectopy and PAF    Review of Systems:   Review of Systems   Constitutional: Negative for activity change, appetite change, chills, diaphoresis, fatigue, fever, unexpected weight gain and unexpected weight loss.   Respiratory: Positive for shortness of breath. Negative for cough, chest tightness and wheezing.    Cardiovascular: Positive for palpitations. Negative for chest pain and leg swelling.   Gastrointestinal: Negative for abdominal pain, anal bleeding, blood in stool and GERD.   Endocrine: Negative for cold intolerance and heat intolerance.   Genitourinary: Negative for hematuria.   Neurological: Positive for dizziness and light-headedness. Negative for syncope and weakness.   Hematological: Does not bruise/bleed easily.   Psychiatric/Behavioral: Negative for depressed mood and stress. The patient is not nervous/anxious.        I have reviewed and/or updated the patient's past medical, past surgical, family, social history, problem list and allergies as appropriate.     Medications:     Current Outpatient Medications:   •  albuterol (VENTOLIN HFA) 108 (90 Base) MCG/ACT inhaler, Inhale 2 puffs Every 4 (Four) Hours As Needed for Wheezing or Shortness of Air., Disp: 1 inhaler, Rfl: 5  •  aspirin 81 MG tablet, Take 81 mg by mouth. Every other day, Disp: , Rfl:   •  BD VEO INSULIN SYRINGE U/F 31G X 15/64\" 0.5 ML misc, USE AS DIRECTED, Disp: 100 each, Rfl: 2  •  Blood Glucose Monitoring " Suppl (ACCU-CHEK CHIO PLUS) w/Device kit, USE AS DIRECTED TO CHECK GLUCOSE 3 TIMES DAILY FOR DM E11.65, Disp: 1 kit, Rfl: 0  •  Cholecalciferol (VITAMIN D3) 5000 units capsule capsule, Take 1 capsule by mouth Daily., Disp: 90 capsule, Rfl: 3  •  glucose blood (ACCU-CHEK CHIO PLUS) test strip, USE AS DIRECTED TO CHECK GLUCOSE THREE TIMES DAILY, Disp: 100 each, Rfl: 3  •  levothyroxine (SYNTHROID) 112 MCG tablet, Take 1 tablet by mouth Daily., Disp: 90 tablet, Rfl: 1  •  magnesium gluconate (MAGONATE) 30 MG tablet, Take 1 tablet by mouth 2 (Two) Times a Day., Disp: 180 tablet, Rfl: 4  •  NOVOLOG MIX 70/30 (70-30) 100 UNIT/ML injection, Inject 70 Units under the skin into the appropriate area as directed 2 (Two) Times a Day With Meals., Disp: 126 mL, Rfl: 3  •  O2 (OXYGEN), Inhale 2 L/min As Needed., Disp: , Rfl:   •  ondansetron ODT (ZOFRAN-ODT) 4 MG disintegrating tablet, Take 1 tablet by mouth Every 8 (Eight) Hours As Needed for Nausea or Vomiting., Disp: 8 tablet, Rfl: 0  •  polyethylene glycol (MIRALAX) powder, Mix 17 g (1 cap full) in 8 oz. Of non-carbonated beverage and drink daily., Disp: 510 g, Rfl: 1  •  warfarin (COUMADIN) 2 MG tablet, Take 1 tablet by mouth Daily. (Patient taking differently: Take 8 mg by mouth Daily.), Disp: 90 tablet, Rfl: 0  •  diltiaZEM CD (CARDIZEM CD) 120 MG 24 hr capsule, Take 1 capsule by mouth Daily., Disp: 30 capsule, Rfl: 3  •  famotidine (PEPCID) 20 MG tablet, Take 1 tablet by mouth Every Night., Disp: 10 tablet, Rfl: 0  •  LORazepam (ATIVAN) 0.5 MG tablet, Take 1 tablet by mouth Every 8 (Eight) Hours As Needed for Anxiety., Disp: 45 tablet, Rfl: 1  •  MAGNESIUM-OXIDE 400 (241.3 Mg) MG tablet tablet, TK 1 T PO QHS, Disp: , Rfl: 3  •  mupirocin (BACTROBAN) 2 % ointment, Apply  topically to the appropriate area as directed 2 (Two) Times a Day., Disp: 15 g, Rfl: 0    Physical Exam:  Vital Signs:   Vitals:    09/13/19 0921 09/13/19 0925   BP: 150/68 150/70   BP Location: Left arm  "Left arm   Patient Position: Sitting Sitting   Cuff Size: Adult Adult   Pulse: 92    SpO2: 95%    Weight: 82.1 kg (181 lb)    Height: 152.4 cm (60\")        Physical Exam   Constitutional: She is oriented to person, place, and time. She appears well-developed and well-nourished. No distress.   HENT:   Head: Normocephalic and atraumatic.   Moist Mucous Membranes.    Eyes: EOM are normal. Pupils are equal, round, and reactive to light. No scleral icterus.   Neck: No tracheal deviation present.   Cardiovascular: Normal rate, regular rhythm, normal heart sounds and intact distal pulses. Exam reveals no gallop and no friction rub.   No murmur heard.  Normal JVD.   Pulmonary/Chest: Effort normal. No stridor. No respiratory distress. She has wheezes. She has no rales. She exhibits no tenderness.   Abdominal: Soft. Bowel sounds are normal. She exhibits no distension. There is no tenderness. There is no rebound and no guarding.   Obese abdomen.   Musculoskeletal: Normal range of motion. She exhibits no edema.   Lymphadenopathy:     She has no cervical adenopathy.   Neurological: She is alert and oriented to person, place, and time.   Skin: Skin is warm and dry. She is not diaphoretic. No erythema.   Psychiatric: She has a normal mood and affect. Her behavior is normal.       Results Review:   I reviewed the patient's new clinical results.      Assessment / Plan:   1.    Paroxysmal atrial fibrillation   --14-day Holter monitor and 8/19 revealed episodes of paroxysmal atrial fibrillation, as well as frequent supraventricular ectopy  --Episodes of atrial fibrillation appeared to be rate controlled and asymptomatic, however symptoms were frequently reported with supraventricular ectopy  --Will start long-acting diltiazem for additional rate control and suppression of ectopy, uptitrate dose as tolerated  --Given elevated CHADS2-VASc score of 4, continue Coumadin for now with dosing and INR checks per PCP  --Will schedule follow-up " in 6 months, with consideration of uptitrating diltiazem at that time if persistent symptoms of palpitations  --If persistent symptoms despite up titration of diltiazem, would then consider antiarrhythmic therapy for suppression    2.  Hypertension  --Remains hypertensive today  --Start diltiazem, as above  --If the patient remains hypertensive on next follow-up, would then add ACE inhibitor     3.  Type 2 diabetes mellitus  --Management per PCP     4. Chronic obstructive pulmonary disease  --On continuous supplemental O2 as an outpatient     5.  Obesity  --Counseled regarding the importance of weight loss through diet and exercise    Preventative Cardiology:   Tobacco Cessation: Prior cessation  Obstructive Sleep Apnea Screening: Deffered  AAA Screening: N/A  Peripheral Arterial Disease Screening: N/A    Follow Up:   Return in about 6 months (around 3/13/2020).    Thank you for allowing me to participate in the care of your patient. Please to not hesitate to contact me with additional questions or concerns.       CRUZ Swan MD  Interventional Cardiology   09/13/2019  9:18 AM

## 2019-09-14 LAB
25(OH)D3+25(OH)D2 SERPL-MCNC: 40 NG/ML (ref 30–100)
ALBUMIN SERPL-MCNC: 4.1 G/DL (ref 3.5–5.2)
ALBUMIN/GLOB SERPL: 1.6 G/DL
ALP SERPL-CCNC: 80 U/L (ref 39–117)
ALT SERPL-CCNC: 17 U/L (ref 1–33)
AST SERPL-CCNC: 16 U/L (ref 1–32)
BASOPHILS # BLD AUTO: 0.04 10*3/MM3 (ref 0–0.2)
BASOPHILS NFR BLD AUTO: 0.6 % (ref 0–1.5)
BILIRUB SERPL-MCNC: 0.2 MG/DL (ref 0.2–1.2)
BUN SERPL-MCNC: 19 MG/DL (ref 8–23)
BUN/CREAT SERPL: 28.8 (ref 7–25)
CALCIUM SERPL-MCNC: 9.8 MG/DL (ref 8.6–10.5)
CHLORIDE SERPL-SCNC: 99 MMOL/L (ref 98–107)
CHOLEST SERPL-MCNC: 231 MG/DL (ref 0–200)
CO2 SERPL-SCNC: 33.3 MMOL/L (ref 22–29)
CREAT SERPL-MCNC: 0.66 MG/DL (ref 0.57–1)
EOSINOPHIL # BLD AUTO: 0.14 10*3/MM3 (ref 0–0.4)
EOSINOPHIL NFR BLD AUTO: 2 % (ref 0.3–6.2)
ERYTHROCYTE [DISTWIDTH] IN BLOOD BY AUTOMATED COUNT: 12.7 % (ref 12.3–15.4)
GLOBULIN SER CALC-MCNC: 2.5 GM/DL
GLUCOSE SERPL-MCNC: 219 MG/DL (ref 65–99)
HCT VFR BLD AUTO: 42.5 % (ref 34–46.6)
HDLC SERPL-MCNC: 45 MG/DL (ref 40–60)
HGB BLD-MCNC: 13.1 G/DL (ref 12–15.9)
IMM GRANULOCYTES # BLD AUTO: 0.03 10*3/MM3 (ref 0–0.05)
IMM GRANULOCYTES NFR BLD AUTO: 0.4 % (ref 0–0.5)
LDLC SERPL CALC-MCNC: 150 MG/DL (ref 0–100)
LYMPHOCYTES # BLD AUTO: 1.14 10*3/MM3 (ref 0.7–3.1)
LYMPHOCYTES NFR BLD AUTO: 16.3 % (ref 19.6–45.3)
MAGNESIUM SERPL-MCNC: 1.8 MG/DL (ref 1.6–2.4)
MCH RBC QN AUTO: 30.5 PG (ref 26.6–33)
MCHC RBC AUTO-ENTMCNC: 30.8 G/DL (ref 31.5–35.7)
MCV RBC AUTO: 99.1 FL (ref 79–97)
MONOCYTES # BLD AUTO: 0.54 10*3/MM3 (ref 0.1–0.9)
MONOCYTES NFR BLD AUTO: 7.7 % (ref 5–12)
NEUTROPHILS # BLD AUTO: 5.12 10*3/MM3 (ref 1.7–7)
NEUTROPHILS NFR BLD AUTO: 73 % (ref 42.7–76)
NRBC BLD AUTO-RTO: 0 /100 WBC (ref 0–0.2)
PLATELET # BLD AUTO: 270 10*3/MM3 (ref 140–450)
POTASSIUM SERPL-SCNC: 4.4 MMOL/L (ref 3.5–5.2)
PROT SERPL-MCNC: 6.6 G/DL (ref 6–8.5)
RBC # BLD AUTO: 4.29 10*6/MM3 (ref 3.77–5.28)
SODIUM SERPL-SCNC: 142 MMOL/L (ref 136–145)
TRIGL SERPL-MCNC: 178 MG/DL (ref 0–150)
TSH SERPL DL<=0.005 MIU/L-ACNC: 1.12 UIU/ML (ref 0.27–4.2)
VLDLC SERPL CALC-MCNC: 35.6 MG/DL
WBC # BLD AUTO: 7.01 10*3/MM3 (ref 3.4–10.8)

## 2019-09-16 RX ORDER — ATORVASTATIN CALCIUM 20 MG/1
TABLET, FILM COATED ORAL
Qty: 150 TABLET | Refills: 0 | Status: SHIPPED | OUTPATIENT
Start: 2019-09-16 | End: 2019-10-22

## 2019-09-19 ENCOUNTER — TELEPHONE (OUTPATIENT)
Dept: INTERNAL MEDICINE | Facility: CLINIC | Age: 74
End: 2019-09-19

## 2019-09-19 NOTE — TELEPHONE ENCOUNTER
Suspect allergies are involved in this. Tylenol okay to take for headaches. Allergy medicines okay. Nose sprays can help but should hold off on those until bleeding stops. Saline spray okay.

## 2019-09-23 ENCOUNTER — ANTICOAGULATION VISIT (OUTPATIENT)
Dept: INTERNAL MEDICINE | Facility: CLINIC | Age: 74
End: 2019-09-23

## 2019-09-23 DIAGNOSIS — I48.91 NEW ONSET ATRIAL FIBRILLATION (HCC): Primary | ICD-10-CM

## 2019-09-23 LAB — INR PPP: 1.7 (ref 0.9–1.1)

## 2019-09-23 PROCEDURE — 85610 PROTHROMBIN TIME: CPT | Performed by: NURSE PRACTITIONER

## 2019-09-23 NOTE — PROGRESS NOTES
Discussed INR, 1.7, drawn today.  Mrs Moeller refuses to increase warfarin dose, per protocol orders.  She would like to wait a week, see what INR is at that time and reassess.  She would also like lab to be drawn at [facility], as she lives in [place] & it is easier to get to.  Advised of possible consequence of inadequate anticoagulation.  She verbalizes understanding, continues to decline increased dose.  Order printed for PT INR to be drawn at [facility], will follow up when result available next week.  As an aside, her left foot is bruised and slightly swollen.  She reports she fell a few days ago.  She was seen at [facility] ED, XR of foot were negative for fracture.  She's wearing flip flop sandals today, advised to wear closed toe shoes for protection of feet from injury and provide stability while walking.

## 2019-09-30 ENCOUNTER — TELEPHONE (OUTPATIENT)
Dept: INTERNAL MEDICINE | Facility: CLINIC | Age: 74
End: 2019-09-30

## 2019-09-30 ENCOUNTER — RESULTS ENCOUNTER (OUTPATIENT)
Dept: INTERNAL MEDICINE | Facility: CLINIC | Age: 74
End: 2019-09-30

## 2019-09-30 DIAGNOSIS — I48.91 NEW ONSET ATRIAL FIBRILLATION (HCC): ICD-10-CM

## 2019-10-01 ENCOUNTER — TELEPHONE (OUTPATIENT)
Dept: INTERNAL MEDICINE | Facility: CLINIC | Age: 74
End: 2019-10-01

## 2019-10-02 ENCOUNTER — OFFICE VISIT (OUTPATIENT)
Dept: INTERNAL MEDICINE | Facility: CLINIC | Age: 74
End: 2019-10-02

## 2019-10-02 VITALS
HEIGHT: 60 IN | HEART RATE: 87 BPM | SYSTOLIC BLOOD PRESSURE: 130 MMHG | TEMPERATURE: 98.7 F | DIASTOLIC BLOOD PRESSURE: 70 MMHG | WEIGHT: 181 LBS | BODY MASS INDEX: 35.53 KG/M2 | OXYGEN SATURATION: 95 %

## 2019-10-02 DIAGNOSIS — E11.59 HYPERTENSION ASSOCIATED WITH DIABETES (HCC): ICD-10-CM

## 2019-10-02 DIAGNOSIS — E11.51 TYPE 2 DIABETES MELLITUS WITH DIABETIC PERIPHERAL ANGIOPATHY WITHOUT GANGRENE, WITH LONG-TERM CURRENT USE OF INSULIN (HCC): ICD-10-CM

## 2019-10-02 DIAGNOSIS — Z79.4 TYPE 2 DIABETES MELLITUS WITH DIABETIC PERIPHERAL ANGIOPATHY WITHOUT GANGRENE, WITH LONG-TERM CURRENT USE OF INSULIN (HCC): ICD-10-CM

## 2019-10-02 DIAGNOSIS — L03.116 CELLULITIS OF LEFT LOWER EXTREMITY: Primary | ICD-10-CM

## 2019-10-02 DIAGNOSIS — I15.2 HYPERTENSION ASSOCIATED WITH DIABETES (HCC): ICD-10-CM

## 2019-10-02 DIAGNOSIS — I48.0 PAROXYSMAL ATRIAL FIBRILLATION (HCC): ICD-10-CM

## 2019-10-02 PROCEDURE — 99214 OFFICE O/P EST MOD 30 MIN: CPT | Performed by: NURSE PRACTITIONER

## 2019-10-02 RX ORDER — CEPHALEXIN 500 MG/1
500 CAPSULE ORAL 2 TIMES DAILY
Qty: 20 CAPSULE | Refills: 0 | Status: SHIPPED | OUTPATIENT
Start: 2019-10-02 | End: 2019-10-12

## 2019-10-02 RX ORDER — DILTIAZEM HYDROCHLORIDE 120 MG/1
CAPSULE, EXTENDED RELEASE ORAL
Refills: 3 | COMMUNITY
Start: 2019-09-13 | End: 2019-10-22

## 2019-10-02 RX ORDER — OXYCODONE HYDROCHLORIDE AND ACETAMINOPHEN 5; 325 MG/1; MG/1
TABLET ORAL
Refills: 0 | COMMUNITY
Start: 2019-09-14 | End: 2020-07-30 | Stop reason: SDUPTHER

## 2019-10-02 NOTE — PROGRESS NOTES
Date: 10/02/2019    Name: Marielena Moeller  : 1945    Chief Complaint:   Chief Complaint   Patient presents with   • Follow-up     left foot       HPI:  Ms Moeller presents to clinic with weeklong h/o left foot pain, left foot & lower leg swelling after an injury to her lower leg leg sustained while she was moving into a new home.  She was seen at [facility] ED, where an XR and US of left lower extremity/foot were negative for bony abnormality or thrombus.  Her left foot and ankle continue to hurt all the time, and her left lower leg and foot are persistently swollen.  She rates the pain in her left foot and ankle as 5/10 at this time.  She has not been taking medication for pain relief.  Wearing flip flops today to clinic.    Mrs Moeller has long history of T2DM.  She does not monitor glucose.  Avoids concentrated sweets, but otherwise diet is not planned.  Patient denies foot ulcerations, hypoglycemia, nausea, polydipsia, polyuria, polyphagia, visual disturbances and weight loss.   She also has a history of HTN, lasting over a year.  She does not monitor BP at home, does not follow a heart healthy diet, does not exercise regularly.  Denies chest pain, dyspnea, orthopnea, palpitations, headaches, weakness, visual disturbances or confusion.  Mrs Moeller has recently been diagnosed with A Fib.  She is taking coumadin 8 mg daily. She eats a consistent amount of leafy green vegetables weekly.  Denies bleeding gums when she brushes her teeth, nosebleeds, bruising, palpitations, dizziness.      History:  The following portions of the patient's history were reviewed and updated as appropriate: allergies, current medications, past medical history, family history, surgical history, social history and problem list.     ROS:  Review of Systems   Constitutional: Positive for fatigue. Negative for activity change and appetite change.   Respiratory: Negative for cough, shortness of breath and wheezing.   "  Cardiovascular: Negative for chest pain.   Musculoskeletal: Positive for gait problem.       VS:  Vitals:    10/02/19 1123   BP: 130/70   Pulse: 87   Temp: 98.7 °F (37.1 °C)   TempSrc: Temporal   SpO2: 95%   Weight: 82.1 kg (181 lb)   Height: 152.4 cm (60\")     Body mass index is 35.35 kg/m².    PE:  Physical Exam   Constitutional: She is oriented to person, place, and time. She appears well-developed and well-nourished. No distress.   HENT:   Head: Normocephalic.   Mouth/Throat: Oropharynx is clear and moist.   Eyes: Conjunctivae are normal. Pupils are equal, round, and reactive to light.   Cardiovascular: Normal rate, regular rhythm, normal heart sounds and intact distal pulses.   Pulmonary/Chest: Effort normal and breath sounds normal.   Musculoskeletal:   Left toes are ecchymotic, light blue and lavender around proximal metatarsal joints, left lower leg below the knee is edematous +2, unable to feel touch below mid shin bilaterally; left foot, ankle, leg below mid shin are slightly warm to the touch   Neurological: She is alert and oriented to person, place, and time.   Skin: Skin is warm. Capillary refill takes less than 2 seconds.     Assessment/Plan:  Marielena was seen today for follow-up.    Diagnoses and all orders for this visit:    Cellulitis of left lower extremity  -     cephalexin (KEFLEX) 500 MG capsule; Take 1 capsule by mouth 2 (Two) Times a Day for 10 days.  -     CBC & Differential  - Wear compression hose, if possible, bilateral lower legs  - Keep leg elevated when not walking  - Wear closed toe shoes, rather than flip flops, particularly when working to avoid injury to feet   - Look at foot daily, monitor for worsening edema, streaking from foot toward knee  Hypertension associated with diabetes (CMS/HCC)        - Follow heart healthy diet.  Advised to reduce daily sodium intake to < 1500 mg per day.  Avoid processed & fast foods.        - Monitor blood pressure as discussed, keep log and bring " to next appointment.          - Exercise as tolerated, with a goal of 30 minutes of moderate exercise most days.         - Take medications as prescribed.  Type 2 diabetes mellitus with diabetic peripheral angiopathy without gangrene, with long-term current use of insulin (CMS/[facility])        - Follow diabetic diet        - Monitor blood sugars as discussed        - See eye doctor annually or as discussed        - Wear protective foot wear/no bare feet        - Check feet regularly for calluses or ulcers        - Discussed risk of poorly controlled diabetes and long-term complications        - Exercise as tolerated up to 30 minutes 5 days a week        - Take all medications as prescribed  Paroxysmal atrial fibrillation (CMS/HCC)        - Continue taking medication as prescribed by cardiologist        - Continue to have PT INR drawn, orders placed to have it drawn in [place] as that is more convenient for the patient.        Return in about 3 months (around 1/2/2020) for Next scheduled follow up.

## 2019-10-03 LAB
BASOPHILS # BLD AUTO: 0.04 10*3/MM3 (ref 0–0.2)
BASOPHILS NFR BLD AUTO: 0.6 % (ref 0–1.5)
EOSINOPHIL # BLD AUTO: 0.16 10*3/MM3 (ref 0–0.4)
EOSINOPHIL NFR BLD AUTO: 2.6 % (ref 0.3–6.2)
ERYTHROCYTE [DISTWIDTH] IN BLOOD BY AUTOMATED COUNT: 12.2 % (ref 12.3–15.4)
HCT VFR BLD AUTO: 38.1 % (ref 34–46.6)
HGB BLD-MCNC: 12.2 G/DL (ref 12–15.9)
IMM GRANULOCYTES # BLD AUTO: 0.03 10*3/MM3 (ref 0–0.05)
IMM GRANULOCYTES NFR BLD AUTO: 0.5 % (ref 0–0.5)
LYMPHOCYTES # BLD AUTO: 1.12 10*3/MM3 (ref 0.7–3.1)
LYMPHOCYTES NFR BLD AUTO: 17.9 % (ref 19.6–45.3)
MCH RBC QN AUTO: 31.4 PG (ref 26.6–33)
MCHC RBC AUTO-ENTMCNC: 32 G/DL (ref 31.5–35.7)
MCV RBC AUTO: 97.9 FL (ref 79–97)
MONOCYTES # BLD AUTO: 0.54 10*3/MM3 (ref 0.1–0.9)
MONOCYTES NFR BLD AUTO: 8.6 % (ref 5–12)
NEUTROPHILS # BLD AUTO: 4.36 10*3/MM3 (ref 1.7–7)
NEUTROPHILS NFR BLD AUTO: 69.8 % (ref 42.7–76)
NRBC BLD AUTO-RTO: 0 /100 WBC (ref 0–0.2)
PLATELET # BLD AUTO: 244 10*3/MM3 (ref 140–450)
RBC # BLD AUTO: 3.89 10*6/MM3 (ref 3.77–5.28)
WBC # BLD AUTO: 6.25 10*3/MM3 (ref 3.4–10.8)

## 2019-10-08 DIAGNOSIS — I48.91 ATRIAL FIBRILLATION, UNSPECIFIED TYPE (HCC): ICD-10-CM

## 2019-10-08 DIAGNOSIS — Z79.01 WARFARIN ANTICOAGULATION: Primary | ICD-10-CM

## 2019-10-09 ENCOUNTER — TELEPHONE (OUTPATIENT)
Dept: CARDIOLOGY | Facility: CLINIC | Age: 74
End: 2019-10-09

## 2019-10-09 NOTE — TELEPHONE ENCOUNTER
Patient was seen on 09/23 and prescribed diltiazem 120 mg. Patient is c/o of muscle pain (back of legs, arms). Advise.

## 2019-10-10 NOTE — TELEPHONE ENCOUNTER
Please let the patient know it is unlikely the low dose of diltiazem is causing her current symptoms, however she can try holding the medication for several days to see if her symptoms improve. If her symptoms improve, she can then discontinue the Diltiazem and we will follow-up her symptoms at her next appointment. If her symptoms do not improve with holding the Diltiazem, then she should re-start the medication.

## 2019-10-18 DIAGNOSIS — J18.9 PNEUMONIA DUE TO INFECTIOUS ORGANISM, UNSPECIFIED LATERALITY, UNSPECIFIED PART OF LUNG: Primary | ICD-10-CM

## 2019-10-18 RX ORDER — ALBUTEROL SULFATE 0.63 MG/3ML
1 SOLUTION RESPIRATORY (INHALATION) EVERY 6 HOURS PRN
Qty: 42 AMPULE | Refills: 1 | Status: SHIPPED | OUTPATIENT
Start: 2019-10-18 | End: 2021-12-29 | Stop reason: SDUPTHER

## 2019-10-22 ENCOUNTER — OFFICE VISIT (OUTPATIENT)
Dept: INTERNAL MEDICINE | Facility: CLINIC | Age: 74
End: 2019-10-22

## 2019-10-22 VITALS
SYSTOLIC BLOOD PRESSURE: 165 MMHG | TEMPERATURE: 98.5 F | HEART RATE: 89 BPM | OXYGEN SATURATION: 99 % | HEIGHT: 60 IN | BODY MASS INDEX: 35.14 KG/M2 | WEIGHT: 179 LBS | DIASTOLIC BLOOD PRESSURE: 80 MMHG

## 2019-10-22 DIAGNOSIS — E11.65 TYPE 2 DIABETES MELLITUS WITH HYPERGLYCEMIA, WITH LONG-TERM CURRENT USE OF INSULIN (HCC): ICD-10-CM

## 2019-10-22 DIAGNOSIS — I15.2 HYPERTENSION ASSOCIATED WITH DIABETES (HCC): ICD-10-CM

## 2019-10-22 DIAGNOSIS — B37.2 CANDIDAL INTERTRIGO: ICD-10-CM

## 2019-10-22 DIAGNOSIS — I48.0 PAROXYSMAL ATRIAL FIBRILLATION (HCC): ICD-10-CM

## 2019-10-22 DIAGNOSIS — Z79.4 TYPE 2 DIABETES MELLITUS WITH HYPERGLYCEMIA, WITH LONG-TERM CURRENT USE OF INSULIN (HCC): ICD-10-CM

## 2019-10-22 DIAGNOSIS — E66.01 CLASS 2 SEVERE OBESITY DUE TO EXCESS CALORIES WITH SERIOUS COMORBIDITY AND BODY MASS INDEX (BMI) OF 35.0 TO 35.9 IN ADULT (HCC): ICD-10-CM

## 2019-10-22 DIAGNOSIS — E11.59 HYPERTENSION ASSOCIATED WITH DIABETES (HCC): ICD-10-CM

## 2019-10-22 DIAGNOSIS — J44.9 CHRONIC OBSTRUCTIVE PULMONARY DISEASE, UNSPECIFIED COPD TYPE (HCC): Primary | ICD-10-CM

## 2019-10-22 PROCEDURE — 99214 OFFICE O/P EST MOD 30 MIN: CPT | Performed by: NURSE PRACTITIONER

## 2019-10-22 RX ORDER — NYSTATIN 100000 U/G
CREAM TOPICAL AS NEEDED
Qty: 30 G | Refills: 0 | Status: SHIPPED | OUTPATIENT
Start: 2019-10-22 | End: 2020-12-11 | Stop reason: SDUPTHER

## 2019-10-22 NOTE — PROGRESS NOTES
Date: 10/22/2019    Name: Marielena Moeller  : 1945    Chief Complaint:   Chief Complaint   Patient presents with   • Follow-up     hospital follow up       HPI:  Marielena Moeller is a 73 y.o. female presents for follow up of LLL pneumonia, diagnosed at [facility] on 10/15/19.  She was found to be in persistent A. fib and CHF in ED on 10/15/2019.  She was admitted and released the next day.  She was prescribed Levaquin p.o. for pneumonia, amiodarone 200 mg twice daily for afib.  She is not taking amiodarone, states she is concerned that it will be detrimental to her health.  An echo was done, was normal, scanned into chart.  Right lower extremity Doppler was done to rule out DVT, it was negative.  She has returned to her home in [place].  She continues to feel slightly short of breath, is using oxygen per nasal cannula at all times.  Prior to pneumonia she was using it when she was active, at bedtime.  Denies fever, chills, change in appetite, malaise, increase in fatigue.  She continues to cough and have shortness of breath, states it is not increased from her normal symptoms of COPD.  She is currently being treated for hypertension & a fib.  She takes medications as prescribed.  She does not monitor blood pressure at home. Denies chest pain, palpitations, lower extremity edema, headaches, weakness, visual disturbances or confusion.  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  She will schedule follow-up with him in the near future.  She is diabetic.  Occasionally monitors blood sugar at home.  She does not recall any readings after being discharged from the hospital.  Takes medications as prescribed. Patient denies foot ulcerations, hypoglycemia, nausea, paresthesia of the feet, polydipsia, polyuria, polyphagia, visual disturbances and weight loss.   Her right foot and lower leg continue to be swollen following an injury  "about 2 months ago.  She has had 3 Dopplers to rule out blood clot, all negative.  The pain is rated as 3 out of 10 at this time.  Denies loss of sensation, gait disturbance.  She states she has had a rash on her lower abdomen for over 2 weeks.  She has been using nystatin cream for 3 days with good success.    History:  The following portions of the patient's history were reviewed and updated as appropriate: allergies, current medications, past medical history, family history, surgical history, social history and problem list.      ROS:  Review of Systems   Constitutional: Positive for fatigue. Negative for appetite change.   Respiratory: Negative for wheezing.    Gastrointestinal: Negative for constipation and diarrhea.   Musculoskeletal: Negative for myalgias.   Skin: Negative for pallor and rash.   Neurological: Negative for facial asymmetry, speech difficulty and memory problem.   Hematological: Does not bruise/bleed easily.       VS:  Vitals:    10/22/19 1516   BP: 165/80   Pulse: 89   Temp: 98.5 °F (36.9 °C)   TempSrc: Temporal   SpO2: 99%   Weight: 81.2 kg (179 lb)   Height: 152.4 cm (60\")       PE:  Physical Exam   Constitutional: She is oriented to person, place, and time. She appears well-developed and well-nourished. She appears ill (chronically). She is obese.  HENT:   Head: Normocephalic.   Nose: Nose normal.   Mouth/Throat: Oropharynx is clear and moist.   Eyes: Conjunctivae and EOM are normal. Pupils are equal, round, and reactive to light.   Neck: Normal range of motion. Neck supple.   Cardiovascular: Normal rate, regular rhythm, normal heart sounds and intact distal pulses.   Pulmonary/Chest: Effort normal. She has wheezes (occasional). She has no rhonchi. She has no rales.   Musculoskeletal:   Right foot and lower leg edematous, +2; tender at MT joints of all toes on right foot   Neurological: She is alert and oriented to person, place, and time.   Skin: Skin is warm. Capillary refill takes less " than 2 seconds.   Beefy red macular rash in pannus, inner thighs     Assessment/Plan:  Marielena was seen today for follow-up.    Diagnoses and all orders for this visit:    Chronic obstructive pulmonary disease, unspecified COPD type (CMS/[facility])  Continue to use oxygen as prescribed, nebulizer as prescribed  Low up with Dr. Sanchez as scheduled  Monitor for fever, chills, malaise.  Call or return to clinic should these symptoms develop.  Candidal intertrigo  -     nystatin (MYCOSTATIN) 729843 UNIT/GM cream; Apply  topically to the appropriate area as directed As Needed (yeast infection).  Hypertension associated with diabetes (CMS/[facility])         - Follow heart healthy diet.  Advised to reduce daily sodium intake to < 1500 mg per day.  Avoid processed & fast foods.        - Monitor blood pressure as discussed, keep log and bring to next appointment.          - Exercise as tolerated, with a goal of 30 minutes of moderate exercise most days.         - Take medications as prescribed.  Paroxysmal atrial fibrillation (CMS/[facility])         - Continue to take diltiazem as prescribed by cardiology        - Follow-up with cardiology as scheduled  Class 2 severe obesity due to excess calories with serious comorbidity and body mass index (BMI) of 35.0 to 35.9 in adult (CMS/[facility])        - Patient's Body mass index is 34.96 kg/m². BMI is above normal parameters. Recommendations include: nutrition counseling.  Declines referral to nutrition counselor at this time.  Type 2 diabetes mellitus with hyperglycemia, with long-term current use of insulin (CMS/[facility])         - Follow diabetic diet        - Monitor blood sugars as discussed        - See eye doctor annually or as discussed        - Wear protective foot wear/no bare feet        - Check feet regularly for calluses or ulcers        - Discussed risk of poorly controlled diabetes and long-term complications        - Exercise as tolerated up to 30 minutes 5 days a week        - Take all  medications as prescribed    Return in about 3 months (around 1/22/2020) for Next scheduled follow up.

## 2019-10-28 DIAGNOSIS — Z79.4 TYPE 2 DIABETES MELLITUS WITH HYPERGLYCEMIA, WITH LONG-TERM CURRENT USE OF INSULIN (HCC): ICD-10-CM

## 2019-10-28 DIAGNOSIS — E11.65 TYPE 2 DIABETES MELLITUS WITH HYPERGLYCEMIA, WITH LONG-TERM CURRENT USE OF INSULIN (HCC): ICD-10-CM

## 2019-11-11 ENCOUNTER — TELEPHONE (OUTPATIENT)
Dept: INTERNAL MEDICINE | Facility: CLINIC | Age: 74
End: 2019-11-11

## 2019-11-11 RX ORDER — ALBUTEROL SULFATE 90 UG/1
AEROSOL, METERED RESPIRATORY (INHALATION)
Qty: 18 G | Refills: 0 | OUTPATIENT
Start: 2019-11-11

## 2019-11-11 RX ORDER — ALBUTEROL SULFATE 90 UG/1
2 AEROSOL, METERED RESPIRATORY (INHALATION) EVERY 4 HOURS PRN
Qty: 1 INHALER | Refills: 5 | Status: SHIPPED | OUTPATIENT
Start: 2019-11-11 | End: 2021-12-29 | Stop reason: SDUPTHER

## 2019-11-11 RX ORDER — WARFARIN SODIUM 2 MG/1
8 TABLET ORAL
Qty: 90 TABLET | Refills: 2 | Status: SHIPPED | OUTPATIENT
Start: 2019-11-11 | End: 2019-11-11 | Stop reason: SDUPTHER

## 2019-11-11 RX ORDER — WARFARIN SODIUM 2 MG/1
8 TABLET ORAL
Qty: 368 TABLET | Refills: 2 | Status: SHIPPED | OUTPATIENT
Start: 2019-11-11 | End: 2019-11-12 | Stop reason: SDUPTHER

## 2019-11-11 NOTE — TELEPHONE ENCOUNTER
Pt requesting her warfarin she stated Dr Starr has put her on 8 mg and has never changed the mg at the pharmacy and she is running out of meds faster please change to Warfarin 8 mg to take 1 time daily and she is also requesting Ventolin HFA inhaler  Sent to [facility] , pharmacy confirmed

## 2019-11-12 ENCOUNTER — TELEPHONE (OUTPATIENT)
Dept: INTERNAL MEDICINE | Facility: CLINIC | Age: 74
End: 2019-11-12

## 2019-11-12 DIAGNOSIS — I48.0 PAROXYSMAL ATRIAL FIBRILLATION (HCC): ICD-10-CM

## 2019-11-12 DIAGNOSIS — E83.42 HYPOMAGNESEMIA: Primary | ICD-10-CM

## 2019-11-12 RX ORDER — WARFARIN SODIUM 2 MG/1
2 TABLET ORAL
Qty: 360 TABLET | Refills: 0 | Status: SHIPPED | OUTPATIENT
Start: 2019-11-12 | End: 2020-11-06 | Stop reason: SDUPTHER

## 2019-11-13 LAB
INR PPP: 1.92 (ref 0.9–1.1)
PROTHROMBIN TIME: 22.5 SECONDS (ref 12–15.1)

## 2019-11-14 LAB — MAGNESIUM SERPL-MCNC: 1.8 MG/DL (ref 1.6–2.4)

## 2019-11-17 DIAGNOSIS — E11.65 TYPE 2 DIABETES MELLITUS WITH HYPERGLYCEMIA, WITH LONG-TERM CURRENT USE OF INSULIN (HCC): ICD-10-CM

## 2019-11-17 DIAGNOSIS — Z79.4 TYPE 2 DIABETES MELLITUS WITH HYPERGLYCEMIA, WITH LONG-TERM CURRENT USE OF INSULIN (HCC): ICD-10-CM

## 2019-11-18 RX ORDER — INSULIN ASPART 100 [IU]/ML
INJECTION, SUSPENSION SUBCUTANEOUS
Qty: 130 ML | Refills: 0 | Status: SHIPPED | OUTPATIENT
Start: 2019-11-18 | End: 2020-04-02

## 2019-11-20 DIAGNOSIS — Z79.4 TYPE 2 DIABETES MELLITUS WITH HYPERGLYCEMIA, WITH LONG-TERM CURRENT USE OF INSULIN (HCC): ICD-10-CM

## 2019-11-20 DIAGNOSIS — E11.65 TYPE 2 DIABETES MELLITUS WITH HYPERGLYCEMIA, WITH LONG-TERM CURRENT USE OF INSULIN (HCC): ICD-10-CM

## 2019-11-26 ENCOUNTER — EPISODE CHANGES (OUTPATIENT)
Dept: CASE MANAGEMENT | Facility: OTHER | Age: 74
End: 2019-11-26

## 2019-11-26 ENCOUNTER — OFFICE VISIT (OUTPATIENT)
Dept: INTERNAL MEDICINE | Facility: CLINIC | Age: 74
End: 2019-11-26

## 2019-11-26 ENCOUNTER — RESULTS ENCOUNTER (OUTPATIENT)
Dept: INTERNAL MEDICINE | Facility: CLINIC | Age: 74
End: 2019-11-26

## 2019-11-26 VITALS
DIASTOLIC BLOOD PRESSURE: 70 MMHG | HEIGHT: 60 IN | BODY MASS INDEX: 34.96 KG/M2 | HEART RATE: 66 BPM | RESPIRATION RATE: 16 BRPM | SYSTOLIC BLOOD PRESSURE: 154 MMHG | TEMPERATURE: 97.2 F | OXYGEN SATURATION: 97 %

## 2019-11-26 DIAGNOSIS — Z23 NEED FOR INFLUENZA VACCINATION: ICD-10-CM

## 2019-11-26 DIAGNOSIS — Z79.01 WARFARIN ANTICOAGULATION: ICD-10-CM

## 2019-11-26 DIAGNOSIS — Z79.4 TYPE 2 DIABETES MELLITUS WITH HYPERGLYCEMIA, WITH LONG-TERM CURRENT USE OF INSULIN (HCC): ICD-10-CM

## 2019-11-26 DIAGNOSIS — I48.0 PAROXYSMAL ATRIAL FIBRILLATION (HCC): ICD-10-CM

## 2019-11-26 DIAGNOSIS — E11.51 TYPE 2 DIABETES MELLITUS WITH DIABETIC PERIPHERAL ANGIOPATHY WITHOUT GANGRENE, WITH LONG-TERM CURRENT USE OF INSULIN (HCC): Primary | ICD-10-CM

## 2019-11-26 DIAGNOSIS — E11.65 TYPE 2 DIABETES MELLITUS WITH HYPERGLYCEMIA, WITH LONG-TERM CURRENT USE OF INSULIN (HCC): ICD-10-CM

## 2019-11-26 DIAGNOSIS — G89.29 CHRONIC ABDOMINAL PAIN: ICD-10-CM

## 2019-11-26 DIAGNOSIS — R30.0 DYSURIA: ICD-10-CM

## 2019-11-26 DIAGNOSIS — R10.9 CHRONIC ABDOMINAL PAIN: ICD-10-CM

## 2019-11-26 DIAGNOSIS — J84.9 INTERSTITIAL LUNG DISEASE (HCC): ICD-10-CM

## 2019-11-26 DIAGNOSIS — B37.2 SKIN CANDIDIASIS: ICD-10-CM

## 2019-11-26 DIAGNOSIS — M79.10 MYALGIA: ICD-10-CM

## 2019-11-26 DIAGNOSIS — Z79.4 TYPE 2 DIABETES MELLITUS WITH DIABETIC PERIPHERAL ANGIOPATHY WITHOUT GANGRENE, WITH LONG-TERM CURRENT USE OF INSULIN (HCC): Primary | ICD-10-CM

## 2019-11-26 DIAGNOSIS — I15.2 HYPERTENSION ASSOCIATED WITH DIABETES (HCC): ICD-10-CM

## 2019-11-26 DIAGNOSIS — E11.59 HYPERTENSION ASSOCIATED WITH DIABETES (HCC): ICD-10-CM

## 2019-11-26 DIAGNOSIS — E66.01 CLASS 2 SEVERE OBESITY DUE TO EXCESS CALORIES WITH SERIOUS COMORBIDITY AND BODY MASS INDEX (BMI) OF 35.0 TO 35.9 IN ADULT (HCC): ICD-10-CM

## 2019-11-26 LAB
BILIRUB BLD-MCNC: NEGATIVE MG/DL
CLARITY, POC: ABNORMAL
COLOR UR: YELLOW
GLUCOSE UR STRIP-MCNC: ABNORMAL MG/DL
HBA1C MFR BLD: 8.3 %
INR PPP: 2.4 (ref 0.9–1.1)
KETONES UR QL: ABNORMAL
LEUKOCYTE EST, POC: NEGATIVE
NITRITE UR-MCNC: NEGATIVE MG/ML
PH UR: 6 [PH] (ref 5–8)
PROT UR STRIP-MCNC: ABNORMAL MG/DL
RBC # UR STRIP: ABNORMAL /UL
SP GR UR: 1.02 (ref 1–1.03)
UROBILINOGEN UR QL: NORMAL

## 2019-11-26 PROCEDURE — G0008 ADMIN INFLUENZA VIRUS VAC: HCPCS | Performed by: FAMILY MEDICINE

## 2019-11-26 PROCEDURE — 85610 PROTHROMBIN TIME: CPT | Performed by: FAMILY MEDICINE

## 2019-11-26 PROCEDURE — 36416 COLLJ CAPILLARY BLOOD SPEC: CPT | Performed by: FAMILY MEDICINE

## 2019-11-26 PROCEDURE — 99214 OFFICE O/P EST MOD 30 MIN: CPT | Performed by: FAMILY MEDICINE

## 2019-11-26 PROCEDURE — 90653 IIV ADJUVANT VACCINE IM: CPT | Performed by: FAMILY MEDICINE

## 2019-11-26 PROCEDURE — 83036 HEMOGLOBIN GLYCOSYLATED A1C: CPT | Performed by: FAMILY MEDICINE

## 2019-11-26 PROCEDURE — 81003 URINALYSIS AUTO W/O SCOPE: CPT | Performed by: FAMILY MEDICINE

## 2019-11-26 RX ORDER — LOSARTAN POTASSIUM 50 MG/1
50 TABLET ORAL DAILY
COMMUNITY
End: 2020-04-10

## 2019-11-26 RX ORDER — SOTALOL HYDROCHLORIDE 80 MG/1
80 TABLET ORAL 2 TIMES DAILY
COMMUNITY
End: 2020-11-20 | Stop reason: SINTOL

## 2019-11-26 NOTE — PROGRESS NOTES
Subjective    Marielena Moeller is a 74 y.o. female here for:  Chief Complaint   Patient presents with   • Hypertension     f/u   • Diabetes     f/u        Admitted in [place] due to pneumonia. Heart doctor there wanted her to come back to see him, so she's been going there instead of Dr. Swan. He has her on sotalol for rate control. Patient continues coumadin for anticoagulation, she declined newer medicines.    For years she's not slept on right side as it ilicits palpitations. 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  She worries there is an abdominal issue that is leading to heart issues and she has chronic abdominal pain. She has heard from one provider she has a hernia, another said she did not. Patient notes slightest touch or pressure to abdomen (such as standing at sink and touching counter) causes pain.     Stopped diltiazem due to pain in muscles of arms and legs. She feels stopping helped but admits she's still having some pain in muscles. Was rx Levaquin by hospital for pneumonia and it caused a lot of muscle pain in shoulders.     Interstitial lung disease per imaging. Patient has seen pulmonary, is on oxygen replacement continuouslly. Continues to have shortness of breath with exertion. Oxygen set at 2 lpm.     Recurrent red irritated rash under breasts that has not improved with nystatin cream. She feels nystatin powder will not help as she tried it in the past. Cannot take oral antifungals due to coumadin therapy.     Declines new medicine for diabetes mellitus today despite possible cardiac benefits of Jardiance and that family. Wishes to continue insulin. She feels resolving abdominal issues may help sugars come down.     Hypertension is a chronic issue that she has had for years and has been difficult to control. Had difficulty getting here today, no family to help. Feels a lot of stress.     Admits to some dysuria as well, worries she may have a urinary tract infection.       "      The following portions of the patient's history were reviewed and updated as appropriate: allergies, current medications, past family history, past medical history, past social history, past surgical history and problem list.    Review of Systems   Constitutional: Positive for activity change and fatigue.   Respiratory: Positive for shortness of breath.    Gastrointestinal: Positive for abdominal pain.   Genitourinary: Positive for dysuria.   Musculoskeletal: Positive for myalgias.   Neurological: Positive for weakness.       Visit Vitals  /70   Pulse 66   Temp 97.2 °F (36.2 °C) (Temporal)   Resp 16   Ht 152.4 cm (60\")   LMP  (LMP Unknown)   SpO2 97%   BMI 34.96 kg/m²       Objective   Physical Exam   Constitutional: She is oriented to person, place, and time. Vital signs are normal. She appears well-developed and well-nourished. She is active.  Non-toxic appearance. She has a sickly appearance. She does not appear ill. No distress. Nasal cannula in place. She is obese.  HENT:   Head: Normocephalic and atraumatic.   Right Ear: Hearing normal.   Left Ear: Hearing normal.   Mouth/Throat: Mucous membranes are not dry.   Eyes: EOM are normal. No scleral icterus.   Neck: Phonation normal. Neck supple.   Cardiovascular: Normal rate and regular rhythm. Exam reveals distant heart sounds.   Pulmonary/Chest: Effort normal. She has decreased breath sounds. She has no wheezes. She has no rhonchi. She has no rales.   Abdominal: Soft. She exhibits distension. There is generalized tenderness. There is no rigidity, no rebound and no guarding.   Neurological: She is alert and oriented to person, place, and time. She displays no tremor. No cranial nerve deficit. Gait abnormal.   Skin: Skin is warm. No rash (erythematous plaques that are beefy red under breasts) noted. She is not diaphoretic. No pallor.   Psychiatric: She has a normal mood and affect. Her speech is normal and behavior is normal. Judgment and thought " content normal. Cognition and memory are normal.   Nursing note and vitals reviewed.      Lab Results   Component Value Date    HGBA1C 8.3 11/26/2019    HGBA1C 8.8 05/08/2019    HGBA1C 8.6 02/06/2019     Lab Results   Component Value Date    INR 2.4 (A) 11/26/2019    INR 1.92 (H) 11/13/2019    INR 1.70 (A) 09/23/2019    PROTIME 22.0 (H) 09/10/2019    PROTIME 10.7 05/12/2018    PROTIME 9.7 05/07/2014     Lab Results   Component Value Date    TSH 1.120 09/13/2019     Lab Results   Component Value Date    FREET4 1.46 07/25/2019     Office Visit on 11/26/2019   Component Date Value Ref Range Status   • Hemoglobin A1C 11/26/2019 8.3  % Final   • INR 11/26/2019 2.4* 0.9 - 1.1 Final   • Color 11/26/2019 Yellow  Yellow, Straw, Dark Yellow, Rosie Final   • Clarity, UA 11/26/2019 Slightly Cloudy* Clear Final   • Specific Gravity  11/26/2019 1.025  1.005 - 1.030 Final   • pH, Urine 11/26/2019 6.0  5.0 - 8.0 Final   • Leukocytes 11/26/2019 Negative  Negative Final   • Nitrite, UA 11/26/2019 Negative  Negative Final   • Protein, POC 11/26/2019 Trace* Negative mg/dL Final   • Glucose, UA 11/26/2019 >=1000 mg/dL (3+)* Negative, 1000 mg/dL (3+) mg/dL Final   • Ketones, UA 11/26/2019 Trace* Negative Final   • Urobilinogen, UA 11/26/2019 Normal  Normal Final   • Bilirubin 11/26/2019 Negative  Negative Final   • Blood, UA 11/26/2019 Trace* Negative Final       Reviewed last cardiology note 9/13/19.    Assessment/Plan     Problem List Items Addressed This Visit        Cardiovascular and Mediastinum    Hypertension associated with diabetes (CMS/HCC)    Overview     · No HCTZ due to pancreatitis early 2018.         Current Assessment & Plan     Hypertension is not controlled.  Continue current medications.  Ambulatory blood pressure monitoring.  Follow up with cardiology.  Blood pressure will be reassessed at the next regular appointment.         Relevant Medications    sotalol (BETAPACE) 80 MG tablet    losartan (COZAAR) 50 MG tablet     Type 2 diabetes mellitus with circulatory disorder, with long-term current use of insulin (CMS/[facility]) - Primary    Overview     · Associated with hypertension          Current Assessment & Plan     Diabetes is uncontrolled..   Patient wishes to continue current medicines. Declines addition of Jardiance, etc.  Diabetes will be reassessed in 3 months.         Relevant Orders    POC Glycosylated Hemoglobin (Hb A1C) (Completed)    Paroxysmal atrial fibrillation (CMS/[facility])    Current Assessment & Plan     INR therapeutic range today at 2.4. No changes to coumadin at this time. Consider newer medicines such as Eliquis down the road.         Relevant Medications    sotalol (BETAPACE) 80 MG tablet    Other Relevant Orders    POC INR (Completed)       Respiratory    Interstitial lung disease (CMS/[facility])    Current Assessment & Plan     Continue oxygen supplementation, follow up with pulmonology.            Digestive    Class 2 obesity with body mass index (BMI) of 35.0 to 35.9 in adult    Overview     · Associated with diabetes mellitus, hypertension             Endocrine    Type 2 diabetes mellitus with hyperglycemia, with long-term current use of insulin (CMS/[facility])    Overview     · History of pancreatitis            Nervous and Auditory    Myalgia    Overview     · Recurrent muscle pains, often attributed to medicines. Unclear etiology.           Other Visit Diagnoses     Warfarin anticoagulation        Relevant Orders    POC INR (Completed)    Chronic abdominal pain        Relevant Orders    Ambulatory Referral to Gastroenterology (Completed)    Dysuria        Relevant Orders    POC Urinalysis Dipstick, Automated (Completed)    Urine Culture - Urine, Urine, Clean Catch    Skin candidiasis        May try home remedies such as vinegar. Consider retrying Nystatin powder down the line if needed.    Need for influenza vaccination        Relevant Orders    Fluad Tri 65yr ([id number]) (Completed)          · Declines Jardiance and  similar as a family member did not do well on them. Says she'll reconsider if her abdominal issues work out       Marychuy Starr MD

## 2019-11-26 NOTE — ASSESSMENT & PLAN NOTE
INR therapeutic range today at 2.4. No changes to coumadin at this time. Consider newer medicines such as Eliquis down the road.

## 2019-11-26 NOTE — ASSESSMENT & PLAN NOTE
Hypertension is not controlled.  Continue current medications.  Ambulatory blood pressure monitoring.  Follow up with cardiology.  Blood pressure will be reassessed at the next regular appointment.

## 2019-11-26 NOTE — ASSESSMENT & PLAN NOTE
Diabetes is uncontrolled..   Patient wishes to continue current medicines. Declines addition of Jardiance, etc.  Diabetes will be reassessed in 3 months.

## 2019-12-02 ENCOUNTER — EPISODE CHANGES (OUTPATIENT)
Dept: CASE MANAGEMENT | Facility: OTHER | Age: 74
End: 2019-12-02

## 2019-12-03 RX ORDER — AMOXICILLIN 875 MG/1
875 TABLET, COATED ORAL 2 TIMES DAILY
Qty: 20 TABLET | Refills: 0 | Status: SHIPPED | OUTPATIENT
Start: 2019-12-03 | End: 2019-12-13

## 2019-12-16 DIAGNOSIS — F41.9 ANXIETY: ICD-10-CM

## 2019-12-17 RX ORDER — LORAZEPAM 0.5 MG/1
TABLET ORAL
Qty: 45 TABLET | Refills: 1 | Status: SHIPPED | OUTPATIENT
Start: 2019-12-17 | End: 2020-04-10 | Stop reason: SDUPTHER

## 2020-01-17 ENCOUNTER — OFFICE VISIT (OUTPATIENT)
Dept: INTERNAL MEDICINE | Facility: CLINIC | Age: 75
End: 2020-01-17

## 2020-01-17 VITALS
OXYGEN SATURATION: 97 % | BODY MASS INDEX: 34.58 KG/M2 | HEIGHT: 60 IN | TEMPERATURE: 98 F | SYSTOLIC BLOOD PRESSURE: 130 MMHG | DIASTOLIC BLOOD PRESSURE: 63 MMHG | HEART RATE: 50 BPM | RESPIRATION RATE: 16 BRPM | WEIGHT: 176.13 LBS

## 2020-01-17 DIAGNOSIS — E11.59 TYPE 2 DIABETES MELLITUS WITH OTHER CIRCULATORY COMPLICATION, WITH LONG-TERM CURRENT USE OF INSULIN (HCC): ICD-10-CM

## 2020-01-17 DIAGNOSIS — E03.9 ACQUIRED HYPOTHYROIDISM: ICD-10-CM

## 2020-01-17 DIAGNOSIS — J44.9 CHRONIC OBSTRUCTIVE PULMONARY DISEASE, UNSPECIFIED COPD TYPE (HCC): ICD-10-CM

## 2020-01-17 DIAGNOSIS — E11.65 TYPE 2 DIABETES MELLITUS WITH HYPERGLYCEMIA, WITH LONG-TERM CURRENT USE OF INSULIN (HCC): Primary | ICD-10-CM

## 2020-01-17 DIAGNOSIS — I48.0 PAROXYSMAL ATRIAL FIBRILLATION (HCC): ICD-10-CM

## 2020-01-17 DIAGNOSIS — Z79.4 TYPE 2 DIABETES MELLITUS WITH HYPERGLYCEMIA, WITH LONG-TERM CURRENT USE OF INSULIN (HCC): Primary | ICD-10-CM

## 2020-01-17 DIAGNOSIS — E11.59 HYPERTENSION ASSOCIATED WITH DIABETES (HCC): ICD-10-CM

## 2020-01-17 DIAGNOSIS — Z79.4 TYPE 2 DIABETES MELLITUS WITH OTHER CIRCULATORY COMPLICATION, WITH LONG-TERM CURRENT USE OF INSULIN (HCC): ICD-10-CM

## 2020-01-17 DIAGNOSIS — I15.2 HYPERTENSION ASSOCIATED WITH DIABETES (HCC): ICD-10-CM

## 2020-01-17 PROBLEM — K92.1 HEMATOCHEZIA: Status: RESOLVED | Noted: 2018-07-27 | Resolved: 2020-01-17

## 2020-01-17 PROBLEM — K62.5 BRBPR (BRIGHT RED BLOOD PER RECTUM): Status: RESOLVED | Noted: 2018-07-27 | Resolved: 2020-01-17

## 2020-01-17 PROBLEM — K59.00 CONSTIPATION: Status: RESOLVED | Noted: 2018-07-27 | Resolved: 2020-01-17

## 2020-01-17 LAB
INR PPP: 2.5 (ref 0.9–1.1)
POC CREATININE URINE: 50
POC MICROALBUMIN URINE: 150

## 2020-01-17 PROCEDURE — 85610 PROTHROMBIN TIME: CPT | Performed by: FAMILY MEDICINE

## 2020-01-17 PROCEDURE — 82044 UR ALBUMIN SEMIQUANTITATIVE: CPT | Performed by: FAMILY MEDICINE

## 2020-01-17 PROCEDURE — 99214 OFFICE O/P EST MOD 30 MIN: CPT | Performed by: FAMILY MEDICINE

## 2020-01-17 RX ORDER — CLONIDINE HYDROCHLORIDE 0.1 MG/1
TABLET ORAL
COMMUNITY
Start: 2020-01-10 | End: 2020-07-30

## 2020-01-17 NOTE — PROGRESS NOTES
"Subjective    Marielena Moeller is a 74 y.o. female here for:  Chief Complaint   Patient presents with   • Diabetes     Checking levels at home and says they are \"fair\".        History per MA reviewed.     History of Present Illness     Diabetes chronic and unchanged. Declines further medicines such as Jardiance, Farxiga, Invokana, mentions commercials and side effects listed.     Continues to see cardiology in [place], he increased sotalol due to heart rhythm and blood pressure. Blood pressure is better.    Followed also by pulm due to lung disease, seen in [place]. No changes, on oxygen.    Scheduled to see Dr. Lorenzo for chronic abdominal pain. No change.     Skin yeast infection slowly improving with vinegar.    The following portions of the patient's history were reviewed and updated as appropriate: allergies, current medications, past family history, past medical history, past social history, past surgical history and problem list.    Review of Systems   Constitutional: Negative for activity change.   Cardiovascular: Positive for palpitations.   Gastrointestinal: Positive for abdominal pain.   Musculoskeletal: Positive for arthralgias.   Hematological: Bruises/bleeds easily.   Psychiatric/Behavioral: Positive for stress.       Objective   Visit Vitals  /63   Pulse 50   Temp 98 °F (36.7 °C) (Temporal)   Resp 16   Ht 152.4 cm (60\")   Wt 79.9 kg (176 lb 2 oz)   LMP  (LMP Unknown)   SpO2 97%   BMI 34.40 kg/m²       Physical Exam   Constitutional: She is oriented to person, place, and time. Vital signs are normal. She appears well-developed and well-nourished. She is active.  Non-toxic appearance. She has a sickly appearance. She does not appear ill. No distress. Nasal cannula in place. She is obese.  HENT:   Head: Normocephalic and atraumatic.   Right Ear: Hearing normal.   Left Ear: Hearing normal.   Mouth/Throat: Mucous membranes are not dry.   Eyes: EOM are normal. No scleral icterus.   Neck: Phonation " normal. Neck supple.   Cardiovascular: Normal rate and regular rhythm. Exam reveals distant heart sounds.   Pulmonary/Chest: Effort normal. She has decreased breath sounds. She has no wheezes. She has no rhonchi. She has no rales.   Neurological: She is alert and oriented to person, place, and time. She displays no tremor. No cranial nerve deficit. Gait abnormal.   Skin: Skin is warm. She is not diaphoretic. No pallor.   Psychiatric: She has a normal mood and affect. Her speech is normal and behavior is normal. Judgment and thought content normal. Cognition and memory are normal.   Nursing note and vitals reviewed.      Lab Results   Component Value Date    HGBA1C 8.3 11/26/2019    HGBA1C 8.8 05/08/2019    HGBA1C 8.6 02/06/2019     Lab Results   Component Value Date    TSH 1.120 09/13/2019     Lab Results   Component Value Date    FREET4 1.46 07/25/2019     Lab Results   Component Value Date    INR 2.5 (A) 01/17/2020    INR 2.4 (A) 11/26/2019    INR 1.92 (H) 11/13/2019    PROTIME 22.0 (H) 09/10/2019    PROTIME 10.7 05/12/2018    PROTIME 9.7 05/07/2014         Assessment/Plan     Problem List Items Addressed This Visit        Cardiovascular and Mediastinum    Hypertension associated with diabetes (CMS/[facility])    Overview     · No HCTZ due to pancreatitis early 2018.         Relevant Medications    cloNIDine (CATAPRES) 0.1 MG tablet    Type 2 diabetes mellitus with circulatory disorder, with long-term current use of insulin (CMS/[facility])    Overview     · Associated with hypertension          Paroxysmal atrial fibrillation (CMS/[facility])    Relevant Orders    POC INR (Completed)       Respiratory    COPD (chronic obstructive pulmonary disease) (CMS/[facility])       Endocrine    Type 2 diabetes mellitus with hyperglycemia, with long-term current use of insulin (CMS/[facility]) - Primary    Overview     · History of pancreatitis         Relevant Orders    POCT microalbumin (Completed)    Acquired hypothyroidism          · No med changes today.  Continue levothyroxine for hypothyroidism, last labs okay. Wearing oxygen as prescribed for COPD, lung dz  · Not yet due for A1C. She continues to decline Jardiance and similar. I asked her to discuss with her cardiologist.  · INR 2.5 today, continue current dose of warfarin and follow up with cardiology  · Follow up with all specialists as scheduled       Marychuy Starr MD

## 2020-01-23 ENCOUNTER — TELEPHONE (OUTPATIENT)
Dept: INTERNAL MEDICINE | Facility: CLINIC | Age: 75
End: 2020-01-23

## 2020-01-23 DIAGNOSIS — E03.9 ACQUIRED HYPOTHYROIDISM: ICD-10-CM

## 2020-01-23 RX ORDER — LEVOTHYROXINE SODIUM 112 UG/1
112 TABLET ORAL DAILY
Qty: 90 TABLET | Refills: 4 | Status: SHIPPED | OUTPATIENT
Start: 2020-01-23 | End: 2021-04-12

## 2020-01-23 NOTE — TELEPHONE ENCOUNTER
Patient called stating she has one day left of her levothyroxine (SYNTHROID) 112 MCG tablet. Patient also stated she usually gets a 90 day supply. [facility]'s pharmacy confirmed. Patient would like a call on her cell [phone] when med request has been sent .

## 2020-02-12 ENCOUNTER — APPOINTMENT (OUTPATIENT)
Dept: GENERAL RADIOLOGY | Facility: HOSPITAL | Age: 75
End: 2020-02-12

## 2020-02-12 ENCOUNTER — HOSPITAL ENCOUNTER (EMERGENCY)
Facility: HOSPITAL | Age: 75
Discharge: HOME | End: 2020-02-12
Attending: EMERGENCY MEDICINE | Admitting: EMERGENCY MEDICINE

## 2020-02-12 ENCOUNTER — EPISODE CHANGES (OUTPATIENT)
Dept: CASE MANAGEMENT | Facility: OTHER | Age: 75
End: 2020-02-12

## 2020-02-12 VITALS
RESPIRATION RATE: 17 BRPM | TEMPERATURE: 98.2 F | SYSTOLIC BLOOD PRESSURE: 128 MMHG | HEART RATE: 62 BPM | DIASTOLIC BLOOD PRESSURE: 69 MMHG | WEIGHT: 176 LBS | HEIGHT: 60 IN | OXYGEN SATURATION: 94 % | BODY MASS INDEX: 34.55 KG/M2

## 2020-02-12 DIAGNOSIS — J18.9 COMMUNITY ACQUIRED PNEUMONIA, UNSPECIFIED LATERALITY: Primary | ICD-10-CM

## 2020-02-12 LAB
A-A DO2: ABNORMAL
ALBUMIN SERPL-MCNC: 3.8 G/DL (ref 3.5–5.2)
ALBUMIN/GLOB SERPL: 1.2 G/DL
ALP SERPL-CCNC: 73 U/L (ref 39–117)
ALT SERPL W P-5'-P-CCNC: 17 U/L (ref 1–33)
ANION GAP SERPL CALCULATED.3IONS-SCNC: 8.5 MMOL/L (ref 5–15)
ARTERIAL PATENCY WRIST A: POSITIVE
AST SERPL-CCNC: 16 U/L (ref 1–32)
ATMOSPHERIC PRESS: 735 MMHG
BASE EXCESS BLDA CALC-SCNC: 9.6 MMOL/L (ref 0–2)
BASOPHILS # BLD AUTO: 0.05 10*3/MM3 (ref 0–0.2)
BASOPHILS NFR BLD AUTO: 0.5 % (ref 0–1.5)
BDY SITE: ABNORMAL
BILIRUB SERPL-MCNC: 0.4 MG/DL (ref 0.2–1.2)
BUN BLD-MCNC: 10 MG/DL (ref 8–23)
BUN/CREAT SERPL: 17.2 (ref 7–25)
CALCIUM SPEC-SCNC: 9.4 MG/DL (ref 8.6–10.5)
CHLORIDE SERPL-SCNC: 95 MMOL/L (ref 98–107)
CO2 SERPL-SCNC: 33.5 MMOL/L (ref 22–29)
COHGB MFR BLD: 0.7 % (ref 0–2)
CREAT BLD-MCNC: 0.58 MG/DL (ref 0.57–1)
DEPRECATED RDW RBC AUTO: 42.9 FL (ref 37–54)
EOSINOPHIL # BLD AUTO: 0.15 10*3/MM3 (ref 0–0.4)
EOSINOPHIL NFR BLD AUTO: 1.4 % (ref 0.3–6.2)
ERYTHROCYTE [DISTWIDTH] IN BLOOD BY AUTOMATED COUNT: 12.3 % (ref 12.3–15.4)
GAS FLOW AIRWAY: 2 LPM
GFR SERPL CREATININE-BSD FRML MDRD: 102 ML/MIN/1.73
GLOBULIN UR ELPH-MCNC: 3.1 GM/DL
GLUCOSE BLD-MCNC: 215 MG/DL (ref 65–99)
HCO3 BLDA-SCNC: 36.4 MMOL/L (ref 22–28)
HCT VFR BLD AUTO: 40.3 % (ref 34–46.6)
HCT VFR BLD CALC: 41.8 %
HGB BLD-MCNC: 13.2 G/DL (ref 12–15.9)
HGB BLDA-MCNC: 13.6 G/DL (ref 12–18)
HOLD SPECIMEN: NORMAL
HOLD SPECIMEN: NORMAL
IMM GRANULOCYTES # BLD AUTO: 0.05 10*3/MM3 (ref 0–0.05)
IMM GRANULOCYTES NFR BLD AUTO: 0.5 % (ref 0–0.5)
INR PPP: 1.56 (ref 0.9–1.1)
LYMPHOCYTES # BLD AUTO: 1.09 10*3/MM3 (ref 0.7–3.1)
LYMPHOCYTES NFR BLD AUTO: 10.5 % (ref 19.6–45.3)
MCH RBC QN AUTO: 31.2 PG (ref 26.6–33)
MCHC RBC AUTO-ENTMCNC: 32.8 G/DL (ref 31.5–35.7)
MCV RBC AUTO: 95.3 FL (ref 79–97)
METHGB BLD QL: 0.9 % (ref 0–1.5)
MODALITY: ABNORMAL
MONOCYTES # BLD AUTO: 0.72 10*3/MM3 (ref 0.1–0.9)
MONOCYTES NFR BLD AUTO: 6.9 % (ref 5–12)
NEUTROPHILS # BLD AUTO: 8.32 10*3/MM3 (ref 1.7–7)
NEUTROPHILS NFR BLD AUTO: 80.2 % (ref 42.7–76)
NOTE: ABNORMAL
NRBC BLD AUTO-RTO: 0 /100 WBC (ref 0–0.2)
NT-PROBNP SERPL-MCNC: 277.2 PG/ML (ref 5–900)
OXYHGB MFR BLDV: 95.7 % (ref 94–99)
PCO2 BLDA: 57 MM HG (ref 35–45)
PCO2 TEMP ADJ BLD: ABNORMAL MM[HG]
PH BLDA: 7.41 PH UNITS (ref 7.3–7.5)
PH, TEMP CORRECTED: ABNORMAL
PLATELET # BLD AUTO: 211 10*3/MM3 (ref 140–450)
PMV BLD AUTO: 10 FL (ref 6–12)
PO2 BLDA: 88.9 MM HG (ref 75–100)
PO2 TEMP ADJ BLD: ABNORMAL MM[HG]
POTASSIUM BLD-SCNC: 4.3 MMOL/L (ref 3.5–5.2)
PROT SERPL-MCNC: 6.9 G/DL (ref 6–8.5)
PROTHROMBIN TIME: 19.5 SECONDS (ref 12–15.1)
RBC # BLD AUTO: 4.23 10*6/MM3 (ref 3.77–5.28)
SAO2 % BLDCOA: 97.3 % (ref 94–100)
SODIUM BLD-SCNC: 137 MMOL/L (ref 136–145)
TROPONIN T SERPL-MCNC: <0.01 NG/ML (ref 0–0.03)
VENTILATOR MODE: ABNORMAL
WBC NRBC COR # BLD: 10.38 10*3/MM3 (ref 3.4–10.8)
WHOLE BLOOD HOLD SPECIMEN: NORMAL
WHOLE BLOOD HOLD SPECIMEN: NORMAL

## 2020-02-12 PROCEDURE — 94799 UNLISTED PULMONARY SVC/PX: CPT

## 2020-02-12 PROCEDURE — 71045 X-RAY EXAM CHEST 1 VIEW: CPT

## 2020-02-12 PROCEDURE — 93005 ELECTROCARDIOGRAM TRACING: CPT | Performed by: PHYSICIAN ASSISTANT

## 2020-02-12 PROCEDURE — 94640 AIRWAY INHALATION TREATMENT: CPT

## 2020-02-12 PROCEDURE — 84484 ASSAY OF TROPONIN QUANT: CPT | Performed by: PHYSICIAN ASSISTANT

## 2020-02-12 PROCEDURE — 82805 BLOOD GASES W/O2 SATURATION: CPT

## 2020-02-12 PROCEDURE — 36600 WITHDRAWAL OF ARTERIAL BLOOD: CPT

## 2020-02-12 PROCEDURE — 83880 ASSAY OF NATRIURETIC PEPTIDE: CPT | Performed by: PHYSICIAN ASSISTANT

## 2020-02-12 PROCEDURE — 80053 COMPREHEN METABOLIC PANEL: CPT | Performed by: PHYSICIAN ASSISTANT

## 2020-02-12 PROCEDURE — 83050 HGB METHEMOGLOBIN QUAN: CPT

## 2020-02-12 PROCEDURE — 85610 PROTHROMBIN TIME: CPT | Performed by: PHYSICIAN ASSISTANT

## 2020-02-12 PROCEDURE — 99284 EMERGENCY DEPT VISIT MOD MDM: CPT

## 2020-02-12 PROCEDURE — 82375 ASSAY CARBOXYHB QUANT: CPT

## 2020-02-12 PROCEDURE — 85025 COMPLETE CBC W/AUTO DIFF WBC: CPT | Performed by: PHYSICIAN ASSISTANT

## 2020-02-12 RX ORDER — CLARITHROMYCIN 500 MG/1
500 TABLET, COATED ORAL 2 TIMES DAILY
Qty: 14 TABLET | Refills: 0 | Status: SHIPPED | OUTPATIENT
Start: 2020-02-12 | End: 2020-02-19

## 2020-02-12 RX ORDER — IPRATROPIUM BROMIDE AND ALBUTEROL SULFATE 2.5; .5 MG/3ML; MG/3ML
3 SOLUTION RESPIRATORY (INHALATION) ONCE
Status: COMPLETED | OUTPATIENT
Start: 2020-02-12 | End: 2020-02-12

## 2020-02-12 RX ORDER — AMOXICILLIN AND CLAVULANATE POTASSIUM 875; 125 MG/1; MG/1
1 TABLET, FILM COATED ORAL 2 TIMES DAILY
Qty: 14 TABLET | Refills: 0 | Status: SHIPPED | OUTPATIENT
Start: 2020-02-12 | End: 2020-02-19

## 2020-02-12 RX ORDER — ONDANSETRON 4 MG/1
4 TABLET, FILM COATED ORAL EVERY 6 HOURS PRN
Qty: 12 TABLET | Refills: 0 | Status: SHIPPED | OUTPATIENT
Start: 2020-02-12

## 2020-02-12 RX ORDER — SODIUM CHLORIDE 0.9 % (FLUSH) 0.9 %
10 SYRINGE (ML) INJECTION AS NEEDED
Status: DISCONTINUED | OUTPATIENT
Start: 2020-02-12 | End: 2020-02-12 | Stop reason: HOSPADM

## 2020-02-12 RX ADMIN — IPRATROPIUM BROMIDE AND ALBUTEROL SULFATE 3 ML: .5; 3 SOLUTION RESPIRATORY (INHALATION) at 11:54

## 2020-02-12 NOTE — ED PROVIDER NOTES
"Subjective   74-year-old female presents with cough, congestion, and shortness of breath especially with exertion.  She has a history of lung disease, she is on oxygen at all times.  She states that over the last 3 to 4 days she has had a productive cough with yellow sputum.  She states that it is also more difficult to ambulate due to the cough and shortness of breath.  She is continue to use her oxygen all the time, and she is use a nebulizer at home with no relief.      History provided by:  Patient   used: No        Review of Systems   Respiratory: Positive for cough, shortness of breath and wheezing.    All other systems reviewed and are negative.      Past Medical History:   Diagnosis Date   • Abdominal hernia    • Abdominal pain    • Arthritis    • Asthma    • Blood in stool    • Body piercing     EARS ONLY   • Bronchitis    • Bulging of lumbar intervertebral disc    • Colon polyp    • Cough    • Deaf, left    • Diabetes mellitus (CMS/HCC)    • Diverticulitis    • GERD (gastroesophageal reflux disease)    • Headache    • Heart murmur    • Hyperlipidemia    • Hypertension    • Leaky heart valve    • Lung nodules    • Osteoporosis    • Palpitations    • Pancreatitis    • Supplemental oxygen dependent     NIGHTLY AND WHEN EXERTED   • Thyroid disease     LEFT SIDE REMOVED   • Tinnitus, left        Allergies   Allergen Reactions   • Doxycycline Nausea And Vomiting     \"Pain worse than pancreatitis\"   • Ciprofloxacin Hives   • Contrast Dye Unknown (See Comments)     Don't remember-been a long time   • Erythromycin Hives and Rash   • Hydrochlorothiazide Unknown (See Comments)           • Levaquin [Levofloxacin] Swelling   • Prednisone Mental Status Change     ITCHING AND NERVOUS       Past Surgical History:   Procedure Laterality Date   • APPENDECTOMY     •  SECTION     • CHOLECYSTECTOMY     • COLONOSCOPY N/A 8/10/2018    Procedure: COLONOSCOPY WITH HOT SNARE POLYPECTOMY X 3; COLD " SNARE POLYPECTOMY, COLD BIOPSY POLYPECTOMY X 4; CLIP PLACEMENT X 1;  Surgeon: Glenn Modi MD;  Location: [facility] ENDOSCOPY;  Service: Gastroenterology   • THYROIDECTOMY, PARTIAL Left    • TUBAL ABDOMINAL LIGATION         Family History   Problem Relation Age of Onset   • Arthritis Mother    • Diabetes Mother    • Osteoporosis Mother    • Arthritis Father    • Diabetes Father    • Heart attack Father    • Hyperlipidemia Father    • Diabetes Sister    • Thyroid disease Sister    • Diabetes Brother    • Diabetes Maternal Grandmother    • Diabetes Maternal Grandfather    • Diabetes Paternal Grandmother    • Diabetes Paternal Grandfather        Social History     Socioeconomic History   • Marital status:      Spouse name: Not on file   • Number of children: Not on file   • Years of education: Not on file   • Highest education level: Not on file   Tobacco Use   • Smoking status: Former Smoker     Packs/day: 2.00     Years: 15.00     Pack years: 30.00     Types: Cigarettes     Last attempt to quit:      Years since quittin.1   • Smokeless tobacco: Never Used   Substance and Sexual Activity   • Alcohol use: No   • Drug use: No   • Sexual activity: Defer           Objective   Physical Exam   Constitutional: She is oriented to person, place, and time. She appears well-developed and well-nourished.   HENT:   Head: Normocephalic.   Eyes: EOM are normal.   Neck: Normal range of motion. Neck supple.   Cardiovascular: Normal rate and regular rhythm.   Pulmonary/Chest: Effort normal. She has wheezes.   Abdominal: Soft. Bowel sounds are normal.   Musculoskeletal: Normal range of motion.   Neurological: She is alert and oriented to person, place, and time. She has normal reflexes.   Skin: Skin is warm and dry.   Psychiatric: She has a normal mood and affect.   Nursing note and vitals reviewed.      Procedures           ED Course  ED Course as of  1343   Wed 2020   1209 EKG interpreted by me  reveals sinus rhythm at a rate of 71.  No ectopy no ischemic changes.    [PF]   1329 Patient has several antibiotics that she has an intolerance to, Levaquin makes her body and joints ache, several of the other medications cause upset stomach rash, these medications are more of an intolerance than an actual allergy or anaphylactic reaction, she is chosen to try the combination of Augmentin and clarithromycin with Zofran to help with the upset stomach.    [CS]   1332 FINDINGS: The heart is normal in size. The mediastinum is unremarkable.  There are low lung volumes with left greater than right basilar  opacities either atelectasis or pneumonia. There is no pneumothorax.   There are no acute osseous abnormalities.     IMPRESSION:  Low lung volumes with bibasilar opacities either reflecting  atelectasis or pneumonia.     Continued followup is recommended.    [PF]      ED Course User Index  [CS] Dawit Tapia Jr., PA-C  [PF] Ervin Osei, DO                                           MDM  Number of Diagnoses or Management Options  Community acquired pneumonia, unspecified laterality: new and requires workup     Amount and/or Complexity of Data Reviewed  Clinical lab tests: reviewed  Tests in the radiology section of CPT®: reviewed  Discuss the patient with other providers: yes    Risk of Complications, Morbidity, and/or Mortality  Presenting problems: low  Diagnostic procedures: low  Management options: low    Patient Progress  Patient progress: stable      Final diagnoses:   Community acquired pneumonia, unspecified laterality            Dawit Tapia Jr., PA-C  02/12/20 2999

## 2020-02-17 ENCOUNTER — EPISODE CHANGES (OUTPATIENT)
Dept: CASE MANAGEMENT | Facility: OTHER | Age: 75
End: 2020-02-17

## 2020-02-18 ENCOUNTER — EPISODE CHANGES (OUTPATIENT)
Dept: CASE MANAGEMENT | Facility: OTHER | Age: 75
End: 2020-02-18

## 2020-02-20 ENCOUNTER — EPISODE CHANGES (OUTPATIENT)
Dept: CASE MANAGEMENT | Facility: OTHER | Age: 75
End: 2020-02-20

## 2020-02-26 ENCOUNTER — PATIENT OUTREACH (OUTPATIENT)
Dept: CASE MANAGEMENT | Facility: OTHER | Age: 75
End: 2020-02-26

## 2020-02-26 ENCOUNTER — EPISODE CHANGES (OUTPATIENT)
Dept: CASE MANAGEMENT | Facility: OTHER | Age: 75
End: 2020-02-26

## 2020-02-26 NOTE — OUTREACH NOTE
"Care Plan Note      Responses   Annual Wellness Visit:   Patient Has Completed [AWV completed 5-8-19]   Care Gaps Addressed  Mammogram, Colon Cancer Screening, Flu Shot   Colon Cancer Screening Type  Colonoscopy   Colonoscopy Status  Up to Date (< 10 yrs)   Flu Shot Status  Up to Date   Mammogram Status  Up to Date   Specific Disease Process Teaching  COPD   Other Patient Education/Resources   24/7 [facility] Nurse Call Line   24/7 Nurse Call Line Education Method  Verbal   Does patient have depression diagnosis?  No   Ed Visits past 12 months:  3 or more   Hospitalizations past 12 months  None   Medication Adherence  Medications understood   Health Literacy  Good        The main concerns and/or symptoms the patient would like to address are:   Had ED visit 2-12-20 with shortness of breath/ Pneumonia.  Today was 4th attempt to contact patient by phone.  Patient stated she is \"feeling fair\".  Stated she did complete the antibiotics ordered in the ED; her breathing is better than it was; she wears oxygen at all times.  Patient said she lives alone, functions independently for ADL's, Mobility (uses a Cane only when outside of home), and Medications.  Stated she has good family support from son and family in [place], and daughter near [place]; daughter drives her to [employer] on her days off work.  Patient stated she monitors her Blood Pressure and Heart Rate 3-4 times/day, as she goes in and out of atrial fib.  Patient voiced clear understanding of extra medication to take if her HR goes over 160, per her doctor's instructions.  Voiced compliance with all her daily medications.  Stated she goes into A-Fib daily, but it comes and goes; makes her feel weak.  Reports she has a Cardiologist appt with a Dr. Causey in [place], in March.  Stated she saw a GI doctor yesterday, and he is planning an EGD 3/19, then she is to f/u with Cardiologist.      Education/instruction provided by Care Coordinator:   Explained role of " Ambulatory  and contact information given.  Discussed ED discharge recommendations; importance of close PCP follow up.  Reviewed next PCP appt; 3-20-20. Patient declined RN-ACM offer to call PCP office for a sooner appt; stated she would call for an appt and coordinate it with her daughter. Encouraged patient to see PCP soon.  Discussed COPD management.  Noted, MyChart is active.  Discussed [employer] Nurse Case Management service available to patient; she said the [employer] Nurse calls her.  Patient given phone number to the 24/7 Nurse Line ([phone]).   No other questions or concerns voiced at this time.    Follow Up Outreach Due:   As needed.    Melina Olea RN  Ambulatory     2/26/2020, 4:36 PM

## 2020-02-26 NOTE — OUTREACH NOTE
Care Coordination Assessment    Documented/Reviewed By:  Melina Olea RN Date/time:  2/26/2020  4:33 PM   Assessment completed with:  patient  Enrolled in care management program:  No  Living arrangement:  alone  Support system:  family  Type of residence:  private residence  Home care services:  No  Equipment used at home:  oxygen/respiratory treatment, cane  Communication device:  No  Bed or wheelchair confined:  No  Inadequate nutrition:  No  Medication adherence problem:  No  Experiencing side effects from current medications:  No  History of fall(s) in last 6 months:  No  Difficulty keeping appointments:  No

## 2020-03-25 ENCOUNTER — TELEPHONE (OUTPATIENT)
Dept: INTERNAL MEDICINE | Facility: CLINIC | Age: 75
End: 2020-03-25

## 2020-03-25 DIAGNOSIS — R60.9 PERIPHERAL EDEMA: Primary | ICD-10-CM

## 2020-03-25 NOTE — TELEPHONE ENCOUNTER
Pt called and stated she was taking a medication for swelling and it tore her stomach up but she would like to try Triamterene 37.5 MG capsules, without HC. Please advise [phone]

## 2020-03-26 NOTE — TELEPHONE ENCOUNTER
There is a potential interaction with one of her medicines so I do not recommend it. Needs to get feet elevated as much as possible. Use compression stockings. If she's supposed to use CPAP/bipap needs to be compliant with that (noncompliance can lead to lower extremity swelling).

## 2020-04-01 NOTE — TELEPHONE ENCOUNTER
"Pt called the office today to ask for an order for blood work. Advised I have been trying to contact her with no answer for the past week. Pt apologizes, her daughter came and got her and brought her to her home. She states her daughter Maryam, is taking care of her and her numbers are :[phone] or [phone]. Pt states we can talk to her daughter about any of her medical needs, appts, etc. Her daughter thought it was best for her to be at home with her so she can take care of her.    Discussed original question called about: She states she talked her to heart doctor who told her to stop her meds and try to elevate her legs and wear compression socks all the time. Advised that was Dr. Starr's recommendations as well. He does not want to change her other meds at this time because he does not want to risk hurting her kidneys and patient is compliant with this for now. She will follow up with him for further questions on this.     However, pt states she is due to have her INR checked and there is a \"drive thru\" lab where she is that will check her blood for her without having to get out of her car or go to the hospital. She just needs an order from Yashira Starr. Pt ask the INR order to be faxed to [phone]. (I did ask if her if her heart doctor managed this and she said no).   "

## 2020-04-02 DIAGNOSIS — Z79.4 TYPE 2 DIABETES MELLITUS WITH HYPERGLYCEMIA, WITH LONG-TERM CURRENT USE OF INSULIN (HCC): ICD-10-CM

## 2020-04-02 DIAGNOSIS — E11.65 TYPE 2 DIABETES MELLITUS WITH HYPERGLYCEMIA, WITH LONG-TERM CURRENT USE OF INSULIN (HCC): ICD-10-CM

## 2020-04-02 RX ORDER — INSULIN ASPART 100 [IU]/ML
INJECTION, SUSPENSION SUBCUTANEOUS
Qty: 130 ML | Refills: 0 | Status: SHIPPED | OUTPATIENT
Start: 2020-04-02 | End: 2020-06-18

## 2020-04-03 NOTE — TELEPHONE ENCOUNTER
Spoke with daughter and advised the cardio team needs to manage this for her mom. She understands and will call them for an order.

## 2020-04-10 ENCOUNTER — RESULTS ENCOUNTER (OUTPATIENT)
Dept: INTERNAL MEDICINE | Facility: CLINIC | Age: 75
End: 2020-04-10

## 2020-04-10 ENCOUNTER — OFFICE VISIT (OUTPATIENT)
Dept: INTERNAL MEDICINE | Facility: CLINIC | Age: 75
End: 2020-04-10

## 2020-04-10 DIAGNOSIS — I48.0 PAROXYSMAL ATRIAL FIBRILLATION (HCC): ICD-10-CM

## 2020-04-10 DIAGNOSIS — I15.2 HYPERTENSION ASSOCIATED WITH DIABETES (HCC): ICD-10-CM

## 2020-04-10 DIAGNOSIS — E11.59 HYPERTENSION ASSOCIATED WITH DIABETES (HCC): ICD-10-CM

## 2020-04-10 DIAGNOSIS — R60.9 PERIPHERAL EDEMA: Primary | ICD-10-CM

## 2020-04-10 DIAGNOSIS — E03.9 ACQUIRED HYPOTHYROIDISM: ICD-10-CM

## 2020-04-10 DIAGNOSIS — E11.59 TYPE 2 DIABETES MELLITUS WITH OTHER CIRCULATORY COMPLICATION, WITH LONG-TERM CURRENT USE OF INSULIN (HCC): ICD-10-CM

## 2020-04-10 DIAGNOSIS — Z79.01 ANTICOAGULATED: ICD-10-CM

## 2020-04-10 DIAGNOSIS — F41.9 ANXIETY: ICD-10-CM

## 2020-04-10 DIAGNOSIS — Z79.4 TYPE 2 DIABETES MELLITUS WITH OTHER CIRCULATORY COMPLICATION, WITH LONG-TERM CURRENT USE OF INSULIN (HCC): ICD-10-CM

## 2020-04-10 DIAGNOSIS — Z79.4 TYPE 2 DIABETES MELLITUS WITH HYPERGLYCEMIA, WITH LONG-TERM CURRENT USE OF INSULIN (HCC): ICD-10-CM

## 2020-04-10 DIAGNOSIS — E11.65 TYPE 2 DIABETES MELLITUS WITH HYPERGLYCEMIA, WITH LONG-TERM CURRENT USE OF INSULIN (HCC): ICD-10-CM

## 2020-04-10 PROCEDURE — 99442 PR PHYS/QHP TELEPHONE EVALUATION 11-20 MIN: CPT | Performed by: FAMILY MEDICINE

## 2020-04-10 RX ORDER — TRIAMTERENE CAPSULES 50 MG/1
50 CAPSULE ORAL DAILY PRN
Qty: 30 CAPSULE | Refills: 0 | Status: SHIPPED | OUTPATIENT
Start: 2020-04-10 | End: 2020-05-21

## 2020-04-10 RX ORDER — LORAZEPAM 0.5 MG/1
0.5 TABLET ORAL EVERY 8 HOURS PRN
Qty: 45 TABLET | Refills: 2 | Status: SHIPPED | OUTPATIENT
Start: 2020-04-10 | End: 2020-05-21

## 2020-04-10 NOTE — PROGRESS NOTES
Subjective    Marielena Moeller is a 74 y.o. female here for:  Chief Complaint   Patient presents with   • Foot Swelling     Right foot is now swelling for a few months. The left foot usually stays swollen but is better now. She tried to order compression hose but was not able to. I tries to keep her legs propped up but says it is hard for her because she is staying at her daughter's house right now. Pt agreeable to telephone visit and denies anyone else on the phone.    • Coagulation Disorder     Pt needs an order for INR. Pt was previously told to get the order from her cardiologist. However, he is in [place], and his office said for her to have her PCP office order the INR.        History per MA reviewed.    History of Present Illness   Paroxysmal atrial fibrillation is a chronic issue, patient has been on Coumadin since approximately August 2019.  She is currently staying with her daughter in [facility], she needs a way to get her INRs checked.  She does not plan on staying with daughter, hopes to return back to the [facility] soon.  She was seeing a cardiologist around where she is right now but he is out of state, does not plan on staying there and wants a temporary set up for INR checks.    She also has been in touch with us regarding lower extremity edema which is a daily issue and is very uncomfortable for her.  She has had pancreatitis in the past with some diuretics.  She would like to try triamterene, daughter has that.  She has not been able to get compression stockings.  Patient has chronic lung issues and is on oxygen therapy.  She reports having the ability to check her blood pressure at home.  Previously she was on losartan for hypertension but says she is not on that at this time.    Since the loss of her  she has suffered from anxiety and panic at times.  Ativan has helped greatly with that.  She asked if she can have a refill.    The following portions of the patient's  history were reviewed and updated as appropriate: allergies, current medications, past family history, past medical history, past social history, past surgical history and problem list.    Review of Systems   Constitutional: Positive for activity change.   Respiratory: Positive for shortness of breath (chronic).    Cardiovascular: Positive for leg swelling.   Psychiatric/Behavioral: Positive for stress.       Visit Vitals  LMP  (LMP Unknown)         Objective   Physical Exam   HENT:   Right Ear: Hearing normal.   Left Ear: Hearing normal.   Neck: Phonation normal.   Psychiatric: She has a normal mood and affect. Her speech is normal and behavior is normal. Judgment and thought content normal. Cognition and memory are normal.   Nursing note reviewed.      Lab Results   Component Value Date    HGBA1C 8.3 11/26/2019    HGBA1C 8.8 05/08/2019    HGBA1C 8.6 02/06/2019         Assessment/Plan     Problem List Items Addressed This Visit        Cardiovascular and Mediastinum    Hypertension associated with diabetes (CMS/HCC)    Overview     · No HCTZ due to pancreatitis early 2018.         Relevant Medications    triamterene (DYRENIUM) 50 MG capsule    Other Relevant Orders    Comprehensive Metabolic Panel    Type 2 diabetes mellitus with circulatory disorder, with long-term current use of insulin (CMS/HCC)    Overview     · Associated with hypertension          Relevant Orders    Hemoglobin A1c    Comprehensive Metabolic Panel    Paroxysmal atrial fibrillation (CMS/HCC)    Relevant Orders    Protime-INR       Endocrine    Type 2 diabetes mellitus with hyperglycemia, with long-term current use of insulin (CMS/HCC)    Overview     · History of pancreatitis         Relevant Orders    Hemoglobin A1c    Comprehensive Metabolic Panel    Acquired hypothyroidism    Relevant Orders    TSH+Free T4    Comprehensive Metabolic Panel       Other    Anxiety    Relevant Medications    LORazepam (ATIVAN) 0.5 MG tablet      Other Visit  Diagnoses     Peripheral edema    -  Primary    Relevant Medications    triamterene (DYRENIUM) 50 MG capsule    Other Relevant Orders    Comprehensive Metabolic Panel    Anticoagulated        Relevant Orders    Protime-INR          This visit has been rescheduled as a phone visit to comply with patient safety concerns in accordance with CDC recommendations. Total time of discussion was 12 minutes.  ·   · Educated patient that the lower extremity edema could be from many things including lung issues.  Cautioned against aggressive diuresis due to potential kidney dysfunction.  Needs to monitor blood pressure as triamterene may affect blood pressure.  Patient is no longer on losartan she reports.  Need to have labs to check potassium levels and kidney function.  I have asked her to get those when she can.  · At this time given pandemic home INR monitoring is ideal, I have completed forms for MD INR and my CMA will get those sent.    Marychuy Starr MD

## 2020-04-13 ENCOUNTER — RESULTS ENCOUNTER (OUTPATIENT)
Dept: INTERNAL MEDICINE | Facility: CLINIC | Age: 75
End: 2020-04-13

## 2020-04-13 DIAGNOSIS — I15.2 HYPERTENSION ASSOCIATED WITH DIABETES (HCC): ICD-10-CM

## 2020-04-13 DIAGNOSIS — E11.59 HYPERTENSION ASSOCIATED WITH DIABETES (HCC): ICD-10-CM

## 2020-04-13 DIAGNOSIS — E03.9 ACQUIRED HYPOTHYROIDISM: ICD-10-CM

## 2020-04-13 DIAGNOSIS — Z79.4 TYPE 2 DIABETES MELLITUS WITH HYPERGLYCEMIA, WITH LONG-TERM CURRENT USE OF INSULIN (HCC): ICD-10-CM

## 2020-04-13 DIAGNOSIS — R60.9 PERIPHERAL EDEMA: ICD-10-CM

## 2020-04-13 DIAGNOSIS — E11.59 TYPE 2 DIABETES MELLITUS WITH OTHER CIRCULATORY COMPLICATION, WITH LONG-TERM CURRENT USE OF INSULIN (HCC): ICD-10-CM

## 2020-04-13 DIAGNOSIS — Z79.4 TYPE 2 DIABETES MELLITUS WITH OTHER CIRCULATORY COMPLICATION, WITH LONG-TERM CURRENT USE OF INSULIN (HCC): ICD-10-CM

## 2020-04-13 DIAGNOSIS — E11.65 TYPE 2 DIABETES MELLITUS WITH HYPERGLYCEMIA, WITH LONG-TERM CURRENT USE OF INSULIN (HCC): ICD-10-CM

## 2020-05-05 ENCOUNTER — TELEPHONE (OUTPATIENT)
Dept: INTERNAL MEDICINE | Facility: CLINIC | Age: 75
End: 2020-05-05

## 2020-05-05 NOTE — TELEPHONE ENCOUNTER
Spoke with [facility] and requested results to be faxed. Will notify patient once labs are received and reviewed by Dr. Starr.

## 2020-05-05 NOTE — TELEPHONE ENCOUNTER
PT CALLING ABOUT INR TEST RESULTS.     SHE HAD THEM DONE ON SAT 5/2 AT [facility] IN [place].       PLEASE CALL PATIENT AND ADVISE OF RESULTS.       PT CALLBACK [phone]

## 2020-05-20 DIAGNOSIS — F41.9 ANXIETY: ICD-10-CM

## 2020-05-21 DIAGNOSIS — R60.9 PERIPHERAL EDEMA: ICD-10-CM

## 2020-05-21 DIAGNOSIS — I15.2 HYPERTENSION ASSOCIATED WITH DIABETES (HCC): ICD-10-CM

## 2020-05-21 DIAGNOSIS — E11.59 HYPERTENSION ASSOCIATED WITH DIABETES (HCC): ICD-10-CM

## 2020-05-21 RX ORDER — TRIAMTERENE CAPSULES 50 MG/1
CAPSULE ORAL
Qty: 30 CAPSULE | Refills: 0 | Status: SHIPPED | OUTPATIENT
Start: 2020-05-21 | End: 2020-05-21

## 2020-05-21 RX ORDER — TRIAMTERENE CAPSULES 50 MG/1
CAPSULE ORAL
Qty: 90 CAPSULE | Refills: 1 | Status: SHIPPED | OUTPATIENT
Start: 2020-05-21

## 2020-05-21 RX ORDER — LORAZEPAM 0.5 MG/1
0.5 TABLET ORAL EVERY 8 HOURS PRN
Qty: 45 TABLET | Refills: 2 | Status: SHIPPED | OUTPATIENT
Start: 2020-05-21 | End: 2020-12-11 | Stop reason: SDUPTHER

## 2020-06-03 ENCOUNTER — TELEPHONE (OUTPATIENT)
Dept: INTERNAL MEDICINE | Facility: CLINIC | Age: 75
End: 2020-06-03

## 2020-06-18 DIAGNOSIS — Z79.4 TYPE 2 DIABETES MELLITUS WITH HYPERGLYCEMIA, WITH LONG-TERM CURRENT USE OF INSULIN (HCC): ICD-10-CM

## 2020-06-18 DIAGNOSIS — E11.65 TYPE 2 DIABETES MELLITUS WITH HYPERGLYCEMIA, WITH LONG-TERM CURRENT USE OF INSULIN (HCC): ICD-10-CM

## 2020-06-18 RX ORDER — INSULIN ASPART 100 [IU]/ML
INJECTION, SUSPENSION SUBCUTANEOUS
Qty: 130 ML | Refills: 0 | Status: SHIPPED | OUTPATIENT
Start: 2020-06-18 | End: 2020-08-10 | Stop reason: SDUPTHER

## 2020-07-03 DIAGNOSIS — Z79.4 TYPE 2 DIABETES MELLITUS WITH HYPERGLYCEMIA, WITH LONG-TERM CURRENT USE OF INSULIN (HCC): ICD-10-CM

## 2020-07-03 DIAGNOSIS — E11.65 TYPE 2 DIABETES MELLITUS WITH HYPERGLYCEMIA, WITH LONG-TERM CURRENT USE OF INSULIN (HCC): ICD-10-CM

## 2020-07-06 ENCOUNTER — TELEPHONE (OUTPATIENT)
Dept: INTERNAL MEDICINE | Facility: CLINIC | Age: 75
End: 2020-07-06

## 2020-07-23 ENCOUNTER — TELEPHONE (OUTPATIENT)
Dept: INTERNAL MEDICINE | Facility: CLINIC | Age: 75
End: 2020-07-23

## 2020-07-23 DIAGNOSIS — Z12.31 ENCOUNTER FOR SCREENING MAMMOGRAM FOR BREAST CANCER: Primary | ICD-10-CM

## 2020-07-23 NOTE — TELEPHONE ENCOUNTER
PATIENT CALLED REQUESTING A REFERRAL FOR A MAMMOGRAM. [facility]      PATIENT CALL BACK    [phone]

## 2020-07-27 ENCOUNTER — TELEPHONE (OUTPATIENT)
Dept: INTERNAL MEDICINE | Facility: CLINIC | Age: 75
End: 2020-07-27

## 2020-07-27 NOTE — TELEPHONE ENCOUNTER
Patient called and stated that she would like to schedule an appointment to see Marychuysweta Starr. The patient states she would prefer for this to be a telephone visit or like a face-time through her ipad due to her not knowing technology very well. The patient would like the appointment to discuss some pain she is having her left hip and every now and then in her right hip. She states she feels more pain when she is up moving around. Please advise.     Patient call back [phone]  Patient states that a message would be okay to leave as well.

## 2020-07-30 ENCOUNTER — TELEPHONE (OUTPATIENT)
Dept: INTERNAL MEDICINE | Facility: CLINIC | Age: 75
End: 2020-07-30

## 2020-07-30 ENCOUNTER — OFFICE VISIT (OUTPATIENT)
Dept: INTERNAL MEDICINE | Facility: CLINIC | Age: 75
End: 2020-07-30

## 2020-07-30 DIAGNOSIS — R25.2 LEG CRAMPS: ICD-10-CM

## 2020-07-30 DIAGNOSIS — M81.0 AGE-RELATED OSTEOPOROSIS WITHOUT CURRENT PATHOLOGICAL FRACTURE: ICD-10-CM

## 2020-07-30 DIAGNOSIS — E03.9 ACQUIRED HYPOTHYROIDISM: ICD-10-CM

## 2020-07-30 DIAGNOSIS — Z79.4 TYPE 2 DIABETES MELLITUS WITH HYPERGLYCEMIA, WITH LONG-TERM CURRENT USE OF INSULIN (HCC): ICD-10-CM

## 2020-07-30 DIAGNOSIS — M25.552 LEFT HIP PAIN: Primary | ICD-10-CM

## 2020-07-30 DIAGNOSIS — E11.65 TYPE 2 DIABETES MELLITUS WITH HYPERGLYCEMIA, WITH LONG-TERM CURRENT USE OF INSULIN (HCC): ICD-10-CM

## 2020-07-30 DIAGNOSIS — E55.9 VITAMIN D DEFICIENCY: ICD-10-CM

## 2020-07-30 DIAGNOSIS — I48.0 PAROXYSMAL ATRIAL FIBRILLATION (HCC): ICD-10-CM

## 2020-07-30 PROCEDURE — 99214 OFFICE O/P EST MOD 30 MIN: CPT | Performed by: FAMILY MEDICINE

## 2020-07-30 RX ORDER — POLYETHYLENE GLYCOL 3350 17 G/17G
POWDER, FOR SOLUTION ORAL
Qty: 510 G | Refills: 1 | Status: SHIPPED | OUTPATIENT
Start: 2020-07-30 | End: 2020-11-20 | Stop reason: ALTCHOICE

## 2020-07-30 RX ORDER — DOXAZOSIN 2 MG/1
2 TABLET ORAL NIGHTLY
COMMUNITY

## 2020-07-30 RX ORDER — OXYCODONE HYDROCHLORIDE AND ACETAMINOPHEN 5; 325 MG/1; MG/1
1 TABLET ORAL EVERY 6 HOURS PRN
Qty: 60 TABLET | Refills: 0 | Status: SHIPPED | OUTPATIENT
Start: 2020-07-30 | End: 2020-08-10 | Stop reason: SDUPTHER

## 2020-07-30 NOTE — TELEPHONE ENCOUNTER
Pt called back and advised of pain med and lorazapam interaction. Separate by at least 2 hours. Pt understands.

## 2020-07-30 NOTE — TELEPHONE ENCOUNTER
Called home # twice, phone rang, then said it could not be completed as dialed. Called cell number listed and left a message for the patient to call me back to deliver Dr. Starr's message.

## 2020-07-30 NOTE — PROGRESS NOTES
"Subjective    Marielena Moeller is a 74 y.o. female with:  Chief Complaint   Patient presents with   • Hip Pain     Left hip pain intermittently for years. Worse in the past few weeks. Denies injury to her hip. Pain takes her breathe away at time.        History per MA reviewed.    \"Killing me\"  Hurts in hip lower back area x years  Would hurt then get better  Now seems to stay  Pain daily, eases off some but if she moves certain way it takes her breath  Example--getting in bed, has to lift leg and it hurts. Once in bed and lays it eases.   No known injuries. No longer has stairs in home other than two on back porch.  Worse x 3-4 weeks.  Still able to walk and bear weight  No known bruising or rash that she knows of  Pt unable to further explain pain  Took motrin 200 mg though she knows she's not supposed to, eases it some    Having leg cramps, would like to have labs to check magnesium \"and all of it\" when she comes for xray.    Chronically anticoagulated with coumadin for Afib, has declined novel anticoagulant therapies.    Diabetes mellitus chronic x years and has been uncontrolled, due for A1C.    Acquired hypothyroidism chronic x years and due for recheck, on levothyroxine. Controlled per last labs.           The following portions of the patient's history were reviewed and updated as appropriate: allergies, current medications, past family history, past medical history, past social history, past surgical history and problem list.    Review of Systems   Constitutional: Positive for activity change. Negative for fever.   Musculoskeletal: Positive for arthralgias, back pain and gait problem.   Skin: Negative for color change.       Visit Vitals  LMP  (LMP Unknown)         Objective   Neck: Voice is within normal limits  Pulm: Normal work of breathing  Neuro: A&O x 3  Psych: Mood and affect appropriate. Memory within normal limits. Thought processes within normal limits.    Lab Results   Component Value Date    " HGBA1C 8.3 11/26/2019     Lab Results   Component Value Date    TSH 1.120 09/13/2019     Lab Results   Component Value Date    FREET4 1.46 07/25/2019         Assessment/Plan     Problem List Items Addressed This Visit        Cardiovascular and Mediastinum    Paroxysmal atrial fibrillation (CMS/HCC)    Relevant Orders    Protime-INR       Digestive    Vitamin D deficiency    Relevant Orders    Comprehensive Metabolic Panel    Vitamin D 25 Hydroxy       Endocrine    Type 2 diabetes mellitus with hyperglycemia, with long-term current use of insulin (CMS/HCC)    Overview     · History of pancreatitis         Relevant Orders    Comprehensive Metabolic Panel    TSH+Free T4    Hemoglobin A1c    Acquired hypothyroidism    Relevant Orders    CBC & Differential    TSH+Free T4       Musculoskeletal and Integument    Osteoporosis    Relevant Orders    Comprehensive Metabolic Panel    Vitamin D 25 Hydroxy      Other Visit Diagnoses     Left hip pain    -  Primary    Relevant Medications    oxyCODONE-acetaminophen (PERCOCET) 5-325 MG per tablet    Other Relevant Orders    XR Hip With or Without Pelvis 2 - 3 View Left    C-reactive Protein    Sedimentation Rate    Leg cramps        Relevant Orders    Magnesium        CONTROLLED SUBSTANCE TRACKING 12/17/2019 5/21/2020 7/30/2020   Fran Montero 12/16/2019 5/20/2020 7/30/2020   Report Number [id number]          · This visit has been rescheduled as a phone visit to comply with patient safety concerns in accordance with CDC recommendations. Total time of discussion was 11 minutes.  · She'll come by soon for xray and labs, doesn't have to fast. Daughter will bring her. Discouraged NSAIDs due to anticoagulation. Refilled percocet which she's tolerated in past, watch for constipation. Refilled Miralax. Will have CMA call her to let her know not to take lorazepam with percocet, separate by at least 2 hours.    Marychuy Starr MD

## 2020-07-30 NOTE — TELEPHONE ENCOUNTER
I failed to let mrs. priest know this on our call.    I sent pain med in but she does not need to take at same time as lorazepam, increases overdose risk. Separate out by at least 2 hours.

## 2020-08-03 ENCOUNTER — TELEPHONE (OUTPATIENT)
Dept: GASTROENTEROLOGY | Facility: CLINIC | Age: 75
End: 2020-08-03

## 2020-08-03 NOTE — TELEPHONE ENCOUNTER
Patient called to cancel the appointment scheduled with Dr. Modi in 2021.  She did not wish to reschedule.

## 2020-08-04 ENCOUNTER — TELEPHONE (OUTPATIENT)
Dept: INTERNAL MEDICINE | Facility: CLINIC | Age: 75
End: 2020-08-04

## 2020-08-04 DIAGNOSIS — N64.4 BREAST PAIN: Primary | ICD-10-CM

## 2020-08-04 NOTE — TELEPHONE ENCOUNTER
CARA WITH [facility] CALLED AND STATED THAT PATIENT IS HAVING SOME ISSUES WITH LEFT BREAST.  THEY ARE REQUESTING ORDERS FOR A LEFT BREAST ULTRASOUND AND A BILATERAL DIAGNOSTIC MAMMOGRAM.  PATIENT IS HAVING BREAST PAIN AND IS DUE FOR A DIAGNOSTIC MAMMOGRAM.  PAIN IS RADIATING FROM AXILLA TO HER NIPPLE.      PLEASE CONTACT CARA IF YOU HAVE ADDITIONAL QUESTIONS.  CALLBACK:  [phone]  FAX:  [phone]

## 2020-08-05 ENCOUNTER — HOSPITAL ENCOUNTER (OUTPATIENT)
Dept: GENERAL RADIOLOGY | Facility: HOSPITAL | Age: 75
Discharge: HOME | End: 2020-08-05
Admitting: FAMILY MEDICINE

## 2020-08-05 PROCEDURE — 73502 X-RAY EXAM HIP UNI 2-3 VIEWS: CPT

## 2020-08-05 NOTE — TELEPHONE ENCOUNTER
CARA FROM  CALLED AGAIN ABOUT THE ORDER FOR THE MAMMOGRAM AND ULTRASOUND.  PATIENT HAS AN APPOINTMENT ON 8/7 SO THEY NEED THE ORDER ASAP.    PLEASE CONTACT CARA IF YOU HAVE ADDITIONAL QUESTIONS.  CALLBACK:  [phone]  FAX:  [phone]

## 2020-08-06 DIAGNOSIS — E55.9 VITAMIN D DEFICIENCY: Primary | ICD-10-CM

## 2020-08-06 LAB
25(OH)D3+25(OH)D2 SERPL-MCNC: 25.4 NG/ML (ref 30–100)
ALBUMIN SERPL-MCNC: 4.3 G/DL (ref 3.5–5.2)
ALBUMIN/GLOB SERPL: 2.3 G/DL
ALP SERPL-CCNC: 77 U/L (ref 39–117)
ALT SERPL-CCNC: 14 U/L (ref 1–33)
AST SERPL-CCNC: 16 U/L (ref 1–32)
BASOPHILS # BLD AUTO: 0.04 10*3/MM3 (ref 0–0.2)
BASOPHILS NFR BLD AUTO: 0.5 % (ref 0–1.5)
BILIRUB SERPL-MCNC: 0.4 MG/DL (ref 0–1.2)
BUN SERPL-MCNC: 15 MG/DL (ref 8–23)
BUN/CREAT SERPL: 24.6 (ref 7–25)
CALCIUM SERPL-MCNC: 9.6 MG/DL (ref 8.6–10.5)
CHLORIDE SERPL-SCNC: 96 MMOL/L (ref 98–107)
CO2 SERPL-SCNC: 35.3 MMOL/L (ref 22–29)
CREAT SERPL-MCNC: 0.61 MG/DL (ref 0.57–1)
CRP SERPL-MCNC: 0.52 MG/DL (ref 0–0.5)
EOSINOPHIL # BLD AUTO: 0.16 10*3/MM3 (ref 0–0.4)
EOSINOPHIL NFR BLD AUTO: 2.2 % (ref 0.3–6.2)
ERYTHROCYTE [DISTWIDTH] IN BLOOD BY AUTOMATED COUNT: 12 % (ref 12.3–15.4)
ERYTHROCYTE [SEDIMENTATION RATE] IN BLOOD BY WESTERGREN METHOD: 18 MM/HR (ref 0–30)
GLOBULIN SER CALC-MCNC: 1.9 GM/DL
GLUCOSE SERPL-MCNC: 247 MG/DL (ref 65–99)
HBA1C MFR BLD: 8.5 % (ref 4.8–5.6)
HCT VFR BLD AUTO: 40.2 % (ref 34–46.6)
HGB BLD-MCNC: 13.5 G/DL (ref 12–15.9)
IMM GRANULOCYTES # BLD AUTO: 0.03 10*3/MM3 (ref 0–0.05)
IMM GRANULOCYTES NFR BLD AUTO: 0.4 % (ref 0–0.5)
INR PPP: 1.12 (ref 0.9–1.1)
LYMPHOCYTES # BLD AUTO: 1.36 10*3/MM3 (ref 0.7–3.1)
LYMPHOCYTES NFR BLD AUTO: 18.7 % (ref 19.6–45.3)
MAGNESIUM SERPL-MCNC: 1.8 MG/DL (ref 1.6–2.4)
MCH RBC QN AUTO: 31.4 PG (ref 26.6–33)
MCHC RBC AUTO-ENTMCNC: 33.6 G/DL (ref 31.5–35.7)
MCV RBC AUTO: 93.5 FL (ref 79–97)
MONOCYTES # BLD AUTO: 0.46 10*3/MM3 (ref 0.1–0.9)
MONOCYTES NFR BLD AUTO: 6.3 % (ref 5–12)
NEUTROPHILS # BLD AUTO: 5.23 10*3/MM3 (ref 1.7–7)
NEUTROPHILS NFR BLD AUTO: 71.9 % (ref 42.7–76)
NRBC BLD AUTO-RTO: 0 /100 WBC (ref 0–0.2)
PLATELET # BLD AUTO: 220 10*3/MM3 (ref 140–450)
POTASSIUM SERPL-SCNC: 4.8 MMOL/L (ref 3.5–5.2)
PROT SERPL-MCNC: 6.2 G/DL (ref 6–8.5)
PROTHROMBIN TIME: 14.9 SECONDS (ref 12–15.1)
RBC # BLD AUTO: 4.3 10*6/MM3 (ref 3.77–5.28)
SODIUM SERPL-SCNC: 139 MMOL/L (ref 136–145)
T4 FREE SERPL-MCNC: 1.75 NG/DL (ref 0.93–1.7)
TSH SERPL DL<=0.005 MIU/L-ACNC: 0.78 UIU/ML (ref 0.27–4.2)
WBC # BLD AUTO: 7.28 10*3/MM3 (ref 3.4–10.8)

## 2020-08-06 RX ORDER — CHOLECALCIFEROL (VITAMIN D3) 1250 MCG
50000 CAPSULE ORAL
Qty: 12 CAPSULE | Refills: 1 | Status: SHIPPED | OUTPATIENT
Start: 2020-08-06 | End: 2020-12-11

## 2020-08-10 ENCOUNTER — OFFICE VISIT (OUTPATIENT)
Dept: INTERNAL MEDICINE | Facility: CLINIC | Age: 75
End: 2020-08-10

## 2020-08-10 DIAGNOSIS — R25.2 LEG CRAMPS: ICD-10-CM

## 2020-08-10 DIAGNOSIS — Z79.4 TYPE 2 DIABETES MELLITUS WITH HYPERGLYCEMIA, WITH LONG-TERM CURRENT USE OF INSULIN (HCC): Primary | ICD-10-CM

## 2020-08-10 DIAGNOSIS — Z79.4 TYPE 2 DIABETES MELLITUS WITH OTHER CIRCULATORY COMPLICATION, WITH LONG-TERM CURRENT USE OF INSULIN (HCC): ICD-10-CM

## 2020-08-10 DIAGNOSIS — J44.9 CHRONIC OBSTRUCTIVE PULMONARY DISEASE, UNSPECIFIED COPD TYPE (HCC): ICD-10-CM

## 2020-08-10 DIAGNOSIS — E03.9 ACQUIRED HYPOTHYROIDISM: ICD-10-CM

## 2020-08-10 DIAGNOSIS — E55.9 VITAMIN D DEFICIENCY: ICD-10-CM

## 2020-08-10 DIAGNOSIS — I48.0 PAROXYSMAL ATRIAL FIBRILLATION (HCC): ICD-10-CM

## 2020-08-10 DIAGNOSIS — E11.59 TYPE 2 DIABETES MELLITUS WITH OTHER CIRCULATORY COMPLICATION, WITH LONG-TERM CURRENT USE OF INSULIN (HCC): ICD-10-CM

## 2020-08-10 DIAGNOSIS — E11.65 TYPE 2 DIABETES MELLITUS WITH HYPERGLYCEMIA, WITH LONG-TERM CURRENT USE OF INSULIN (HCC): Primary | ICD-10-CM

## 2020-08-10 DIAGNOSIS — Z79.01 WARFARIN ANTICOAGULATION: ICD-10-CM

## 2020-08-10 DIAGNOSIS — M16.12 PRIMARY OSTEOARTHRITIS OF LEFT HIP: ICD-10-CM

## 2020-08-10 DIAGNOSIS — M81.0 AGE-RELATED OSTEOPOROSIS WITHOUT CURRENT PATHOLOGICAL FRACTURE: ICD-10-CM

## 2020-08-10 DIAGNOSIS — M25.552 LEFT HIP PAIN: ICD-10-CM

## 2020-08-10 PROCEDURE — 99214 OFFICE O/P EST MOD 30 MIN: CPT | Performed by: FAMILY MEDICINE

## 2020-08-10 RX ORDER — INSULIN ASPART 100 [IU]/ML
75 INJECTION, SUSPENSION SUBCUTANEOUS 2 TIMES DAILY WITH MEALS
Qty: 135 ML | Refills: 3 | Status: SHIPPED | OUTPATIENT
Start: 2020-08-10 | End: 2021-01-25

## 2020-08-10 RX ORDER — OXYCODONE HYDROCHLORIDE AND ACETAMINOPHEN 5; 325 MG/1; MG/1
1 TABLET ORAL EVERY 6 HOURS PRN
Qty: 60 TABLET | Refills: 0 | Status: SHIPPED | OUTPATIENT
Start: 2020-08-10 | End: 2021-12-29

## 2020-08-10 NOTE — PROGRESS NOTES
You have chosen to receive care through a telephone visit. Do you consent to use a telephone visit for your medical care today? Yes    On this telephone visit is Laury CH CMA, Dr. Starr, and Marielena Moeller.

## 2020-08-10 NOTE — ASSESSMENT & PLAN NOTE
TSH low normal, free T4 just 0.05 out of normal. Suggest staying on current dose of levothyroxine, recheck lab in 3 months at follow up visit.

## 2020-08-10 NOTE — ASSESSMENT & PLAN NOTE
Diabetes is not controlled.   Work on diet, insulin dosing, goal under 200 on glucose levels  Diabetes will be reassessed in 3 months.

## 2020-08-10 NOTE — PATIENT INSTRUCTIONS
Vitamin K Foods and Warfarin  Warfarin is a blood thinner (anticoagulant). Anticoagulant medicines help prevent the formation of blood clots. These medicines work by decreasing the activity of vitamin K, which promotes normal blood clotting.  When you take warfarin, problems can occur from suddenly increasing or decreasing the amount of vitamin K that you eat from one day to the next. Problems may include:  · Blood clots.  · Bleeding.  What general guidelines do I need to follow?  To avoid problems when taking warfarin:  · Eat a balanced diet that includes:  ? Fresh fruits and vegetables.  ? Whole grains.  ? Low-fat dairy products.  ? Lean proteins, such as fish, eggs, and lean cuts of meat.  · Keep your intake of vitamin K consistent from day to day. To do this:  ? Avoid eating large amounts of vitamin K one day and low amounts of vitamin K the next day.  ? If you take a multivitamin that contains vitamin K, be sure to take it every day.  ? Know which foods contain vitamin K. Use the lists below to understand serving sizes and the amount of vitamin K in one serving.  · Avoid major changes in your diet. If you are going to change your diet, talk with your health care provider before making changes.  · Work with a nutrition specialist (dietitian) to develop a meal plan that works best for you.    High vitamin K foods  Foods that are high in vitamin K contain more than 100 mcg (micrograms) per serving. These include:  · Broccoli (cooked) - ½ cup has 110 mcg.  · [place] sprouts (cooked) - ½ cup has 109 mcg.  · Greens, beet (cooked) - ½ cup has 350 mcg.  · Greens, charo (cooked) - ½ cup has 418 mcg.  · Greens, turnip (cooked) - ½ cup has 265 mcg.  · Green onions or scallions - ½ cup has 105 mcg.  · Kale (fresh or frozen) - ½ cup has 531 mcg.  · Parsley (raw) - 10 sprigs has 164 mcg.  · Spinach (cooked) - ½ cup has 444 mcg.  · Swiss chard (cooked) - ½ cup has 287 mcg.  Moderate vitamin K foods  Foods that have a  moderate amount of vitamin K contain  mcg per serving. These include:  · Asparagus (cooked) - 5 burnett have 38 mcg.  · Black-eyed peas (dried) - ½ cup has 32 mcg.  · Cabbage (cooked) - ½ cup has 37 mcg.  · Kiwi fruit - 1 medium has 31 mcg.  · Lettuce - 1 cup has 57-63 mcg.  · Okra (frozen) - ½ cup has 44 mcg.  · Prunes (dried) - 5 prunes have 25 mcg.  · Watercress (raw) - 1 cup has 85 mcg.  Low vitamin K foods  Foods low in vitamin K contain less than 25 mcg per serving. These include:  · Artichoke - 1 medium has 18 mcg.  · Avocado - 1 oz. has 6 mcg.  · Blueberries - ½ cup has 14 mcg.  · Cabbage (raw) - ½ cup has 21 mcg.  · Carrots (cooked) - ½ cup has 11 mcg.  · Cauliflower (raw) - ½ cup has 11 mcg.  · Cucumber with peel (raw) - ½ cup has 9 mcg.  · Grapes - ½ cup has 12 mcg.  · [place] - 1 medium has 9 mcg.  · Nuts - 1 oz. has 15 mcg.  · Pear - 1 medium has 8 mcg.  · Peas (cooked) - ½ cup has 19 mcg.  · Pickles - 1 spear has 14 mcg.  · Pumpkin seeds - 1 oz. has 13 mcg.  · Sauerkraut (canned) - ½ cup has 16 mcg.  · Soybeans (cooked) - ½ cup has 16 mcg.  · Tomato (raw) - 1 medium has 10 mcg.  · Tomato sauce - ½ cup has 17 mcg.  Vitamin K-free foods  If a food contain less than 5 mcg per serving, it is considered to have no vitamin K. These foods include:  · Bread and cereal products.  · Cheese.  · Eggs.  · Fish and shellfish.  · Meat and poultry.  · Milk and dairy products.  · Sunflower seeds.  Actual amounts of vitamin K in foods may be different depending on processing. Talk with your dietitian about what foods you can eat and what foods you should avoid.  This information is not intended to replace advice given to you by your health care provider. Make sure you discuss any questions you have with your health care provider.  Document Released: 10/15/2010 Document Revised: 11/30/2018 Document Reviewed: 03/22/2017  Elsevier Patient Education © 2020 [employer] Inc[employer]

## 2020-08-10 NOTE — PROGRESS NOTES
Subjective    Marielena Moeller is a 74 y.o. female with:  Chief Complaint   Patient presents with   • Results     Discuss most recent labs.       History per MA reviewed.    Continues to mcdaniel hip pain, limits ability to exercise. Takes narcotic, just half of pill, when pain is severe. Makes her feel a bit loopy, so rests when she takes it.    Chronically anticoagulated with coumadin for Afib, has declined novel anticoagulant therapies. Has been eating cabbage and didn't realize it would affect INR.     Diabetes mellitus chronic x years and has been uncontrolled. On insulin 70/30.     Acquired hypothyroidism chronic x years on levothyroxine.          The following portions of the patient's history were reviewed and updated as appropriate: allergies, current medications, past family history, past medical history, past social history, past surgical history and problem list.    Review of Systems   Constitutional: Positive for fatigue. Negative for fever.   Respiratory: Positive for shortness of breath (exertion).    Musculoskeletal: Positive for arthralgias and gait problem.       Visit Vitals  LMP  (LMP Unknown)         Objective   Neck: Voice is within normal limits  Pulm: Normal work of breathing  Neuro: A&O x 3  Psych: Mood and affect appropriate. Memory within normal limits. Thought processes within normal limits.     Component      Latest Ref Rng & Units 8/5/2020   INR      0.90 - 1.10 1.12 (H)     Results for orders placed in visit on 07/30/20   XR Hip With or Without Pelvis 2 - 3 View Left    Narrative EXAMINATION: XR HIP W OR WO PELVIS 2-3 VIEW LEFT-      INDICATION: severe pain; M25.552-Pain in left hip      COMPARISON: NONE     FINDINGS: Moderate diffuse demineralization is present. Mild bilateral  hip osteoarthrosis some osteophytosis about the greater trochanter and  acetabulum. No acute fracture or dislocation.       Impression Mild demineralization and left hip osteoarthrosis. No acute  fracture or  dislocation.     This report was finalized on 8/5/2020 1:24 PM by Shaan Merida.              Assessment/Plan     Problem List Items Addressed This Visit        Cardiovascular and Mediastinum    Type 2 diabetes mellitus with circulatory disorder, with long-term current use of insulin (CMS/[facility])    Overview     · Associated with hypertension          Relevant Medications    NOVOLOG MIX 70/30 (70-30) 100 UNIT/ML injection    Paroxysmal atrial fibrillation (CMS/[facility])    Current Assessment & Plan     INR too low.  Educated on consistency of foods with vitamin K intake; eat food with similar levels of vitamin K every day or avoid.  Recheck INR next week. Goal is 2-3.            Respiratory    COPD (chronic obstructive pulmonary disease) (CMS/[facility])    Current Assessment & Plan     COPD is unchanged.  carbon dioxide high on labs, likely due to lung disease, follow up with pulmonology                Digestive    Vitamin D deficiency    Current Assessment & Plan     Important to get level up due to bone density being low.  Continue high dose weekly supplement.            Endocrine    Type 2 diabetes mellitus with hyperglycemia, with long-term current use of insulin (CMS/[facility]) - Primary    Overview     · History of pancreatitis         Current Assessment & Plan     Diabetes is not controlled.   Work on diet, insulin dosing, goal under 200 on glucose levels  Diabetes will be reassessed in 3 months.         Relevant Medications    NOVOLOG MIX 70/30 (70-30) 100 UNIT/ML injection    Acquired hypothyroidism    Current Assessment & Plan     TSH low normal, free T4 just 0.05 out of normal. Suggest staying on current dose of levothyroxine, recheck lab in 3 months at follow up visit.            Musculoskeletal and Integument    Osteoporosis    Current Assessment & Plan     Weakened bone per xray of hip. Encouraged weight-bearing activity (with cane for safety).            Hematopoietic and Hemostatic    Warfarin anticoagulation       Other Visit Diagnoses     Primary osteoarthritis of left hip        Relevant Medications    oxyCODONE-acetaminophen (PERCOCET) 5-325 MG per tablet    Left hip pain        Relevant Medications    oxyCODONE-acetaminophen (PERCOCET) 5-325 MG per tablet    Leg cramps        Magnesium is in normal range, continue current dose on magnesium supplement.          · This visit has been rescheduled as a phone visit to comply with patient safety concerns in accordance with CDC recommendations. Total time of discussion was 11 minutes.  · Take narcotic cautiously, watch for constipation    Marychuy Starr MD

## 2020-08-10 NOTE — ASSESSMENT & PLAN NOTE
INR too low.  Educated on consistency of foods with vitamin K intake; eat food with similar levels of vitamin K every day or avoid.  Recheck INR next week. Goal is 2-3.

## 2020-08-10 NOTE — ASSESSMENT & PLAN NOTE
COPD is unchanged.  carbon dioxide high on labs, likely due to lung disease, follow up with pulmonology

## 2020-11-06 DIAGNOSIS — I48.0 PAROXYSMAL ATRIAL FIBRILLATION (HCC): ICD-10-CM

## 2020-11-06 DIAGNOSIS — Z79.01 WARFARIN ANTICOAGULATION: Primary | ICD-10-CM

## 2020-11-09 RX ORDER — WARFARIN SODIUM 2 MG/1
2 TABLET ORAL
Qty: 90 TABLET | Refills: 0 | Status: SHIPPED | OUTPATIENT
Start: 2020-11-09 | End: 2020-12-11

## 2020-11-09 NOTE — TELEPHONE ENCOUNTER
"Spoke with pt and she states \"time gets away\" from her and she is not sure when the last time she had her INR done. She is out of meds (pharmacy gave her enough pills for today and tomorrow). Advised we need to know her INR before we can refill the coumadin. Explained the importance of checking this routinely. Pt states she will go to [facility] in the morning for the INR.   "

## 2020-11-11 ENCOUNTER — TELEPHONE (OUTPATIENT)
Dept: INTERNAL MEDICINE | Facility: CLINIC | Age: 75
End: 2020-11-11

## 2020-11-11 NOTE — TELEPHONE ENCOUNTER
Spoke with pt on 11-9-2020 and discussed the importance of having her INR checked regularly while she is on coumadin. This is the reason she has not been able to get her refill yet. Pt assured me she would go in the morning (11/10/2020) to have her INR checked. Dr. Starr sent in her refill with the assurance the patient was going in the morning for labs. I have called on 11/10/2020 and 11/11/2020 both in the late afternoons for lab results and the patient has still not had her labs drawn. Per the patient she goes to [facility] in [place] for her INR. Last INR they have is in August.     Called pt to discuss. She did not answer. Left a message encouraging pt to have her labs drawn in the morning so Dr. Starr can view her INR. Advised to call back with any questions or concerns.

## 2020-11-12 ENCOUNTER — TELEPHONE (OUTPATIENT)
Dept: INTERNAL MEDICINE | Facility: CLINIC | Age: 75
End: 2020-11-12

## 2020-11-12 NOTE — TELEPHONE ENCOUNTER
PT STATED SHE IS NOT GOING TO BE ABLE TO GO GET HER INR FOR A COUPLE DAYS DUE TO HER DAUGHTER BEING OUT OF TOWN AND SON IS IN [place].     PLEASE GIVE PT A CLL BACK IF THAT IS NOT OK.     NUMBER:  [phone]

## 2020-11-18 NOTE — TELEPHONE ENCOUNTER
Spoke with pt and advised. She understands.    Pt did ask why her Ativan was not wrote for 90 days like all her other meds. Advised that is a controlled med and she is not to take to it every day, therefore, it is not appropriate for 90 day refills. Advised she only gets 45 pills at a time and should only take it as needed. This is not a medication that should be refilled every month. Pt states she understands. While on the phone she asked to change her upcoming appt to a telephone visit so she does not have to come out. Understood and changed to a telephone visit.

## 2020-11-20 ENCOUNTER — OFFICE VISIT (OUTPATIENT)
Dept: INTERNAL MEDICINE | Facility: CLINIC | Age: 75
End: 2020-11-20

## 2020-11-20 DIAGNOSIS — Z79.4 TYPE 2 DIABETES MELLITUS WITH DIABETIC PERIPHERAL ANGIOPATHY WITHOUT GANGRENE, WITH LONG-TERM CURRENT USE OF INSULIN (HCC): ICD-10-CM

## 2020-11-20 DIAGNOSIS — E11.65 TYPE 2 DIABETES MELLITUS WITH HYPERGLYCEMIA, WITH LONG-TERM CURRENT USE OF INSULIN (HCC): Primary | ICD-10-CM

## 2020-11-20 DIAGNOSIS — E11.51 TYPE 2 DIABETES MELLITUS WITH DIABETIC PERIPHERAL ANGIOPATHY WITHOUT GANGRENE, WITH LONG-TERM CURRENT USE OF INSULIN (HCC): ICD-10-CM

## 2020-11-20 DIAGNOSIS — Z79.01 WARFARIN ANTICOAGULATION: ICD-10-CM

## 2020-11-20 DIAGNOSIS — E11.59 TYPE 2 DIABETES MELLITUS WITH OTHER CIRCULATORY COMPLICATION, WITH LONG-TERM CURRENT USE OF INSULIN (HCC): ICD-10-CM

## 2020-11-20 DIAGNOSIS — I48.0 PAROXYSMAL ATRIAL FIBRILLATION (HCC): ICD-10-CM

## 2020-11-20 DIAGNOSIS — K04.7 TOOTH INFECTION: ICD-10-CM

## 2020-11-20 DIAGNOSIS — E55.9 VITAMIN D DEFICIENCY: ICD-10-CM

## 2020-11-20 DIAGNOSIS — Z79.4 TYPE 2 DIABETES MELLITUS WITH HYPERGLYCEMIA, WITH LONG-TERM CURRENT USE OF INSULIN (HCC): Primary | ICD-10-CM

## 2020-11-20 DIAGNOSIS — Z79.4 TYPE 2 DIABETES MELLITUS WITH OTHER CIRCULATORY COMPLICATION, WITH LONG-TERM CURRENT USE OF INSULIN (HCC): ICD-10-CM

## 2020-11-20 DIAGNOSIS — E03.9 ACQUIRED HYPOTHYROIDISM: ICD-10-CM

## 2020-11-20 PROCEDURE — 99214 OFFICE O/P EST MOD 30 MIN: CPT | Performed by: FAMILY MEDICINE

## 2020-11-20 RX ORDER — AMOXICILLIN 875 MG/1
875 TABLET, COATED ORAL 2 TIMES DAILY
Qty: 20 TABLET | Refills: 0 | Status: SHIPPED | OUTPATIENT
Start: 2020-11-20 | End: 2020-11-30

## 2020-11-20 NOTE — PROGRESS NOTES
"Subjective    Marielena Moeller is a 75 y.o. female with:  Chief Complaint   Patient presents with   • Diabetes     Pt states her diabetes seems to be more controlled on the Novolog. Morning sugar was around 200 today.    • Ear Problem     Pt thinks she has an ear infection in both ears, left worse than right. She states they hurt down inside her ears. Fever has been at 98-99, which she states is high for her.      Visit originally scheduled in office. Changed to telephone at patient request.    History per MA reviewed.    Ear pain \"deep down\" and jaw hurts  Teeth hurting and has a bad tooth  Reports talking with her heart doctor about what to do  Thinks she needs two teeth pulled  Says temp normally 97.1 or 97.2 but it's been up to 98-99  Pain in ears x 2 weeks  Insists she needs an antibiotic, as her past provider would just give her amoxicillin    Diabetes mellitus chronic and historically uncontrolled insulin dependent  Due for A1C    Continues levothyroxine for acquired hypothyroidism    On coumadin for Afib          The following portions of the patient's history were reviewed and updated as appropriate: allergies, current medications, past family history, past medical history, past social history, past surgical history and problem list.    Review of Systems   Constitutional: Positive for fatigue.   HENT: Positive for dental problem.    Musculoskeletal: Positive for arthralgias.   Psychiatric/Behavioral: Positive for stress.       Visit Vitals  LMP  (LMP Unknown)         Objective   Neck: Voice is within normal limits  Pulm: Normal work of breathing  Neuro: A&O x 3  Psych: Mood and affect appropriate. Memory within normal limits. Thought processes within normal limits.    Lab Results   Component Value Date    HGBA1C 8.50 (H) 08/05/2020    HGBA1C 8.3 11/26/2019    HGBA1C 8.8 05/08/2019     Lab Results   Component Value Date    TSH 0.776 08/05/2020     Lab Results   Component Value Date    FREET4 1.75 (H) " 08/05/2020        Assessment/Plan     Problem List Items Addressed This Visit        Cardiovascular and Mediastinum    Type 2 diabetes mellitus with circulatory disorder, with long-term current use of insulin (CMS/[facility])    Overview     · Associated with hypertension          Relevant Orders    Hemoglobin A1c    Comprehensive Metabolic Panel    Lipid Panel    Hemoglobin A1c    Comprehensive Metabolic Panel    Lipid Panel    Paroxysmal atrial fibrillation (CMS/[facility])    Relevant Orders    CBC (No Diff)    Protime-INR    Type 2 diabetes mellitus with diabetic peripheral angiopathy without gangrene, with long-term current use of insulin (CMS/[facility])    Relevant Orders    Hemoglobin A1c    Comprehensive Metabolic Panel    Lipid Panel       Digestive    Vitamin D deficiency    Relevant Orders    Vitamin D 25 Hydroxy       Endocrine    Type 2 diabetes mellitus with hyperglycemia, with long-term current use of insulin (CMS/[facility]) - Primary    Overview     · History of pancreatitis         Relevant Orders    Hemoglobin A1c    Comprehensive Metabolic Panel    Lipid Panel    Acquired hypothyroidism    Relevant Orders    CBC (No Diff)    TSH+Free T4       Hematopoietic and Hemostatic    Warfarin anticoagulation    Relevant Orders    Protime-INR      Other Visit Diagnoses     Tooth infection        Relevant Medications    amoxicillin (AMOXIL) 875 MG tablet          · This visit has been rescheduled as a phone visit to comply with patient safety concerns in accordance with CDC recommendations. Total time of discussion was 15 minutes.  · Plan was to do poc A1C in office today. Will need to send lab order out.   · Last INR therapeutic range, recheck one month from that date. Continue coumadin (patient has declined newer medicines)    Marychuy Starr MD

## 2020-12-04 ENCOUNTER — RESULTS ENCOUNTER (OUTPATIENT)
Dept: INTERNAL MEDICINE | Facility: CLINIC | Age: 75
End: 2020-12-04

## 2020-12-04 DIAGNOSIS — I48.0 PAROXYSMAL ATRIAL FIBRILLATION (HCC): ICD-10-CM

## 2020-12-04 DIAGNOSIS — Z79.01 WARFARIN ANTICOAGULATION: ICD-10-CM

## 2020-12-11 ENCOUNTER — TELEMEDICINE (OUTPATIENT)
Dept: INTERNAL MEDICINE | Facility: CLINIC | Age: 75
End: 2020-12-11

## 2020-12-11 DIAGNOSIS — H92.03 EARACHE SYMPTOMS IN BOTH EARS: ICD-10-CM

## 2020-12-11 DIAGNOSIS — E11.59 TYPE 2 DIABETES MELLITUS WITH OTHER CIRCULATORY COMPLICATION, WITH LONG-TERM CURRENT USE OF INSULIN (HCC): Primary | ICD-10-CM

## 2020-12-11 DIAGNOSIS — F41.9 ANXIETY: ICD-10-CM

## 2020-12-11 DIAGNOSIS — Z79.01 WARFARIN ANTICOAGULATION: ICD-10-CM

## 2020-12-11 DIAGNOSIS — B37.2 CANDIDAL INTERTRIGO: ICD-10-CM

## 2020-12-11 DIAGNOSIS — Z79.4 TYPE 2 DIABETES MELLITUS WITH OTHER CIRCULATORY COMPLICATION, WITH LONG-TERM CURRENT USE OF INSULIN (HCC): Primary | ICD-10-CM

## 2020-12-11 PROCEDURE — 99214 OFFICE O/P EST MOD 30 MIN: CPT | Performed by: NURSE PRACTITIONER

## 2020-12-11 RX ORDER — WARFARIN SODIUM 2 MG/1
2 TABLET ORAL 4 TIMES DAILY
COMMUNITY
End: 2020-12-11

## 2020-12-11 RX ORDER — NYSTATIN 100000 U/G
CREAM TOPICAL AS NEEDED
Qty: 30 G | Refills: 0 | Status: SHIPPED | OUTPATIENT
Start: 2020-12-11

## 2020-12-11 RX ORDER — WARFARIN SODIUM 4 MG/1
TABLET ORAL
Qty: 360 TABLET | Refills: 0 | Status: SHIPPED | OUTPATIENT
Start: 2020-12-11 | End: 2021-02-05 | Stop reason: DRUGHIGH

## 2020-12-11 RX ORDER — LORAZEPAM 0.5 MG/1
0.5 TABLET ORAL EVERY 8 HOURS PRN
Qty: 45 TABLET | Refills: 2 | Status: SHIPPED | OUTPATIENT
Start: 2020-12-11 | End: 2021-08-20

## 2020-12-11 RX ORDER — AMOXICILLIN 875 MG/1
875 TABLET, COATED ORAL 2 TIMES DAILY
Qty: 20 TABLET | Refills: 0 | Status: SHIPPED | OUTPATIENT
Start: 2020-12-11 | End: 2020-12-21

## 2020-12-11 RX ORDER — LORAZEPAM 0.5 MG/1
0.5 TABLET ORAL EVERY 8 HOURS PRN
Qty: 45 TABLET | Refills: 2 | Status: CANCELLED | OUTPATIENT
Start: 2020-12-11

## 2020-12-11 NOTE — PROGRESS NOTES
You have chosen to receive care through a telehealth visit.  Do you consent to use a video/audio connection for your medical care today? Yes  Active Parties: Phoebe KELLY, Norm Beckman and patient

## 2020-12-11 NOTE — PROGRESS NOTES
Date: 2020    Name: Marielena Moeller  : 1945      Chief Complaint:   Chief Complaint   Patient presents with   • Earache     both ears       HPI:  Marielena Moeller is a 75 y.o. female presents for video visit in regard to related to persistent earache, popping and cracking with an ache she feels deep inside her ear that radiates below her jaw.  She has finished one round of amoxicillin prescribed on 20, states it is the only antibiotic she can take without significant side effects.  Symptoms improved but did not completely resolve, she no longer feels she has a fever but earache is progressively worsening since antibiotic completed.  Sounds are either amplified or muffled when she turns her head a certain way.  She found meclizine prescribed in 2017, for dizziness; did not take it but wonders if it would dry up fluid behind her ear.    She is requesting a 90 day supply of warfarin. Currently taking 8 mg; prescribed 2 mg, 4 tablets daily.  She understands the need for 2 mg, in case adjustments need to be made to her dose but would prefer to take 4 mg, 2 tablets daily.  Reports her copay is significantly less when she gets a 90 day supply vs 30 day supply.    Continues to take insulin as prescribed for T2DM. Fasting glucose has been < 180 the past 2 days.  She continues to have rash beneath breasts, in skin folds.  Requesting refill of nystatin cream, which is normally effective.      History: The following portions of the patient's history were reviewed and updated as appropriate: allergies, current medications, past medical history, family history, surgical history, social history and problem list.      ROS:  Review of Systems   Constitutional: Negative.    HENT: Negative for congestion and postnasal drip.    Neurological: Negative for facial asymmetry.     Unable to complete visit using a video connection to the patient, she agreed to complete visit using telephone.      Assessment/Plan:  Diagnoses and all orders for this visit:    1. Type 2 diabetes mellitus with other circulatory complication, with long-term current use of insulin (CMS/[facility]) (Primary)        - Follow diabetic diet        - Monitor blood sugars as discussed, to better assess trends and glycemic response to certain food, drink, activities.  Advised fasting blood glucose should be < 130, 2 hours      post-prandial should be < 180        - See eye doctor annually or as discussed        - Wear protective foot wear/no bare feet        - Check feet regularly for calluses or ulcers        - Discussed risk of poorly controlled diabetes and long-term complications        - Take all medications as prescribed    2. Anxiety    3. Candidal intertrigo  -     nystatin (MYCOSTATIN) 611647 UNIT/GM cream; Apply  topically to the appropriate area as directed As Needed (yeast infection).  Dispense: 30 g; Refill: 0  - Dry skin folds completely after bathing, before dressing.  Consider using a hair dryer on low heat     4. Warfarin anticoagulation  -     warfarin (Coumadin) 4 MG tablet; Take 2 tablets daily  Dispense: 360 tablet; Refill: 0    5. Earache symptoms in both ears  -     amoxicillin (AMOXIL) 875 MG tablet; Take 1 tablet by mouth 2 (Two) Times a Day for 10 days.  Dispense: 20 tablet; Refill: 0  - Discussed prednisone, patient unable to tolerate it  - Warm compress to ear(s) as needed for pain.        Return for Next scheduled follow up.

## 2021-01-11 ENCOUNTER — TELEPHONE (OUTPATIENT)
Dept: INTERNAL MEDICINE | Facility: CLINIC | Age: 76
End: 2021-01-11

## 2021-01-11 ENCOUNTER — ANTICOAGULATION VISIT (OUTPATIENT)
Dept: INTERNAL MEDICINE | Facility: CLINIC | Age: 76
End: 2021-01-11

## 2021-01-11 LAB — INR PPP: 1.8 (ref 2–3)

## 2021-01-11 NOTE — TELEPHONE ENCOUNTER
Caller: Tristan Moeller    Relationship: Self    Best call back number: [phone]    Caller requesting test results: TRISTAN    What test was performed:PROTIME- INR     When was the test performed: 12/30/20    Where was the test performed: [facility]    Additional notes:

## 2021-01-25 DIAGNOSIS — E55.9 VITAMIN D DEFICIENCY: ICD-10-CM

## 2021-01-25 DIAGNOSIS — Z79.4 TYPE 2 DIABETES MELLITUS WITH HYPERGLYCEMIA, WITH LONG-TERM CURRENT USE OF INSULIN (HCC): ICD-10-CM

## 2021-01-25 DIAGNOSIS — E11.65 TYPE 2 DIABETES MELLITUS WITH HYPERGLYCEMIA, WITH LONG-TERM CURRENT USE OF INSULIN (HCC): ICD-10-CM

## 2021-01-25 RX ORDER — CHOLECALCIFEROL (VITAMIN D3) 1250 MCG
CAPSULE ORAL
Qty: 12 CAPSULE | Refills: 0 | Status: SHIPPED | OUTPATIENT
Start: 2021-01-25

## 2021-01-25 RX ORDER — INSULIN ASPART 100 [IU]/ML
INJECTION, SUSPENSION SUBCUTANEOUS
Qty: 130 ML | Refills: 1 | Status: SHIPPED | OUTPATIENT
Start: 2021-01-25 | End: 2021-08-09

## 2021-01-25 RX ORDER — SYRING-NEEDL,DISP,INSUL,0.3 ML 31GX15/64"
SYRINGE, EMPTY DISPOSABLE MISCELLANEOUS
Qty: 300 EACH | Refills: 2 | Status: SHIPPED | OUTPATIENT
Start: 2021-01-25 | End: 2022-02-21

## 2021-01-25 NOTE — TELEPHONE ENCOUNTER
Patient had labs ordered in November that have not been collected, vitamin D level included. Needs to have drawn, may need to fax order again.

## 2021-02-05 ENCOUNTER — OFFICE VISIT (OUTPATIENT)
Dept: INTERNAL MEDICINE | Facility: CLINIC | Age: 76
End: 2021-02-05

## 2021-02-05 VITALS
SYSTOLIC BLOOD PRESSURE: 124 MMHG | HEART RATE: 92 BPM | HEIGHT: 60 IN | TEMPERATURE: 98.4 F | BODY MASS INDEX: 34.37 KG/M2 | OXYGEN SATURATION: 96 % | DIASTOLIC BLOOD PRESSURE: 82 MMHG

## 2021-02-05 DIAGNOSIS — E03.9 ACQUIRED HYPOTHYROIDISM: ICD-10-CM

## 2021-02-05 DIAGNOSIS — Z79.01 WARFARIN ANTICOAGULATION: ICD-10-CM

## 2021-02-05 DIAGNOSIS — E55.9 VITAMIN D DEFICIENCY: ICD-10-CM

## 2021-02-05 DIAGNOSIS — Z79.4 TYPE 2 DIABETES MELLITUS WITH HYPERGLYCEMIA, WITH LONG-TERM CURRENT USE OF INSULIN (HCC): ICD-10-CM

## 2021-02-05 DIAGNOSIS — E11.65 TYPE 2 DIABETES MELLITUS WITH HYPERGLYCEMIA, WITH LONG-TERM CURRENT USE OF INSULIN (HCC): ICD-10-CM

## 2021-02-05 DIAGNOSIS — R53.82 CHRONIC FATIGUE: Primary | ICD-10-CM

## 2021-02-05 DIAGNOSIS — G89.29 OTHER CHRONIC PAIN: ICD-10-CM

## 2021-02-05 DIAGNOSIS — I15.2 HYPERTENSION ASSOCIATED WITH DIABETES (HCC): ICD-10-CM

## 2021-02-05 DIAGNOSIS — H66.004 RECURRENT ACUTE SUPPURATIVE OTITIS MEDIA OF RIGHT EAR WITHOUT SPONTANEOUS RUPTURE OF TYMPANIC MEMBRANE: ICD-10-CM

## 2021-02-05 DIAGNOSIS — I48.0 PAROXYSMAL ATRIAL FIBRILLATION (HCC): ICD-10-CM

## 2021-02-05 DIAGNOSIS — J44.9 CHRONIC OBSTRUCTIVE PULMONARY DISEASE, UNSPECIFIED COPD TYPE (HCC): ICD-10-CM

## 2021-02-05 DIAGNOSIS — Z77.018: ICD-10-CM

## 2021-02-05 DIAGNOSIS — R39.9 URINARY SYMPTOM OR SIGN: ICD-10-CM

## 2021-02-05 DIAGNOSIS — E11.59 HYPERTENSION ASSOCIATED WITH DIABETES (HCC): ICD-10-CM

## 2021-02-05 LAB
BILIRUB BLD-MCNC: NEGATIVE MG/DL
CLARITY, POC: CLEAR
COLOR UR: YELLOW
GLUCOSE UR STRIP-MCNC: NEGATIVE MG/DL
INR PPP: 1.4 (ref 0.9–1.1)
KETONES UR QL: NEGATIVE
LEUKOCYTE EST, POC: NEGATIVE
NITRITE UR-MCNC: NEGATIVE MG/ML
PH UR: 7 [PH] (ref 5–8)
PROT UR STRIP-MCNC: ABNORMAL MG/DL
RBC # UR STRIP: ABNORMAL /UL
SP GR UR: 1.01 (ref 1–1.03)
UROBILINOGEN UR QL: NORMAL

## 2021-02-05 PROCEDURE — 36416 COLLJ CAPILLARY BLOOD SPEC: CPT | Performed by: NURSE PRACTITIONER

## 2021-02-05 PROCEDURE — 99215 OFFICE O/P EST HI 40 MIN: CPT | Performed by: NURSE PRACTITIONER

## 2021-02-05 PROCEDURE — 81003 URINALYSIS AUTO W/O SCOPE: CPT | Performed by: NURSE PRACTITIONER

## 2021-02-05 PROCEDURE — 85610 PROTHROMBIN TIME: CPT | Performed by: NURSE PRACTITIONER

## 2021-02-05 RX ORDER — CEFDINIR 300 MG/1
300 CAPSULE ORAL 2 TIMES DAILY
Qty: 14 CAPSULE | Refills: 0 | Status: SHIPPED | OUTPATIENT
Start: 2021-02-05 | End: 2021-02-12

## 2021-02-05 RX ORDER — WARFARIN SODIUM 10 MG/1
TABLET ORAL
Qty: 30 TABLET | Refills: 1 | Status: SHIPPED | OUTPATIENT
Start: 2021-02-05 | End: 2021-12-29

## 2021-02-05 NOTE — PROGRESS NOTES
"Date: 2021    Name: Marielena Moeller  : 1945    Chief Complaint:   Chief Complaint   Patient presents with   • Earache     both ears,weight gain,neck pain       HPI:  Marielena Moeller is a 75 y.o. female presents with increasing fatigue, bilateral shoulder pain, earache, knot in throat, foamy urine.  These symptoms have been present, worsening, over the past few months.    She was treated for ear infection 2 months ago.  Completed amoxicillin as prescribed.  Earache and fullness improved for a brief time.    She has had partial thyroidectomy (left).  Is concerned thyroid levels may be abnormal.  Her daughter was recently tested for thyroid abnormality, TPO was abnormal.  Ms Moeller would like to have that test, as well as \"full thyroid panel\" in case that is contributing to her fatigue.  She has gained weight, refused weigh in today.  Denies temperature intolerance, hair/skin/nail changes, bowel habit changes, palpitations, anxiety, sore throat, hoarseness.  She is using O2, 2.5 liters per nasal cannula.  This is what she uses at home.  SOA, cough have not increased appreciably.    Bilateral shoulder pain, neck pain have increased.  She uses a heating pad frequently, states it is effective.  She rarely takes a pain pill, when she does it is half the dose as it makes her very drowsy.  She does not recall recent injury to shoulders or neck.  She has been going through old paperwork, things from her previous home.  She believes she may have been exposed to both mercury and lead while going through her things.   Reports her urine has been foamy, glossy for the past few weeks.   She is accompanied today by her daughter.     History:  The following portions of the patient's history were reviewed and updated as appropriate: allergies, current medications, past medical history, family history, surgical history, social history and problem list.       VS:  Vitals:    21 1519   BP: 124/82   Pulse: 92 " "  Temp: 98.4 °F (36.9 °C)   TempSrc: Infrared   SpO2: 96%   Weight: Comment: did not weigh   Height: 152.4 cm (60\")     Body mass index is 34.37 kg/m².    PE:  Physical Exam  Constitutional:       Appearance: She is not ill-appearing.   HENT:      Head: Normocephalic.      Right Ear: External ear normal. Tympanic membrane is erythematous and bulging.      Left Ear: Ear canal and external ear normal. A middle ear effusion is present.   Eyes:      Conjunctiva/sclera: Conjunctivae normal.      Pupils: Pupils are equal, round, and reactive to light.   Neck:      Musculoskeletal: Normal range of motion and neck supple.      Thyroid: No thyroid mass or thyroid tenderness.   Cardiovascular:      Rate and Rhythm: Normal rate and regular rhythm.      Pulses: Normal pulses.      Heart sounds: Normal heart sounds.   Pulmonary:      Effort: Pulmonary effort is normal.      Breath sounds: Examination of the right-lower field reveals decreased breath sounds. Examination of the left-lower field reveals decreased breath sounds. Decreased breath sounds present.   Abdominal:      General: Abdomen is protuberant. There is distension.      Tenderness: There is no right CVA tenderness or left CVA tenderness.   Musculoskeletal:         General: Tenderness present.   Lymphadenopathy:      Head:      Right side of head: Occipital adenopathy present.      Cervical: No cervical adenopathy.   Skin:     General: Skin is warm.      Capillary Refill: Capillary refill takes less than 2 seconds.   Neurological:      Mental Status: She is alert and oriented to person, place, and time.      Coordination: Coordination normal.      Gait: Gait normal.   Psychiatric:         Mood and Affect: Mood normal.         Behavior: Behavior normal.         Thought Content: Thought content normal.       Office Visit on 02/05/2021   Component Date Value Ref Range Status   • INR 02/05/2021 1.4* 0.9 - 1.1 Final   • Color 02/05/2021 Yellow  Yellow, Straw, Dark Yellow, " Rosie Final   • Clarity, UA 02/05/2021 Clear  Clear Final   • Specific Gravity  02/05/2021 1.015  1.005 - 1.030 Final   • pH, Urine 02/05/2021 7.0  5.0 - 8.0 Final   • Leukocytes 02/05/2021 Negative  Negative Final   • Nitrite, UA 02/05/2021 Negative  Negative Final   • Protein, POC 02/05/2021 Trace* Negative mg/dL Final   • Glucose, UA 02/05/2021 Negative  Negative, 1000 mg/dL (3+) mg/dL Final   • Ketones, UA 02/05/2021 Negative  Negative Final   • Urobilinogen, UA 02/05/2021 Normal  Normal Final   • Bilirubin 02/05/2021 Negative  Negative Final   • Blood, UA 02/05/2021 1+* Negative Final        Assessment/Plan:  Diagnoses and all orders for this visit:    1. Chronic fatigue (Primary)  -     CBC & Differential  -     Vitamin D 25 Hydroxy    2. Hypertension associated with diabetes (CMS/HCC)  -     Comprehensive Metabolic Panel        - Follow heart healthy diet.  Advised to reduce daily sodium intake to < 1500 mg per day.  Avoid processed & fast foods.        - Monitor blood pressure as discussed, keep log and bring to next appointment.          - Take medications as prescribed.  3. Paroxysmal atrial fibrillation (CMS/HCC)  -     warfarin (Coumadin) 10 MG tablet; Take one tablet daily  Dispense: 30 tablet; Refill: 1  -     POCT INR  - Follow up with cardiology as scheduled.  States she sees cardiologist in [place], who has told her she might be able to stop taking warfarin, as she seems to be in NSR    4. Warfarin anticoagulation  -     warfarin (Coumadin) 10 MG tablet; Take one tablet daily  Dispense: 30 tablet; Refill: 1  -     POCT INR    5. Type 2 diabetes mellitus with hyperglycemia, with long-term current use of insulin (CMS/HCC)        - Follow diabetic diet        - See eye doctor annually or as discussed        - Wear protective foot wear/no bare feet        - Check feet regularly for calluses or ulcers        - Discussed risk of poorly controlled diabetes and long-term complications        - Take all  medications as prescribed    6. Acquired hypothyroidism  -     T4, Free  -     TSH  -     T3, Free  -     Thyroid Peroxidase Antibody    7. Vitamin D deficiency  -     Vitamin D 25 Hydroxy    8. Chronic obstructive pulmonary disease, unspecified COPD type (CMS/HCC)        - Continue using O2, inhalers as prescribed.     9. Suspected exposure to hazardous metal  -     Heavy Metals Profile II, Blood    10. Other chronic pain  -     LIONEL  -     C-reactive Protein  - Encouraged to take acetaminophen 500 mg BID for the next 5-7 days.  If effective, take when needed per package directions after 7 days.    - Continue using heating pad prn pain    11. Urinary symptom or sign  -     POCT urinalysis dipstick, automated    12. Recurrent acute suppurative otitis media of right ear without spontaneous rupture of tympanic membrane  -     cefdinir (OMNICEF) 300 MG capsule; Take 1 capsule by mouth 2 (Two) Times a Day for 7 days.  Dispense: 14 capsule; Refill: 0  - Advised should earache continue, return to clinic for further evaluation.            Return in about 3 months (around 5/5/2021) for Next scheduled follow up.

## 2021-02-08 ENCOUNTER — TELEPHONE (OUTPATIENT)
Dept: INTERNAL MEDICINE | Facility: CLINIC | Age: 76
End: 2021-02-08

## 2021-02-08 NOTE — TELEPHONE ENCOUNTER
Caller: Marielena Moeller    Relationship: Self    Best call back number: [phone]    Caller requesting test results: yes    What test was performed: lab work    When was the test performed: 2/5/21    Additional notes: Patient requested a call back with her results as soon as possible with her results. Patient would also like to know if these could be mailed to her along with a copy of her office visit summary from 2/5/21

## 2021-02-09 LAB
25(OH)D3+25(OH)D2 SERPL-MCNC: 52.2 NG/ML (ref 30–100)
ALBUMIN SERPL-MCNC: 4.1 G/DL (ref 3.7–4.7)
ALBUMIN/GLOB SERPL: 1.5 {RATIO} (ref 1.2–2.2)
ALP SERPL-CCNC: 84 IU/L (ref 39–117)
ALT SERPL-CCNC: 15 IU/L (ref 0–32)
ANA SER QL: POSITIVE
ARSENIC BLD-MCNC: 7 UG/L (ref 2–23)
AST SERPL-CCNC: 18 IU/L (ref 0–40)
BASOPHILS # BLD AUTO: 0 X10E3/UL (ref 0–0.2)
BASOPHILS NFR BLD AUTO: 1 %
BILIRUB SERPL-MCNC: <0.2 MG/DL (ref 0–1.2)
BUN SERPL-MCNC: 13 MG/DL (ref 8–27)
BUN/CREAT SERPL: 21 (ref 12–28)
CADMIUM BLD-MCNC: <0.5 UG/L (ref 0–1.2)
CALCIUM SERPL-MCNC: 9.7 MG/DL (ref 8.7–10.3)
CHLORIDE SERPL-SCNC: 98 MMOL/L (ref 96–106)
CO2 SERPL-SCNC: 31 MMOL/L (ref 20–29)
CREAT SERPL-MCNC: 0.62 MG/DL (ref 0.57–1)
CRP SERPL-MCNC: 26 MG/L (ref 0–10)
DSDNA AB SER-ACNC: <1 IU/ML (ref 0–9)
EOSINOPHIL # BLD AUTO: 0.2 X10E3/UL (ref 0–0.4)
EOSINOPHIL NFR BLD AUTO: 3 %
ERYTHROCYTE [DISTWIDTH] IN BLOOD BY AUTOMATED COUNT: 11.8 % (ref 11.7–15.4)
GLOBULIN SER CALC-MCNC: 2.7 G/DL (ref 1.5–4.5)
GLUCOSE SERPL-MCNC: 144 MG/DL (ref 65–99)
HCT VFR BLD AUTO: 38.8 % (ref 34–46.6)
HGB BLD-MCNC: 13 G/DL (ref 11.1–15.9)
IMM GRANULOCYTES # BLD AUTO: 0.1 X10E3/UL (ref 0–0.1)
IMM GRANULOCYTES NFR BLD AUTO: 1 %
LEAD BLDV-MCNC: <1 UG/DL (ref 0–4)
LYMPHOCYTES # BLD AUTO: 1.5 X10E3/UL (ref 0.7–3.1)
LYMPHOCYTES NFR BLD AUTO: 21 %
Lab: NORMAL
MCH RBC QN AUTO: 31.6 PG (ref 26.6–33)
MCHC RBC AUTO-ENTMCNC: 33.5 G/DL (ref 31.5–35.7)
MCV RBC AUTO: 94 FL (ref 79–97)
MERCURY BLD-MCNC: <1 UG/L (ref 0–14.9)
MONOCYTES # BLD AUTO: 0.6 X10E3/UL (ref 0.1–0.9)
MONOCYTES NFR BLD AUTO: 9 %
NEUTROPHILS # BLD AUTO: 4.6 X10E3/UL (ref 1.4–7)
NEUTROPHILS NFR BLD AUTO: 65 %
PLATELET # BLD AUTO: 259 X10E3/UL (ref 150–450)
POTASSIUM SERPL-SCNC: 4.3 MMOL/L (ref 3.5–5.2)
PROT SERPL-MCNC: 6.8 G/DL (ref 6–8.5)
RBC # BLD AUTO: 4.11 X10E6/UL (ref 3.77–5.28)
SODIUM SERPL-SCNC: 144 MMOL/L (ref 134–144)
T3FREE SERPL-MCNC: 2.8 PG/ML (ref 2–4.4)
T4 FREE SERPL-MCNC: 1.37 NG/DL (ref 0.82–1.77)
THYROPEROXIDASE AB SERPL-ACNC: <9 IU/ML (ref 0–34)
TSH SERPL DL<=0.005 MIU/L-ACNC: 2.25 UIU/ML (ref 0.45–4.5)
WBC # BLD AUTO: 7 X10E3/UL (ref 3.4–10.8)

## 2021-02-25 NOTE — PROGRESS NOTES
You have chosen to receive care through a telephone visit. Do you consent to use a telephone visit for your medical care today? Yes3  .  On this telephone visit is Laury CH CMA, Dr. Starr, and Marielena Moeller.   Opt out

## 2021-04-09 DIAGNOSIS — E03.9 ACQUIRED HYPOTHYROIDISM: ICD-10-CM

## 2021-04-12 RX ORDER — LEVOTHYROXINE SODIUM 112 UG/1
112 TABLET ORAL DAILY
Qty: 90 TABLET | Refills: 1 | Status: SHIPPED | OUTPATIENT
Start: 2021-04-12 | End: 2021-11-05

## 2021-05-06 DIAGNOSIS — Z79.4 TYPE 2 DIABETES MELLITUS WITH HYPERGLYCEMIA, WITH LONG-TERM CURRENT USE OF INSULIN (HCC): ICD-10-CM

## 2021-05-06 DIAGNOSIS — E11.65 TYPE 2 DIABETES MELLITUS WITH HYPERGLYCEMIA, WITH LONG-TERM CURRENT USE OF INSULIN (HCC): ICD-10-CM

## 2021-05-07 RX ORDER — BLOOD SUGAR DIAGNOSTIC
STRIP MISCELLANEOUS
Qty: 300 EACH | Refills: 3 | Status: SHIPPED | OUTPATIENT
Start: 2021-05-07 | End: 2022-06-20

## 2021-05-24 ENCOUNTER — PATIENT OUTREACH (OUTPATIENT)
Dept: CASE MANAGEMENT | Facility: OTHER | Age: 76
End: 2021-05-24

## 2021-05-24 ENCOUNTER — EPISODE CHANGES (OUTPATIENT)
Dept: CASE MANAGEMENT | Facility: OTHER | Age: 76
End: 2021-05-24

## 2021-05-24 NOTE — OUTREACH NOTE
Patient Outreach Note    RN-ACM outreach with patient.  Acute activity alert received via Patient PING.  Patient had an ED visit at [facility] 05/21/21.  Purpose of outreach explained.  Patient reported having presented at ED due to being dizzy and lightheaded for an extended period of time.  Patient discussed having been prescribed meclozine which seems to be helping.  Patient declined assistance in scheduling f/u.    Patient had questions relative to assistance with cutting toenails.  Patient is diabetic. Education provided.  Patient to call the customer service number on her [employer] card to discuss coverage and copay for podiatry as well as in-network providers.  RN-ACM provided general contact information for [facility] [phone] and [facility] [phone].  Patient to pursue then contact RN-ACM if additional assistance is needed.      Waleska Nuñez RN  Ambulatory     5/24/2021, 15:56 EDT

## 2021-06-14 ENCOUNTER — TELEMEDICINE (OUTPATIENT)
Dept: INTERNAL MEDICINE | Facility: CLINIC | Age: 76
End: 2021-06-14

## 2021-06-14 DIAGNOSIS — K04.7 TOOTH INFECTION: ICD-10-CM

## 2021-06-14 DIAGNOSIS — E03.9 ACQUIRED HYPOTHYROIDISM: Primary | ICD-10-CM

## 2021-06-14 DIAGNOSIS — E04.1 THYROID NODULE: ICD-10-CM

## 2021-06-14 DIAGNOSIS — M79.10 MYALGIA: ICD-10-CM

## 2021-06-14 DIAGNOSIS — E11.65 TYPE 2 DIABETES MELLITUS WITH HYPERGLYCEMIA, WITH LONG-TERM CURRENT USE OF INSULIN (HCC): ICD-10-CM

## 2021-06-14 DIAGNOSIS — I48.0 PAROXYSMAL ATRIAL FIBRILLATION (HCC): ICD-10-CM

## 2021-06-14 DIAGNOSIS — Z79.4 TYPE 2 DIABETES MELLITUS WITH HYPERGLYCEMIA, WITH LONG-TERM CURRENT USE OF INSULIN (HCC): ICD-10-CM

## 2021-06-14 DIAGNOSIS — R79.0 LOW MAGNESIUM LEVEL: ICD-10-CM

## 2021-06-14 DIAGNOSIS — Z79.01 WARFARIN ANTICOAGULATION: ICD-10-CM

## 2021-06-14 PROCEDURE — 99441 PR PHYS/QHP TELEPHONE EVALUATION 5-10 MIN: CPT | Performed by: FAMILY MEDICINE

## 2021-06-14 RX ORDER — MECLIZINE HYDROCHLORIDE 25 MG/1
TABLET ORAL AS NEEDED
COMMUNITY
Start: 2021-05-21

## 2021-06-14 NOTE — PROGRESS NOTES
"Subjective    Marielena Moeller is a 75 y.o. female here for:  Chief Complaint   Patient presents with   • Hypothyroidism     Pt states she is having pain in her neck where her thyroid is. US from 5/2019 shows tiny nodules on her thyroid.        Patient presents for a virtual visit. This visit was scheduled as a video visit to comply with patient safety concerns in accordance with CDC recommendations. She was not able to connect via [employer] nor [employer] video , technical difficulties, so we did a telephone visit.     \"I've run a low grade fever for months and months\"  Teeth hurt  Earache  Saw ENT and was told ears looked good but maybe she had inner ear infection, was dizzy.  Was also seen in ER for dizziness, was given meclizine. It worked, she's not had that anymore.   Teeth--trying to get them pulled. Figures she'd have to be on antibiotics  Afib--no episode x year and a half she says  Gastro--\"vagal nerve would cause Afib\" she says. Afib would start when she laid on one side or leaned one way, but that has stopped.   Under chin where thyroid hurt before it's hurting again.   Daughter said last ultrasound said to do another scan. Patient doesn't remember that, but says there's a lot she doesn't remember anymore.   ENT said she could have an issue with saliva gland  Noted low magnesium level when she was in ER recently.            During today's visit, I reviewed the documented allergies, medications, chief complaint, and pertinent vitals.  I have confirmed with the patient that there have been no changes since this information was discussed with my clinical team member.    The following portions of the patient's history were reviewed and updated as appropriate: allergies, current medications, past family history, past medical history, past social history, past surgical history and problem list.    Review of Systems   Musculoskeletal: Positive for myalgias (\"my thumbs hurt\") and neck pain. "   Psychiatric/Behavioral: Positive for stress (family member in hospital).       Visit Vitals  LMP  (LMP Unknown)         Objective   Nursing note reviewed.  General: healthy appearing, no distress.  HENT: no facial deformities, hearing is within normal limits   Eyes: EOM intact, no obvious nystagmus.  Neck: phonation normal. No stridor  Pulm: Normal work of breathing  Neuro: A&O x 3. No abnormal movements.  Skin: No rashes appreciated to face. No visible cyanosis  Psych: Mood and affect appropriate. Insight normal. Judgement appropriate. Behavior normal. Memory normal.     Lab Results   Component Value Date    HGBA1C 8.50 (H) 08/05/2020    HGBA1C 8.3 11/26/2019    HGBA1C 8.8 05/08/2019       Lab Results   Component Value Date    TSH 2.250 02/05/2021     Lab Results   Component Value Date    FREET4 1.37 02/05/2021         US Thyroid (05/10/2019 15:50)  INDICATION: Neck pain. History of thyroid lesion.     FINDINGS: The right lobe measures 3.6 x 1.7 x 2.4 cm. Patient is  reportedly status post left thyroidectomy. Isthmus measures 0.6 cm. The  right lobe is slightly heterogeneous and demonstrates several tiny  hypoechoic foci, measuring up to approximately 6 mm. No dominant mass  otherwise.     IMPRESSION:  Tiny nodules in the right lobe of the thyroid could be  followed in 12 months       Assessment/Plan     Problem List Items Addressed This Visit        Cardiac and Vasculature    Paroxysmal atrial fibrillation (CMS/HCC)    Relevant Orders    Protime-INR    CBC (No Diff)       Coag and Thromboembolic    Warfarin anticoagulation    Relevant Orders    Protime-INR       Endocrine and Metabolic    Type 2 diabetes mellitus with hyperglycemia, with long-term current use of insulin (CMS/HCC)    Overview     · History of pancreatitis         Relevant Orders    Hemoglobin A1c    Lipid Panel    Comprehensive Metabolic Panel    CBC (No Diff)    Acquired hypothyroidism - Primary    Relevant Orders    US Thyroid    TSH+Free T4     CBC (No Diff)       Musculoskeletal and Injuries    Myalgia    Overview     · Recurrent muscle pains, often attributed to medicines. Unclear etiology.         Relevant Orders    CK      Other Visit Diagnoses     Thyroid nodule        Relevant Orders    US Thyroid    TSH+Free T4    Tooth infection        Low magnesium level        Relevant Orders    Magnesium    Comprehensive Metabolic Panel          · Asked pt to have labs done when she comes up for ultrasound. Describing many symptoms of hypothyroidism.   · Needs to see dentist regarding dental issues. I'm  Hesitant to do another antibiotic because it can affect INR. Last labs her CRP was elevated, recurrent dental infection could explain this.  Return in about 4 months (around 10/14/2021) for Medicare Wellness (366 days from last AWV).    This visit has been rescheduled as a phone visit to comply with patient safety concerns in accordance with CDC recommendations. Total time of discussion was 10 minutes.      Marychuy Starr MD

## 2021-06-14 NOTE — PROGRESS NOTES
You have chosen to receive care through a telehealth visit.  Do you consent to use a video/audio connection for your medical care today? Yes    On this video visit is Laury GALLARDO CMA, Dr. Starr, and Marielena Moeller

## 2021-06-28 ENCOUNTER — APPOINTMENT (OUTPATIENT)
Dept: ULTRASOUND IMAGING | Facility: HOSPITAL | Age: 76
End: 2021-06-28

## 2021-07-02 ENCOUNTER — APPOINTMENT (OUTPATIENT)
Dept: ULTRASOUND IMAGING | Facility: HOSPITAL | Age: 76
End: 2021-07-02

## 2021-07-16 ENCOUNTER — APPOINTMENT (OUTPATIENT)
Dept: ULTRASOUND IMAGING | Facility: HOSPITAL | Age: 76
End: 2021-07-16

## 2021-07-21 ENCOUNTER — HOSPITAL ENCOUNTER (OUTPATIENT)
Dept: ULTRASOUND IMAGING | Facility: HOSPITAL | Age: 76
Discharge: HOME | End: 2021-07-21
Admitting: FAMILY MEDICINE

## 2021-07-21 DIAGNOSIS — E04.1 THYROID NODULE: ICD-10-CM

## 2021-07-21 DIAGNOSIS — E03.9 ACQUIRED HYPOTHYROIDISM: ICD-10-CM

## 2021-07-21 PROCEDURE — 76536 US EXAM OF HEAD AND NECK: CPT

## 2021-07-22 NOTE — PROGRESS NOTES
Please let her know there's a small cyst in right lobe of thyroid but not worrisome. No further imaging needed on thyroid at this time.

## 2021-08-08 DIAGNOSIS — E11.65 TYPE 2 DIABETES MELLITUS WITH HYPERGLYCEMIA, WITH LONG-TERM CURRENT USE OF INSULIN (HCC): ICD-10-CM

## 2021-08-08 DIAGNOSIS — Z79.4 TYPE 2 DIABETES MELLITUS WITH HYPERGLYCEMIA, WITH LONG-TERM CURRENT USE OF INSULIN (HCC): ICD-10-CM

## 2021-08-09 RX ORDER — INSULIN ASPART 100 [IU]/ML
INJECTION, SUSPENSION SUBCUTANEOUS
Qty: 130 ML | Refills: 1 | Status: SHIPPED | OUTPATIENT
Start: 2021-08-09 | End: 2021-12-29 | Stop reason: SDUPTHER

## 2021-08-19 DIAGNOSIS — F41.9 ANXIETY: ICD-10-CM

## 2021-08-20 RX ORDER — LORAZEPAM 0.5 MG/1
TABLET ORAL
Qty: 45 TABLET | Refills: 0 | Status: SHIPPED | OUTPATIENT
Start: 2021-08-20 | End: 2021-12-27 | Stop reason: SDUPTHER

## 2021-09-27 ENCOUNTER — TELEPHONE (OUTPATIENT)
Dept: INTERNAL MEDICINE | Facility: CLINIC | Age: 76
End: 2021-09-27

## 2021-09-27 NOTE — TELEPHONE ENCOUNTER
Caller: Marielena Moeller    Relationship: Self    Best call back number:  [phone]    What medication are you requesting: AMOXICILLIN 875 MG PLAIN NOT FLAGYL     What are your current symptoms: DIVERTICULITIS, STOOL HAS AN STRONG ODOR, LOWER SIDE PAIN WHEN WALKING, SEVERE CRAMPING       How long have you been experiencing symptoms: A COUPLE DAYS     Have you had these symptoms before:    [x] Yes  [] No    Have you been treated for these symptoms before:   [x] Yes  [] No    If a prescription is needed, what is your preferred pharmacy and phone number:  [facility]     Additional notes: PATIENT STATES SHE FEELS SO BAD THAT SHE CAN'T COME IN   SHE IS HAVING A DIVERTICULITIS FLAR UP AND IS REQUESTING MEDICATION   SHE ATE SOME TOMATOES THAT MIGHT HAVE STARTED IT  SHE IS DRINKING A LOT OF WINTER AND CLEAR LIQUIDS      ALSO PATIENT IS WANTING TO DO A STOOL TEST   PLEASE CALL ABOUT THIS

## 2021-09-27 NOTE — TELEPHONE ENCOUNTER
No, suggest she go to ER. I've had patients have ruptures and abscess formations when they've requested the same as she's asking for, suggest she be evaluated, needs CT scan. This isn't safe to just throw an antibiotic at blindly.

## 2021-09-28 NOTE — TELEPHONE ENCOUNTER
"Spoke with pt and advised she needs to go to the ER for this as it is unsafe to blindly throw antibiotics without knowing what is going on inside the intestines. Pt states she feels like we \"just dont want to treat her\". Advised I apologize I did not get back to her yesterday. However, Dr. Starr is not refusing to treat her it is just not safe. Advised if we treat with antibiotics and there are abscess lesions on the intestines she could perforate, end up with c-diff or lots of other emergent situations. Pt states the \"TV says not to go to the ER\". Advised I understand however, this is the safest option for her. Pt says she will go.   "

## 2021-11-03 DIAGNOSIS — E03.9 ACQUIRED HYPOTHYROIDISM: ICD-10-CM

## 2021-11-05 RX ORDER — LEVOTHYROXINE SODIUM 112 UG/1
112 TABLET ORAL DAILY
Qty: 90 TABLET | Refills: 1 | Status: SHIPPED | OUTPATIENT
Start: 2021-11-05 | End: 2022-05-12

## 2021-11-24 NOTE — TELEPHONE ENCOUNTER
Ms. Peguero called and said that her leg is hurting her really bad and wanting to know if you can refer her to a orthopedic? Said that it is swelling. Said she would come in for a appt if needed.    Additional Progress Note...

## 2021-12-27 DIAGNOSIS — F41.9 ANXIETY: ICD-10-CM

## 2021-12-27 RX ORDER — LORAZEPAM 0.5 MG/1
0.5 TABLET ORAL EVERY 8 HOURS PRN
Qty: 45 TABLET | Refills: 0 | Status: SHIPPED | OUTPATIENT
Start: 2021-12-27 | End: 2021-12-29 | Stop reason: SDUPTHER

## 2021-12-27 NOTE — TELEPHONE ENCOUNTER
Rx Refill Note  Requested Prescriptions     Pending Prescriptions Disp Refills   • LORazepam (ATIVAN) 0.5 MG tablet 45 tablet 0     Sig: Take 1 tablet by mouth Every 8 (Eight) Hours As Needed. for anxiety      Last office visit with prescribing clinician: 06/14/2021   Next office visit with prescribing clinician: Visit date not found        N/A    Jude Shukla MA  12/27/21, 11:51 EST

## 2021-12-27 NOTE — TELEPHONE ENCOUNTER
Caller: Marielena Moeller    Relationship: Self    Best call back number: [phone]    Requested Prescriptions:   Requested Prescriptions     Pending Prescriptions Disp Refills   • LORazepam (ATIVAN) 0.5 MG tablet 45 tablet 0     Sig: Take 1 tablet by mouth Every 8 (Eight) Hours As Needed. for anxiety        Pharmacy where request should be sent: [id number] - [place], [place] - [address] - [phone] [facility] [phone] FX     Additional details provided by patient:     Does the patient have less than a 3 day supply:  [x] Yes  [] No    Ines Hawkins Rep   12/27/21 11:25 EST

## 2021-12-29 ENCOUNTER — OFFICE VISIT (OUTPATIENT)
Dept: INTERNAL MEDICINE | Facility: CLINIC | Age: 76
End: 2021-12-29

## 2021-12-29 VITALS
BODY MASS INDEX: 34.37 KG/M2 | TEMPERATURE: 98.4 F | HEIGHT: 60 IN | RESPIRATION RATE: 16 BRPM | OXYGEN SATURATION: 100 % | SYSTOLIC BLOOD PRESSURE: 152 MMHG | HEART RATE: 76 BPM | DIASTOLIC BLOOD PRESSURE: 90 MMHG

## 2021-12-29 DIAGNOSIS — Z51.81 MEDICATION MONITORING ENCOUNTER: ICD-10-CM

## 2021-12-29 DIAGNOSIS — E11.59 TYPE 2 DIABETES MELLITUS WITH OTHER CIRCULATORY COMPLICATION, WITH LONG-TERM CURRENT USE OF INSULIN (HCC): ICD-10-CM

## 2021-12-29 DIAGNOSIS — E11.51 TYPE 2 DIABETES MELLITUS WITH DIABETIC PERIPHERAL ANGIOPATHY WITHOUT GANGRENE, WITH LONG-TERM CURRENT USE OF INSULIN (HCC): ICD-10-CM

## 2021-12-29 DIAGNOSIS — Z28.21 PNEUMOCOCCAL VACCINATION DECLINED BY PATIENT: ICD-10-CM

## 2021-12-29 DIAGNOSIS — E11.65 TYPE 2 DIABETES MELLITUS WITH HYPERGLYCEMIA, WITH LONG-TERM CURRENT USE OF INSULIN (HCC): ICD-10-CM

## 2021-12-29 DIAGNOSIS — Z91.81 AT HIGH RISK FOR FALLS: ICD-10-CM

## 2021-12-29 DIAGNOSIS — Z00.00 MEDICARE ANNUAL WELLNESS VISIT, SUBSEQUENT: Primary | ICD-10-CM

## 2021-12-29 DIAGNOSIS — Z79.4 TYPE 2 DIABETES MELLITUS WITH OTHER CIRCULATORY COMPLICATION, WITH LONG-TERM CURRENT USE OF INSULIN (HCC): ICD-10-CM

## 2021-12-29 DIAGNOSIS — J84.9 INTERSTITIAL LUNG DISEASE (HCC): ICD-10-CM

## 2021-12-29 DIAGNOSIS — I15.2 HYPERTENSION ASSOCIATED WITH DIABETES (HCC): ICD-10-CM

## 2021-12-29 DIAGNOSIS — E11.59 HYPERTENSION ASSOCIATED WITH DIABETES (HCC): ICD-10-CM

## 2021-12-29 DIAGNOSIS — E11.42 DIABETIC POLYNEUROPATHY ASSOCIATED WITH TYPE 2 DIABETES MELLITUS (HCC): ICD-10-CM

## 2021-12-29 DIAGNOSIS — Z79.4 TYPE 2 DIABETES MELLITUS WITH HYPERGLYCEMIA, WITH LONG-TERM CURRENT USE OF INSULIN (HCC): ICD-10-CM

## 2021-12-29 DIAGNOSIS — E03.9 ACQUIRED HYPOTHYROIDISM: ICD-10-CM

## 2021-12-29 DIAGNOSIS — E55.9 VITAMIN D DEFICIENCY: ICD-10-CM

## 2021-12-29 DIAGNOSIS — Z28.21 INFLUENZA VACCINATION DECLINED: ICD-10-CM

## 2021-12-29 DIAGNOSIS — I48.0 PAROXYSMAL ATRIAL FIBRILLATION (HCC): ICD-10-CM

## 2021-12-29 DIAGNOSIS — J44.9 CHRONIC OBSTRUCTIVE PULMONARY DISEASE, UNSPECIFIED COPD TYPE (HCC): ICD-10-CM

## 2021-12-29 DIAGNOSIS — Z79.4 TYPE 2 DIABETES MELLITUS WITH DIABETIC PERIPHERAL ANGIOPATHY WITHOUT GANGRENE, WITH LONG-TERM CURRENT USE OF INSULIN (HCC): ICD-10-CM

## 2021-12-29 DIAGNOSIS — F41.9 ANXIETY: ICD-10-CM

## 2021-12-29 DIAGNOSIS — Z79.4 LONG-TERM INSULIN USE (HCC): ICD-10-CM

## 2021-12-29 DIAGNOSIS — I42.9 CARDIOMYOPATHY, UNSPECIFIED TYPE (HCC): ICD-10-CM

## 2021-12-29 PROCEDURE — 96160 PT-FOCUSED HLTH RISK ASSMT: CPT | Performed by: FAMILY MEDICINE

## 2021-12-29 PROCEDURE — G0439 PPPS, SUBSEQ VISIT: HCPCS | Performed by: FAMILY MEDICINE

## 2021-12-29 PROCEDURE — 99397 PER PM REEVAL EST PAT 65+ YR: CPT | Performed by: FAMILY MEDICINE

## 2021-12-29 PROCEDURE — 1170F FXNL STATUS ASSESSED: CPT | Performed by: FAMILY MEDICINE

## 2021-12-29 PROCEDURE — 1159F MED LIST DOCD IN RCRD: CPT | Performed by: FAMILY MEDICINE

## 2021-12-29 PROCEDURE — 99213 OFFICE O/P EST LOW 20 MIN: CPT | Performed by: FAMILY MEDICINE

## 2021-12-29 PROCEDURE — 1125F AMNT PAIN NOTED PAIN PRSNT: CPT | Performed by: FAMILY MEDICINE

## 2021-12-29 RX ORDER — PROCHLORPERAZINE 25 MG/1
1 SUPPOSITORY RECTAL CONTINUOUS
Qty: 1 EACH | Refills: 0 | Status: SHIPPED | OUTPATIENT
Start: 2021-12-29

## 2021-12-29 RX ORDER — ALBUTEROL SULFATE 0.63 MG/3ML
1 SOLUTION RESPIRATORY (INHALATION) EVERY 6 HOURS PRN
Qty: 60 EACH | Refills: 11 | Status: SHIPPED | OUTPATIENT
Start: 2021-12-29

## 2021-12-29 RX ORDER — ALBUTEROL SULFATE 90 UG/1
2 AEROSOL, METERED RESPIRATORY (INHALATION) EVERY 6 HOURS PRN
Qty: 54 G | Refills: 3 | Status: SHIPPED | OUTPATIENT
Start: 2021-12-29

## 2021-12-29 RX ORDER — PROCHLORPERAZINE 25 MG/1
SUPPOSITORY RECTAL
Qty: 3 EACH | Refills: 11 | Status: SHIPPED | OUTPATIENT
Start: 2021-12-29

## 2021-12-29 RX ORDER — INSULIN ASPART 100 [IU]/ML
70 INJECTION, SUSPENSION SUBCUTANEOUS 2 TIMES DAILY WITH MEALS
Qty: 126 ML | Refills: 3 | Status: SHIPPED | OUTPATIENT
Start: 2021-12-29 | End: 2022-02-28

## 2021-12-29 RX ORDER — PROCHLORPERAZINE 25 MG/1
1 SUPPOSITORY RECTAL ONCE
Qty: 1 EACH | Refills: 0 | Status: SHIPPED | OUTPATIENT
Start: 2021-12-29 | End: 2021-12-29

## 2021-12-29 RX ORDER — LORAZEPAM 0.5 MG/1
0.5 TABLET ORAL EVERY 8 HOURS PRN
Qty: 45 TABLET | Refills: 3 | Status: SHIPPED | OUTPATIENT
Start: 2021-12-29

## 2021-12-29 NOTE — PROGRESS NOTES
"The ABCs of the Annual Wellness Visit  Subsequent Medicare Wellness Visit    Chief Complaint   Patient presents with   • Shortness of Breath     pt on oxygen, needs 6 min walk for insurance    • Medicare Wellness-subsequent      Subjective    History of Present Illness:  Marielena Moeller is a 76 y.o. female who presents for a Subsequent Medicare Wellness Visit. Her last in office visit with me was January 2020.    Wears oxygen, dependent on this. Must have updated order, six min walk.     The following portions of the patient's history were reviewed and   updated as appropriate: allergies, current medications, past family history, past medical history, past social history, past surgical history and problem list.    Compared to one year ago, the patient feels her physical   health is worse.    Compared to one year ago, the patient feels her mental   health is worse.    Recent Hospitalizations:  She was not admitted to the hospital during the last year.       Current Medical Providers:  Patient Care Team:  Marychuy Starr MD as PCP - General (Family Medicine)    Outpatient Medications Prior to Visit   Medication Sig Dispense Refill   • aspirin 81 MG tablet Take 81 mg by mouth. Every other day     • BD Veo Insulin Syringe U/F 31G X 15/64\" 0.5 ML misc USE AS DIRECTED TWICE DAILY 300 each 2   • Blood Glucose Monitoring Suppl (ACCU-CHEK CHIO PLUS) w/Device kit USE AS DIRECTED TO CHECK GLUCOSE 3 TIMES DAILY FOR DM E11.65 1 kit 0   • Cholecalciferol (Vitamin D3) 1.25 MG (96778 UT) capsule TAKE 1 CAPSULE BY MOUTH EVERY 7 DAYS 12 capsule 0   • doxazosin (CARDURA) 2 MG tablet Take 2 mg by mouth Every Night.     • glucose blood (Accu-Chek Chio Plus) test strip TEST THREE TIMES DAILY AS DIRECTED 300 each 3   • levothyroxine (SYNTHROID, LEVOTHROID) 112 MCG tablet TAKE 1 TABLET BY MOUTH DAILY 90 tablet 1   • MAGNESIUM-OXIDE 400 (241.3 Mg) MG tablet tablet TK 1 T PO QHS  3   • meclizine (ANTIVERT) 25 MG tablet      • " nystatin (MYCOSTATIN) 653465 UNIT/GM cream Apply  topically to the appropriate area as directed As Needed (yeast infection). 30 g 0   • O2 (OXYGEN) Inhale 2 L/min As Needed.     • ondansetron (ZOFRAN) 4 MG tablet Take 1 tablet by mouth Every 6 (Six) Hours As Needed for Nausea. 12 tablet 0   • triamterene (DYRENIUM) 50 MG capsule TAKE 1 CAPSULE BY MOUTH EVERY DAY AS NEEDED 90 capsule 1   • albuterol (ACCUNEB) 0.63 MG/3ML nebulizer solution Take 3 mL by nebulization Every 6 (Six) Hours As Needed for Wheezing. 42 ampule 1   • albuterol sulfate HFA (VENTOLIN HFA) 108 (90 Base) MCG/ACT inhaler Inhale 2 puffs Every 4 (Four) Hours As Needed for Wheezing or Shortness of Air. 1 inhaler 5   • LORazepam (ATIVAN) 0.5 MG tablet Take 1 tablet by mouth Every 8 (Eight) Hours As Needed for Anxiety. In office visit needed for further refills. 45 tablet 0   • NovoLOG Mix 70/30 (70-30) 100 UNIT/ML injection INJECT 70 UNITS UNDER THE SKIN TWICE DAILY WITH MEALS 130 mL 1   • oxyCODONE-acetaminophen (PERCOCET) 5-325 MG per tablet Take 1 tablet by mouth Every 6 (Six) Hours As Needed for Severe Pain . 60 tablet 0   • warfarin (Coumadin) 10 MG tablet Take one tablet daily 30 tablet 1     No facility-administered medications prior to visit.       No opioid medication identified on active medication list. I have reviewed chart for other potential  high risk medication/s and harmful drug interactions in the elderly.          Aspirin is on active medication list. aspirin is recommended 81 mg daily and is listed on her med list but she states she is not taking. suggested use..      Patient Active Problem List   Diagnosis   • Type 2 diabetes mellitus with hyperglycemia, with long-term current use of insulin (HCC)   • Hypertension associated with diabetes (HCC)   • Osteoporosis   • Class 2 obesity with body mass index (BMI) of 35.0 to 35.9 in adult   • Acquired hypothyroidism   • Vitamin D deficiency   • History of colon polyps   • COPD (chronic  "obstructive pulmonary disease) (HCC)   • Pneumococcal vaccination declined by patient   • Anxiety   • Type 2 diabetes mellitus with circulatory disorder, with long-term current use of insulin (HCC)   • Paroxysmal atrial fibrillation (HCC)   • Interstitial lung disease (HCC)   • Myalgia   • Warfarin anticoagulation   • Type 2 diabetes mellitus with diabetic peripheral angiopathy without gangrene, with long-term current use of insulin (HCC)   • Influenza vaccination declined     Advance Care Planning  Advance Directive is not on file.  ACP discussion was held with the patient during this visit. Patient does not have an advance directive, declines further assistance.    Review of Systems   Constitutional: Positive for fatigue. Negative for fever.   Respiratory: Positive for shortness of breath.    Musculoskeletal: Positive for gait problem.   Neurological: Negative for dizziness.        Objective    Vitals:    12/29/21 1600   BP: 152/90   BP Location: Right arm   Patient Position: Sitting   Cuff Size: Adult   Pulse: 76   Resp: 16   Temp: 98.4 °F (36.9 °C)   TempSrc: Temporal   SpO2: 100%  Comment: 2.5L   Weight: Comment: pt did not weigh   Height: 152.4 cm (60\")   PainSc:   4   PainLoc: Hip     Body mass index is 34.37 kg/m².  BMI has not been calculated during today's encounter.     Does the patient have evidence of cognitive impairment? No    Physical Exam  Vitals and nursing note reviewed.   Constitutional:       General: She is not in acute distress.     Appearance: Normal appearance. She is well-developed and well-groomed. She is obese. She is not ill-appearing, toxic-appearing or diaphoretic.      Interventions: Nasal cannula and face mask in place.      Comments: In wheelchair   HENT:      Head: Normocephalic and atraumatic.      Right Ear: Hearing, tympanic membrane, ear canal and external ear normal.      Left Ear: Hearing, tympanic membrane, ear canal and external ear normal.   Eyes:      General: Lids are " normal. No scleral icterus.     Extraocular Movements: Extraocular movements intact.   Neck:      Trachea: Phonation normal.   Cardiovascular:      Rate and Rhythm: Normal rate and regular rhythm.   Pulmonary:      Effort: Pulmonary effort is normal.      Breath sounds: Decreased air movement present. No stridor. No wheezing, rhonchi or rales.   Abdominal:      General: Abdomen is protuberant.      Comments: Abdominal hernia   Musculoskeletal:      Cervical back: Neck supple.   Skin:     General: Skin is warm and dry.      Coloration: Skin is not cyanotic, jaundiced or pale.   Neurological:      General: No focal deficit present.      Mental Status: She is alert and oriented to person, place, and time.      Motor: Motor function is intact.   Psychiatric:         Attention and Perception: Attention and perception normal.         Mood and Affect: Mood and affect normal.         Speech: Speech normal.         Behavior: Behavior normal. Behavior is cooperative.         Thought Content: Thought content normal.         Cognition and Memory: Cognition and memory normal.         Judgment: Judgment normal.             • Oxygen Saturation at rest on room air 95%  • Walked on room air for six minute walk and oxygen saturation dropped to 85%  • Applied oxygen at 2.5L/min while continuing to walk.  • At  1 minutes walking with oxygen at 2.5L/min oxygen saturation up to 95%        HEALTH RISK ASSESSMENT    Smoking Status:  Social History     Tobacco Use   Smoking Status Former Smoker   • Packs/day: 2.00   • Years: 15.00   • Pack years: 30.00   • Types: Cigarettes   • Quit date:    • Years since quittin.0   Smokeless Tobacco Never Used     Alcohol Consumption:  Social History     Substance and Sexual Activity   Alcohol Use No     Fall Risk Screen:    STEADI Fall Risk Assessment was completed, and patient is at MODERATE risk for falls. Assessment completed on:2021    Depression Screening:  PHQ-2/PHQ-9 Depression  Screening 12/29/2021   Little interest or pleasure in doing things 0   Feeling down, depressed, or hopeless 1   Total Score 1       Health Habits and Functional and Cognitive Screening:  Functional & Cognitive Status 12/29/2021   Do you have difficulty preparing food and eating? No   Do you have difficulty bathing yourself, getting dressed or grooming yourself? Yes   Do you have difficulty using the toilet? No   Do you have difficulty moving around from place to place? Yes   Do you have trouble with steps or getting out of a bed or a chair? Yes   Current Diet Well Balanced Diet   Dental Exam Up to date   Eye Exam Up to date   Exercise (times per week) 0 times per week        Exercise Frequency Comment -   Current Exercises Include No Regular Exercise   Current Exercise Activities Include -   Do you need help using the phone?  No   Are you deaf or do you have serious difficulty hearing?  Yes   Do you need help with transportation? No   Do you need help shopping? No   Do you need help preparing meals?  No   Do you need help with housework?  Yes   Do you need help with laundry? No   Do you need help taking your medications? No   Do you need help managing money? No   Do you ever drive or ride in a car without wearing a seat belt? No   Have you felt unusual stress, anger or loneliness in the last month? Yes   Who do you live with? Alone   If you need help, do you have trouble finding someone available to you? No   Have you been bothered in the last four weeks by sexual problems? No   Do you have difficulty concentrating, remembering or making decisions? No       Age-appropriate Screening Schedule:  Refer to the list below for future screening recommendations based on patient's age, sex and/or medical conditions. Orders for these recommended tests are listed in the plan section. The patient has been provided with a written plan.    Health Maintenance   Topic Date Due   • DIABETIC FOOT EXAM  12/11/2018   • DIABETIC EYE  EXAM  09/27/2019   • LIPID PANEL  09/13/2020   • URINE MICROALBUMIN  01/17/2021   • HEMOGLOBIN A1C  02/05/2021   • ZOSTER VACCINE (2 of 2) 12/01/2022 (Originally 2/5/2018)   • INFLUENZA VACCINE  12/29/2022 (Originally 8/1/2021)   • MAMMOGRAM  08/07/2022   • DXA SCAN  Discontinued   • TDAP/TD VACCINES  Discontinued              Assessment/Plan   CMS Preventative Services Quick Reference  Risk Factors Identified During Encounter  Cardiovascular Disease  Fall Risk-High or Moderate  Immunizations Discussed/Encouraged (specific Immunizations; Influenza, Pneumococcal 23, Shingrix and COVID19  Inactivity/Sedentary  Obesity/Overweight   Polypharmacy  The above risks/problems have been discussed with the patient.  Follow up actions/plans if indicated are seen below in the Assessment/Plan Section.  Pertinent information has been shared with the patient in the After Visit Summary.    Diagnoses and all orders for this visit:    1. Medicare annual wellness visit, subsequent (Primary)    2. Chronic obstructive pulmonary disease, unspecified COPD type (HCC)  -     CBC (No Diff)  -     Oxygen Therapy  -     albuterol sulfate HFA (Ventolin HFA) 108 (90 Base) MCG/ACT inhaler; Inhale 2 puffs Every 6 (Six) Hours As Needed for Wheezing or Shortness of Air.  Dispense: 54 g; Refill: 3  -     albuterol (ACCUNEB) 0.63 MG/3ML nebulizer solution; Take 3 mL by nebulization Every 6 (Six) Hours As Needed for Wheezing.  Dispense: 60 each; Refill: 11    3. Interstitial lung disease (HCC)  -     CBC (No Diff)  -     Oxygen Therapy    4. Type 2 diabetes mellitus with hyperglycemia, with long-term current use of insulin (HCC)  -     Hemoglobin A1c  -     Lipid Panel  -     TSH+Free T4  -     Continuous Blood Gluc Transmit (Dexcom G6 Transmitter) misc; 1 each 1 (One) Time for 1 dose.  Dispense: 1 each; Refill: 0  -     Continuous Blood Gluc Sensor (Dexcom G6 Sensor); Every 10 (Ten) Days. Apply as directed  Dispense: 3 each; Refill: 11  -      Continuous Blood Gluc  (Dexcom G6 ) device; 1 each Continuous.  Dispense: 1 each; Refill: 0  -     NovoLOG Mix 70/30 (70-30) 100 UNIT/ML injection; Inject 70 Units under the skin into the appropriate area as directed 2 (Two) Times a Day With Meals.  Dispense: 126 mL; Refill: 3    5. Type 2 diabetes mellitus with other circulatory complication, with long-term current use of insulin (HCC)  -     Hemoglobin A1c  -     Lipid Panel  -     TSH+Free T4  -     Continuous Blood Gluc Transmit (Dexcom G6 Transmitter) misc; 1 each 1 (One) Time for 1 dose.  Dispense: 1 each; Refill: 0  -     Continuous Blood Gluc Sensor (Dexcom G6 Sensor); Every 10 (Ten) Days. Apply as directed  Dispense: 3 each; Refill: 11  -     Continuous Blood Gluc  (Dexcom G6 ) device; 1 each Continuous.  Dispense: 1 each; Refill: 0  -     NovoLOG Mix 70/30 (70-30) 100 UNIT/ML injection; Inject 70 Units under the skin into the appropriate area as directed 2 (Two) Times a Day With Meals.  Dispense: 126 mL; Refill: 3    6. Type 2 diabetes mellitus with diabetic peripheral angiopathy without gangrene, with long-term current use of insulin (HCC)  -     Hemoglobin A1c  -     Lipid Panel  -     TSH+Free T4  -     NovoLOG Mix 70/30 (70-30) 100 UNIT/ML injection; Inject 70 Units under the skin into the appropriate area as directed 2 (Two) Times a Day With Meals.  Dispense: 126 mL; Refill: 3    7. Diabetic polyneuropathy associated with type 2 diabetes mellitus (HCC)  -     Hemoglobin A1c  -     TSH+Free T4  -     Vitamin B12  -     Folate  -     CBC (No Diff)  -     Comprehensive Metabolic Panel    8. Long-term insulin use (HCC)  -     Hemoglobin A1c    9. Cardiomyopathy, unspecified type (HCC)  -     TSH+Free T4  -     Oxygen Therapy    10. Paroxysmal atrial fibrillation (HCC)  -     Magnesium    11. Hypertension associated with diabetes (HCC)  -     TSH+Free T4    12. Anxiety  -     LORazepam (ATIVAN) 0.5 MG tablet; Take 1 tablet  by mouth Every 8 (Eight) Hours As Needed for Anxiety.  Dispense: 45 tablet; Refill: 3    13. Acquired hypothyroidism  -     TSH+Free T4    14. Vitamin D deficiency  -     Vitamin D 25 Hydroxy  -     Comprehensive Metabolic Panel    15. At high risk for falls    16. Influenza vaccination declined  Comments:  encouraged at some point    17. Pneumococcal vaccination declined by patient  Comments:  encouraged at some point    18. Medication monitoring encounter  -     Hemoglobin A1c  -     Lipid Panel  -     TSH+Free T4  -     Vitamin D 25 Hydroxy  -     Vitamin B12  -     Folate  -     CBC (No Diff)  -     Comprehensive Metabolic Panel  -     Magnesium        Follow Up:   Return in about 3 months (around 4/3/2022) for Diabetes follow up.     An After Visit Summary and PPPS were made available to the patient.        I spent 45 minutes caring for Marielena on this date of service. This time includes time spent by me in the following activities:preparing for the visit, reviewing tests, performing a medically appropriate examination and/or evaluation , counseling and educating the patient/family/caregiver, ordering medications, tests, or procedures and documenting information in the medical record     I spent 20 minutes on the separately reported service of [id number]. This time is not included in the time used to support the E/M service also reported today.

## 2021-12-29 NOTE — PATIENT INSTRUCTIONS
Fall Prevention in the Home, Adult  Falls can cause injuries and can affect people from all age groups. There are many simple things that you can do to make your home safe and to help prevent falls. Ask for help when making these changes, if needed.  What actions can I take to prevent falls?  General instructions  · Use good lighting in all rooms. Replace any light bulbs that burn out.  · Turn on lights if it is dark. Use night-lights.  · Place frequently used items in easy-to-reach places. Lower the shelves around your home if necessary.  · Set up furniture so that there are clear paths around it. Avoid moving your furniture around.  · Remove throw rugs and other tripping hazards from the floor.  · Avoid walking on wet floors.  · Fix any uneven floor surfaces.  · Add color or contrast paint or tape to grab bars and handrails in your home. Place contrasting color strips on the first and last steps of stairways.  · When you use a stepladder, make sure that it is completely opened and that the sides are firmly locked. Have someone hold the ladder while you are using it. Do not climb a closed stepladder.  · Be aware of any and all pets.  What can I do in the bathroom?         · Keep the floor dry. Immediately clean up any water that spills onto the floor.  · Remove soap buildup in the tub or shower on a regular basis.  · Use non-skid mats or decals on the floor of the tub or shower.  · Attach bath mats securely with double-sided, non-slip rug tape.  · If you need to sit down while you are in the shower, use a plastic, non-slip stool.  · Install grab bars by the toilet and in the tub and shower. Do not use towel bars as grab bars.  What can I do in the bedroom?  · Make sure that a bedside light is easy to reach.  · Do not use oversized bedding that drapes onto the floor.  · Have a firm chair that has side arms to use for getting dressed.  What can I do in the kitchen?  · Clean up any spills right away.  · If you  need to reach for something above you, use a sturdy step stool that has a grab bar.  · Keep electrical cables out of the way.  · Do not use floor polish or wax that makes floors slippery. If you must use wax, make sure that it is non-skid floor wax.  What can I do in the stairways?  · Do not leave any items on the stairs.  · Make sure that you have a light switch at the top of the stairs and the bottom of the stairs. Have them installed if you do not have them.  · Make sure that there are handrails on both sides of the stairs. Fix handrails that are broken or loose. Make sure that handrails are as long as the stairways.  · Install non-slip stair treads on all stairs in your home.  · Avoid having throw rugs at the top or bottom of stairways, or secure the rugs with carpet tape to prevent them from moving.  · Choose a carpet design that does not hide the edge of steps on the stairway.  · Check any carpeting to make sure that it is firmly attached to the stairs. Fix any carpet that is loose or worn.  What can I do on the outside of my home?  · Use bright outdoor lighting.  · Regularly repair the edges of walkways and driveways and fix any cracks.  · Remove high doorway thresholds.  · Trim any shrubbery on the main path into your home.  · Regularly check that handrails are securely fastened and in good repair. Both sides of any steps should have handrails.  · Install guardrails along the edges of any raised decks or porches.  · Clear walkways of debris and clutter, including tools and rocks.  · Have leaves, snow, and ice cleared regularly.  · Use sand or salt on walkways during winter months.  · In the garage, clean up any spills right away, including grease or oil spills.  What other actions can I take?  · Wear closed-toe shoes that fit well and support your feet. Wear shoes that have rubber soles or low heels.  · Use mobility aids as needed, such as canes, walkers, scooters, and crutches.  · Review your medicines with  your health care provider. Some medicines can cause dizziness or changes in blood pressure, which increase your risk of falling.  Talk with your health care provider about other ways that you can decrease your risk of falls. This may include working with a physical therapist or  to improve your strength, balance, and endurance.  Where to find more information  · Centers for Disease Control and Prevention, [place]: https://www.cdc.gov  · National [place] on Aging: https://ke1hpon.sav.nih.gov  Contact a health care provider if:  · You are afraid of falling at home.  · You feel weak, drowsy, or dizzy at home.  · You fall at home.  Summary  · There are many simple things that you can do to make your home safe and to help prevent falls.  · Ways to make your home safe include removing tripping hazards and installing grab bars in the bathroom.  · Ask for help when making these changes in your home.  This information is not intended to replace advice given to you by your health care provider. Make sure you discuss any questions you have with your health care provider.  Document Revised: 11/30/2018 Document Reviewed: 08/02/2018  [employer] Patient Education © 2021 [employer]      Chronic Obstructive Pulmonary Disease    Chronic obstructive pulmonary disease (COPD) is a long-term (chronic) condition that affects the lungs. COPD is a general term that can be used to describe many different lung problems that cause lung swelling (inflammation) and limit airflow, including chronic bronchitis and emphysema. If you have COPD, your lung function will probably never return to normal. In most cases, it gets worse over time. However, there are steps you can take to slow the progression of the disease and improve your quality of life.  What are the causes?  This condition may be caused by:  · Smoking. This is the most common cause.  · Certain genes passed down through families.  What increases the risk?  The following factors  may make you more likely to develop this condition:  · Secondhand smoke from cigarettes, pipes, or cigars.  · Exposure to chemicals and other irritants such as fumes and dust in the work environment.  · Chronic lung conditions or infections.  What are the signs or symptoms?  Symptoms of this condition include:  · Shortness of breath, especially during physical activity.  · Chronic cough with a large amount of thick mucus. Sometimes the cough may not have any mucus (dry cough).  · Wheezing.  · Rapid breaths.  · Gray or bluish discoloration (cyanosis) of the skin, especially in your fingers, toes, or lips.  · Feeling tired (fatigue).  · Weight loss.  · Chest tightness.  · Frequent infections.  · Episodes when breathing symptoms become much worse (exacerbations).  · Swelling in the ankles, feet, or legs. This may occur in later stages of the disease.  How is this diagnosed?  This condition is diagnosed based on:  · Your medical history.  · A physical exam.  You may also have tests, including:  · Lung (pulmonary) function tests. This may include a spirometry test, which measures your ability to exhale properly.  · Chest X-ray.  · CT scan.  · Blood tests.  How is this treated?  This condition may be treated with:  · Medicines. These may include inhaled rescue medicines to treat acute exacerbations as well as long-term, or maintenance, medicines to prevent flare-ups of COPD.  ? Bronchodilators help treat COPD by dilating the airways to allow increased airflow and make your breathing more comfortable.  ? Steroids can reduce airway inflammation and help prevent exacerbations.  · Smoking cessation. If you smoke, your health care provider may ask you to quit, and may also recommend therapy or replacement products to help you quit.  · Pulmonary rehabilitation. This may involve working with a team of health care providers and specialists, such as respiratory, occupational, and physical therapists.  · Exercise and physical  activity. These are beneficial for nearly all people with COPD.  · Nutrition therapy to gain weight, if you are underweight.  · Oxygen. Supplemental oxygen therapy is only helpful if you have a low oxygen level in your blood (hypoxemia).  · Lung surgery or transplant.  · Palliative care. This is to help people with COPD feel comfortable when treatment is no longer working.  Follow these instructions at home:  Medicines  · Take over-the-counter and prescription medicines (inhaled or pills) only as told by your health care provider.  · Talk to your health care provider before taking any cough or allergy medicines. You may need to avoid certain medicines that dry out your airways.  Lifestyle  · If you are a smoker, the most important thing that you can do is to stop smoking. Do not use any products that contain nicotine or tobacco, such as cigarettes and e-cigarettes. If you need help quitting, ask your health care provider. Continuing to smoke will cause the disease to progress faster.  · Avoid exposure to things that irritate your lungs, such as smoke, chemicals, and fumes.  · Stay active, but balance activity with periods of rest. Exercise and physical activity will help you maintain your ability to do things you want to do.  · Learn and use relaxation techniques to manage stress and to control your breathing.  · Get the right amount of sleep and get quality sleep. Most adults need 7 or more hours per night.  · Eat healthy foods. Eating smaller, more frequent meals and resting before meals may help you maintain your strength.  Controlled breathing  Learn and use controlled breathing techniques as directed by your health care provider. Controlled breathing techniques include:  · Pursed lip breathing. Start by breathing in (inhaling) through your nose for 1 second. Then, purse your lips as if you were going to whistle and breathe out (exhale) through the pursed lips for 2 seconds.  · Diaphragmatic breathing. Start by  putting one hand on your abdomen just above your waist. Inhale slowly through your nose. The hand on your abdomen should move out. Then purse your lips and exhale slowly. You should be able to feel the hand on your abdomen moving in as you exhale.  Controlled coughing  Learn and use controlled coughing to clear mucus from your lungs. Controlled coughing is a series of short, progressive coughs. The steps of controlled coughing are:  1. Lean your head slightly forward.  2. Breathe in deeply using diaphragmatic breathing.  3. Try to hold your breath for 3 seconds.  4. Keep your mouth slightly open while coughing twice.  5. Spit any mucus out into a tissue.  6. Rest and repeat the steps once or twice as needed.  General instructions  · Make sure you receive all the vaccines that your health care provider recommends, especially the pneumococcal and influenza vaccines. Preventing infection and hospitalization is very important when you have COPD.  · Use oxygen therapy and pulmonary rehabilitation if directed to by your health care provider. If you require home oxygen therapy, ask your health care provider whether you should purchase a pulse oximeter to measure your oxygen level at home.  · Work with your health care provider to develop a COPD action plan. This will help you know what steps to take if your condition gets worse.  · Keep other chronic health conditions under control as told by your health care provider.  · Avoid extreme temperature and humidity changes.  · Avoid contact with people who have an illness that spreads from person to person (is contagious), such as viral infections or pneumonia.  · Keep all follow-up visits as told by your health care provider. This is important.  Contact a health care provider if:  · You are coughing up more mucus than usual.  · There is a change in the color or thickness of your mucus.  · Your breathing is more labored than usual.  · Your breathing is faster than usual.  · You  have difficulty sleeping.  · You need to use your rescue medicines or inhalers more often than expected.  · You have trouble doing routine activities such as getting dressed or walking around the house.  Get help right away if:  · You have shortness of breath while you are resting.  · You have shortness of breath that prevents you from:  ? Being able to talk.  ? Performing your usual physical activities.  · You have chest pain lasting longer than 5 minutes.  · Your skin color is more blue (cyanotic) than usual.  · You measure low oxygen saturations for longer than 5 minutes with a pulse oximeter.  · You have a fever.  · You feel too tired to breathe normally.  Summary  · Chronic obstructive pulmonary disease (COPD) is a long-term (chronic) condition that affects the lungs.  · Your lung function will probably never return to normal. In most cases, it gets worse over time. However, there are steps you can take to slow the progression of the disease and improve your quality of life.  · Treatment for COPD may include taking medicines, quitting smoking, pulmonary rehabilitation, and changes to diet and exercise. As the disease progresses, you may need oxygen therapy, a lung transplant, or palliative care.  · To help manage your condition, do not smoke, avoid exposure to things that irritate your lungs, stay up to date on all vaccines, and follow your health care provider's instructions for taking medicines.  This information is not intended to replace advice given to you by your health care provider. Make sure you discuss any questions you have with your health care provider.  Document Revised: 11/30/2018 Document Reviewed: 01/22/2018  [employer] Patient Education © 2021 [employer]      Medicare Wellness  Personal Prevention Plan of Service     Date of Office Visit:  12/29/2021  Encounter Provider:  Marychuy Starr MD  Place of Service:  [facility]  Patient Name: Marielena Shea  Sajan  :  1945    As part of the Medicare Wellness portion of your visit today, we are providing you with this personalized preventive plan of services (PPPS). This plan is based upon recommendations of the [place] Preventive Services Task Force (USPSTF) and the Advisory Committee on Immunization Practices (ACIP).    This lists the preventive care services that should be considered, and provides dates of when you are due. Items listed as completed are up-to-date and do not require any further intervention.    Health Maintenance   Topic Date Due   • DIABETIC FOOT EXAM  2018   • DIABETIC EYE EXAM  2019   • ANNUAL WELLNESS VISIT  2020   • LIPID PANEL  2020   • URINE MICROALBUMIN  2021   • HEMOGLOBIN A1C  2021   • Pneumococcal Vaccine 65+ (1 of 2 - PPSV23) 2022 (Originally 1951)   • ZOSTER VACCINE (2 of 2) 2022 (Originally 2018)   • INFLUENZA VACCINE  2022 (Originally 2021)   • COVID-19 Vaccine (3 - Booster for Pfizer series) 2022   • MAMMOGRAM  2022   • COLORECTAL CANCER SCREENING  08/10/2028   • HEPATITIS C SCREENING  Completed   • DXA SCAN  Discontinued   • TDAP/TD VACCINES  Discontinued       Orders Placed This Encounter   Procedures   • Oxygen Therapy     Order Specific Question:   Delivery Modality     Answer:   Nasal Cannula     Order Specific Question:   Liters Per Minute:     Answer:   2.5     Order Specific Question:   Duration:     Answer:   Continuous     Order Specific Question:   Equipment     Answer:    Oxygen Concentrator &  &  Portable Gaseous Oxygen System & Portable Oxygen Contents Gaseous &  Conserving Regulator     Order Specific Question:   The face to face evaluation was performed on     Answer:   2021     Order Specific Question:   Which DME company is this being sent to?     Answer:   [employer] [[id number]]     Order Specific Question:   Length of Need (99 Months =  Lifetime)     Answer:   99 Months = Lifetime   • Hemoglobin A1c     Order Specific Question:   Release to patient     Answer:   Immediate   • Lipid Panel     Order Specific Question:   [facility] Has the patient fasted?     Answer:   Yes   • TSH+Free T4     Order Specific Question:   Release to patient     Answer:   Immediate   • Vitamin D 25 Hydroxy     Order Specific Question:   Release to patient     Answer:   Immediate   • Vitamin B12     Order Specific Question:   Release to patient     Answer:   Immediate   • Folate     Order Specific Question:   Release to patient     Answer:   Immediate   • CBC (No Diff)     Order Specific Question:   Release to patient     Answer:   Immediate   • Comprehensive Metabolic Panel     Order Specific Question:   Release to patient     Answer:   Immediate   • Magnesium     Order Specific Question:   Release to patient     Answer:   Immediate       Return in about 3 months (around 4/3/2022) for Diabetes follow up.

## 2021-12-30 ENCOUNTER — TELEPHONE (OUTPATIENT)
Dept: INTERNAL MEDICINE | Facility: CLINIC | Age: 76
End: 2021-12-30

## 2021-12-30 LAB
25(OH)D3+25(OH)D2 SERPL-MCNC: 44.4 NG/ML (ref 30–100)
ALBUMIN SERPL-MCNC: 4 G/DL (ref 3.7–4.7)
ALBUMIN/GLOB SERPL: 1.8 {RATIO} (ref 1.2–2.2)
ALP SERPL-CCNC: 89 IU/L (ref 44–121)
ALT SERPL-CCNC: 14 IU/L (ref 0–32)
AST SERPL-CCNC: 18 IU/L (ref 0–40)
BILIRUB SERPL-MCNC: <0.2 MG/DL (ref 0–1.2)
BUN SERPL-MCNC: 13 MG/DL (ref 8–27)
BUN/CREAT SERPL: 22 (ref 12–28)
CALCIUM SERPL-MCNC: 9.5 MG/DL (ref 8.7–10.3)
CHLORIDE SERPL-SCNC: 98 MMOL/L (ref 96–106)
CHOLEST SERPL-MCNC: 257 MG/DL (ref 100–199)
CO2 SERPL-SCNC: 28 MMOL/L (ref 20–29)
CREAT SERPL-MCNC: 0.6 MG/DL (ref 0.57–1)
ERYTHROCYTE [DISTWIDTH] IN BLOOD BY AUTOMATED COUNT: 12 % (ref 11.7–15.4)
FOLATE SERPL-MCNC: 7.1 NG/ML
GLOBULIN SER CALC-MCNC: 2.2 G/DL (ref 1.5–4.5)
GLUCOSE SERPL-MCNC: 129 MG/DL (ref 65–99)
HBA1C MFR BLD: 9.7 % (ref 4.8–5.6)
HCT VFR BLD AUTO: 37.9 % (ref 34–46.6)
HDLC SERPL-MCNC: 57 MG/DL
HGB BLD-MCNC: 12.5 G/DL (ref 11.1–15.9)
LDLC SERPL CALC-MCNC: 175 MG/DL (ref 0–99)
MAGNESIUM SERPL-MCNC: 1.9 MG/DL (ref 1.6–2.3)
MCH RBC QN AUTO: 31.2 PG (ref 26.6–33)
MCHC RBC AUTO-ENTMCNC: 33 G/DL (ref 31.5–35.7)
MCV RBC AUTO: 95 FL (ref 79–97)
PLATELET # BLD AUTO: 235 X10E3/UL (ref 150–450)
POTASSIUM SERPL-SCNC: 4.5 MMOL/L (ref 3.5–5.2)
PROT SERPL-MCNC: 6.2 G/DL (ref 6–8.5)
RBC # BLD AUTO: 4.01 X10E6/UL (ref 3.77–5.28)
SODIUM SERPL-SCNC: 143 MMOL/L (ref 134–144)
T4 FREE SERPL-MCNC: 1.58 NG/DL (ref 0.82–1.77)
TRIGL SERPL-MCNC: 137 MG/DL (ref 0–149)
TSH SERPL DL<=0.005 MIU/L-ACNC: 1.63 UIU/ML (ref 0.45–4.5)
VIT B12 SERPL-MCNC: 700 PG/ML (ref 232–1245)
VLDLC SERPL CALC-MCNC: 25 MG/DL (ref 5–40)
WBC # BLD AUTO: 7.1 X10E3/UL (ref 3.4–10.8)

## 2022-01-03 NOTE — TELEPHONE ENCOUNTER
"Need to take out the 2 min part of the walking    · Needs to say \"patient oxygen saturation dropped to 85% on 6 minute walk\"    "

## 2022-01-21 ENCOUNTER — TELEPHONE (OUTPATIENT)
Dept: INTERNAL MEDICINE | Facility: CLINIC | Age: 77
End: 2022-01-21

## 2022-01-21 NOTE — TELEPHONE ENCOUNTER
Will you check status of continuous glucose monitor? As we've explained in past we won't send an antibiotic without evaluation. I've had many patients recently who have had ear pain who did not have infection. Should consider covid testing.

## 2022-01-21 NOTE — TELEPHONE ENCOUNTER
Caller: Marielena Moeller    Relationship: Self    Best call back number: [phone]    What medication are you requesting: AMOXACILLIN   What are your current symptoms:   LEFT EAR PAIN AND LOW GRADE FEVER    How long have you been experiencing symptoms:   COUPLE WEEKS  Have you had these symptoms before:    [x] Yes  [] No    Have you been treated for these symptoms before:   [x] Yes  [] No    If a prescription is needed, what is your preferred pharmacy and phone number:      [id number] [address] [facility] - [phone] [facility] [phone] FX        Additional notes:    PT NOT ABLE TO COME IN FOR AN APPT

## 2022-01-21 NOTE — TELEPHONE ENCOUNTER
PT CALLED STATED THAT SHE HAS NOT RECEIVED RX   Continuous Blood Gluc  (Dexcom G6 ) device      AND   Continuous Blood Gluc Sensor (Dexcom G6 Sensor      FROM PHARMACY,    PLEASE ADVISE.  CALL BACK:[phone]    [id number] - [place] - [address] - [phone]  - [phone] FX

## 2022-01-24 NOTE — TELEPHONE ENCOUNTER
Called pt to inform of  message as well as her dexcom PA has not come back just yet, left vm to call back

## 2022-01-24 NOTE — TELEPHONE ENCOUNTER
Called pt, she informed me she has no way out of her house to get tested and is okay without the medication. I informed pt if she got worse to go to the ER since she was not willing to come into the office. Pt agreed. I also informed pt we do not have results on the PA for her dexcom yet.

## 2022-02-20 DIAGNOSIS — Z79.4 TYPE 2 DIABETES MELLITUS WITH HYPERGLYCEMIA, WITH LONG-TERM CURRENT USE OF INSULIN: ICD-10-CM

## 2022-02-20 DIAGNOSIS — E11.65 TYPE 2 DIABETES MELLITUS WITH HYPERGLYCEMIA, WITH LONG-TERM CURRENT USE OF INSULIN: ICD-10-CM

## 2022-02-21 RX ORDER — SYRING-NEEDL,DISP,INSUL,0.3 ML 31GX15/64"
SYRINGE, EMPTY DISPOSABLE MISCELLANEOUS
Qty: 300 EACH | Refills: 2 | Status: SHIPPED | OUTPATIENT
Start: 2022-02-21

## 2022-02-21 NOTE — TELEPHONE ENCOUNTER
"Rx Refill Note  Requested Prescriptions     Pending Prescriptions Disp Refills   • BD Veo Insulin Syringe U/F 31G X 15/64\" 0.5 ML misc [Pharmacy Med Name: B-D INS SYR UF 0.5ML/31GX 15/64] 300 each 2     Sig: USE AS DIRECTED TWICE DAILY      Last office visit with prescribing clinician: 12/29/2021      Next office visit with prescribing clinician: 4/1/2022            Jude Shukla MA  02/21/22, 12:43 EST  "

## 2022-02-23 ENCOUNTER — TELEPHONE (OUTPATIENT)
Dept: INTERNAL MEDICINE | Facility: CLINIC | Age: 77
End: 2022-02-23

## 2022-02-23 DIAGNOSIS — Z79.4 TYPE 2 DIABETES MELLITUS WITH HYPERGLYCEMIA, WITH LONG-TERM CURRENT USE OF INSULIN: ICD-10-CM

## 2022-02-23 DIAGNOSIS — I42.9 CARDIOMYOPATHY, UNSPECIFIED TYPE: ICD-10-CM

## 2022-02-23 DIAGNOSIS — E11.42 DIABETIC POLYNEUROPATHY ASSOCIATED WITH TYPE 2 DIABETES MELLITUS: ICD-10-CM

## 2022-02-23 DIAGNOSIS — J44.9 CHRONIC OBSTRUCTIVE PULMONARY DISEASE, UNSPECIFIED COPD TYPE: Primary | ICD-10-CM

## 2022-02-23 DIAGNOSIS — R53.82 CHRONIC FATIGUE: ICD-10-CM

## 2022-02-23 DIAGNOSIS — Z91.81 AT HIGH RISK FOR FALLS: ICD-10-CM

## 2022-02-23 DIAGNOSIS — I48.0 PAROXYSMAL ATRIAL FIBRILLATION: ICD-10-CM

## 2022-02-23 DIAGNOSIS — E11.65 TYPE 2 DIABETES MELLITUS WITH HYPERGLYCEMIA, WITH LONG-TERM CURRENT USE OF INSULIN: ICD-10-CM

## 2022-02-23 DIAGNOSIS — J84.9 INTERSTITIAL LUNG DISEASE: ICD-10-CM

## 2022-02-23 NOTE — TELEPHONE ENCOUNTER
Pt's daughter called requesting that her mother receive Home Health and wants to know if you would place the order.  The pt is disabled, lives alone, and is unable to get out of the home without great difficulty.  Please return call to daughter in regard to this matter. She is also upset that when her mother calls she does not receive a call back within a timely matter.

## 2022-02-23 NOTE — TELEPHONE ENCOUNTER
Regarding home health: recent issues in clinic have taught us patients have to have a face to face visit within a certain time frame for home health to be ordered. She was last seen in clinic on 12/29/21. I'm not sure that falls in the window of eligibility. Also, home health will not be a long term resolution, as insurance will only cover visits for a certain duration. HH order in, but not sure it will work.    Regarding calls, I've not been involved in that aspect directly. Clinic policy is return calls within 48 hours I believe and I know our MAs are working hard every day to get back to patients as quickly as possible between patients in office. Please remind them that [employer] messages are another option, though not meant for urgent/emergent needs/questions as the box is not continuously monitored.

## 2022-02-24 ENCOUNTER — HOME HEALTH ADMISSION (OUTPATIENT)
Dept: HOME HEALTH SERVICES | Facility: HOME HEALTHCARE | Age: 77
End: 2022-02-24

## 2022-02-25 ENCOUNTER — HOME CARE VISIT (OUTPATIENT)
Dept: HOME HEALTH SERVICES | Facility: HOME HEALTHCARE | Age: 77
End: 2022-02-25

## 2022-02-25 PROCEDURE — G0299 HHS/HOSPICE OF RN EA 15 MIN: HCPCS

## 2022-02-27 VITALS
WEIGHT: 181 LBS | DIASTOLIC BLOOD PRESSURE: 98 MMHG | OXYGEN SATURATION: 100 % | BODY MASS INDEX: 35.53 KG/M2 | SYSTOLIC BLOOD PRESSURE: 146 MMHG | HEIGHT: 60 IN | TEMPERATURE: 98.8 F | RESPIRATION RATE: 20 BRPM | HEART RATE: 62 BPM

## 2022-02-27 NOTE — HOME HEALTH
Pt is a 75 yo white female admitted to [facility] for services of SN/PT/OT due to recent exacerbation of COPD, pt has a hx of Type 2 DM, Cardiomyopathy, A-fib, Interstitial pulmonary disease. Pt lives alone and ambulates occaionally with her cane. Pt stated she has a W/C but it is in the trunk of her car. Pt becomes extremely dyspnic on exertion. VS stable, O2 sat at 100% at present wearing her BNO2 @ 2.5L continuous.

## 2022-02-28 ENCOUNTER — HOME CARE VISIT (OUTPATIENT)
Dept: HOME HEALTH SERVICES | Facility: HOME HEALTHCARE | Age: 77
End: 2022-02-28

## 2022-02-28 ENCOUNTER — TELEPHONE (OUTPATIENT)
Dept: INTERNAL MEDICINE | Facility: CLINIC | Age: 77
End: 2022-02-28

## 2022-02-28 VITALS
OXYGEN SATURATION: 100 % | DIASTOLIC BLOOD PRESSURE: 74 MMHG | HEART RATE: 76 BPM | TEMPERATURE: 98.9 F | SYSTOLIC BLOOD PRESSURE: 150 MMHG | RESPIRATION RATE: 20 BRPM

## 2022-02-28 PROCEDURE — G0299 HHS/HOSPICE OF RN EA 15 MIN: HCPCS

## 2022-02-28 PROCEDURE — G0151 HHCP-SERV OF PT,EA 15 MIN: HCPCS

## 2022-02-28 NOTE — HOME HEALTH
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 Pt ambulating slowly due to dyspnea on exertion. Pt wearing BNO2 at 2.5L continuously. Pt has an occasional dry cough. VS stable, O2 sat at 100%. Pain noted to be 4/10.

## 2022-02-28 NOTE — TELEPHONE ENCOUNTER
Unable to reach patient by phone.  Left message requesting return phone call.  Need to verify if patient is still taking Coumadin, and if so, she is past due for INR.

## 2022-03-01 ENCOUNTER — TELEPHONE (OUTPATIENT)
Dept: INTERNAL MEDICINE | Facility: CLINIC | Age: 77
End: 2022-03-01

## 2022-03-01 ENCOUNTER — HOME CARE VISIT (OUTPATIENT)
Dept: HOME HEALTH SERVICES | Facility: HOME HEALTHCARE | Age: 77
End: 2022-03-01

## 2022-03-01 VITALS
TEMPERATURE: 97.7 F | RESPIRATION RATE: 18 BRPM | HEART RATE: 70 BPM | SYSTOLIC BLOOD PRESSURE: 133 MMHG | DIASTOLIC BLOOD PRESSURE: 78 MMHG | OXYGEN SATURATION: 95 %

## 2022-03-01 PROCEDURE — G0152 HHCP-SERV OF OT,EA 15 MIN: HCPCS

## 2022-03-01 NOTE — TELEPHONE ENCOUNTER
Caller: Marielena Moeller    Relationship: Self    Best call back number:  [phone]    Who are you requesting to speak with (clinical staff, provider,  specific staff member):   CLINICAL     What was the call regarding:  PATIENT IS CALLING TO GIVE A PHONE NUMBER FOR [employer] [phone]    SHE SAID THIS IS FOR HER DEXCOM TO BE ORDERED     HUMAN SAID THEY HAVE MORE QUESTIONS ABOUT THIS     Do you require a callback: YES

## 2022-03-01 NOTE — TELEPHONE ENCOUNTER
PA not acceptable, called [facility] and they explained she has not reached her deductable yet which is 329 dollars. The pt would also have to pay 4% of the price of the dexcom. Called pt and verbally informed her of this information, she verbalized understanding that the Dexcom was not covered through her insurance.

## 2022-03-02 ENCOUNTER — HOME CARE VISIT (OUTPATIENT)
Dept: HOME HEALTH SERVICES | Facility: HOME HEALTHCARE | Age: 77
End: 2022-03-02

## 2022-03-02 VITALS
DIASTOLIC BLOOD PRESSURE: 80 MMHG | SYSTOLIC BLOOD PRESSURE: 150 MMHG | TEMPERATURE: 97.7 F | RESPIRATION RATE: 18 BRPM | HEART RATE: 79 BPM

## 2022-03-02 PROCEDURE — G0156 HHCP-SVS OF AIDE,EA 15 MIN: HCPCS

## 2022-03-03 ENCOUNTER — HOME CARE VISIT (OUTPATIENT)
Dept: HOME HEALTH SERVICES | Facility: HOME HEALTHCARE | Age: 77
End: 2022-03-03

## 2022-03-03 PROCEDURE — G0180 MD CERTIFICATION HHA PATIENT: HCPCS | Performed by: FAMILY MEDICINE

## 2022-03-03 PROCEDURE — G0495 RN CARE TRAIN/EDU IN HH: HCPCS

## 2022-03-04 VITALS
TEMPERATURE: 98.6 F | RESPIRATION RATE: 20 BRPM | SYSTOLIC BLOOD PRESSURE: 157 MMHG | DIASTOLIC BLOOD PRESSURE: 89 MMHG | HEART RATE: 81 BPM | OXYGEN SATURATION: 99 %

## 2022-03-07 ENCOUNTER — HOME CARE VISIT (OUTPATIENT)
Dept: HOME HEALTH SERVICES | Facility: HOME HEALTHCARE | Age: 77
End: 2022-03-07

## 2022-03-07 PROCEDURE — G0299 HHS/HOSPICE OF RN EA 15 MIN: HCPCS

## 2022-03-08 ENCOUNTER — HOME CARE VISIT (OUTPATIENT)
Dept: HOME HEALTH SERVICES | Facility: HOME HEALTHCARE | Age: 77
End: 2022-03-08

## 2022-03-08 VITALS
SYSTOLIC BLOOD PRESSURE: 134 MMHG | TEMPERATURE: 98.1 F | HEART RATE: 77 BPM | OXYGEN SATURATION: 97 % | RESPIRATION RATE: 19 BRPM | DIASTOLIC BLOOD PRESSURE: 77 MMHG

## 2022-03-08 PROCEDURE — G0151 HHCP-SERV OF PT,EA 15 MIN: HCPCS

## 2022-03-09 ENCOUNTER — HOME CARE VISIT (OUTPATIENT)
Dept: HOME HEALTH SERVICES | Facility: HOME HEALTHCARE | Age: 77
End: 2022-03-09

## 2022-03-09 VITALS
RESPIRATION RATE: 24 BRPM | DIASTOLIC BLOOD PRESSURE: 94 MMHG | OXYGEN SATURATION: 98 % | HEART RATE: 76 BPM | SYSTOLIC BLOOD PRESSURE: 156 MMHG | TEMPERATURE: 98 F

## 2022-03-09 PROCEDURE — G0152 HHCP-SERV OF OT,EA 15 MIN: HCPCS

## 2022-03-15 ENCOUNTER — HOME CARE VISIT (OUTPATIENT)
Dept: HOME HEALTH SERVICES | Facility: HOME HEALTHCARE | Age: 77
End: 2022-03-15

## 2022-03-15 PROCEDURE — G0151 HHCP-SERV OF PT,EA 15 MIN: HCPCS

## 2022-03-15 PROCEDURE — G0299 HHS/HOSPICE OF RN EA 15 MIN: HCPCS

## 2022-03-16 ENCOUNTER — HOME CARE VISIT (OUTPATIENT)
Dept: HOME HEALTH SERVICES | Facility: HOME HEALTHCARE | Age: 77
End: 2022-03-16

## 2022-03-16 VITALS
SYSTOLIC BLOOD PRESSURE: 131 MMHG | OXYGEN SATURATION: 97 % | HEART RATE: 72 BPM | DIASTOLIC BLOOD PRESSURE: 70 MMHG | RESPIRATION RATE: 19 BRPM | TEMPERATURE: 97.8 F

## 2022-03-16 PROCEDURE — G0152 HHCP-SERV OF OT,EA 15 MIN: HCPCS

## 2022-03-17 ENCOUNTER — HOME CARE VISIT (OUTPATIENT)
Dept: HOME HEALTH SERVICES | Facility: HOME HEALTHCARE | Age: 77
End: 2022-03-17

## 2022-03-17 VITALS
DIASTOLIC BLOOD PRESSURE: 62 MMHG | SYSTOLIC BLOOD PRESSURE: 140 MMHG | RESPIRATION RATE: 18 BRPM | HEART RATE: 78 BPM | TEMPERATURE: 97.7 F

## 2022-03-17 PROCEDURE — G0156 HHCP-SVS OF AIDE,EA 15 MIN: HCPCS

## 2022-03-17 NOTE — HOME HEALTH
Patient given standing written HEP this date and demonstrates good technique with performance.  Final visit next week for recheck of ther ex and techniques.

## 2022-03-18 VITALS
RESPIRATION RATE: 22 BRPM | HEART RATE: 98 BPM | SYSTOLIC BLOOD PRESSURE: 157 MMHG | DIASTOLIC BLOOD PRESSURE: 80 MMHG | TEMPERATURE: 97.8 F | OXYGEN SATURATION: 97 %

## 2022-03-21 ENCOUNTER — HOME CARE VISIT (OUTPATIENT)
Dept: HOME HEALTH SERVICES | Facility: HOME HEALTHCARE | Age: 77
End: 2022-03-21

## 2022-03-21 VITALS
RESPIRATION RATE: 16 BRPM | DIASTOLIC BLOOD PRESSURE: 97 MMHG | OXYGEN SATURATION: 99 % | SYSTOLIC BLOOD PRESSURE: 193 MMHG | HEART RATE: 91 BPM | TEMPERATURE: 97.7 F

## 2022-03-21 PROCEDURE — G0156 HHCP-SVS OF AIDE,EA 15 MIN: HCPCS

## 2022-03-21 PROCEDURE — G0151 HHCP-SERV OF PT,EA 15 MIN: HCPCS

## 2022-03-21 NOTE — HOME HEALTH
OT INITIAL EVAL COMPLETED. OT PROVIDED SKILLED INSTRUCTION ON HOME SAFETY, EC/WS, AE NEEDS AND UE AROM HEP. PT RETURNED DEMO. OT TO CONTINUE INSTRUCTION NEXT VISIT.

## 2022-03-22 ENCOUNTER — HOME CARE VISIT (OUTPATIENT)
Dept: HOME HEALTH SERVICES | Facility: HOME HEALTHCARE | Age: 77
End: 2022-03-22

## 2022-03-22 VITALS
HEART RATE: 80 BPM | RESPIRATION RATE: 22 BRPM | SYSTOLIC BLOOD PRESSURE: 150 MMHG | DIASTOLIC BLOOD PRESSURE: 70 MMHG | TEMPERATURE: 99.8 F

## 2022-03-22 PROCEDURE — G0299 HHS/HOSPICE OF RN EA 15 MIN: HCPCS

## 2022-03-23 ENCOUNTER — HOME CARE VISIT (OUTPATIENT)
Dept: HOME HEALTH SERVICES | Facility: HOME HEALTHCARE | Age: 77
End: 2022-03-23

## 2022-03-23 PROCEDURE — G0152 HHCP-SERV OF OT,EA 15 MIN: HCPCS

## 2022-03-24 VITALS
SYSTOLIC BLOOD PRESSURE: 145 MMHG | DIASTOLIC BLOOD PRESSURE: 82 MMHG | RESPIRATION RATE: 16 BRPM | OXYGEN SATURATION: 94 % | TEMPERATURE: 98 F | HEART RATE: 100 BPM

## 2022-03-28 ENCOUNTER — HOME CARE VISIT (OUTPATIENT)
Dept: HOME HEALTH SERVICES | Facility: HOME HEALTHCARE | Age: 77
End: 2022-03-28

## 2022-03-28 PROCEDURE — G0156 HHCP-SVS OF AIDE,EA 15 MIN: HCPCS

## 2022-03-28 PROCEDURE — G0151 HHCP-SERV OF PT,EA 15 MIN: HCPCS

## 2022-03-29 ENCOUNTER — HOME CARE VISIT (OUTPATIENT)
Dept: HOME HEALTH SERVICES | Facility: HOME HEALTHCARE | Age: 77
End: 2022-03-29

## 2022-03-29 VITALS
DIASTOLIC BLOOD PRESSURE: 82 MMHG | RESPIRATION RATE: 16 BRPM | RESPIRATION RATE: 15 BRPM | HEART RATE: 79 BPM | TEMPERATURE: 97.4 F | DIASTOLIC BLOOD PRESSURE: 79 MMHG | RESPIRATION RATE: 16 BRPM | OXYGEN SATURATION: 94 % | OXYGEN SATURATION: 95 % | TEMPERATURE: 97.8 F | DIASTOLIC BLOOD PRESSURE: 77 MMHG | SYSTOLIC BLOOD PRESSURE: 139 MMHG | SYSTOLIC BLOOD PRESSURE: 131 MMHG | SYSTOLIC BLOOD PRESSURE: 128 MMHG | TEMPERATURE: 97.7 F | OXYGEN SATURATION: 93 % | HEART RATE: 81 BPM | HEART RATE: 78 BPM

## 2022-03-29 VITALS
DIASTOLIC BLOOD PRESSURE: 78 MMHG | SYSTOLIC BLOOD PRESSURE: 138 MMHG | OXYGEN SATURATION: 95 % | RESPIRATION RATE: 18 BRPM | TEMPERATURE: 97.7 F | HEART RATE: 73 BPM

## 2022-03-29 VITALS
TEMPERATURE: 99.4 F | DIASTOLIC BLOOD PRESSURE: 64 MMHG | RESPIRATION RATE: 22 BRPM | HEART RATE: 66 BPM | SYSTOLIC BLOOD PRESSURE: 138 MMHG

## 2022-03-29 PROCEDURE — G0299 HHS/HOSPICE OF RN EA 15 MIN: HCPCS

## 2022-03-29 NOTE — HOME HEALTH
Patient has completed her approved PT visits and has been given written seated and standing HEP and demonstrates safe technique with performance.

## 2022-03-29 NOTE — HOME HEALTH
OT PROVIDED SKILLED INSTRUCTION ON UE HEP FOR ROM AND STRENGTH AS WELL AS IADLS USING AE/AT AND EC/WS. PT TOLERATED TX WELL. OT SUSPECTS GOOD FOLLOWTHROUGH.

## 2022-03-29 NOTE — HOME HEALTH
OT PROVIDED SKILLED INSTRUCTION ON HOME SAFETY, UE HEP, ADLS AND BATHROOM TRANSFERS. PT RETURNED DEMO OR VERBALIZED UNDERSTANDING.

## 2022-03-29 NOTE — HOME HEALTH
OT 30 DAY REASSESSMENT COMPLETE. OT PROVIDED INSTRUCTION ON ADLS. IADLS, HEP AND HOME SAFETY. PT PROGRESSING WELL WITH POC.

## 2022-03-30 ENCOUNTER — HOME CARE VISIT (OUTPATIENT)
Dept: HOME HEALTH SERVICES | Facility: HOME HEALTHCARE | Age: 77
End: 2022-03-30

## 2022-03-30 PROCEDURE — G0152 HHCP-SERV OF OT,EA 15 MIN: HCPCS

## 2022-03-31 VITALS
OXYGEN SATURATION: 96 % | SYSTOLIC BLOOD PRESSURE: 166 MMHG | TEMPERATURE: 97.8 F | HEART RATE: 77 BPM | DIASTOLIC BLOOD PRESSURE: 83 MMHG | RESPIRATION RATE: 16 BRPM

## 2022-04-04 VITALS
OXYGEN SATURATION: 96 % | HEART RATE: 78 BPM | RESPIRATION RATE: 15 BRPM | SYSTOLIC BLOOD PRESSURE: 127 MMHG | DIASTOLIC BLOOD PRESSURE: 74 MMHG

## 2022-04-04 NOTE — HOME HEALTH
OT PROVIDED SKILLED INSTRUCTION ON UE HEP FOR STRENGTH AND AROM, UB AND LB ADLS USING AE/AT TRAINING, HOME SAFETY ED, EC/WS AND SAFE SIMPLE MEAL PREP. PT PROGRESSING AS EXPECTED WITH POC. TOLERATED TX WELL.

## 2022-04-05 ENCOUNTER — HOME CARE VISIT (OUTPATIENT)
Dept: HOME HEALTH SERVICES | Facility: HOME HEALTHCARE | Age: 77
End: 2022-04-05

## 2022-04-06 ENCOUNTER — HOME CARE VISIT (OUTPATIENT)
Dept: HOME HEALTH SERVICES | Facility: HOME HEALTHCARE | Age: 77
End: 2022-04-06

## 2022-04-06 PROCEDURE — G0152 HHCP-SERV OF OT,EA 15 MIN: HCPCS

## 2022-04-12 VITALS
RESPIRATION RATE: 15 BRPM | TEMPERATURE: 97.8 F | OXYGEN SATURATION: 96 % | HEART RATE: 75 BPM | DIASTOLIC BLOOD PRESSURE: 69 MMHG | SYSTOLIC BLOOD PRESSURE: 127 MMHG

## 2022-04-12 NOTE — HOME HEALTH
OT AND HOME HEALTH AGENCY DC COMPLETED THIS DATE. PT DISCHARGED WITH GOALS MET OR CURRENT MAX BENEFIT GAINED. OT PROVIDED SKILLED INSTRUCTION ON DC HOME PROGRAM FOR HEP, HOME SAFETY, ADLS/IADLS AND EC/WS. PT IND WITH FOLLOWTHROUGH AND IS IN AGREEMENT WITH DC.

## 2022-05-12 DIAGNOSIS — E03.9 ACQUIRED HYPOTHYROIDISM: ICD-10-CM

## 2022-05-12 RX ORDER — LEVOTHYROXINE SODIUM 112 UG/1
112 TABLET ORAL DAILY
Qty: 90 TABLET | Refills: 1 | Status: SHIPPED | OUTPATIENT
Start: 2022-05-12

## 2022-05-24 ENCOUNTER — TELEPHONE (OUTPATIENT)
Dept: INTERNAL MEDICINE | Facility: CLINIC | Age: 77
End: 2022-05-24

## 2022-05-24 NOTE — TELEPHONE ENCOUNTER
Attempted to contact patient multiple times by phone and [employer] message.    Patient is due for an INR check.    Hub may deliver message.

## 2022-06-10 ENCOUNTER — PATIENT MESSAGE (OUTPATIENT)
Dept: INTERNAL MEDICINE | Facility: CLINIC | Age: 77
End: 2022-06-10

## 2022-06-19 DIAGNOSIS — Z79.4 TYPE 2 DIABETES MELLITUS WITH HYPERGLYCEMIA, WITH LONG-TERM CURRENT USE OF INSULIN: ICD-10-CM

## 2022-06-19 DIAGNOSIS — E11.65 TYPE 2 DIABETES MELLITUS WITH HYPERGLYCEMIA, WITH LONG-TERM CURRENT USE OF INSULIN: ICD-10-CM

## 2022-06-20 ENCOUNTER — TELEPHONE (OUTPATIENT)
Dept: INTERNAL MEDICINE | Facility: CLINIC | Age: 77
End: 2022-06-20

## 2022-06-20 RX ORDER — BLOOD SUGAR DIAGNOSTIC
STRIP MISCELLANEOUS
Qty: 300 EACH | Refills: 3 | Status: SHIPPED | OUTPATIENT
Start: 2022-06-20

## 2022-06-20 NOTE — TELEPHONE ENCOUNTER
Spoke with patient and she states she is going to her cardiologist in the next 2 weeks and will sign one at that office.

## 2022-06-20 NOTE — TELEPHONE ENCOUNTER
Rx Refill Note  Requested Prescriptions     Pending Prescriptions Disp Refills   • glucose blood (Accu-Chek Anne Plus) test strip [Pharmacy Med Name: ACCU-CHEK ANNE PLUS STRIPS 100S]       Sig: USE AS DIRECTED THREE TIMES DAILY      Last office visit with prescribing clinician: 2/5/2021      Next office visit with prescribing clinician: Visit date not found            Laury Garrett LPN  06/20/22, 12:09 EDT

## 2022-06-20 NOTE — TELEPHONE ENCOUNTER
PATIENT CALLED IN TO STATE THAT SHE NO LONGER TAKES THE WARFARRIN, DUE TO LACK OF AFIB IN THE PAST 2 YEARS. CARDIOLOGIST STATED THAT IT WAS OK TO STOP.

## 2022-10-28 ENCOUNTER — OFFICE VISIT (OUTPATIENT)
Dept: CARDIOLOGY | Facility: CLINIC | Age: 77
End: 2022-10-28

## 2022-10-28 VITALS
HEIGHT: 60 IN | OXYGEN SATURATION: 99 % | DIASTOLIC BLOOD PRESSURE: 78 MMHG | SYSTOLIC BLOOD PRESSURE: 148 MMHG | WEIGHT: 178 LBS | HEART RATE: 81 BPM | BODY MASS INDEX: 34.95 KG/M2

## 2022-10-28 DIAGNOSIS — I48.0 PAROXYSMAL ATRIAL FIBRILLATION: Primary | ICD-10-CM

## 2022-10-28 PROCEDURE — 99204 OFFICE O/P NEW MOD 45 MIN: CPT | Performed by: INTERNAL MEDICINE

## 2022-10-28 PROCEDURE — 93000 ELECTROCARDIOGRAM COMPLETE: CPT | Performed by: INTERNAL MEDICINE

## 2022-10-28 RX ORDER — ASPIRIN 81 MG/1
81 TABLET ORAL DAILY
Qty: 30 TABLET | Refills: 6 | Status: SHIPPED | OUTPATIENT
Start: 2022-10-28

## 2022-10-28 RX ORDER — IRBESARTAN 75 MG/1
75 TABLET ORAL DAILY
COMMUNITY
Start: 2022-10-10

## 2022-10-28 RX ORDER — ZINC GLUCONATE 50 MG
0.5 TABLET ORAL EVERY OTHER DAY
COMMUNITY

## 2022-10-28 NOTE — PROGRESS NOTES
[facility]   Consult  Marielena Moeller  1945    VISIT DATE:  10/28/22    PCP:   Provider, No Known  [facility] [id number]        CC:  Paroxysmal atrial fibrillation (CMS/[facility])      Problem List:  1.  Paroxysmal atrial fibrillation  -Confirmed on previous Holter monitor with a burden of 1.3's percent  2.  COPD  3.  Hyperlipidemia  4.  Hypertension  5.  Hypothyroid  6.  Ventral hernia  7.  Diverticulosis    Cardiac testing: Echo 2019 hazard  EF 55%, normal RV, trace mitral and tricuspid regurgitation    History of Present Illness:  Marielena Moeller  Is a 76 y.o. female with pertinent cardiac history detailed above.  Patient carries a history of atrial fibrillation but not on routine medications for this.  She previously saw Dr. Swan and was prescribed diltiazem and Coumadin.  She has COPD, HTN.  She takes clonidine prn for BSP > 160mmHg.  She is also on doxazosin, irbesartan, triamterene.    She feels afib and palpitations are related to changes in position.  She states her GI doctor thought it could be vagally related, and that is why could change with position.  She states the pulse can get up 140-145.  Lasts 30 minutes at a time.  She reports her BP at home typically is well controlled.  She was on warfarin but stopped it because it was interfering with her diet she likes to eat a lot of greens.  She also reported prior nosebleeds and bleeding related to diverticulosis.  Discussed options of other anticoagulants and watchman with her today but she is hesitant to procedure.  She is agreeable to an aspirin.  She is in sinus rhythm at time of our exam.  Blood pressure mildly elevated.      Patient Active Problem List    Diagnosis Date Noted   • Influenza vaccination declined 12/29/2021   • Type 2 diabetes mellitus with diabetic peripheral angiopathy without gangrene, with long-term current use of insulin ([facility]) 11/20/2020   • Warfarin anticoagulation 08/10/2020   •  "Interstitial lung disease ([facility]) 11/26/2019   • Myalgia 11/26/2019     Note Last Updated: 11/26/2019     · Recurrent muscle pains, often attributed to medicines. Unclear etiology.     • Paroxysmal atrial fibrillation ([facility]) 09/13/2019   • Type 2 diabetes mellitus with circulatory disorder, with long-term current use of insulin ([facility]) 05/13/2019     Note Last Updated: 5/13/2019     · Associated with hypertension      • Anxiety 05/08/2019   • COPD (chronic obstructive pulmonary disease) ([facility]) 11/08/2018   • Pneumococcal vaccination declined by patient 11/08/2018   • History of colon polyps 07/27/2018     Note Last Updated: 7/27/2018     Added automatically from request for surgery [id number]     • Acquired hypothyroidism 07/24/2018   • Vitamin D deficiency 07/24/2018   • Class 2 obesity with body mass index (BMI) of 35.0 to 35.9 in adult 04/21/2018     Note Last Updated: 11/26/2019     · Associated with diabetes mellitus, hypertension      • Type 2 diabetes mellitus with hyperglycemia, with long-term current use of insulin ([facility]) 09/19/2017     Note Last Updated: 11/8/2018     · History of pancreatitis     • Hypertension associated with diabetes ([facility]) 09/19/2017     Note Last Updated: 4/21/2018     · No HCTZ due to pancreatitis early 2018.     • Osteoporosis 09/19/2017       Allergies   Allergen Reactions   • Doxycycline Nausea And Vomiting     \"Pain worse than pancreatitis\"   • Diltiazem Other (See Comments)     nausea   • Sotalol Other (See Comments)     nausea   • Ciprofloxacin Hives   • Contrast Dye Rash     Breaks her out   • Erythromycin Hives and Rash   • Hydrochlorothiazide Other (See Comments)        pt developed pancreastitis   • Levaquin [Levofloxacin] Swelling   • Prednisone Mental Status Change     ITCHING AND NERVOUS       Social History     Socioeconomic History   • Marital status:    Tobacco Use   • Smoking status: Former     Packs/day: 2.00     Years: 15.00     Pack years: 30.00     Types: Cigarettes " "    Quit date:      Years since quittin.8   • Smokeless tobacco: Never   Substance and Sexual Activity   • Alcohol use: No   • Drug use: No   • Sexual activity: Defer       Family History   Problem Relation Age of Onset   • Arthritis Mother    • Diabetes Mother    • Osteoporosis Mother    • Arthritis Father    • Diabetes Father    • Heart attack Father    • Hyperlipidemia Father    • Diabetes Sister    • Thyroid disease Sister    • Diabetes Brother    • Diabetes Maternal Grandmother    • Diabetes Maternal Grandfather    • Diabetes Paternal Grandmother    • Diabetes Paternal Grandfather        Current Medications:    Current Outpatient Medications:   •  acetaminophen (TYLENOL) 500 MG tablet, Take 1,000 mg by mouth Every 6 (Six) Hours As Needed for Fever, Headache or Mild Pain ., Disp: , Rfl:   •  albuterol (ACCUNEB) 0.63 MG/3ML nebulizer solution, Take 3 mL by nebulization Every 6 (Six) Hours As Needed for Wheezing., Disp: 60 each, Rfl: 11  •  albuterol sulfate HFA (Ventolin HFA) 108 (90 Base) MCG/ACT inhaler, Inhale 2 puffs Every 6 (Six) Hours As Needed for Wheezing or Shortness of Air., Disp: 54 g, Rfl: 3  •  BD Veo Insulin Syringe U/F 31G X 15/64\" 0.5 ML misc, USE AS DIRECTED TWICE DAILY, Disp: 300 each, Rfl: 2  •  Blood Glucose Monitoring Suppl (ACCU-CHEK CHIO PLUS) w/Device kit, USE AS DIRECTED TO CHECK GLUCOSE 3 TIMES DAILY FOR DM E11.65, Disp: 1 kit, Rfl: 0  •  Cholecalciferol (Vitamin D3) 1.25 MG (01375 UT) capsule, TAKE 1 CAPSULE BY MOUTH EVERY 7 DAYS, Disp: 12 capsule, Rfl: 0  •  Continuous Blood Gluc  (Dexcom G6 ) device, 1 each Continuous., Disp: 1 each, Rfl: 0  •  Continuous Blood Gluc Sensor (Dexcom G6 Sensor), Every 10 (Ten) Days. Apply as directed, Disp: 3 each, Rfl: 11  •  diclofenac (VOLTAREN) 50 MG EC tablet, Take 1 tablet by mouth 2 (Two) Times a Day As Needed. for pain, Disp: , Rfl:   •  docusate sodium (COLACE) 100 MG capsule, Take 100 mg by mouth Daily As Needed for " Constipation., Disp: , Rfl:   •  glucose blood (Accu-Chek Anne Plus) test strip, USE AS DIRECTED THREE TIMES DAILY, Disp: 300 each, Rfl: 3  •  insulin aspart protamine-insulin aspart (novoLOG 70/30) injection, Inject 50 Units under the skin into the appropriate area as directed 3 (Three) Times a Day., Disp: , Rfl:   •  irbesartan (AVAPRO) 75 MG tablet, Take 1 tablet by mouth Daily., Disp: , Rfl:   •  levothyroxine (SYNTHROID, LEVOTHROID) 112 MCG tablet, TAKE 1 TABLET BY MOUTH DAILY, Disp: 90 tablet, Rfl: 1  •  LORazepam (ATIVAN) 0.5 MG tablet, Take 1 tablet by mouth Every 8 (Eight) Hours As Needed for Anxiety., Disp: 45 tablet, Rfl: 3  •  MAGNESIUM-OXIDE 400 (241.3 Mg) MG tablet tablet, TK 1 T PO QHS, Disp: , Rfl: 3  •  meclizine (ANTIVERT) 25 MG tablet, As Needed., Disp: , Rfl:   •  O2 (OXYGEN), Inhale 3 L/min As Needed., Disp: , Rfl:   •  ondansetron (ZOFRAN) 4 MG tablet, Take 1 tablet by mouth Every 6 (Six) Hours As Needed for Nausea., Disp: 12 tablet, Rfl: 0  •  psyllium (METAMUCIL) 58.6 % powder, Take 1 packet by mouth Daily., Disp: , Rfl:   •  triamterene (DYRENIUM) 50 MG capsule, TAKE 1 CAPSULE BY MOUTH EVERY DAY AS NEEDED, Disp: 90 capsule, Rfl: 1  •  Zinc 50 MG tablet, Take 0.5 tablets by mouth Every Other Day., Disp: , Rfl:   •  aspirin 81 MG EC tablet, Take 1 tablet by mouth Daily., Disp: 30 tablet, Rfl: 6  •  aspirin 81 MG tablet, Take 81 mg by mouth. Every other day, Disp: , Rfl:   •  doxazosin (CARDURA) 2 MG tablet, Take 2 mg by mouth Every Night., Disp: , Rfl:   •  nystatin (MYCOSTATIN) 173425 UNIT/GM cream, Apply  topically to the appropriate area as directed As Needed (yeast infection)., Disp: 30 g, Rfl: 0  •  warfarin (COUMADIN) 4 MG tablet, Take 2 mg by mouth Every Night. Pt has 4 mg tablets and takes 1/2 tab daily and is weaning herself off of them., Disp: , Rfl:      Review of Systems   Cardiovascular: Positive for dyspnea on exertion, irregular heartbeat and palpitations. Negative for chest  "pain and leg swelling.   Respiratory: Positive for shortness of breath.        Vitals:    10/28/22 1322   BP: 148/78   BP Location: Left arm   Patient Position: Sitting   Cuff Size: Adult   Pulse: 81   SpO2: 99%   Weight: 80.7 kg (178 lb)   Height: 152.4 cm (60\")       Physical Exam  Constitutional:       Appearance: Normal appearance.   Cardiovascular:      Rate and Rhythm: Normal rate and regular rhythm.      Pulses: Normal pulses.      Heart sounds: Normal heart sounds.   Pulmonary:      Effort: Pulmonary effort is normal.   Musculoskeletal:      Right lower leg: No edema.      Left lower leg: No edema.   Neurological:      General: No focal deficit present.      Mental Status: She is alert.         Diagnostic Data:    ECG 12 Lead    Date/Time: 10/28/2022 1:27 PM  Performed by: Wu Pathak MD  Authorized by: Wu Pathak MD   Comparison: compared with previous ECG from 3/4/2020  Similar to previous ECG  Rhythm: sinus rhythm  Rate: normal  BPM: 81  ST Elevation: V1 and V2  QRS axis: normal    Clinical impression: non-specific ECG  Clinical impression comment: Stable EKG compared with 2020.            Lab Results   Component Value Date    CHLPL 257 (H) 12/29/2021    TRIG 137 12/29/2021    HDL 57 12/29/2021     Lab Results   Component Value Date    GLUCOSE 129 (H) 12/29/2021    BUN 13 12/29/2021    CREATININE 0.60 12/29/2021     12/29/2021    K 4.5 12/29/2021    CL 98 12/29/2021    CO2 28 12/29/2021     Lab Results   Component Value Date    HGBA1C 9.7 (H) 12/29/2021     Lab Results   Component Value Date    WBC 7.1 12/29/2021    HGB 12.5 12/29/2021    HCT 37.9 12/29/2021     12/29/2021       Assessment:   Diagnosis Plan   1. Paroxysmal atrial fibrillation (HCC)  ECG 12 Lead    Adult Transthoracic Echo Complete W/ Cont if Necessary Per Protocol    Holter Monitor - 72 Hour Up To 15 Days          Plan:      1.  Paroxysmal A. Fib  -Not currently anticoagulated, used to take warfarin.  " Stopped due to dietary and interference as well as reports of nosebleeds and GI bleeding  -She is agreeable to taking aspirin 81 mg  -Currently does not want to pursue NOAC or watchman consideration  -Obtain updated echo and updated monitor today to reassess A. fib burden  -Reports intolerances to sotalol, diltiazem, metoprolol.    2.  Hypertension  -On irbesartan, doxazosin, triamterene  -Labs October 2022: Normal potassium, normal creatinine 0.58  -No changes made today    3.  DM  -Per her primary    4.  HLD  -Last available lipids total cholesterol 257, HDL 57, , triglycerides 130  -She is not currently on a statin, will discuss with her further and follow-up, she declines today    5.  Hypothyroid  -Last TSH was normal    6.  COPD on chronic 2 L O2    Wu Pathak MD [facility]

## 2022-11-18 ENCOUNTER — HOSPITAL ENCOUNTER (OUTPATIENT)
Dept: CARDIOLOGY | Facility: HOSPITAL | Age: 77
Discharge: HOME | End: 2022-11-18

## 2022-11-23 ENCOUNTER — TELEPHONE (OUTPATIENT)
Dept: CARDIOLOGY | Facility: CLINIC | Age: 77
End: 2022-11-23

## 2022-11-23 RX ORDER — BISOPROLOL FUMARATE 5 MG/1
5 TABLET, FILM COATED ORAL DAILY
Qty: 30 TABLET | Refills: 5 | Status: SHIPPED | OUTPATIENT
Start: 2022-11-23 | End: 2023-02-02 | Stop reason: DRUGHIGH

## 2022-11-23 NOTE — TELEPHONE ENCOUNTER
Called patient regarding NSK results and recommendations above. All questions answered at this time. Patient verbalizes understanding and agreeable to plan.

## 2022-11-30 NOTE — TELEPHONE ENCOUNTER
----- Message from Wu Pathak MD sent at 11/22/2022  5:40 PM EST -----  Patient's heart monitor did show she was in A. fib about 2.5% of the time.  At last visit she was agreeable to taking an aspirin but declined anticoagulation.  She did have periods where her heart rate was elevated.  If she was agreeable I would see if she would try bisoprolol 5 mg a beta-blocker to prevent elevated heart rates.  She states she had issues with metoprolol and sotalol in the past causing nausea   It often takes 1-4 weeks to recover from your surgery.  This varies from women to women.    To avoid constipation, eat fruits, vegetables, and whole grains.  Drink plenty of water.  Your doctor may suggest that you use a laxative or a mild stool softener.    Increase activity gradually.  Ask your friends and family to help with shores and errands while you recover.      Do not lift anything over 10 pounds to avoid straining your incision.    You may shower    Do not put anything in your vagina until your doctor says it is safe to do so.  This includes tampons, douching and sexual intercourse.    CALL YOUR DOCTOR    *Chills or a fever of 100.4 or higher    *Bright red vaginal bleeding or foul smelling discharge    *Difficulty urinating or burning during urination    *Severe abdominal pain or bloating    *Red, swollen, draining or bleeding incision site.

## 2023-01-30 ENCOUNTER — HOSPITAL ENCOUNTER (OUTPATIENT)
Dept: CARDIOLOGY | Facility: HOSPITAL | Age: 78
Discharge: HOME | End: 2023-01-30
Admitting: INTERNAL MEDICINE
Payer: MEDICARE

## 2023-01-30 VITALS — WEIGHT: 178 LBS | BODY MASS INDEX: 34.95 KG/M2 | HEIGHT: 60 IN

## 2023-01-30 DIAGNOSIS — I48.0 PAROXYSMAL ATRIAL FIBRILLATION: ICD-10-CM

## 2023-01-30 LAB
BH CV ECHO MEAS - AI P1/2T: 728.5 MSEC
BH CV ECHO MEAS - AO MAX PG: 8.1 MMHG
BH CV ECHO MEAS - AO MEAN PG: 5 MMHG
BH CV ECHO MEAS - AO ROOT DIAM: 3.1 CM
BH CV ECHO MEAS - AO V2 MAX: 142 CM/SEC
BH CV ECHO MEAS - AO V2 VTI: 31.2 CM
BH CV ECHO MEAS - AVA(I,D): 2.14 CM2
BH CV ECHO MEAS - EDV(CUBED): 44.4 ML
BH CV ECHO MEAS - EDV(MOD-SP2): 89 ML
BH CV ECHO MEAS - EDV(MOD-SP4): 85 ML
BH CV ECHO MEAS - EF(MOD-BP): 60 %
BH CV ECHO MEAS - EF(MOD-SP2): 61.8 %
BH CV ECHO MEAS - EF(MOD-SP4): 57.6 %
BH CV ECHO MEAS - ESV(CUBED): 14.3 ML
BH CV ECHO MEAS - ESV(MOD-SP2): 34 ML
BH CV ECHO MEAS - ESV(MOD-SP4): 36 ML
BH CV ECHO MEAS - FS: 31.4 %
BH CV ECHO MEAS - IVS/LVPW: 0.92 CM
BH CV ECHO MEAS - IVSD: 1.22 CM
BH CV ECHO MEAS - LA DIMENSION: 3.9 CM
BH CV ECHO MEAS - LAT PEAK E' VEL: 6.7 CM/SEC
BH CV ECHO MEAS - LV DIASTOLIC VOL/BSA (35-75): 47.8 CM2
BH CV ECHO MEAS - LV MASS(C)D: 151.6 GRAMS
BH CV ECHO MEAS - LV MAX PG: 4.6 MMHG
BH CV ECHO MEAS - LV MEAN PG: 2 MMHG
BH CV ECHO MEAS - LV SYSTOLIC VOL/BSA (12-30): 20.3 CM2
BH CV ECHO MEAS - LV V1 MAX: 107 CM/SEC
BH CV ECHO MEAS - LV V1 VTI: 21.3 CM
BH CV ECHO MEAS - LVIDD: 3.5 CM
BH CV ECHO MEAS - LVIDS: 2.43 CM
BH CV ECHO MEAS - LVOT AREA: 3.1 CM2
BH CV ECHO MEAS - LVOT DIAM: 2 CM
BH CV ECHO MEAS - LVPWD: 1.33 CM
BH CV ECHO MEAS - MED PEAK E' VEL: 5.09 CM/SEC
BH CV ECHO MEAS - MV A MAX VEL: 86.4 CM/SEC
BH CV ECHO MEAS - MV DEC SLOPE: 285 CM/SEC2
BH CV ECHO MEAS - MV DEC TIME: 0.24 MSEC
BH CV ECHO MEAS - MV E MAX VEL: 64.2 CM/SEC
BH CV ECHO MEAS - MV E/A: 0.74
BH CV ECHO MEAS - MV MAX PG: 3.8 MMHG
BH CV ECHO MEAS - MV MEAN PG: 2 MMHG
BH CV ECHO MEAS - MV P1/2T: 76.8 MSEC
BH CV ECHO MEAS - MV V2 VTI: 25.6 CM
BH CV ECHO MEAS - MVA(P1/2T): 2.9 CM2
BH CV ECHO MEAS - MVA(VTI): 2.6 CM2
BH CV ECHO MEAS - PA ACC SLOPE: 752 CM/SEC2
BH CV ECHO MEAS - PA ACC TIME: 0.11 SEC
BH CV ECHO MEAS - PA PR(ACCEL): 27.7 MMHG
BH CV ECHO MEAS - PA V2 MAX: 96.4 CM/SEC
BH CV ECHO MEAS - RAP SYSTOLE: 8 MMHG
BH CV ECHO MEAS - RVSP: 20 MMHG
BH CV ECHO MEAS - SI(MOD-SP2): 31 ML/M2
BH CV ECHO MEAS - SI(MOD-SP4): 27.6 ML/M2
BH CV ECHO MEAS - SV(LVOT): 66.9 ML
BH CV ECHO MEAS - SV(MOD-SP2): 55 ML
BH CV ECHO MEAS - SV(MOD-SP4): 49 ML
BH CV ECHO MEAS - TAPSE (>1.6): 2.2 CM
BH CV ECHO MEAS - TR MAX PG: 11.8 MMHG
BH CV ECHO MEAS - TR MAX VEL: 172 CM/SEC
BH CV ECHO MEASUREMENTS AVERAGE E/E' RATIO: 10.89
BH CV XLRA - RV BASE: 3.3 CM
BH CV XLRA - RV LENGTH: 5.5 CM
BH CV XLRA - RV MID: 2.5 CM
BH CV XLRA - TDI S': 20.3 CM/SEC
LEFT ATRIUM VOLUME INDEX: 20.2 ML/M2
MAXIMAL PREDICTED HEART RATE: 145 BPM
STRESS TARGET HR: 123 BPM

## 2023-01-30 PROCEDURE — 93306 TTE W/DOPPLER COMPLETE: CPT | Performed by: INTERNAL MEDICINE

## 2023-01-30 PROCEDURE — 93306 TTE W/DOPPLER COMPLETE: CPT

## 2023-02-01 ENCOUNTER — TELEPHONE (OUTPATIENT)
Dept: CARDIOLOGY | Facility: CLINIC | Age: 78
End: 2023-02-01
Payer: MEDICARE

## 2023-02-01 NOTE — TELEPHONE ENCOUNTER
Called patient regarding NSK results above. All questions answered at this time. Patient verbalizes understanding.    Pt states that she could not tolerate bisoprolol. She was very unsteady on her feet, felt like balance was off and was afraid she would fall. Pt took medication once.      Pt's BP running 135-148/80s-90s. Pt feels like she goes in to a-fib every other day, lasting from a few minutes to a few hours. Pt states that she has hernia and if she sits a certain way, it seems to trigger a-fib.     Please advise.

## 2023-02-01 NOTE — TELEPHONE ENCOUNTER
I believe when I saw her she states she could also not tolerate diltiazem.      We could try Multaq 400 mg twice daily for her.  If she wants to start on that I would have her come into office for an EKG within a week of starting.

## 2023-02-01 NOTE — TELEPHONE ENCOUNTER
----- Message from Wu Pathak MD sent at 1/31/2023  4:47 PM EST -----  Patient's echo was stable compared with 2019.  Normal ejection fraction.  No significant valvular abnormalities.  No additional changes needed at this time

## 2023-02-02 RX ORDER — BISOPROLOL FUMARATE 5 MG/1
2.5 TABLET, FILM COATED ORAL DAILY
Qty: 15 TABLET | Refills: 5 | Status: SHIPPED | OUTPATIENT
Start: 2023-02-02

## 2023-02-02 NOTE — TELEPHONE ENCOUNTER
Pt called back and states that her daughter told her that she cannot take Multaq because she has pulmonary fibrosis.     Pt wants to know if she can try to take half of previous bisoprolol dose, taking bisoprolol 2.5 mg daily.     Please advise.

## 2023-02-02 NOTE — TELEPHONE ENCOUNTER
Yes I am okay with the half dose of bisoprolol.    Agree if she has the pulmonary history of fibrosis best to not use Multaq.  I was under the impression she mainly had COPD.

## 2023-02-02 NOTE — TELEPHONE ENCOUNTER
Pt called back, discussed NSK recommendations above. Pt verbalizes understanding and agreeable to plan.    New RX sent in for bisoprolol 2.5 mg daily

## 2023-03-12 DIAGNOSIS — Z79.4 TYPE 2 DIABETES MELLITUS WITH HYPERGLYCEMIA, WITH LONG-TERM CURRENT USE OF INSULIN: ICD-10-CM

## 2023-03-12 DIAGNOSIS — E11.65 TYPE 2 DIABETES MELLITUS WITH HYPERGLYCEMIA, WITH LONG-TERM CURRENT USE OF INSULIN: ICD-10-CM

## 2023-03-13 RX ORDER — SYRING-NEEDL,DISP,INSUL,0.3 ML 31GX15/64"
SYRINGE, EMPTY DISPOSABLE MISCELLANEOUS
Qty: 300 EACH | Refills: 2 | OUTPATIENT
Start: 2023-03-13

## 2023-03-31 ENCOUNTER — TRANSCRIBE ORDERS (OUTPATIENT)
Dept: ADMINISTRATIVE | Facility: HOSPITAL | Age: 78
End: 2023-03-31
Payer: MEDICARE

## 2023-03-31 DIAGNOSIS — M79.601 RIGHT ARM PAIN: Primary | ICD-10-CM

## 2023-03-31 DIAGNOSIS — M79.89 NODULE OF SOFT TISSUE: ICD-10-CM

## 2023-04-21 ENCOUNTER — OFFICE VISIT (OUTPATIENT)
Dept: FAMILY MEDICINE CLINIC | Facility: CLINIC | Age: 78
End: 2023-04-21
Payer: MEDICARE

## 2023-04-21 VITALS
DIASTOLIC BLOOD PRESSURE: 90 MMHG | TEMPERATURE: 97.6 F | SYSTOLIC BLOOD PRESSURE: 144 MMHG | BODY MASS INDEX: 34.75 KG/M2 | OXYGEN SATURATION: 97 % | HEIGHT: 60 IN | WEIGHT: 177 LBS | HEART RATE: 79 BPM | RESPIRATION RATE: 16 BRPM

## 2023-04-21 DIAGNOSIS — M25.552 CHRONIC ARTHRALGIAS OF KNEES AND HIPS: ICD-10-CM

## 2023-04-21 DIAGNOSIS — Z78.9 IMPAIRED MOBILITY AND ADLS: ICD-10-CM

## 2023-04-21 DIAGNOSIS — E11.65 TYPE 2 DIABETES MELLITUS WITH HYPERGLYCEMIA, WITH LONG-TERM CURRENT USE OF INSULIN: Primary | ICD-10-CM

## 2023-04-21 DIAGNOSIS — E03.9 ACQUIRED HYPOTHYROIDISM: ICD-10-CM

## 2023-04-21 DIAGNOSIS — Z99.81 ON HOME OXYGEN THERAPY: ICD-10-CM

## 2023-04-21 DIAGNOSIS — M25.561 CHRONIC ARTHRALGIAS OF KNEES AND HIPS: ICD-10-CM

## 2023-04-21 DIAGNOSIS — Z74.09 IMPAIRED MOBILITY AND ADLS: ICD-10-CM

## 2023-04-21 DIAGNOSIS — R22.31 MASS OF RIGHT ELBOW: ICD-10-CM

## 2023-04-21 DIAGNOSIS — F41.9 ANXIETY: ICD-10-CM

## 2023-04-21 DIAGNOSIS — M25.562 CHRONIC ARTHRALGIAS OF KNEES AND HIPS: ICD-10-CM

## 2023-04-21 DIAGNOSIS — I10 PRIMARY HYPERTENSION: ICD-10-CM

## 2023-04-21 DIAGNOSIS — M25.551 CHRONIC ARTHRALGIAS OF KNEES AND HIPS: ICD-10-CM

## 2023-04-21 DIAGNOSIS — J44.9 CHRONIC OBSTRUCTIVE PULMONARY DISEASE, UNSPECIFIED COPD TYPE: ICD-10-CM

## 2023-04-21 DIAGNOSIS — I48.0 PAROXYSMAL ATRIAL FIBRILLATION: ICD-10-CM

## 2023-04-21 DIAGNOSIS — R22.42 HIP MASS, LEFT: ICD-10-CM

## 2023-04-21 DIAGNOSIS — Z79.4 TYPE 2 DIABETES MELLITUS WITH HYPERGLYCEMIA, WITH LONG-TERM CURRENT USE OF INSULIN: Primary | ICD-10-CM

## 2023-04-21 DIAGNOSIS — G89.29 CHRONIC ARTHRALGIAS OF KNEES AND HIPS: ICD-10-CM

## 2023-04-21 PROBLEM — E11.9 TYPE 2 DIABETES MELLITUS: Status: ACTIVE | Noted: 2017-09-19

## 2023-04-21 PROBLEM — E78.00 HIGH CHOLESTEROL: Status: ACTIVE | Noted: 2022-11-11

## 2023-04-21 PROBLEM — K21.9 GASTROESOPHAGEAL REFLUX DISEASE: Status: ACTIVE | Noted: 2020-02-24

## 2023-04-21 LAB
EXPIRATION DATE: NORMAL
HBA1C MFR BLD: 9.1 %
Lab: NORMAL

## 2023-04-21 RX ORDER — LORAZEPAM 0.5 MG/1
0.5 TABLET ORAL EVERY 8 HOURS PRN
Qty: 45 TABLET | Refills: 3 | Status: SHIPPED | OUTPATIENT
Start: 2023-04-21

## 2023-04-21 RX ORDER — CLONIDINE HYDROCHLORIDE 0.1 MG/1
0.1 TABLET ORAL 2 TIMES DAILY PRN
COMMUNITY
End: 2023-04-21 | Stop reason: SDUPTHER

## 2023-04-21 RX ORDER — SYRING-NEEDL,DISP,INSUL,0.3 ML 31GX15/64"
SYRINGE, EMPTY DISPOSABLE MISCELLANEOUS
Qty: 300 EACH | Refills: 2 | Status: SHIPPED | OUTPATIENT
Start: 2023-04-21

## 2023-04-21 RX ORDER — CLONIDINE HYDROCHLORIDE 0.1 MG/1
0.1 TABLET ORAL 2 TIMES DAILY PRN
Qty: 20 TABLET | Refills: 1 | Status: SHIPPED | OUTPATIENT
Start: 2023-04-21

## 2023-04-21 RX ORDER — IRBESARTAN 75 MG/1
75 TABLET ORAL DAILY
Qty: 90 TABLET | Refills: 1 | Status: SHIPPED | OUTPATIENT
Start: 2023-04-21

## 2023-04-21 RX ORDER — LEVOTHYROXINE SODIUM 112 UG/1
112 TABLET ORAL DAILY
Qty: 90 TABLET | Refills: 1 | Status: SHIPPED | OUTPATIENT
Start: 2023-04-21

## 2023-04-21 RX ORDER — ASPIRIN 81 MG/1
81 TABLET ORAL DAILY
Qty: 30 TABLET | Refills: 6 | Status: SHIPPED | OUTPATIENT
Start: 2023-04-21

## 2023-04-21 RX ORDER — INSULIN ASPART 100 [IU]/ML
50 INJECTION, SUSPENSION SUBCUTANEOUS 3 TIMES DAILY
Qty: 100 ML | Refills: 2 | Status: SHIPPED | OUTPATIENT
Start: 2023-04-21

## 2023-04-21 NOTE — PROGRESS NOTES
"       New Patient History and Physical      Referring Physician: No ref. provider found    Chief Complaint:    Chief Complaint   Patient presents with   • Med Refill     Establish care  Med refills        History of Present Illness:   Marielena Moeller is a 77 y.o. female who presents today as a new patient to the [facility] office. Patient has previously been seen by Marychuy Starr in [place]. Patient states that she developed pneumonia in 2019 and started seeing a PCP in [place], [place]. Patient states that she has been following pulmonologist and cardiologist since that time.     Patient sees Dr. Pathak with [facility].    Patient has not seen pulmonologist in years. States that she was told that she should use continuous oxygen and \"treat the symptoms\" for COPD.    History of Paroxysmal Afib, COPD, HLD, HTN, hypothyroidism, diverticulosis    Patient has tried medication for afib before and it caused dizziness. Patient state that when she goes into afib she takes clonidine, ativan, and drinks hybiscus tea and drops her blood pressure and it helps her heart rate to return to normal.     Patient on 2.5-3L oxygen per [place] at home, continuous. Patient needs to be reassessed for renewal of oxygen supplementation order.    Acute concerns of rash under breasts and in folds of abdomen, intermittent, itching.     Has had intermittent fever for years--thinks that it is caused by her poor dentition     US scheduled for right inner elbow, raised, enlarged area, soft tissue--scheduled 5/19    Left breast pain, cannot stand to get mammogram--started about 2 years ago    Degenerative arthritis bilateral hips, lipoma left hip    Subjective     Review of Systems   Constitutional: Positive for fever (intermittent).   HENT: Positive for dental problem.    Respiratory: Positive for cough and shortness of breath (home oxygen supplementation).    Cardiovascular: Positive for palpitations. Negative for chest pain.   Genitourinary: "        Left breast pain   Musculoskeletal: Positive for arthralgias (multiple joints) and gait problem (chronic).   Psychiatric/Behavioral: Negative for sleep disturbance.        The following portions of the patient's history were reviewed and updated as appropriate: allergies, current medications, past family history, past medical history, past social history, past surgical history and problem list.    Past Medical History:   Past Medical History:   Diagnosis Date   • Abdominal hernia    • Abdominal pain    • Arthritis    • Asthma    • Blood in stool    • Body piercing     EARS ONLY   • Bronchitis    • Bulging of lumbar intervertebral disc    • Colon polyp    • Cough    • COVID-19 2022   • Deaf, left    • Diabetes mellitus    • Diverticulitis    • GERD (gastroesophageal reflux disease)    • Headache    • Heart murmur    • Hyperlipidemia    • Hypertension    • Leaky heart valve    • Lung nodules    • Osteoporosis    • Palpitations    • Pancreatitis    • Supplemental oxygen dependent     NIGHTLY AND WHEN EXERTED   • Thyroid disease     LEFT SIDE REMOVED   • Tinnitus, left        Past Surgical History:  Past Surgical History:   Procedure Laterality Date   • APPENDECTOMY     •  SECTION     • CHOLECYSTECTOMY     • COLONOSCOPY N/A 8/10/2018    Procedure: COLONOSCOPY WITH HOT SNARE POLYPECTOMY X 3; COLD SNARE POLYPECTOMY, COLD BIOPSY POLYPECTOMY X 4; CLIP PLACEMENT X 1;  Surgeon: Glenn Modi MD;  Location: [facility];  Service: Gastroenterology   • THYROIDECTOMY, PARTIAL Left    • TUBAL ABDOMINAL LIGATION         Family History: family history includes Arthritis in her father and mother; Diabetes in her brother, father, maternal grandfather, maternal grandmother, mother, paternal grandfather, paternal grandmother, and sister; Heart attack in her father; Hyperlipidemia in her father; Osteoporosis in her mother; Thyroid disease in her sister.    Social History:  reports that she quit  "smoking about 48 years ago. Her smoking use included cigarettes. She has a 30.00 pack-year smoking history. She has never been exposed to tobacco smoke. She has never used smokeless tobacco. She reports that she does not drink alcohol and does not use drugs.    Medications:    Current Outpatient Medications:   •  albuterol (ACCUNEB) 0.63 MG/3ML nebulizer solution, Take 3 mL by nebulization Every 6 (Six) Hours As Needed for Wheezing., Disp: 60 each, Rfl: 11  •  albuterol sulfate HFA (Ventolin HFA) 108 (90 Base) MCG/ACT inhaler, Inhale 2 puffs Every 6 (Six) Hours As Needed for Wheezing or Shortness of Air., Disp: 54 g, Rfl: 3  •  aspirin 81 MG EC tablet, Take 1 tablet by mouth Daily., Disp: 30 tablet, Rfl: 6  •  Blood Glucose Monitoring Suppl (ACCU-CHEK CHIO PLUS) w/Device kit, USE AS DIRECTED TO CHECK GLUCOSE 3 TIMES DAILY FOR DM E11.65, Disp: 1 kit, Rfl: 0  •  Cholecalciferol (Vitamin D3) 1.25 MG (85203 UT) capsule, TAKE 1 CAPSULE BY MOUTH EVERY 7 DAYS, Disp: 12 capsule, Rfl: 0  •  cloNIDine (CATAPRES) 0.1 MG tablet, Take 1 tablet by mouth 2 (Two) Times a Day As Needed (BP > 165/90)., Disp: 20 tablet, Rfl: 1  •  Continuous Blood Gluc  (Dexcom G6 ) device, 1 each Continuous., Disp: 1 each, Rfl: 0  •  Continuous Blood Gluc Sensor (Dexcom G6 Sensor), Every 10 (Ten) Days. Apply as directed, Disp: 3 each, Rfl: 11  •  glucose blood (Accu-Chek Chio Plus) test strip, USE AS DIRECTED THREE TIMES DAILY, Disp: 300 each, Rfl: 3  •  insulin aspart prot-insulin aspart (novoLOG 70/30) (70-30) 100 UNIT/ML injection, Inject 50 Units under the skin into the appropriate area as directed 3 (Three) Times a Day., Disp: 100 mL, Rfl: 2  •  Insulin Syringe-Needle U-100 (BD Veo Insulin Syringe U/F) 31G X 15/64\" 0.5 ML misc, as directed, Disp: 300 each, Rfl: 2  •  irbesartan (AVAPRO) 75 MG tablet, Take 1 tablet by mouth Daily., Disp: 90 tablet, Rfl: 1  •  levothyroxine (SYNTHROID, LEVOTHROID) 112 MCG tablet, Take 1 tablet by " "mouth Daily., Disp: 90 tablet, Rfl: 1  •  LORazepam (ATIVAN) 0.5 MG tablet, Take 1 tablet by mouth Every 8 (Eight) Hours As Needed for Anxiety., Disp: 45 tablet, Rfl: 3  •  MAGNESIUM-OXIDE 400 (241.3 Mg) MG tablet tablet, TK 1 T PO QHS, Disp: , Rfl: 3  •  O2 (OXYGEN), Inhale 3 L/min As Needed., Disp: , Rfl:   •  Zinc 50 MG tablet, Take 0.5 tablets by mouth Every Other Day., Disp: , Rfl:     Allergies:  Allergies   Allergen Reactions   • Doxycycline Nausea And Vomiting     \"Pain worse than pancreatitis\"   • Diltiazem Other (See Comments)     nausea   • Sotalol Other (See Comments)     nausea   • Spironolactone Other (See Comments)     Dropped magnesium     • Warfarin Other (See Comments)     Nose bleed   • Ciprofloxacin Hives   • Contrast Dye (Echo Or Unknown Ct/Mr) Rash     Breaks her out   • Erythromycin Hives and Rash   • Hydrochlorothiazide Other (See Comments)        pt developed pancreastitis   • Levaquin [Levofloxacin] Swelling   • Prednisone Mental Status Change     ITCHING AND NERVOUS   • Shellfish Allergy Rash     Breaks her out       Objective     Vital Signs:   /90   Pulse 79   Temp 97.6 °F (36.4 °C)   Resp 16   Ht 152.4 cm (60\")   Wt 80.3 kg (177 lb)   LMP  (LMP Unknown)   SpO2 97%   BMI 34.57 kg/m²      BMI is >= 30 and <35. (Class 1 Obesity). The following options were offered after discussion;: nutrition counseling/recommendations       Physical Exam:  Physical Exam  Vitals and nursing note reviewed.   Constitutional:       Appearance: She is obese.   Eyes:      Pupils: Pupils are equal, round, and reactive to light.   Cardiovascular:      Rate and Rhythm: Normal rate. Rhythm irregular.      Heart sounds: Normal heart sounds.   Pulmonary:      Effort: Respiratory distress (mild, supplemental oxygen) present.      Breath sounds: Wheezing present. No rhonchi.   Abdominal:      General: Bowel sounds are normal.   Musculoskeletal:      Right elbow: Swelling present.        Arms:       Right " "hip: Bony tenderness and crepitus present. Decreased strength.      Left hip: Tenderness, bony tenderness and crepitus present. Decreased range of motion.        Legs:    Neurological:      Mental Status: She is alert and oriented to person, place, and time.   Psychiatric:         Mood and Affect: Mood normal.         Behavior: Behavior normal.         Assessment / Plan     Assessment/Plan:   Diagnoses and all orders for this visit:    1. Type 2 diabetes mellitus with hyperglycemia, with long-term current use of insulin (Primary)  -     Insulin Syringe-Needle U-100 (BD Veo Insulin Syringe U/F) 31G X 15/64\" 0.5 ML misc; as directed  Dispense: 300 each; Refill: 2  -     insulin aspart prot-insulin aspart (novoLOG 70/30) (70-30) 100 UNIT/ML injection; Inject 50 Units under the skin into the appropriate area as directed 3 (Three) Times a Day.  Dispense: 100 mL; Refill: 2  -     POC Glycosylated Hemoglobin (Hb A1C)    2. Acquired hypothyroidism  -     levothyroxine (SYNTHROID, LEVOTHROID) 112 MCG tablet; Take 1 tablet by mouth Daily.  Dispense: 90 tablet; Refill: 1    3. Anxiety  -     LORazepam (ATIVAN) 0.5 MG tablet; Take 1 tablet by mouth Every 8 (Eight) Hours As Needed for Anxiety.  Dispense: 45 tablet; Refill: 3    4. Impaired mobility and ADLs  -     Ambulatory Referral to Home Health    5. Primary hypertension  -     cloNIDine (CATAPRES) 0.1 MG tablet; Take 1 tablet by mouth 2 (Two) Times a Day As Needed (BP > 165/90).  Dispense: 20 tablet; Refill: 1    6. Paroxysmal atrial fibrillation  -     aspirin 81 MG EC tablet; Take 1 tablet by mouth Daily.  Dispense: 30 tablet; Refill: 6    7. Mass of right elbow    8. Chronic arthralgias of knees and hips  -     Ambulatory Referral to Home Health    9. Hip mass, left  -     Ambulatory Referral to Home Health    10. Chronic obstructive pulmonary disease, unspecified COPD type    11. On home oxygen therapy  -     Ambulatory Referral to Home Health    Other orders  -     " irbesartan (AVAPRO) 75 MG tablet; Take 1 tablet by mouth Daily.  Dispense: 90 tablet; Refill: 1       Aleve for pain but do not take aspirin that day    Discussion Summary:  Discussed plan of care in detail with pt today; pt verb understanding and agrees.      I spent 50 minutes caring for Marielena on this date of service. This time includes time spent by me in the following activities:preparing for the visit, reviewing tests, obtaining and/or reviewing a separately obtained history, performing a medically appropriate examination and/or evaluation , counseling and educating the patient/family/caregiver, ordering medications, tests, or procedures, referring and communicating with other health care professionals  and documenting information in the medical record      I have reviewed and updated all copied forward information, as appropriate.  I attest to the accuracy and relevance of any unchanged information.        Follow up:  Return in about 2 months (around 6/21/2023) for Annual.     Patient Education:  There are no Patient Instructions on file for this visit.    LETICIA Moreira  04/26/23  10:15 EDT    Please note that portions of this note may have been completed with a voice recognition program.

## 2023-04-28 ENCOUNTER — HOSPITAL ENCOUNTER (EMERGENCY)
Facility: HOSPITAL | Age: 78
Discharge: HOME | End: 2023-04-28
Attending: EMERGENCY MEDICINE
Payer: MEDICARE

## 2023-04-28 ENCOUNTER — HOME HEALTH ADMISSION (OUTPATIENT)
Dept: HOME HEALTH SERVICES | Facility: HOME HEALTHCARE | Age: 78
End: 2023-04-28
Payer: MEDICARE

## 2023-04-28 ENCOUNTER — APPOINTMENT (OUTPATIENT)
Dept: CT IMAGING | Facility: HOSPITAL | Age: 78
End: 2023-04-28
Payer: MEDICARE

## 2023-04-28 VITALS
SYSTOLIC BLOOD PRESSURE: 139 MMHG | BODY MASS INDEX: 34.16 KG/M2 | TEMPERATURE: 97.9 F | HEART RATE: 70 BPM | OXYGEN SATURATION: 97 % | WEIGHT: 174 LBS | RESPIRATION RATE: 16 BRPM | HEIGHT: 60 IN | DIASTOLIC BLOOD PRESSURE: 69 MMHG

## 2023-04-28 DIAGNOSIS — R68.89 FEELING POORLY: ICD-10-CM

## 2023-04-28 DIAGNOSIS — I10 PRIMARY HYPERTENSION: Primary | ICD-10-CM

## 2023-04-28 LAB
ALBUMIN SERPL-MCNC: 3.7 G/DL (ref 3.5–5.2)
ALBUMIN/GLOB SERPL: 1.3 G/DL
ALP SERPL-CCNC: 81 U/L (ref 39–117)
ALT SERPL W P-5'-P-CCNC: 13 U/L (ref 1–33)
ANION GAP SERPL CALCULATED.3IONS-SCNC: 6.7 MMOL/L (ref 5–15)
AST SERPL-CCNC: 15 U/L (ref 1–32)
BACTERIA UR QL AUTO: ABNORMAL /HPF
BASOPHILS # BLD AUTO: 0.06 10*3/MM3 (ref 0–0.2)
BASOPHILS NFR BLD AUTO: 0.8 % (ref 0–1.5)
BILIRUB SERPL-MCNC: 0.3 MG/DL (ref 0–1.2)
BILIRUB UR QL STRIP: NEGATIVE
BUN SERPL-MCNC: 14 MG/DL (ref 8–23)
BUN/CREAT SERPL: 22.6 (ref 7–25)
CALCIUM SPEC-SCNC: 9.3 MG/DL (ref 8.6–10.5)
CHLORIDE SERPL-SCNC: 98 MMOL/L (ref 98–107)
CLARITY UR: CLEAR
CO2 SERPL-SCNC: 31.3 MMOL/L (ref 22–29)
COLOR UR: YELLOW
CREAT SERPL-MCNC: 0.62 MG/DL (ref 0.57–1)
DEPRECATED RDW RBC AUTO: 41.7 FL (ref 37–54)
EGFRCR SERPLBLD CKD-EPI 2021: 93 ML/MIN/1.73
EOSINOPHIL # BLD AUTO: 0.17 10*3/MM3 (ref 0–0.4)
EOSINOPHIL NFR BLD AUTO: 2.4 % (ref 0.3–6.2)
ERYTHROCYTE [DISTWIDTH] IN BLOOD BY AUTOMATED COUNT: 11.9 % (ref 12.3–15.4)
GLOBULIN UR ELPH-MCNC: 2.9 GM/DL
GLUCOSE SERPL-MCNC: 317 MG/DL (ref 65–99)
GLUCOSE UR STRIP-MCNC: ABNORMAL MG/DL
HCT VFR BLD AUTO: 39.6 % (ref 34–46.6)
HGB BLD-MCNC: 13 G/DL (ref 12–15.9)
HGB UR QL STRIP.AUTO: NEGATIVE
HOLD SPECIMEN: NORMAL
HOLD SPECIMEN: NORMAL
HYALINE CASTS UR QL AUTO: ABNORMAL /LPF
IMM GRANULOCYTES # BLD AUTO: 0.03 10*3/MM3 (ref 0–0.05)
IMM GRANULOCYTES NFR BLD AUTO: 0.4 % (ref 0–0.5)
KETONES UR QL STRIP: ABNORMAL
LEUKOCYTE ESTERASE UR QL STRIP.AUTO: NEGATIVE
LIPASE SERPL-CCNC: 28 U/L (ref 13–60)
LYMPHOCYTES # BLD AUTO: 0.99 10*3/MM3 (ref 0.7–3.1)
LYMPHOCYTES NFR BLD AUTO: 13.7 % (ref 19.6–45.3)
MCH RBC QN AUTO: 31.4 PG (ref 26.6–33)
MCHC RBC AUTO-ENTMCNC: 32.8 G/DL (ref 31.5–35.7)
MCV RBC AUTO: 95.7 FL (ref 79–97)
MONOCYTES # BLD AUTO: 0.45 10*3/MM3 (ref 0.1–0.9)
MONOCYTES NFR BLD AUTO: 6.2 % (ref 5–12)
NEUTROPHILS NFR BLD AUTO: 5.52 10*3/MM3 (ref 1.7–7)
NEUTROPHILS NFR BLD AUTO: 76.5 % (ref 42.7–76)
NITRITE UR QL STRIP: NEGATIVE
NRBC BLD AUTO-RTO: 0 /100 WBC (ref 0–0.2)
NT-PROBNP SERPL-MCNC: 192.2 PG/ML (ref 0–1800)
PH UR STRIP.AUTO: 6.5 [PH] (ref 5–8)
PLATELET # BLD AUTO: 225 10*3/MM3 (ref 140–450)
PMV BLD AUTO: 10.2 FL (ref 6–12)
POTASSIUM SERPL-SCNC: 4.8 MMOL/L (ref 3.5–5.2)
PROT SERPL-MCNC: 6.6 G/DL (ref 6–8.5)
PROT UR QL STRIP: ABNORMAL
RBC # BLD AUTO: 4.14 10*6/MM3 (ref 3.77–5.28)
RBC # UR STRIP: ABNORMAL /HPF
REF LAB TEST METHOD: ABNORMAL
SODIUM SERPL-SCNC: 136 MMOL/L (ref 136–145)
SP GR UR STRIP: 1.02 (ref 1–1.03)
SQUAMOUS #/AREA URNS HPF: ABNORMAL /HPF
TROPONIN T SERPL HS-MCNC: 20 NG/L
TROPONIN T SERPL HS-MCNC: 20 NG/L
UROBILINOGEN UR QL STRIP: ABNORMAL
WBC # UR STRIP: ABNORMAL /HPF
WBC NRBC COR # BLD: 7.22 10*3/MM3 (ref 3.4–10.8)
WHOLE BLOOD HOLD COAG: NORMAL
WHOLE BLOOD HOLD SPECIMEN: NORMAL

## 2023-04-28 PROCEDURE — 80053 COMPREHEN METABOLIC PANEL: CPT | Performed by: EMERGENCY MEDICINE

## 2023-04-28 PROCEDURE — 99284 EMERGENCY DEPT VISIT MOD MDM: CPT

## 2023-04-28 PROCEDURE — 70450 CT HEAD/BRAIN W/O DYE: CPT

## 2023-04-28 PROCEDURE — 83880 ASSAY OF NATRIURETIC PEPTIDE: CPT | Performed by: EMERGENCY MEDICINE

## 2023-04-28 PROCEDURE — 81001 URINALYSIS AUTO W/SCOPE: CPT | Performed by: EMERGENCY MEDICINE

## 2023-04-28 PROCEDURE — 83690 ASSAY OF LIPASE: CPT | Performed by: EMERGENCY MEDICINE

## 2023-04-28 PROCEDURE — 84484 ASSAY OF TROPONIN QUANT: CPT | Performed by: EMERGENCY MEDICINE

## 2023-04-28 PROCEDURE — 93005 ELECTROCARDIOGRAM TRACING: CPT | Performed by: EMERGENCY MEDICINE

## 2023-04-28 PROCEDURE — 85025 COMPLETE CBC W/AUTO DIFF WBC: CPT | Performed by: EMERGENCY MEDICINE

## 2023-04-28 PROCEDURE — 36415 COLL VENOUS BLD VENIPUNCTURE: CPT

## 2023-04-28 RX ORDER — SODIUM CHLORIDE 0.9 % (FLUSH) 0.9 %
10 SYRINGE (ML) INJECTION AS NEEDED
Status: DISCONTINUED | OUTPATIENT
Start: 2023-04-28 | End: 2023-04-28 | Stop reason: HOSPADM

## 2023-04-28 NOTE — ED PROVIDER NOTES
"  HPI: Marielena Moeller is a 77 y.o. female who presents to the emergency department complaining of \"not feeling right\".  She states that she woke up this morning \"not feeling right\", she cannot further describe the symptoms.  It seems like her family was worried that this was either anxiety or a \"mini stroke\".  She checked her glucose and it was elevated in the 230s and her blood pressure was elevated as well, she took Ativan and clonidine and EMS was called.  Her only complaint at this time is a mild headache.  She denies any fevers, chills, increased cough, nausea, vomiting, chest pain, shortness of breath.      REVIEW OF SYSTEMS: All other systems reviewed and are negative     PAST MEDICAL HISTORY:   Past Medical History:   Diagnosis Date   • Abdominal hernia    • Abdominal pain    • Arthritis    • Asthma    • Blood in stool    • Body piercing     EARS ONLY   • Bronchitis    • Bulging of lumbar intervertebral disc    • Colon polyp    • Cough    • COVID-19 07/2022   • Deaf, left    • Diabetes mellitus    • Diverticulitis    • GERD (gastroesophageal reflux disease)    • Headache    • Heart murmur    • Hyperlipidemia    • Hypertension    • Leaky heart valve    • Lung nodules    • Osteoporosis    • Palpitations    • Pancreatitis    • Supplemental oxygen dependent     NIGHTLY AND WHEN EXERTED   • Thyroid disease     LEFT SIDE REMOVED   • Tinnitus, left         FAMILY HISTORY:   Family History   Problem Relation Age of Onset   • Arthritis Mother    • Diabetes Mother    • Osteoporosis Mother    • Arthritis Father    • Diabetes Father    • Heart attack Father    • Hyperlipidemia Father    • Diabetes Sister    • Thyroid disease Sister    • Diabetes Brother    • Diabetes Maternal Grandmother    • Diabetes Maternal Grandfather    • Diabetes Paternal Grandmother    • Diabetes Paternal Grandfather         SOCIAL HISTORY:   Social History     Socioeconomic History   • Marital status:    Tobacco Use   • Smoking status: " Former     Packs/day: 2.00     Years: 15.00     Pack years: 30.00     Types: Cigarettes     Quit date:      Years since quittin.3     Passive exposure: Never   • Smokeless tobacco: Never   Vaping Use   • Vaping Use: Never used   Substance and Sexual Activity   • Alcohol use: No   • Drug use: No   • Sexual activity: Defer        SURGICAL HISTORY:   Past Surgical History:   Procedure Laterality Date   • APPENDECTOMY     •  SECTION     • CHOLECYSTECTOMY     • COLONOSCOPY N/A 8/10/2018    Procedure: COLONOSCOPY WITH HOT SNARE POLYPECTOMY X 3; COLD SNARE POLYPECTOMY, COLD BIOPSY POLYPECTOMY X 4; CLIP PLACEMENT X 1;  Surgeon: Glenn Modi MD;  Location: [facility];  Service: Gastroenterology   • THYROIDECTOMY, PARTIAL Left    • TUBAL ABDOMINAL LIGATION          ALLERGIES: Doxycycline, Diltiazem, Sotalol, Spironolactone, Warfarin, Ciprofloxacin, Contrast dye (echo or unknown ct/mr), Erythromycin, Hydrochlorothiazide, Levaquin [levofloxacin], Prednisone, and Shellfish allergy       PHYSICAL EXAM:   VITAL SIGNS:   Vitals:    23 0716   BP: 151/67   Pulse: 69   Resp: 16   Temp: 97.9 °F (36.6 °C)   SpO2: 99%      CONSTITUTIONAL: Awake, well appearing, nontoxic   HENT: Atraumatic, normocephalic, oral mucosa moist, airway patent. Nares patent without drainage. External ears normal.   EYES: Conjunctivae clear, EOMI, PERRL   NECK: Trachea midline, nontender, supple   CARDIOVASCULAR: Normal heart rate, Normal rhythm.  PULMONARY/CHEST: Normal work of breathing. Clear to auscultation, no rhonchi, wheezes, or rales.  ABDOMINAL: Nondistended, soft, nontender, no rebound or guarding.  NEUROLOGIC: Nonfocal, moves all four extremities, no gross sensory or motor deficits.   EXTREMITIES: No clubbing, cyanosis, or edema   SKIN: Warm, Dry, No erythema, No rash       ED COURSE / MEDICAL DECISION MAKING:     Marielena Moeller is a 77 y.o. female who presents to the emergency department for evaluation  of not feeling right, headache, high blood pressure.  Well-developed, well-nourished obese elderly lady in no distress with exam as above.  Her vital signs here are normal.  Her oxygen saturation is normal on her home O2 at 99%.  Her exam is otherwise nonfocal.  Will obtain EKG, basic labs, urinalysis, head CT.  We will continue to monitor.  Disposition pending.    Differential diagnosis includes anxiety, TIA, UTI, electrolyte abnormality among other etiologies.    EKG interpreted by me: Sinus rhythm, normal rate, no acute ST changes, some nonspecific T waves, poor R wave progression, this is an abnormal EKG    Work-up is overall reassuring.  Serial enzymes are flat.  Head CT is negative.  Patient is sitting up at bedside, feels well, has no complaints.  She would like to go home.  I do feel that is a reasonable plan.  She has close follow-up with her primary doctor.  Return precautions discussed.  Patient and family are happy with this plan.    Final diagnoses:   Primary hypertension   Feeling poorly        Dejuan Szymanski MD  04/28/23 [id number]

## 2023-05-05 ENCOUNTER — APPOINTMENT (OUTPATIENT)
Dept: GENERAL RADIOLOGY | Facility: HOSPITAL | Age: 78
DRG: 243 | End: 2023-05-05
Payer: MEDICARE

## 2023-05-05 ENCOUNTER — OFFICE VISIT (OUTPATIENT)
Dept: FAMILY MEDICINE CLINIC | Facility: CLINIC | Age: 78
End: 2023-05-05
Payer: MEDICARE

## 2023-05-05 ENCOUNTER — APPOINTMENT (OUTPATIENT)
Dept: CT IMAGING | Facility: HOSPITAL | Age: 78
DRG: 243 | End: 2023-05-05
Payer: MEDICARE

## 2023-05-05 ENCOUNTER — HOSPITAL ENCOUNTER (INPATIENT)
Facility: HOSPITAL | Age: 78
LOS: 3 days | Discharge: HOME HEALTH | DRG: 243 | End: 2023-05-09
Attending: EMERGENCY MEDICINE | Admitting: INTERNAL MEDICINE
Payer: MEDICARE

## 2023-05-05 VITALS
TEMPERATURE: 98.4 F | HEIGHT: 60 IN | BODY MASS INDEX: 34.36 KG/M2 | RESPIRATION RATE: 16 BRPM | WEIGHT: 175 LBS | SYSTOLIC BLOOD PRESSURE: 138 MMHG | DIASTOLIC BLOOD PRESSURE: 90 MMHG | HEART RATE: 123 BPM | OXYGEN SATURATION: 94 %

## 2023-05-05 DIAGNOSIS — Z79.4 TYPE 2 DIABETES MELLITUS WITH OTHER CIRCULATORY COMPLICATION, WITH LONG-TERM CURRENT USE OF INSULIN: ICD-10-CM

## 2023-05-05 DIAGNOSIS — I49.5 SICK SINUS SYNDROME: ICD-10-CM

## 2023-05-05 DIAGNOSIS — Z79.4 TYPE 2 DIABETES MELLITUS WITH HYPERGLYCEMIA, WITH LONG-TERM CURRENT USE OF INSULIN: ICD-10-CM

## 2023-05-05 DIAGNOSIS — J96.12 CHRONIC RESPIRATORY FAILURE WITH HYPERCAPNIA: ICD-10-CM

## 2023-05-05 DIAGNOSIS — Z99.81 ON HOME OXYGEN THERAPY: ICD-10-CM

## 2023-05-05 DIAGNOSIS — E83.42 HYPOMAGNESEMIA: ICD-10-CM

## 2023-05-05 DIAGNOSIS — E11.59 TYPE 2 DIABETES MELLITUS WITH OTHER CIRCULATORY COMPLICATION, WITH LONG-TERM CURRENT USE OF INSULIN: ICD-10-CM

## 2023-05-05 DIAGNOSIS — I10 PRIMARY HYPERTENSION: ICD-10-CM

## 2023-05-05 DIAGNOSIS — I10 HYPERTENSION, UNSPECIFIED TYPE: ICD-10-CM

## 2023-05-05 DIAGNOSIS — R55 SYNCOPE, UNSPECIFIED SYNCOPE TYPE: Primary | ICD-10-CM

## 2023-05-05 DIAGNOSIS — J44.9 CHRONIC OBSTRUCTIVE PULMONARY DISEASE, UNSPECIFIED COPD TYPE: ICD-10-CM

## 2023-05-05 DIAGNOSIS — E03.9 ACQUIRED HYPOTHYROIDISM: ICD-10-CM

## 2023-05-05 DIAGNOSIS — E11.65 TYPE 2 DIABETES MELLITUS WITH HYPERGLYCEMIA, WITH LONG-TERM CURRENT USE OF INSULIN: ICD-10-CM

## 2023-05-05 DIAGNOSIS — J44.9 CHRONIC OBSTRUCTIVE PULMONARY DISEASE, UNSPECIFIED COPD TYPE: Primary | ICD-10-CM

## 2023-05-05 DIAGNOSIS — I46.9 ASYSTOLE: ICD-10-CM

## 2023-05-05 DIAGNOSIS — E78.2 MIXED HYPERLIPIDEMIA: ICD-10-CM

## 2023-05-05 DIAGNOSIS — I48.0 PAROXYSMAL ATRIAL FIBRILLATION: ICD-10-CM

## 2023-05-05 DIAGNOSIS — E11.9 TYPE 2 DIABETES MELLITUS WITHOUT COMPLICATION, UNSPECIFIED WHETHER LONG TERM INSULIN USE: ICD-10-CM

## 2023-05-05 LAB
ALBUMIN SERPL-MCNC: 4.2 G/DL (ref 3.5–5.2)
ALBUMIN/GLOB SERPL: 1.6 G/DL
ALP SERPL-CCNC: 87 U/L (ref 39–117)
ALT SERPL W P-5'-P-CCNC: 28 U/L (ref 1–33)
ANION GAP SERPL CALCULATED.3IONS-SCNC: 8 MMOL/L (ref 5–15)
AST SERPL-CCNC: 22 U/L (ref 1–32)
BASOPHILS # BLD AUTO: 0.05 10*3/MM3 (ref 0–0.2)
BASOPHILS NFR BLD AUTO: 0.5 % (ref 0–1.5)
BILIRUB SERPL-MCNC: 0.2 MG/DL (ref 0–1.2)
BUN SERPL-MCNC: 12 MG/DL (ref 8–23)
BUN/CREAT SERPL: 19.4 (ref 7–25)
CALCIUM SPEC-SCNC: 9.7 MG/DL (ref 8.6–10.5)
CHLORIDE SERPL-SCNC: 100 MMOL/L (ref 98–107)
CO2 SERPL-SCNC: 33 MMOL/L (ref 22–29)
CREAT SERPL-MCNC: 0.62 MG/DL (ref 0.57–1)
DEPRECATED RDW RBC AUTO: 42 FL (ref 37–54)
EGFRCR SERPLBLD CKD-EPI 2021: 93 ML/MIN/1.73
EOSINOPHIL # BLD AUTO: 0.07 10*3/MM3 (ref 0–0.4)
EOSINOPHIL NFR BLD AUTO: 0.7 % (ref 0.3–6.2)
ERYTHROCYTE [DISTWIDTH] IN BLOOD BY AUTOMATED COUNT: 11.9 % (ref 12.3–15.4)
GLOBULIN UR ELPH-MCNC: 2.7 GM/DL
GLUCOSE BLDC GLUCOMTR-MCNC: 251 MG/DL (ref 70–130)
GLUCOSE SERPL-MCNC: 255 MG/DL (ref 65–99)
HCT VFR BLD AUTO: 41.4 % (ref 34–46.6)
HGB BLD-MCNC: 13.3 G/DL (ref 12–15.9)
HOLD SPECIMEN: NORMAL
HOLD SPECIMEN: NORMAL
IMM GRANULOCYTES # BLD AUTO: 0.05 10*3/MM3 (ref 0–0.05)
IMM GRANULOCYTES NFR BLD AUTO: 0.5 % (ref 0–0.5)
LYMPHOCYTES # BLD AUTO: 1.26 10*3/MM3 (ref 0.7–3.1)
LYMPHOCYTES NFR BLD AUTO: 12.2 % (ref 19.6–45.3)
MAGNESIUM SERPL-MCNC: 1.9 MG/DL (ref 1.6–2.4)
MCH RBC QN AUTO: 31.1 PG (ref 26.6–33)
MCHC RBC AUTO-ENTMCNC: 32.1 G/DL (ref 31.5–35.7)
MCV RBC AUTO: 96.7 FL (ref 79–97)
MONOCYTES # BLD AUTO: 0.54 10*3/MM3 (ref 0.1–0.9)
MONOCYTES NFR BLD AUTO: 5.2 % (ref 5–12)
NEUTROPHILS NFR BLD AUTO: 8.36 10*3/MM3 (ref 1.7–7)
NEUTROPHILS NFR BLD AUTO: 80.9 % (ref 42.7–76)
NRBC BLD AUTO-RTO: 0 /100 WBC (ref 0–0.2)
PLATELET # BLD AUTO: 256 10*3/MM3 (ref 140–450)
PMV BLD AUTO: 10.3 FL (ref 6–12)
POTASSIUM SERPL-SCNC: 4.5 MMOL/L (ref 3.5–5.2)
PROT SERPL-MCNC: 6.9 G/DL (ref 6–8.5)
RBC # BLD AUTO: 4.28 10*6/MM3 (ref 3.77–5.28)
SODIUM SERPL-SCNC: 141 MMOL/L (ref 136–145)
TROPONIN T SERPL HS-MCNC: 18 NG/L
WBC NRBC COR # BLD: 10.33 10*3/MM3 (ref 3.4–10.8)
WHOLE BLOOD HOLD COAG: NORMAL
WHOLE BLOOD HOLD SPECIMEN: NORMAL

## 2023-05-05 PROCEDURE — 93005 ELECTROCARDIOGRAM TRACING: CPT | Performed by: EMERGENCY MEDICINE

## 2023-05-05 PROCEDURE — 82948 REAGENT STRIP/BLOOD GLUCOSE: CPT

## 2023-05-05 PROCEDURE — 83735 ASSAY OF MAGNESIUM: CPT | Performed by: EMERGENCY MEDICINE

## 2023-05-05 PROCEDURE — 83036 HEMOGLOBIN GLYCOSYLATED A1C: CPT | Performed by: NURSE PRACTITIONER

## 2023-05-05 PROCEDURE — 84484 ASSAY OF TROPONIN QUANT: CPT | Performed by: EMERGENCY MEDICINE

## 2023-05-05 PROCEDURE — 71045 X-RAY EXAM CHEST 1 VIEW: CPT

## 2023-05-05 PROCEDURE — 99285 EMERGENCY DEPT VISIT HI MDM: CPT

## 2023-05-05 PROCEDURE — 93005 ELECTROCARDIOGRAM TRACING: CPT

## 2023-05-05 PROCEDURE — 85025 COMPLETE CBC W/AUTO DIFF WBC: CPT | Performed by: EMERGENCY MEDICINE

## 2023-05-05 PROCEDURE — 80053 COMPREHEN METABOLIC PANEL: CPT | Performed by: EMERGENCY MEDICINE

## 2023-05-05 RX ORDER — LEVOTHYROXINE SODIUM 112 UG/1
112 TABLET ORAL
Status: DISCONTINUED | OUTPATIENT
Start: 2023-05-06 | End: 2023-05-09 | Stop reason: HOSPADM

## 2023-05-05 RX ORDER — IPRATROPIUM BROMIDE AND ALBUTEROL SULFATE 2.5; .5 MG/3ML; MG/3ML
3 SOLUTION RESPIRATORY (INHALATION) EVERY 4 HOURS PRN
Status: DISCONTINUED | OUTPATIENT
Start: 2023-05-05 | End: 2023-05-09 | Stop reason: HOSPADM

## 2023-05-05 RX ORDER — CALCIUM CARB/VITAMIN D3/VIT K1 500-100-40
TABLET,CHEWABLE ORAL 3 TIMES DAILY
COMMUNITY
Start: 2023-04-21

## 2023-05-05 RX ORDER — SODIUM CHLORIDE 9 MG/ML
40 INJECTION, SOLUTION INTRAVENOUS AS NEEDED
Status: DISCONTINUED | OUTPATIENT
Start: 2023-05-05 | End: 2023-05-09 | Stop reason: HOSPADM

## 2023-05-05 RX ORDER — IBUPROFEN 600 MG/1
1 TABLET ORAL
Status: DISCONTINUED | OUTPATIENT
Start: 2023-05-05 | End: 2023-05-06

## 2023-05-05 RX ORDER — ENOXAPARIN SODIUM 100 MG/ML
40 INJECTION SUBCUTANEOUS DAILY
Status: DISCONTINUED | OUTPATIENT
Start: 2023-05-06 | End: 2023-05-09 | Stop reason: HOSPADM

## 2023-05-05 RX ORDER — NICOTINE POLACRILEX 4 MG
15 LOZENGE BUCCAL
Status: DISCONTINUED | OUTPATIENT
Start: 2023-05-05 | End: 2023-05-06

## 2023-05-05 RX ORDER — SODIUM CHLORIDE 0.9 % (FLUSH) 0.9 %
10 SYRINGE (ML) INJECTION EVERY 12 HOURS SCHEDULED
Status: DISCONTINUED | OUTPATIENT
Start: 2023-05-06 | End: 2023-05-09 | Stop reason: HOSPADM

## 2023-05-05 RX ORDER — MAGNESIUM SULFATE HEPTAHYDRATE 40 MG/ML
2 INJECTION, SOLUTION INTRAVENOUS ONCE
Status: COMPLETED | OUTPATIENT
Start: 2023-05-05 | End: 2023-05-06

## 2023-05-05 RX ORDER — SODIUM CHLORIDE, SODIUM LACTATE, POTASSIUM CHLORIDE, CALCIUM CHLORIDE 600; 310; 30; 20 MG/100ML; MG/100ML; MG/100ML; MG/100ML
100 INJECTION, SOLUTION INTRAVENOUS CONTINUOUS
Status: DISCONTINUED | OUTPATIENT
Start: 2023-05-06 | End: 2023-05-06

## 2023-05-05 RX ORDER — BUDESONIDE AND FORMOTEROL FUMARATE DIHYDRATE 160; 4.5 UG/1; UG/1
2 AEROSOL RESPIRATORY (INHALATION)
Status: DISCONTINUED | OUTPATIENT
Start: 2023-05-06 | End: 2023-05-09 | Stop reason: HOSPADM

## 2023-05-05 RX ORDER — SODIUM CHLORIDE 0.9 % (FLUSH) 0.9 %
10 SYRINGE (ML) INJECTION AS NEEDED
Status: DISCONTINUED | OUTPATIENT
Start: 2023-05-05 | End: 2023-05-09 | Stop reason: HOSPADM

## 2023-05-05 RX ORDER — DEXTROSE MONOHYDRATE 25 G/50ML
25 INJECTION, SOLUTION INTRAVENOUS
Status: DISCONTINUED | OUTPATIENT
Start: 2023-05-05 | End: 2023-05-06

## 2023-05-05 RX ORDER — BISOPROLOL FUMARATE AND HYDROCHLOROTHIAZIDE 2.5; 6.25 MG/1; MG/1
1 TABLET ORAL DAILY
COMMUNITY
End: 2023-05-09 | Stop reason: HOSPADM

## 2023-05-05 NOTE — PROGRESS NOTES
"                      Established Patient        Chief Complaint:   Chief Complaint   Patient presents with   • Labs Only     Check up and labs          History of Present Illness:    Marielena Moeller is a 77 y.o. female who presents today for follow up and labs.     Taking 1/2 bisoprolol because heart rate was dropping with full tablet, doing better  Taking clonidine PRN    Needs walk test today to maintain prescription for home oxygen supplementation    Patient seen in ED yesterday --presented \"not feeling right\", BP was elevated at the time of encounter  feeling fatigued since discharge    Subjective     The following portions of the patient's history were reviewed and updated as appropriate: allergies, current medications, past family history, past medical history, past social history, past surgical history and problem list.    ALLERGIES  Allergies   Allergen Reactions   • Doxycycline Nausea And Vomiting     \"Pain worse than pancreatitis\"   • Diltiazem Other (See Comments)     nausea   • Sotalol Other (See Comments)     nausea   • Spironolactone Other (See Comments)     Dropped magnesium     • Warfarin Other (See Comments)     Nose bleed   • Ciprofloxacin Hives   • Contrast Dye (Echo Or Unknown Ct/Mr) Rash     Breaks her out   • Erythromycin Hives and Rash   • Hydrochlorothiazide Other (See Comments)        pt developed pancreastitis   • Levaquin [Levofloxacin] Swelling   • Prednisone Mental Status Change     ITCHING AND NERVOUS   • Shellfish Allergy Rash     Breaks her out       ROS  Review of Systems   Constitutional: Positive for fatigue. Negative for fever.   Respiratory: Positive for cough and shortness of breath.    Cardiovascular: Positive for palpitations and leg swelling. Negative for chest pain.   Gastrointestinal: Negative for diarrhea, nausea and vomiting.   Neurological: Positive for dizziness and weakness.       Objective     Vital Signs:   /90   Pulse (!) 123   Temp 98.4 °F (36.9 °C)   Resp " "16   Ht 152.4 cm (60\")   Wt 79.4 kg (175 lb)   LMP  (LMP Unknown)   SpO2 94%   BMI 34.18 kg/m²     BMI is >= 30 and <35. (Class 1 Obesity). The following options were offered after discussion;: exercise counseling/recommendations and nutrition counseling/recommendations       Physical Exam   Physical Exam  Vitals and nursing note reviewed.   Constitutional:       Appearance: She is obese.   Eyes:      Pupils: Pupils are equal, round, and reactive to light.   Cardiovascular:      Rate and Rhythm: Regular rhythm. Tachycardia present.   Pulmonary:      Breath sounds: No decreased air movement. Decreased breath sounds and wheezing present. No rhonchi.   Abdominal:      General: Bowel sounds are normal.      Palpations: Abdomen is soft.   Neurological:      Mental Status: She is alert and oriented to person, place, and time.   Psychiatric:         Mood and Affect: Mood normal.         Behavior: Behavior normal.         Assessment and Plan      Assessment/Plan:   Diagnoses and all orders for this visit:    1. Chronic obstructive pulmonary disease, unspecified COPD type (Primary)  -     6 Minute Walk Test    2. Acquired hypothyroidism  -     TSH  -     T4, free    3. Primary hypertension    4. Type 2 diabetes mellitus with hyperglycemia, with long-term current use of insulin    5. On home oxygen therapy  -     6 Minute Walk Test    6. Paroxysmal atrial fibrillation    7. Mixed hyperlipidemia  -     Lipid Panel    8. Hypomagnesemia  -     Magnesium    Other orders  -     No Test Indicated        Discussion Summary:  Discussed plan of care in detail with pt today; pt verb understanding and agrees.      I spent 40 minutes caring for Marielena on this date of service. This time includes time spent by me in the following activities:preparing for the visit, reviewing tests, obtaining and/or reviewing a separately obtained history, performing a medically appropriate examination and/or evaluation , counseling and educating the " patient/family/caregiver, ordering medications, tests, or procedures and documenting information in the medical record      I have reviewed and updated all copied forward information, as appropriate.  I attest to the accuracy and relevance of any unchanged information.      Follow up:  Return in about 2 months (around 7/5/2023).     Patient Education:  There are no Patient Instructions on file for this visit.    LETICIA Moreira  05/16/23  13:07 EDT          Please note that portions of this note may have been completed with a voice recognition program.

## 2023-05-05 NOTE — Clinical Note
2 x-ray detectable sponge(s) removed from the pocket and methodical wound exploration (MWE) performed.

## 2023-05-05 NOTE — Clinical Note
A 6 fr sheath was  inserted with ultrasound guidance into the left subclavian vein. Sheath insertion delayed.

## 2023-05-06 ENCOUNTER — APPOINTMENT (OUTPATIENT)
Dept: CT IMAGING | Facility: HOSPITAL | Age: 78
DRG: 243 | End: 2023-05-06
Payer: MEDICARE

## 2023-05-06 ENCOUNTER — APPOINTMENT (OUTPATIENT)
Dept: CARDIOLOGY | Facility: HOSPITAL | Age: 78
DRG: 243 | End: 2023-05-06
Payer: MEDICARE

## 2023-05-06 PROBLEM — R55 SYNCOPE, UNSPECIFIED SYNCOPE TYPE: Status: ACTIVE | Noted: 2023-05-06

## 2023-05-06 PROBLEM — J96.12 CHRONIC RESPIRATORY FAILURE WITH HYPERCAPNIA: Status: ACTIVE | Noted: 2023-05-06

## 2023-05-06 PROBLEM — I49.5 SICK SINUS SYNDROME: Status: ACTIVE | Noted: 2023-05-06

## 2023-05-06 LAB
ANION GAP SERPL CALCULATED.3IONS-SCNC: 6 MMOL/L (ref 5–15)
ARTERIAL PATENCY WRIST A: ABNORMAL
ATMOSPHERIC PRESS: ABNORMAL MM[HG]
BASE EXCESS BLDA CALC-SCNC: 9.6 MMOL/L (ref 0–2)
BDY SITE: ABNORMAL
BODY TEMPERATURE: 37 C
BUN SERPL-MCNC: 12 MG/DL (ref 8–23)
BUN/CREAT SERPL: 19.4 (ref 7–25)
CALCIUM SPEC-SCNC: 9.3 MG/DL (ref 8.6–10.5)
CHLORIDE SERPL-SCNC: 98 MMOL/L (ref 98–107)
CO2 BLDA-SCNC: 38.1 MMOL/L (ref 22–33)
CO2 SERPL-SCNC: 35 MMOL/L (ref 22–29)
COHGB MFR BLD: 1.3 % (ref 0–2)
CREAT SERPL-MCNC: 0.62 MG/DL (ref 0.57–1)
DEPRECATED RDW RBC AUTO: 43.2 FL (ref 37–54)
EGFRCR SERPLBLD CKD-EPI 2021: 93 ML/MIN/1.73
EPAP: 0
ERYTHROCYTE [DISTWIDTH] IN BLOOD BY AUTOMATED COUNT: 11.9 % (ref 12.3–15.4)
GLUCOSE BLDC GLUCOMTR-MCNC: 211 MG/DL (ref 70–130)
GLUCOSE BLDC GLUCOMTR-MCNC: 228 MG/DL (ref 70–130)
GLUCOSE BLDC GLUCOMTR-MCNC: 236 MG/DL (ref 70–130)
GLUCOSE BLDC GLUCOMTR-MCNC: 293 MG/DL (ref 70–130)
GLUCOSE SERPL-MCNC: 228 MG/DL (ref 65–99)
HBA1C MFR BLD: 9.1 % (ref 4.8–5.6)
HCO3 BLDA-SCNC: 36.3 MMOL/L (ref 20–26)
HCT VFR BLD AUTO: 38.8 % (ref 34–46.6)
HCT VFR BLD CALC: 36.8 % (ref 38–51)
HGB BLD-MCNC: 12.5 G/DL (ref 12–15.9)
HGB BLDA-MCNC: 12 G/DL (ref 14–18)
HOLD SPECIMEN: NORMAL
INHALED O2 CONCENTRATION: 28 %
IPAP: 0
MAGNESIUM SERPL-MCNC: 2.5 MG/DL (ref 1.6–2.4)
MCH RBC QN AUTO: 31.4 PG (ref 26.6–33)
MCHC RBC AUTO-ENTMCNC: 32.2 G/DL (ref 31.5–35.7)
MCV RBC AUTO: 97.5 FL (ref 79–97)
METHGB BLD QL: 0.8 % (ref 0–1.5)
MODALITY: ABNORMAL
NOTE: ABNORMAL
NT-PROBNP SERPL-MCNC: 889.6 PG/ML (ref 0–1800)
OXYHGB MFR BLDV: 96.4 % (ref 94–99)
PAW @ PEAK INSP FLOW SETTING VENT: 0 CMH2O
PCO2 BLDA: 58.7 MM HG (ref 35–45)
PCO2 TEMP ADJ BLD: 58.7 MM HG (ref 35–45)
PH BLDA: 7.4 PH UNITS (ref 7.35–7.45)
PH, TEMP CORRECTED: 7.4 PH UNITS
PHOSPHATE SERPL-MCNC: 3.6 MG/DL (ref 2.5–4.5)
PLATELET # BLD AUTO: 237 10*3/MM3 (ref 140–450)
PMV BLD AUTO: 10.3 FL (ref 6–12)
PO2 BLDA: 99.8 MM HG (ref 83–108)
PO2 TEMP ADJ BLD: 99.8 MM HG (ref 83–108)
POTASSIUM SERPL-SCNC: 4.4 MMOL/L (ref 3.5–5.2)
RBC # BLD AUTO: 3.98 10*6/MM3 (ref 3.77–5.28)
SODIUM SERPL-SCNC: 139 MMOL/L (ref 136–145)
TOTAL RATE: 0 BREATHS/MINUTE
TSH SERPL DL<=0.05 MIU/L-ACNC: 1.87 UIU/ML (ref 0.27–4.2)
WBC NRBC COR # BLD: 9.52 10*3/MM3 (ref 3.4–10.8)

## 2023-05-06 PROCEDURE — 63710000001 INSULIN DETEMIR PER 5 UNITS: Performed by: NURSE PRACTITIONER

## 2023-05-06 PROCEDURE — 80048 BASIC METABOLIC PNL TOTAL CA: CPT | Performed by: NURSE PRACTITIONER

## 2023-05-06 PROCEDURE — 99291 CRITICAL CARE FIRST HOUR: CPT | Performed by: INTERNAL MEDICINE

## 2023-05-06 PROCEDURE — 25010000002 ENOXAPARIN PER 10 MG: Performed by: NURSE PRACTITIONER

## 2023-05-06 PROCEDURE — 82805 BLOOD GASES W/O2 SATURATION: CPT

## 2023-05-06 PROCEDURE — 94799 UNLISTED PULMONARY SVC/PX: CPT

## 2023-05-06 PROCEDURE — 94761 N-INVAS EAR/PLS OXIMETRY MLT: CPT

## 2023-05-06 PROCEDURE — 93306 TTE W/DOPPLER COMPLETE: CPT

## 2023-05-06 PROCEDURE — 83735 ASSAY OF MAGNESIUM: CPT | Performed by: NURSE PRACTITIONER

## 2023-05-06 PROCEDURE — 93306 TTE W/DOPPLER COMPLETE: CPT | Performed by: INTERNAL MEDICINE

## 2023-05-06 PROCEDURE — 25010000002 MAGNESIUM SULFATE 2 GM/50ML SOLUTION: Performed by: NURSE PRACTITIONER

## 2023-05-06 PROCEDURE — 36600 WITHDRAWAL OF ARTERIAL BLOOD: CPT

## 2023-05-06 PROCEDURE — 82948 REAGENT STRIP/BLOOD GLUCOSE: CPT

## 2023-05-06 PROCEDURE — 84100 ASSAY OF PHOSPHORUS: CPT | Performed by: NURSE PRACTITIONER

## 2023-05-06 PROCEDURE — 70450 CT HEAD/BRAIN W/O DYE: CPT

## 2023-05-06 PROCEDURE — 99223 1ST HOSP IP/OBS HIGH 75: CPT | Performed by: INTERNAL MEDICINE

## 2023-05-06 PROCEDURE — 83880 ASSAY OF NATRIURETIC PEPTIDE: CPT | Performed by: INTERNAL MEDICINE

## 2023-05-06 PROCEDURE — 84443 ASSAY THYROID STIM HORMONE: CPT | Performed by: NURSE PRACTITIONER

## 2023-05-06 PROCEDURE — 82375 ASSAY CARBOXYHB QUANT: CPT

## 2023-05-06 PROCEDURE — 83050 HGB METHEMOGLOBIN QUAN: CPT

## 2023-05-06 PROCEDURE — 63710000001 INSULIN LISPRO (HUMAN) PER 5 UNITS: Performed by: INTERNAL MEDICINE

## 2023-05-06 PROCEDURE — 85027 COMPLETE CBC AUTOMATED: CPT | Performed by: NURSE PRACTITIONER

## 2023-05-06 RX ORDER — CLONIDINE HYDROCHLORIDE 0.1 MG/1
0.1 TABLET ORAL EVERY 12 HOURS SCHEDULED
Status: DISCONTINUED | OUTPATIENT
Start: 2023-05-06 | End: 2023-05-06

## 2023-05-06 RX ORDER — LORAZEPAM 0.5 MG/1
0.5 TABLET ORAL EVERY 12 HOURS PRN
Status: DISCONTINUED | OUTPATIENT
Start: 2023-05-06 | End: 2023-05-09 | Stop reason: HOSPADM

## 2023-05-06 RX ORDER — ASPIRIN 81 MG/1
81 TABLET ORAL DAILY
Status: DISCONTINUED | OUTPATIENT
Start: 2023-05-06 | End: 2023-05-09 | Stop reason: HOSPADM

## 2023-05-06 RX ORDER — ACETAMINOPHEN 325 MG/1
650 TABLET ORAL EVERY 6 HOURS PRN
Status: DISCONTINUED | OUTPATIENT
Start: 2023-05-06 | End: 2023-05-08 | Stop reason: SDUPTHER

## 2023-05-06 RX ORDER — INSULIN LISPRO 100 [IU]/ML
0-14 INJECTION, SOLUTION INTRAVENOUS; SUBCUTANEOUS
Status: DISCONTINUED | OUTPATIENT
Start: 2023-05-06 | End: 2023-05-07

## 2023-05-06 RX ORDER — DEXTROSE MONOHYDRATE 25 G/50ML
25 INJECTION, SOLUTION INTRAVENOUS
Status: DISCONTINUED | OUTPATIENT
Start: 2023-05-06 | End: 2023-05-09 | Stop reason: HOSPADM

## 2023-05-06 RX ORDER — NICOTINE POLACRILEX 4 MG
15 LOZENGE BUCCAL
Status: DISCONTINUED | OUTPATIENT
Start: 2023-05-06 | End: 2023-05-09 | Stop reason: HOSPADM

## 2023-05-06 RX ORDER — LOSARTAN POTASSIUM 25 MG/1
25 TABLET ORAL
Status: DISCONTINUED | OUTPATIENT
Start: 2023-05-06 | End: 2023-05-07

## 2023-05-06 RX ORDER — HYDRALAZINE HYDROCHLORIDE 50 MG/1
50 TABLET, FILM COATED ORAL AS NEEDED
Status: DISCONTINUED | OUTPATIENT
Start: 2023-05-06 | End: 2023-05-09 | Stop reason: HOSPADM

## 2023-05-06 RX ORDER — IBUPROFEN 600 MG/1
1 TABLET ORAL
Status: DISCONTINUED | OUTPATIENT
Start: 2023-05-06 | End: 2023-05-09 | Stop reason: HOSPADM

## 2023-05-06 RX ORDER — NYSTATIN 100000 U/G
1 CREAM TOPICAL EVERY 12 HOURS SCHEDULED
Status: DISCONTINUED | OUTPATIENT
Start: 2023-05-06 | End: 2023-05-09 | Stop reason: HOSPADM

## 2023-05-06 RX ADMIN — INSULIN LISPRO 8 UNITS: 100 INJECTION, SOLUTION INTRAVENOUS; SUBCUTANEOUS at 16:40

## 2023-05-06 RX ADMIN — LEVOTHYROXINE SODIUM 112 MCG: 112 TABLET ORAL at 06:46

## 2023-05-06 RX ADMIN — ASPIRIN 81 MG: 81 TABLET, COATED ORAL at 11:41

## 2023-05-06 RX ADMIN — LOSARTAN POTASSIUM 25 MG: 25 TABLET, FILM COATED ORAL at 12:17

## 2023-05-06 RX ADMIN — LORAZEPAM 0.5 MG: 0.5 TABLET ORAL at 18:16

## 2023-05-06 RX ADMIN — ACETAMINOPHEN 325MG 650 MG: 325 TABLET ORAL at 11:41

## 2023-05-06 RX ADMIN — CLONIDINE HYDROCHLORIDE 0.1 MG: 0.1 TABLET ORAL at 08:17

## 2023-05-06 RX ADMIN — Medication 10 ML: at 08:17

## 2023-05-06 RX ADMIN — INSULIN LISPRO 5 UNITS: 100 INJECTION, SOLUTION INTRAVENOUS; SUBCUTANEOUS at 11:40

## 2023-05-06 RX ADMIN — ENOXAPARIN SODIUM 40 MG: 40 INJECTION SUBCUTANEOUS at 00:18

## 2023-05-06 RX ADMIN — MAGNESIUM SULFATE HEPTAHYDRATE 2 G: 2 INJECTION, SOLUTION INTRAVENOUS at 00:11

## 2023-05-06 RX ADMIN — Medication 10 ML: at 00:15

## 2023-05-06 RX ADMIN — NYSTATIN 1 APPLICATION: 100000 CREAM TOPICAL at 11:41

## 2023-05-06 RX ADMIN — Medication 10 ML: at 20:11

## 2023-05-06 RX ADMIN — INSULIN DETEMIR 15 UNITS: 100 INJECTION, SOLUTION SUBCUTANEOUS at 20:36

## 2023-05-06 RX ADMIN — SODIUM CHLORIDE, POTASSIUM CHLORIDE, SODIUM LACTATE AND CALCIUM CHLORIDE 100 ML/HR: 600; 310; 30; 20 INJECTION, SOLUTION INTRAVENOUS at 00:16

## 2023-05-06 NOTE — ED PROVIDER NOTES
Subjective   History of Present Illness  77-year-old female who presents for evaluation of palpitations and syncope.  The patient reports that at roughly 2 PM today she felt like she went in A-fib and began to have palpitations.  She states that she went to her primary care physician at 4:30 PM but was ultimately sent home.  She reports at 5:30 PM the day she stood up from a chair and ultimately passed out.  The daughter reports that she has had 20 short episodes of passing out since that given time which finally prompted the presentation here to the ER.  She reports a history of A-fib with RVR, does not take any anticoagulation.  She denies fever, bodies, or chills.  No chest pain, abdominal pain, or change in bowel or urinary function.  No new medications.        Review of Systems   Constitutional: Negative for chills, fatigue and fever.   HENT: Negative for congestion, ear pain, postnasal drip, sinus pressure and sore throat.    Eyes: Negative for pain, redness and visual disturbance.   Respiratory: Positive for shortness of breath. Negative for cough and chest tightness.    Cardiovascular: Positive for palpitations. Negative for chest pain and leg swelling.   Gastrointestinal: Negative for abdominal pain, anal bleeding, blood in stool, diarrhea, nausea and vomiting.   Endocrine: Negative for polydipsia and polyuria.   Genitourinary: Negative for difficulty urinating, dysuria, frequency and urgency.   Musculoskeletal: Negative for arthralgias, back pain and neck pain.   Skin: Negative for pallor and rash.   Allergic/Immunologic: Negative for environmental allergies and immunocompromised state.   Neurological: Positive for syncope. Negative for dizziness, weakness and headaches.   Hematological: Negative for adenopathy.   Psychiatric/Behavioral: Negative for confusion, self-injury and suicidal ideas. The patient is not nervous/anxious.    All other systems reviewed and are negative.      Past Medical History:  "  Diagnosis Date   • Abdominal hernia    • Abdominal pain    • Arthritis    • Asthma    • Blood in stool    • Body piercing     EARS ONLY   • Bronchitis    • Bulging of lumbar intervertebral disc    • Colon polyp    • Cough    • COVID-19 2022   • Deaf, left    • Diabetes mellitus    • Diverticulitis    • GERD (gastroesophageal reflux disease)    • Headache    • Heart murmur    • Hyperlipidemia    • Hypertension    • Leaky heart valve    • Lung nodules    • Osteoporosis    • Palpitations    • Pancreatitis    • Supplemental oxygen dependent     NIGHTLY AND WHEN EXERTED   • Thyroid disease     LEFT SIDE REMOVED   • Tinnitus, left        Allergies   Allergen Reactions   • Doxycycline Nausea And Vomiting     \"Pain worse than pancreatitis\"   • Diltiazem Other (See Comments)     nausea   • Sotalol Other (See Comments)     nausea   • Spironolactone Other (See Comments)     Dropped magnesium     • Warfarin Other (See Comments)     Nose bleed   • Ciprofloxacin Hives   • Contrast Dye (Echo Or Unknown Ct/Mr) Rash     Breaks her out   • Erythromycin Hives and Rash   • Hydrochlorothiazide Other (See Comments)        pt developed pancreastitis   • Levaquin [Levofloxacin] Swelling   • Prednisone Mental Status Change     ITCHING AND NERVOUS   • Shellfish Allergy Rash     Breaks her out       Past Surgical History:   Procedure Laterality Date   • APPENDECTOMY     •  SECTION     • CHOLECYSTECTOMY     • COLONOSCOPY N/A 8/10/2018    Procedure: COLONOSCOPY WITH HOT SNARE POLYPECTOMY X 3; COLD SNARE POLYPECTOMY, COLD BIOPSY POLYPECTOMY X 4; CLIP PLACEMENT X 1;  Surgeon: Glenn Modi MD;  Location: [facility] ENDOSCOPY;  Service: Gastroenterology   • THYROIDECTOMY, PARTIAL Left    • TUBAL ABDOMINAL LIGATION         Family History   Problem Relation Age of Onset   • Arthritis Mother    • Diabetes Mother    • Osteoporosis Mother    • Arthritis Father    • Diabetes Father    • Heart attack Father    • Hyperlipidemia " Father    • Diabetes Sister    • Thyroid disease Sister    • Diabetes Brother    • Diabetes Maternal Grandmother    • Diabetes Maternal Grandfather    • Diabetes Paternal Grandmother    • Diabetes Paternal Grandfather        Social History     Socioeconomic History   • Marital status:    Tobacco Use   • Smoking status: Former     Packs/day: 2.00     Years: 15.00     Pack years: 30.00     Types: Cigarettes     Quit date:      Years since quittin.3     Passive exposure: Never   • Smokeless tobacco: Never   Vaping Use   • Vaping Use: Never used   Substance and Sexual Activity   • Alcohol use: No   • Drug use: No   • Sexual activity: Defer           Objective   Physical Exam  Vitals and nursing note reviewed.   Constitutional:       General: She is not in acute distress.     Appearance: Normal appearance. She is well-developed. She is not toxic-appearing or diaphoretic.   HENT:      Head: Normocephalic and atraumatic.      Right Ear: External ear normal.      Left Ear: External ear normal.      Nose: Nose normal.   Eyes:      General: Lids are normal.      Pupils: Pupils are equal, round, and reactive to light.   Neck:      Trachea: No tracheal deviation.   Cardiovascular:      Rate and Rhythm: Tachycardia present. Rhythm irregularly irregular.      Pulses: No decreased pulses.      Heart sounds: Normal heart sounds. No murmur heard.    No friction rub. No gallop.      Comments: The patient primarily was tachycardic with A-fib with RVR.  She had intermittent pauses consistent with asystole that would last 3 to 5 seconds.  During that time she was observed to feel fatigued and stated that she felt like she was going to pass out.  But was not observed to lose consciousness while laying in the bed.  While in the ER she converted to sinus rhythm spontaneously.  Pulmonary:      Effort: Pulmonary effort is normal. No respiratory distress.      Breath sounds: Normal breath sounds. No decreased breath sounds,  wheezing, rhonchi or rales.   Abdominal:      General: Bowel sounds are normal.      Palpations: Abdomen is soft.      Tenderness: There is no abdominal tenderness. There is no guarding or rebound.   Musculoskeletal:         General: No deformity. Normal range of motion.      Cervical back: Normal range of motion and neck supple.   Lymphadenopathy:      Cervical: No cervical adenopathy.   Skin:     General: Skin is warm and dry.      Findings: No rash.   Neurological:      Mental Status: She is alert and oriented to person, place, and time.      Cranial Nerves: No cranial nerve deficit.      Sensory: No sensory deficit.   Psychiatric:         Speech: Speech normal.         Behavior: Behavior normal.         Thought Content: Thought content normal.         Judgment: Judgment normal.         Critical Care  Performed by: Toney Simon MD  Authorized by: Toney Simon MD     Critical care provider statement:     Critical care time (minutes):  35    Critical care time was exclusive of:  Separately billable procedures and treating other patients    Critical care was necessary to treat or prevent imminent or life-threatening deterioration of the following conditions:  Circulatory failure    Critical care was time spent personally by me on the following activities:  Development of treatment plan with patient or surrogate, discussions with consultants, evaluation of patient's response to treatment, examination of patient, obtaining history from patient or surrogate, ordering and performing treatments and interventions, ordering and review of laboratory studies, ordering and review of radiographic studies, pulse oximetry, re-evaluation of patient's condition and review of old charts               ED Course                                           Medical Decision Making  Differential diagnosis includes arrhythmia, dehydration, sepsis, urinary tract infection, pneumonia, renal insufficiency, electrolyte  abnormality.    The patient was identified to be in A-fib with RVR with intermittent long pauses consistent with asystole.  She then converted to normal sinus rhythm spontaneously and appeared better after that with stable and normal vital signs.    I discussed the patient's presentation with the on-call cardiologist, Dr. Urbina.  He recommends ICU admission for closer observation and the cardiology service will consult on the patient.    Lab evaluation is relatively nonrevealing.    Chest x-ray does not show acute abnormalities.    I discussed the patient with the intensivist, Dr. Bowers, who will consult for admission.    Asystole: complicated acute illness or injury  Syncope, unspecified syncope type: complicated acute illness or injury  Amount and/or Complexity of Data Reviewed  Independent Historian: friend  External Data Reviewed: labs, radiology and notes.  Labs: ordered. Decision-making details documented in ED Course.  Radiology: ordered and independent interpretation performed. Decision-making details documented in ED Course.  ECG/medicine tests: ordered and independent interpretation performed. Decision-making details documented in ED Course.      Risk  Prescription drug management.  Decision regarding hospitalization.          Final diagnoses:   Syncope, unspecified syncope type   Asystole       ED Disposition  ED Disposition     ED Disposition   Decision to Admit    Condition   --    Comment   Level of Care: Critical Care [6]   Admitting Physician: LUCI BOWERS [[id number]]               No follow-up provider specified.       Medication List      No changes were made to your prescriptions during this visit.          Toney Simon MD  05/06/23 0259

## 2023-05-06 NOTE — PLAN OF CARE
Problem: Adult Inpatient Plan of Care  Goal: Plan of Care Review  Outcome: Ongoing, Progressing  Goal: Patient-Specific Goal (Individualized)  Outcome: Ongoing, Progressing  Goal: Absence of Hospital-Acquired Illness or Injury  Outcome: Ongoing, Progressing  Goal: Optimal Comfort and Wellbeing  Outcome: Ongoing, Progressing  Goal: Readiness for Transition of Care  Outcome: Ongoing, Progressing     Problem: Syncope  Goal: Absence of Syncopal Symptoms  Outcome: Ongoing, Progressing     Problem: Fall Injury Risk  Goal: Absence of Fall and Fall-Related Injury  Outcome: Ongoing, Progressing   Goal Outcome Evaluation:      VSS A/O X 4. No episodes of syncope. Will continue to monitor and progress towards goals

## 2023-05-06 NOTE — CASE MANAGEMENT/SOCIAL WORK
Discharge Planning Assessment  [facility]     Patient Name: Marielena Moeller  MRN: [id number]  Today's Date: 5/6/2023    Admit Date: 5/5/2023    Plan: IDP   Discharge Needs Assessment     Row Name 05/06/23 1319       Living Environment    People in Home alone    Current Living Arrangements home    Potentially Unsafe Housing Conditions none    Primary Care Provided by self    Provides Primary Care For no one, unable/limited ability to care for self    Family Caregiver if Needed child(pao), adult    Family Caregiver Names Dawit Adames (Son)   [phone] (Mobile)    Quality of Family Relationships helpful;involved;supportive    Able to Return to Prior Arrangements yes       Resource/Environmental Concerns    Resource/Environmental Concerns none    Transportation Concerns none       Food Insecurity    Within the past 12 months, you worried that your food would run out before you got the money to buy more. Never true    Within the past 12 months, the food you bought just didn't last and you didn't have money to get more. Never true       Transition Planning    Patient/Family Anticipates Transition to home with family    Patient/Family Anticipated Services at Transition none    Transportation Anticipated family or friend will provide       Discharge Needs Assessment    Equipment Currently Used at Home cane, straight;oxygen;shower chair;walker, rolling    Concerns to be Addressed denies needs/concerns at this time    Anticipated Changes Related to Illness inability to care for self    Equipment Needed After Discharge none    Current Discharge Risk chronically ill;physical impairment               Discharge Plan     Row Name 05/06/23 1321       Plan    Plan IDP    Patient/Family in Agreement with Plan yes    Plan Comments I spoke with the patient at the bedside. She lives in a house in [place] in a home alone. She has two sons and daughter. Her son Dawit lives nearby and can assist as needed. She is semi  independent with her ADLs. She wears O2 at home through [employer], has a cane, walker, and shower chair she can use if needed. She plans to discharge home with her son or daughter to transport her. She has a PCP, medical insurance, and regular access to food and medication. CM to follow and assist as needed.    Final Discharge Disposition Code 01 - home or self-care              Continued Care and Services - Admitted Since 5/5/2023    Coordination has not been started for this encounter.     Selected Continued Care - Episodes Includes continued care and service providers with selected services from the active episodes listed below    Chronic Care Management Episode start date: 5/1/2023   There are no active outsourced providers for this episode.                  Demographic Summary    No documentation.                Functional Status     Row Name 05/06/23 1318       Functional Status    Usual Activity Tolerance moderate    Current Activity Tolerance fair       Physical Activity    On average, how many days per week do you engage in moderate to strenuous exercise (like a brisk walk)? 0 days    On average, how many minutes do you engage in exercise at this level? 0 min    Number of minutes of exercise per week 0       Assessment of Health Literacy    How often do you have someone help you read hospital materials? Occasionally    How often do you have problems learning about your medical condition because of difficulty understanding written information? Occasionally    How often do you have a problem understanding what is told to you about your medical condition? Occasionally    How confident are you filling out medical forms by yourself? Quite a bit    Health Literacy Good       Functional Status, IADL    Medications independent    Meal Preparation assistive equipment    Housekeeping assistive equipment    Laundry assistive equipment    Shopping assistive equipment and person       Mental Status     General Appearance WDL WDL       Mental Status Summary    Recent Changes in Mental Status/Cognitive Functioning no changes               Psychosocial    No documentation.                Abuse/Neglect    No documentation.                Legal    No documentation.                Substance Abuse    No documentation.                Patient Forms    No documentation.                   Jocelyn Michelle RN

## 2023-05-06 NOTE — H&P
"INTENSIVIST   INITIAL HOSPITAL VISIT NOTE     Hospital:  LOS: 0 days     Ms. Marielena Moeller, 77 y.o. female is seen for a Chief Complaint of:  Chief Complaint   Patient presents with   • Syncope       Paroxysmal atrial fibrillation    Acquired hypothyroidism    COPD (chronic obstructive pulmonary disease)    Type 2 diabetes mellitus with diabetic peripheral angiopathy without gangrene, with long-term current use of insulin    Gastroesophageal reflux disease    High cholesterol    Hypertension    Subjective   S     Marielena Moeller is a 77 y.o. female who has a PMHX of PAF (seen by Dr. Pathak, not on anticoagulation), chronic mixed respiratory failure (on 2.5 - 3 L NC @ home), T2DM (on insulin therapy), COPD, HTN, HLD and hypothyroidism.      Patient has a longstanding history of atrial fibrillation with intermittent presyncopal episodes, palpitations and weakness.  These have recently become more frequent as patient was just evaluated at outside hospital approximately 1x week ago with a similar (but less severe) presentation.  Patient and daughter endorse that over the last 24 to 48 hours Mrs Moeller has had greater than 20 episodes of, \"almost passing out.\"  Symptoms include lightheadedness and a vague sensation in abdomen (though not painful). She has never fully lost consciousness.  She presented to the ER on 5/05/2023 at [facility] and was in atrial fibrillation with RVR. While being monitored via telemetry she had frequent, 3-4x second pauses of \"asystole\" which correlated with pre-syncope.  She has had little to eat/drink over the course of the day.  She denies chest pain, nausea, vomiting, diarrhea, fever, chills, night sweats.  While being observed in the emergency department patient self converted and currently in normal sinus rhythm.  Cardiology consulted. ICU ultimately called for admission due to witnessed pauses of asystole.     5 minutes of critical care provided by LETICIA Lira in " addendum accordance with split/shared billing. This time excludes other billable procedures. Time does include preparation of documents, medical consultations, review of old records, and direct bedside care. Patient is at high risk for life-threatening deterioration due to arrythmia.   Electronically signed by Noel Ann, LETICIA, 23, 12:02 AM EDT.     PMH: She  has a past medical history of Abdominal hernia, Abdominal pain, Arthritis, Asthma, Blood in stool, Body piercing, Bronchitis, Bulging of lumbar intervertebral disc, Colon polyp, Cough, COVID-19 (2022), Deaf, left, Diabetes mellitus, Diverticulitis, GERD (gastroesophageal reflux disease), Headache, Heart murmur, Hyperlipidemia, Hypertension, Leaky heart valve, Lung nodules, Osteoporosis, Palpitations, Pancreatitis, Supplemental oxygen dependent, Thyroid disease, and Tinnitus, left.   PSxH: She  has a past surgical history that includes Thyroidectomy, partial (Left, );  section; Appendectomy (); Tubal ligation (); Cholecystectomy (); and Colonoscopy (N/A, 8/10/2018).      Medications:  No current facility-administered medications on file prior to encounter.     Current Outpatient Medications on File Prior to Encounter   Medication Sig   • albuterol (ACCUNEB) 0.63 MG/3ML nebulizer solution Take 3 mL by nebulization Every 6 (Six) Hours As Needed for Wheezing.   • albuterol sulfate HFA (Ventolin HFA) 108 (90 Base) MCG/ACT inhaler Inhale 2 puffs Every 6 (Six) Hours As Needed for Wheezing or Shortness of Air.   • aspirin 81 MG EC tablet Take 1 tablet by mouth Daily.   • bisoprolol-hydrochlorothiazide (ZIAC) 2.5-6.25 MG per tablet Take 1 tablet by mouth Daily.   • Blood Glucose Monitoring Suppl (ACCU-CHEK CHIO PLUS) w/Device kit USE AS DIRECTED TO CHECK GLUCOSE 3 TIMES DAILY FOR DM E11.65   • Cholecalciferol (Vitamin D3) 1.25 MG (30002 UT) capsule TAKE 1 CAPSULE BY MOUTH EVERY 7 DAYS   • cloNIDine (CATAPRES) 0.1 MG tablet Take  "1 tablet by mouth 2 (Two) Times a Day As Needed (BP > 165/90).   • Continuous Blood Gluc  (Dexcom G6 ) device 1 each Continuous.   • Continuous Blood Gluc Sensor (Dexcom G6 Sensor) Every 10 (Ten) Days. Apply as directed   • glucose blood (Accu-Chek Anne Plus) test strip USE AS DIRECTED THREE TIMES DAILY   • insulin aspart prot-insulin aspart (novoLOG 70/30) (70-30) 100 UNIT/ML injection Inject 50 Units under the skin into the appropriate area as directed 3 (Three) Times a Day.   • Insulin Syringe 31G X 5/16\" 1 ML misc 3 (Three) Times a Day.   • Insulin Syringe-Needle U-100 (BD Veo Insulin Syringe U/F) 31G X 15/64\" 0.5 ML misc as directed   • irbesartan (AVAPRO) 75 MG tablet Take 1 tablet by mouth Daily.   • levothyroxine (SYNTHROID, LEVOTHROID) 112 MCG tablet Take 1 tablet by mouth Daily.   • LORazepam (ATIVAN) 0.5 MG tablet Take 1 tablet by mouth Every 8 (Eight) Hours As Needed for Anxiety.   • MAGNESIUM-OXIDE 400 (241.3 Mg) MG tablet tablet TK 1 T PO QHS   • O2 (OXYGEN) Inhale 3 L/min As Needed.   • Zinc 50 MG tablet Take 0.5 tablets by mouth Every Other Day.     Allergies: She is allergic to doxycycline, diltiazem, sotalol, spironolactone, warfarin, ciprofloxacin, contrast dye (echo or unknown ct/mr), erythromycin, hydrochlorothiazide, levaquin [levofloxacin], prednisone, and shellfish allergy.   FH: Her family history includes Arthritis in her father and mother; Diabetes in her brother, father, maternal grandfather, maternal grandmother, mother, paternal grandfather, paternal grandmother, and sister; Heart attack in her father; Hyperlipidemia in her father; Osteoporosis in her mother; Thyroid disease in her sister.   SH: She  reports that she quit smoking about 48 years ago. Her smoking use included cigarettes. She has a 30.00 pack-year smoking history. She has never been exposed to tobacco smoke. She has never used smokeless tobacco. She reports that she does not drink alcohol and does not use " "drugs.     The patient's relevant past medical, surgical and social history were reviewed and updated in Epic as appropriate.     ROS (14): Fourteen point review of system performed and negative except for that mentioned in HPI.     Objective   O     I/O 24 hrs (7:00AM - 6:59 AM)  Intake/Output     None        Medications (drips):  lactated ringers    Physical Examination  Vital Signs: Blood pressure 149/65, pulse 64, temperature 97.8 °F (36.6 °C), temperature source Oral, resp. rate 18, height 152.4 cm (60\"), weight 78.9 kg (174 lb), SpO2 95 %.    General: The patient appears in no acute distress. Alert, cooperative and interactive.  HEENT:NC/AT, PERRL, Normal nasal mucosa, MMM.  Neck: Trachea midline, No masses.  Lymphadenopathy: No palpable anterior cervical, submandibular, submental, or posterior cervical lymphadenopathy. No palpable supra- or infraclavicular lymphadenopathy.   Chest: Clear to auscultation bilaterally, No wheezing, rhonchi, or rales. Normal work of breathing. Equal chest rise.  Cardiac: Regular rhythm, normal rate, S1S2 auscultated. No murmurs, rubs or gallops.   Extremities: No lower extremity edema. No clubbing or cyanosis.  Skin: No rashes, open wounds, or bruising. Warm, dry, well-perfused.  Neuro: Motor power grossly intact bilaterally. Speech fluid and fluent. Thought process coherent.  Psych: Alert and oriented x 3, Mood stable.    Lines, Drains & Airways     Active LDAs     Name Placement date Placement time Site Days    Peripheral IV 05/05/23 2154 Right Antecubital 05/05/23 2154  Antecubital  less than 1    Peripheral IV 05/05/23 2255 Left Antecubital 05/05/23 2255  Antecubital  less than 1              Results from last 7 days   Lab Units 05/05/23 2155   WBC 10*3/mm3 10.33   HEMOGLOBIN g/dL 13.3   MCV fL 96.7   PLATELETS 10*3/mm3 256     Results from last 7 days   Lab Units 05/05/23 2155   SODIUM mmol/L 141   POTASSIUM mmol/L 4.5   CO2 mmol/L 33.0*   CREATININE mg/dL 0.62 "   MAGNESIUM mg/dL 1.9     Estimated Creatinine Clearance: 72.9 mL/min (by C-G formula based on SCr of 0.62 mg/dL).  Results from last 7 days   Lab Units 05/05/23  2155   ALK PHOS U/L 87   BILIRUBIN mg/dL 0.2   ALT (SGPT) U/L 28   AST (SGOT) U/L 22     Images:   5/5/2023      Imaging Results (Last 24 Hours)     Procedure Component Value Units Date/Time    CT Head Without Contrast [[id number]] Resulted: 05/05/23 2343     Updated: 05/05/23 2345    XR Chest 1 View [[id number]] Resulted: 05/05/23 2337     Updated: 05/05/23 2344        ECG/EMG Results (last 24 hours)     Procedure Component Value Units Date/Time    ECG 12 Lead ED Triage Standing Order; Syncope [[id number]] Collected: 05/05/23 2135     Updated: 05/05/23 2135     QT Interval 276 ms      QTC Interval 408 ms     Narrative:      Test Reason : ED Triage Standing Order~  Blood Pressure :   */*   mmHG  Vent. Rate : 132 BPM     Atrial Rate :   * BPM     P-R Int :   * ms          QRS Dur :  72 ms      QT Int : 276 ms       P-R-T Axes :   *  54  97 degrees     QTc Int : 408 ms    Atrial fibrillation with rapid ventricular response  Abnormal ECG  No previous ECGs available    Referred By:            Confirmed By:     ECG 12 Lead Rhythm Change [[id number]] Collected: 05/05/23 2318     Updated: 05/05/23 2319     QT Interval 422 ms      QTC Interval 445 ms     Narrative:      Test Reason : RC  Blood Pressure :   */*   mmHG  Vent. Rate :  67 BPM     Atrial Rate :  67 BPM     P-R Int : 174 ms          QRS Dur :  70 ms      QT Int : 422 ms       P-R-T Axes :  66  50  70 degrees     QTc Int : 445 ms    ** Poor data quality, interpretation may be adversely affected  Normal sinus rhythm  Septal infarct , age undetermined  Abnormal ECG  When compared with ECG of 05-MAY-2023 21:35, (Unconfirmed)  Sinus rhythm has replaced Atrial fibrillation  Vent. rate has decreased BY  65 BPM  Non-specific change in ST segment in Inferior leads    Referred By: EDMD           Confirmed By:          I reviewed the patient's new laboratory and imaging results.  I independently reviewed the patient's new images.    Assessment & Plan   A / P       Nutrition:   NPO Diet NPO Type: Strict NPO  Advance Directives:   Code Status and Medical Interventions:   Ordered at: 05/05/23 4560     Code Status (Patient has no pulse and is not breathing):    CPR (Attempt to Resuscitate)     Medical Interventions (Patient has pulse or is breathing):    Full Support     Active Hospital Problems    Diagnosis    • **Paroxysmal atrial fibrillation    • High cholesterol    • Type 2 diabetes mellitus with diabetic peripheral angiopathy without gangrene, with long-term current use of insulin    • Gastroesophageal reflux disease    • Hypertension    • COPD (chronic obstructive pulmonary disease)    • Acquired hypothyroidism      Assessment / Plan:    1. Admit to the ICU given atrial fibrillation with RVR and multiple episodes of asystole while being observed in the emergency department.  We will monitor hemodynamics closely but given that patient has recently converted to normal sinus rhythm, would not initiate antiarrhythmics, beta-blockade, etc.   2. Monitor and replace electrolytes per protocol.  Will empirically give 2g of magnesium  3. Given poor PO intake over the last 24 to 48 hours will start maintenance IV fluids  4. ICS/LABA & prn BD  5. SSI every 6x hours   6. NPO while awaiting Cardiology evaluation.  Potentially need work-up and evaluation for pacemaker placement given this acute on (worsening) chronic issue but will defer to specialists   7. ECHO in January 2023 was WNL; Defer to Cardiology if they wish a repeat    Plan of care and goals reviewed during interdisciplinary rounds.  I discussed the patient's findings and my recommendations with patient, family and nursing staff    -- Enoc Bowers MD  Pulmonary/Critical Care    Critical Care time spent in direct patient care: 35 minutes (excluding procedure time, if  applicable) including high complexity decision making to assess, manipulate, and support vital organ system failure in this individual who has impairment of one or more vital organ systems such that there is a high probability of imminent or life threatening deterioration in the patient’s condition.

## 2023-05-06 NOTE — PLAN OF CARE
Goal Outcome Evaluation:  Plan of Care Reviewed With: patient, daughter        Progress: improving  Outcome Evaluation: Pt in SB throughout shift, BP meds changed per cardiology, ECHO in progress, IVF d/c'd, diet resumed. Tylenol given x1 for headache with relief. Pt up to chair x6 hours. ABG obtained for baseline, weaned O2 to 1LNC, pt wears 2-3L NC at home around the clock. Ordered to tele, plan to monitor pt closely for rhythm abnormalities.

## 2023-05-06 NOTE — CONSULTS
"[facility] at [facility]  CARDIOLOGY CONSULTATION NOTE    Marielena Moeller  : 1945  MRN:[id number]  Home Phone:[phone]    Date of Admission:2023  Date of Consultation: 23    PCP: Marychuy Atwood APRN    IDENTIFICATION: A 77 y.o. female resident of [place], [place]     Chief Complaint   Patient presents with   • Syncope     PROBLEM LIST:   1.  Paroxysmal atrial fibrillation  -Confirmed on previous Holter monitor with a burden of 1.3's percent  - 14 day monitor 10/2022: 2.6% Afib burden, longest episode 3 hours 41min, max HR 152bpm, avg HR 78bpm  2.  COPD  3.  Hyperlipidemia  4.  Hypertension  5.  Hypothyroid  6.  Ventral hernia  7.  Diverticulosis     Cardiac testing: Echo 2023  EF 60%, mild LVH, trace AI, mild MR, normal RVSP     ALLERGIES:   Allergies   Allergen Reactions   • Doxycycline Nausea And Vomiting     \"Pain worse than pancreatitis\"   • Diltiazem Other (See Comments)     nausea   • Sotalol Other (See Comments)     nausea   • Spironolactone Other (See Comments)     Dropped magnesium     • Warfarin Other (See Comments)     Nose bleed   • Ciprofloxacin Hives   • Contrast Dye (Echo Or Unknown Ct/Mr) Rash     Breaks her out   • Erythromycin Hives and Rash   • Hydrochlorothiazide Other (See Comments)        pt developed pancreastitis   • Levaquin [Levofloxacin] Swelling   • Prednisone Mental Status Change     ITCHING AND NERVOUS   • Shellfish Allergy Rash     Breaks her out       HOME MEDICINES:   Current Outpatient Medications   Medication Instructions   • albuterol (ACCUNEB) 0.63 mg, Nebulization, Every 6 Hours PRN   • albuterol sulfate HFA (Ventolin HFA) 108 (90 Base) MCG/ACT inhaler 2 puffs, Inhalation, Every 6 Hours PRN   • aspirin 81 mg, Oral, Daily   • bisoprolol-hydrochlorothiazide (ZIAC) 2.5-6.25 MG per tablet 1 tablet, Oral, Daily   • Blood Glucose Monitoring Suppl (ACCU-CHEK CHIO PLUS) w/Device kit USE AS DIRECTED TO CHECK GLUCOSE 3 TIMES DAILY FOR " "DM E11.65   • Cholecalciferol (Vitamin D3) 1.25 MG (77108 UT) capsule TAKE 1 CAPSULE BY MOUTH EVERY 7 DAYS   • cloNIDine (CATAPRES) 0.1 mg, Oral, 2 Times Daily PRN   • Continuous Blood Gluc  (Dexcom G6 ) device 1 each, Does not apply, Continuous   • Continuous Blood Gluc Sensor (Dexcom G6 Sensor) Does not apply, Every 10 Days, Apply as directed   • glucose blood (Accu-Chek Anne Plus) test strip USE AS DIRECTED THREE TIMES DAILY   • insulin aspart prot-insulin aspart (novoLOG 70/30) (70-30) 100 UNIT/ML injection 50 Units, Subcutaneous, 3 Times Daily   • Insulin Syringe 31G X 5/16\" 1 ML misc 3 Times Daily   • Insulin Syringe-Needle U-100 (BD Veo Insulin Syringe U/F) 31G X 15/64\" 0.5 ML misc as directed   • irbesartan (AVAPRO) 75 mg, Oral, Daily   • levothyroxine (SYNTHROID, LEVOTHROID) 112 mcg, Oral, Daily   • LORazepam (ATIVAN) 0.5 mg, Oral, Every 8 Hours PRN   • MAGNESIUM-OXIDE 400 (241.3 Mg) MG tablet tablet TK 1 T PO QHS   • O2 (OXYGEN) 3 L/min, Inhalation, As Needed   • Zinc 50 MG tablet 0.5 tablets, Oral, Every Other Day       HPI: Mrs. Moeller is a 78 y/o female with above noted history who is seen in consultation today for PAF with RVR and sinus pauses. She presented to the ER with multiple pre-syncopal episodes, started yesterday afternoon. Denies any ed syncope. On arrival she was in rapid atrial fibrillation, however while in the ER she was noted to have multiple pauses, associated with pre-syncope. She self-converted to sinus rhythm. Telemetry strips from the ER are unavailable for review at this time. She was evaluated by Dr. Pathak earlier this year for PAF. Echo in January revealed normal EF, mild MR, normal RVSP. Monitor showed 2.6% Afib burden, patient has refused anticoagulation in the past due to intolerance to Warfarin and nosebleeds. She was tried on low dose Bisoprolol however had bradycardia and \"felt bad\" therefore her dose was reduced to 2.5mg daily which she has been taking " "for the past 1-2 weeks. She denies any chest pain, or heart failure symptoms. She does have associated tachypalpitations when she is in Afib. She is on chronic O2, and lives alone, ambulates with cane and walker.       ROS: All systems have been reviewed and are negative with the exception of those mentioned in the HPI and problem list above.    Surgical History:   Past Surgical History:   Procedure Laterality Date   • APPENDECTOMY     •  SECTION     • CHOLECYSTECTOMY     • COLONOSCOPY N/A 8/10/2018    Procedure: COLONOSCOPY WITH HOT SNARE POLYPECTOMY X 3; COLD SNARE POLYPECTOMY, COLD BIOPSY POLYPECTOMY X 4; CLIP PLACEMENT X 1;  Surgeon: Glenn Modi MD;  Location: [facility] ENDOSCOPY;  Service: Gastroenterology   • THYROIDECTOMY, PARTIAL Left    • TUBAL ABDOMINAL LIGATION         Social History:   Social History     Socioeconomic History   • Marital status:    Tobacco Use   • Smoking status: Former     Packs/day: 2.00     Years: 15.00     Pack years: 30.00     Types: Cigarettes     Quit date:      Years since quittin.3     Passive exposure: Never   • Smokeless tobacco: Never   Vaping Use   • Vaping Use: Never used   Substance and Sexual Activity   • Alcohol use: No   • Drug use: No   • Sexual activity: Defer       Family History:   Family History   Problem Relation Age of Onset   • Arthritis Mother    • Diabetes Mother    • Osteoporosis Mother    • Arthritis Father    • Diabetes Father    • Heart attack Father    • Hyperlipidemia Father    • Diabetes Sister    • Thyroid disease Sister    • Diabetes Brother    • Diabetes Maternal Grandmother    • Diabetes Maternal Grandfather    • Diabetes Paternal Grandmother    • Diabetes Paternal Grandfather        Objective     BP (!) 187/86 (BP Location: Left arm, Patient Position: Lying)   Pulse 63   Temp 98.2 °F (36.8 °C) (Oral)   Resp 20   Ht 152.4 cm (60\")   Wt 78.9 kg (174 lb)   LMP  (LMP Unknown)   SpO2 100%   BMI 33.98 " kg/m²     Intake/Output Summary (Last 24 hours) at 5/6/2023 0737  Last data filed at 5/6/2023 0722  Gross per 24 hour   Intake 50 ml   Output 580 ml   Net -530 ml       PHYSICAL EXAM:  CONSTITUTIONAL: Well nourished, cooperative, in no acute distress  HEENT: Normocephalic, atraumatic, PERRLA, no JVD  CARDIOVASCULAR:  Regular rhythm and slow rate, no murmur, gallop, rub.   RESPIRATORY: Normal respiratory effort, no wheezing, rales or rhonchi  GI: Soft, nontender  EXTREMITIES: No gross deformities, no edema.   SKIN: Warm, dry. No bleeding, bruising or rash  NEUROLOGICAL: No focal deficits  PSYCHIATRIC: Normal mood and affect. Behavior is normal     Labs/Diagnostic Data  Results from last 7 days   Lab Units 05/06/23  0622 05/05/23  2155   SODIUM mmol/L 139 141   POTASSIUM mmol/L 4.4 4.5   CHLORIDE mmol/L 98 100   CO2 mmol/L 35.0* 33.0*   BUN mg/dL 12 12   CREATININE mg/dL 0.62 0.62   GLUCOSE mg/dL 228* 255*   CALCIUM mg/dL 9.3 9.7     Results from last 7 days   Lab Units 05/05/23  2155   HSTROP T ng/L 18*     Results from last 7 days   Lab Units 05/06/23  0622 05/05/23  2155   WBC 10*3/mm3 9.52 10.33   HEMOGLOBIN g/dL 12.5 13.3   HEMATOCRIT % 38.8 41.4   PLATELETS 10*3/mm3 237 256     Results from last 7 days   Lab Units 05/06/23 0622   MAGNESIUM mg/dL 2.5*         Results from last 7 days   Lab Units 05/06/23  0622   TSH uIU/mL 1.870     Results from last 7 days   Lab Units 05/05/23  2155   HEMOGLOBIN A1C % 9.10*     I personally reviewed the patient's EKG/Telemetry data all show afib with rvr, now with sr. personally interpreted chest x-ray which shows good aeration no pulmonary edema    Radiology Data:   CXR 5/5/23:  IMPRESSION:     1.  Bibasilar predominant pulmonary interstitial opacities, similar prior exams.  2.  Otherwise, no acute cardiopulmonary process.    Current Medications:    budesonide-formoterol, 2 puff, Inhalation, BID - RT  cloNIDine, 0.1 mg, Oral, Q12H  enoxaparin, 40 mg, Subcutaneous,  Daily  insulin regular, 0-14 Units, Subcutaneous, Q6H  levothyroxine, 112 mcg, Oral, Q AM  sodium chloride, 10 mL, Intravenous, Q12H      lactated ringers, 100 mL/hr, Last Rate: 100 mL/hr (05/06/23 0016)        Assessment and Plan:     1. 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   - check echo, which was ordered- continue to monitor for recurrent pauses     2. PAF with RVR  - self-converted to SR  - CHADsVASC=5 , has previously declined anticoagulation and hesitant to proceed with Watchman     3. Hypertension   - BPs slightly elevated  - resume home ARB and increase as needed  - stop Clonidine as she was not taking at home, and could cause bradycardia     4. Hypothyroidism  - TSH WNL    Discussed with Dr. Mcfadden he agrees with tele monitoring over weekend but no urgent need for ppm.     Scribed for Shan Campo MD by Jessica Hudson PA-C. 5/6/2023  09:35 EDT    I,Shan Campo M.D., personally performed the services described in this documentation as scribed by the above named individual in my presence, and it is both accurate and complete.    Shan Campo MD, [facility], [facility]  05/06/23  12:35 EDT

## 2023-05-06 NOTE — PROGRESS NOTES
Intensivist Note     5/6/2023  Hospital Day: 0  * No surgery found *  ICU Stays Timeline            Hospital Admission: 05/05/23 2129 - Current  ICU stays: 1      In Date/Time Event Department ICU Stay Duration     05/05/23 2129 Admission  [facility]      05/06/23 0602 Transfer In  [facility] ICU 2 hours             Ms. Marielena Moeller, 77 y.o. female is followed for:    PAF with RVR this admission    Sick sinus syndrome (tachybradycardia) with pauses and syncope    Syncope, unspecified syncope type    COPD on home oxygen    Chronic respiratory failure with hypercapnia    Acquired hypothyroidism    Type 2 diabetes mellitus with diabetic peripheral angiopathy without gangrene, with long-term current use of insulin    Gastroesophageal reflux disease    Hyperlipidemia    Hypertension       SUBJECTIVE     77-year-old white female former smoker with PMH of PAF (not on anticoagulation due to patient refusal and also declined watchman.patient presently followed by Dr. Pathak.patient also has COPD with chronic hypoxemic/hypercarbic respiratory failure on 2.5 to 3 L oxygen at home, diabetes mellitus, hypertension, hyperlipidemia, and hypothyroidism.  Patient presented to [facility] ER complaining of palpitations and syncope.      Patient has a long history of PAF with intermittent presyncopal episodes, palpitations and weakness.  Episodes have become more frequent and patient was seen 1 week PTA with a similar presentation but less severe.  Over the last 24 to 48 hours patient has had at least 20 episodes of presyncope with symptoms of lightheadedness but has not fully lost consciousness.  Upon presentation patient was in atrial fibrillation with RVR.  She then had 3-4 episodes of pauses (asystole) which correlated with presyncope (unfortunately these were not documented although I am told one pause lasted 45 seconds).  Documented heart rate on arrival was 132 (although I was told at 1 point it was 175).  Patient  "spontaneously converted to sinus rhythm while in the ER.  Note that patient has previously been tried on low-dose bisoprolol but developed bradycardia and dose was just decreased to 2.5 mg daily 1 to 2 weeks PTA.    Interval history: Uneventful evening and has remained in sinus rhythm without any recurrence of atrial fibrillation.  Denies chest pain, cough, purulent sputum production, hemoptysis, or pleurisy.  No palpitations or syncope overnight.  Only complaint patient has is irritation from area of intertrigo in lower abdomen.  No nausea, vomiting, diarrhea, melena, hematochezia, or hematemesis.  No difficulty voiding.  Patient has been somewhat hypertensive and was given a prn dose of clonidine this morning, but this has since been stopped and patient begun on Cozaar (was on irbesartan at home).  Her home dose of bisoprolol is being held because of her recent bradycardia (heart rate today only 50 bpm).  Patient denies dyspnea and O2 sats are 99% on 2 L (as noted is on home oxygen).  No ABGs available but she is a chronic CO2 retainer and her last ABG 2020 revealed a PCO2 of 57 on 2 L.    ROS: Per subjective, all other systems reviewed and were negative.    The patient's relevant PMH, PSH, FH, and SH were reviewed and updated in Epic as appropriate. Allergies and Medications reviewed.    OBJECTIVE     /57   Pulse (!) 47   Temp 97.9 °F (36.6 °C) (Oral)   Resp 16   Ht 152.4 cm (60\")   Wt 78.9 kg (174 lb)   LMP  (LMP Unknown)   SpO2 99%   BMI 33.98 kg/m²      Flow (L/min): 2    Flowsheet Rows    Flowsheet Row First Filed Value   Admission Height 152.4 cm (60\") Documented at 05/05/2023 2127   Admission Weight 78.9 kg (174 lb) Documented at 05/05/2023 2127        Intake & Output (last day)       05/05 0701  05/06 0700 05/06 0701  05/07 0700    P.O.  240    I.V. (mL/kg) 50 (0.6) 950.4 (12)    Total Intake(mL/kg) 50 (0.6) 1190.4 (15.1)    Urine (mL/kg/hr) 180 575 (0.9)    Stool  0    Total Output 180 575 "    Net -130 +615.4          Urine Unmeasured Occurrence  1 x    Stool Unmeasured Occurrence  2 x          Exam:  General Exam:  Overweight white female propped up in bed in NAD  HEENT: Pupils equal and reactive. Nose and throat clear.  Neck:                          Supple, no JVD, thyromegaly, or adenopathy  Lungs: Clear to auscultation and percussion anteriorly and posteriorly.  No wheezes, rales, rhonchi  Cardiovascular: RRR without murmurs or gallops.  HR only 50 bpm  Abdomen: Soft nontender without organomegaly or masses.  Somewhat obese   and rectal: Deferred.  Extremities: No cyanosis clubbing edema.  Neurologic:                 Symmetric strength without focal deficits.  Alert and oriented.  Cooperates and follows all commands.      CXR 5/5/2023: Heart size normal.  Pulmonary arteries appear slightly prominent.  No pulmonary parenchymal or pleural abnormalities although very slight increase in interstitial markings that is unchanged from previous films    Echo 1/30/2023: Normal LVEF.  Mild hypertrophy of LV wall.  Otherwise unremarkable      Results from last 7 days   Lab Units 05/06/23  0622 05/05/23  2155   WBC 10*3/mm3 9.52 10.33   HEMOGLOBIN g/dL 12.5 13.3   HEMATOCRIT % 38.8 41.4   PLATELETS 10*3/mm3 237 256     Results from last 7 days   Lab Units 05/06/23  0622 05/05/23  2155   SODIUM mmol/L 139 141   POTASSIUM mmol/L 4.4 4.5   CHLORIDE mmol/L 98 100   CO2 mmol/L 35.0* 33.0*   BUN mg/dL 12 12   CREATININE mg/dL 0.62 0.62   GLUCOSE mg/dL 228* 255*   CALCIUM mg/dL 9.3 9.7     Results from last 7 days   Lab Units 05/06/23  0622 05/05/23  2155   MAGNESIUM mg/dL 2.5* 1.9   PHOSPHORUS mg/dL 3.6  --      Results from last 7 days   Lab Units 05/05/23  2155   ALK PHOS U/L 87   BILIRUBIN mg/dL 0.2   ALT (SGPT) U/L 28   AST (SGOT) U/L 22       Lab Results   Component Value Date    SEDRATE 18 08/05/2020       Lab Results   Component Value Date    PROBNP 192.2 04/28/2023    PROBNP 277.2 02/12/2020             COVID LABS:  Results From Last 14 Days   Lab Units 05/05/23  2155 04/28/23  0946 04/28/23  0725   PROBNP pg/mL  --   --  192.2   HSTROP T ng/L 18* 20* 20*          Lab Results   Component Value Date    TROPONINI <0.012 07/31/2019    TROPONINT 18 (H) 05/05/2023     Lab Results   Component Value Date    TSH 1.870 05/06/2023     Lab Results   Component Value Date    LACTATE 1.8 05/12/2018     No results found for: CORTISOL          I reviewed the patient's results, images and medication.    Assessment & Plan   ASSESSMENT        PAF with RVR this admission    Sick sinus syndrome (tachybradycardia) with pauses and syncope    Syncope, unspecified syncope type    COPD on home oxygen    Chronic respiratory failure with hypercapnia    Acquired hypothyroidism    Type 2 diabetes mellitus with diabetic peripheral angiopathy without gangrene, with long-term current use of insulin    Gastroesophageal reflux disease    Hyperlipidemia    Hypertension      DISCUSSION: No further episodes of severe bradycardia or pauses since admission off her home dose of bisoprolol.  Hypertension now under control on ARB.  Cardiology wishes to watch over the weekend on telemetry and make a decision as to whether or not she will need ablation +/- PPM for her tachybradycardia syndrome.  I think she will always have difficulty with the tachy component in view of her severe COPD and further attempts to control it with medication will likely lead to significant bradycardia.    PLAN     1.  Watch over the weekend on telemetry and cardiology will reassess Monday to see if she needs ablation/PPM  2.  Will transfer to telemetry and ask the hospitalists to assume attending role  3.  Baseline ABGs as last ABG in 2020 revealed a PCO2 greater than 50  4.  Because of chronic hypercarbia will try to maintain O2 sats between 88 to 92% to maximize respiratory drive.  5.  Keep serum potassium at 4.5 or greater to avoid metabolic alkalosis developing and reducing  respiratory drive  6.  Begin regular dose of Levemir at night and adjust based on SSI  7.  Continue SSI  8.  Mycostatin cream to areas of intertrigo      Plan of care and goals reviewed with multidisciplinary team at daily rounds.    I discussed the patient's findings and my recommendations with patient, family and nursing staff    Patient is critically ill due to arrhythmias and associated syncope and has a high risk of life-threatening decline in condition.  They require continuous monitoring and frequent reassessment of condition for adjustment of management in order to lessen risk.     Time spent 40 min (It does not include procedure time).    Electronically signed by Denzel Lombardo MD, 05/06/23, 8:02 AM EDT.   Pulmonary / Critical care medicine

## 2023-05-07 LAB
ALBUMIN SERPL-MCNC: 3.9 G/DL (ref 3.5–5.2)
ANION GAP SERPL CALCULATED.3IONS-SCNC: 7 MMOL/L (ref 5–15)
BASOPHILS # BLD AUTO: 0.04 10*3/MM3 (ref 0–0.2)
BASOPHILS NFR BLD AUTO: 0.4 % (ref 0–1.5)
BH CV ECHO MEAS - AO MAX PG: 5.7 MMHG
BH CV ECHO MEAS - AO MEAN PG: 3 MMHG
BH CV ECHO MEAS - AO ROOT DIAM: 3.1 CM
BH CV ECHO MEAS - AO V2 MAX: 119 CM/SEC
BH CV ECHO MEAS - AO V2 VTI: 29.6 CM
BH CV ECHO MEAS - AVA(I,D): 2.3 CM2
BH CV ECHO MEAS - EDV(CUBED): 117.6 ML
BH CV ECHO MEAS - EDV(MOD-SP2): 92.3 ML
BH CV ECHO MEAS - EDV(MOD-SP4): 86.1 ML
BH CV ECHO MEAS - EF(MOD-BP): 55.1 %
BH CV ECHO MEAS - EF(MOD-SP2): 55.7 %
BH CV ECHO MEAS - EF(MOD-SP4): 55.6 %
BH CV ECHO MEAS - ESV(CUBED): 39.3 ML
BH CV ECHO MEAS - ESV(MOD-SP2): 40.9 ML
BH CV ECHO MEAS - ESV(MOD-SP4): 38.2 ML
BH CV ECHO MEAS - FS: 30.6 %
BH CV ECHO MEAS - IVS/LVPW: 1 CM
BH CV ECHO MEAS - IVSD: 1 CM
BH CV ECHO MEAS - LA DIMENSION: 3.1 CM
BH CV ECHO MEAS - LAT PEAK E' VEL: 7.3 CM/SEC
BH CV ECHO MEAS - LV DIASTOLIC VOL/BSA (35-75): 48.9 CM2
BH CV ECHO MEAS - LV MASS(C)D: 176 GRAMS
BH CV ECHO MEAS - LV MAX PG: 3.6 MMHG
BH CV ECHO MEAS - LV MEAN PG: 2 MMHG
BH CV ECHO MEAS - LV SYSTOLIC VOL/BSA (12-30): 21.7 CM2
BH CV ECHO MEAS - LV V1 MAX: 94.7 CM/SEC
BH CV ECHO MEAS - LV V1 VTI: 21.7 CM
BH CV ECHO MEAS - LVIDD: 4.9 CM
BH CV ECHO MEAS - LVIDS: 3.4 CM
BH CV ECHO MEAS - LVOT AREA: 3.1 CM2
BH CV ECHO MEAS - LVOT DIAM: 2 CM
BH CV ECHO MEAS - LVPWD: 1 CM
BH CV ECHO MEAS - MED PEAK E' VEL: 7 CM/SEC
BH CV ECHO MEAS - MV A MAX VEL: 61.2 CM/SEC
BH CV ECHO MEAS - MV DEC TIME: 0.15 MSEC
BH CV ECHO MEAS - MV E MAX VEL: 94.5 CM/SEC
BH CV ECHO MEAS - MV E/A: 1.54
BH CV ECHO MEAS - PA ACC TIME: 0.07 SEC
BH CV ECHO MEAS - PA PR(ACCEL): 48.8 MMHG
BH CV ECHO MEAS - RAP SYSTOLE: 3 MMHG
BH CV ECHO MEAS - RVSP: 27 MMHG
BH CV ECHO MEAS - SI(MOD-SP2): 29.2 ML/M2
BH CV ECHO MEAS - SI(MOD-SP4): 27.2 ML/M2
BH CV ECHO MEAS - SV(LVOT): 68.2 ML
BH CV ECHO MEAS - SV(MOD-SP2): 51.4 ML
BH CV ECHO MEAS - SV(MOD-SP4): 47.9 ML
BH CV ECHO MEAS - TAPSE (>1.6): 2.16 CM
BH CV ECHO MEAS - TR MAX PG: 23.8 MMHG
BH CV ECHO MEAS - TR MAX VEL: 244 CM/SEC
BH CV ECHO MEASUREMENTS AVERAGE E/E' RATIO: 13.22
BH CV VAS BP LEFT ARM: NORMAL MMHG
BH CV XLRA - RV BASE: 3.1 CM
BH CV XLRA - RV LENGTH: 7.6 CM
BH CV XLRA - RV MID: 2.3 CM
BH CV XLRA - TDI S': 10.7 CM/SEC
BUN SERPL-MCNC: 15 MG/DL (ref 8–23)
BUN/CREAT SERPL: 23.1 (ref 7–25)
CALCIUM SPEC-SCNC: 9.5 MG/DL (ref 8.6–10.5)
CHLORIDE SERPL-SCNC: 97 MMOL/L (ref 98–107)
CO2 SERPL-SCNC: 34 MMOL/L (ref 22–29)
CREAT SERPL-MCNC: 0.65 MG/DL (ref 0.57–1)
DEPRECATED RDW RBC AUTO: 42.5 FL (ref 37–54)
EGFRCR SERPLBLD CKD-EPI 2021: 91.9 ML/MIN/1.73
EOSINOPHIL # BLD AUTO: 0.18 10*3/MM3 (ref 0–0.4)
EOSINOPHIL NFR BLD AUTO: 1.9 % (ref 0.3–6.2)
ERYTHROCYTE [DISTWIDTH] IN BLOOD BY AUTOMATED COUNT: 12 % (ref 12.3–15.4)
GLUCOSE BLDC GLUCOMTR-MCNC: 245 MG/DL (ref 70–130)
GLUCOSE BLDC GLUCOMTR-MCNC: 269 MG/DL (ref 70–130)
GLUCOSE BLDC GLUCOMTR-MCNC: 294 MG/DL (ref 70–130)
GLUCOSE BLDC GLUCOMTR-MCNC: 333 MG/DL (ref 70–130)
GLUCOSE BLDC GLUCOMTR-MCNC: 341 MG/DL (ref 70–130)
GLUCOSE SERPL-MCNC: 255 MG/DL (ref 65–99)
HCT VFR BLD AUTO: 40.7 % (ref 34–46.6)
HGB BLD-MCNC: 13.1 G/DL (ref 12–15.9)
IMM GRANULOCYTES # BLD AUTO: 0.05 10*3/MM3 (ref 0–0.05)
IMM GRANULOCYTES NFR BLD AUTO: 0.5 % (ref 0–0.5)
LEFT ATRIUM VOLUME INDEX: 26 ML/M2
LYMPHOCYTES # BLD AUTO: 1.31 10*3/MM3 (ref 0.7–3.1)
LYMPHOCYTES NFR BLD AUTO: 13.8 % (ref 19.6–45.3)
MAGNESIUM SERPL-MCNC: 2 MG/DL (ref 1.6–2.4)
MAXIMAL PREDICTED HEART RATE: 145 BPM
MCH RBC QN AUTO: 31.2 PG (ref 26.6–33)
MCHC RBC AUTO-ENTMCNC: 32.2 G/DL (ref 31.5–35.7)
MCV RBC AUTO: 96.9 FL (ref 79–97)
MONOCYTES # BLD AUTO: 0.49 10*3/MM3 (ref 0.1–0.9)
MONOCYTES NFR BLD AUTO: 5.2 % (ref 5–12)
NEUTROPHILS NFR BLD AUTO: 7.42 10*3/MM3 (ref 1.7–7)
NEUTROPHILS NFR BLD AUTO: 78.2 % (ref 42.7–76)
NRBC BLD AUTO-RTO: 0 /100 WBC (ref 0–0.2)
PHOSPHATE SERPL-MCNC: 2.6 MG/DL (ref 2.5–4.5)
PLATELET # BLD AUTO: 220 10*3/MM3 (ref 140–450)
PMV BLD AUTO: 10.6 FL (ref 6–12)
POTASSIUM SERPL-SCNC: 4.4 MMOL/L (ref 3.5–5.2)
RBC # BLD AUTO: 4.2 10*6/MM3 (ref 3.77–5.28)
SODIUM SERPL-SCNC: 138 MMOL/L (ref 136–145)
STRESS TARGET HR: 123 BPM
WBC NRBC COR # BLD: 9.49 10*3/MM3 (ref 3.4–10.8)

## 2023-05-07 PROCEDURE — 25010000002 ENOXAPARIN PER 10 MG: Performed by: NURSE PRACTITIONER

## 2023-05-07 PROCEDURE — 83735 ASSAY OF MAGNESIUM: CPT | Performed by: INTERNAL MEDICINE

## 2023-05-07 PROCEDURE — 63710000001 INSULIN LISPRO (HUMAN) PER 5 UNITS: Performed by: NURSE PRACTITIONER

## 2023-05-07 PROCEDURE — 85025 COMPLETE CBC W/AUTO DIFF WBC: CPT | Performed by: INTERNAL MEDICINE

## 2023-05-07 PROCEDURE — 82948 REAGENT STRIP/BLOOD GLUCOSE: CPT

## 2023-05-07 PROCEDURE — 63710000001 INSULIN DETEMIR PER 5 UNITS: Performed by: NURSE PRACTITIONER

## 2023-05-07 PROCEDURE — 99232 SBSQ HOSP IP/OBS MODERATE 35: CPT | Performed by: INTERNAL MEDICINE

## 2023-05-07 PROCEDURE — 94799 UNLISTED PULMONARY SVC/PX: CPT

## 2023-05-07 PROCEDURE — 94664 DEMO&/EVAL PT USE INHALER: CPT

## 2023-05-07 PROCEDURE — 63710000001 INSULIN LISPRO (HUMAN) PER 5 UNITS: Performed by: INTERNAL MEDICINE

## 2023-05-07 PROCEDURE — 99232 SBSQ HOSP IP/OBS MODERATE 35: CPT | Performed by: NURSE PRACTITIONER

## 2023-05-07 PROCEDURE — 80069 RENAL FUNCTION PANEL: CPT | Performed by: INTERNAL MEDICINE

## 2023-05-07 RX ORDER — AMLODIPINE BESYLATE 5 MG/1
5 TABLET ORAL
Status: DISCONTINUED | OUTPATIENT
Start: 2023-05-07 | End: 2023-05-08

## 2023-05-07 RX ORDER — LOSARTAN POTASSIUM 50 MG/1
100 TABLET ORAL
Status: DISCONTINUED | OUTPATIENT
Start: 2023-05-08 | End: 2023-05-09 | Stop reason: HOSPADM

## 2023-05-07 RX ORDER — INSULIN LISPRO 100 [IU]/ML
0-14 INJECTION, SOLUTION INTRAVENOUS; SUBCUTANEOUS
Status: DISCONTINUED | OUTPATIENT
Start: 2023-05-07 | End: 2023-05-09 | Stop reason: HOSPADM

## 2023-05-07 RX ADMIN — ENOXAPARIN SODIUM 40 MG: 40 INJECTION SUBCUTANEOUS at 08:03

## 2023-05-07 RX ADMIN — AMLODIPINE BESYLATE 5 MG: 5 TABLET ORAL at 10:49

## 2023-05-07 RX ADMIN — BUDESONIDE AND FORMOTEROL FUMARATE DIHYDRATE 2 PUFF: 160; 4.5 AEROSOL RESPIRATORY (INHALATION) at 20:38

## 2023-05-07 RX ADMIN — ASPIRIN 81 MG: 81 TABLET, COATED ORAL at 08:03

## 2023-05-07 RX ADMIN — INSULIN LISPRO 10 UNITS: 100 INJECTION, SOLUTION INTRAVENOUS; SUBCUTANEOUS at 12:17

## 2023-05-07 RX ADMIN — HYDRALAZINE HYDROCHLORIDE 50 MG: 50 TABLET, FILM COATED ORAL at 00:19

## 2023-05-07 RX ADMIN — NYSTATIN 1 APPLICATION: 100000 CREAM TOPICAL at 22:55

## 2023-05-07 RX ADMIN — INSULIN LISPRO 10 UNITS: 100 INJECTION, SOLUTION INTRAVENOUS; SUBCUTANEOUS at 20:18

## 2023-05-07 RX ADMIN — Medication 10 ML: at 08:03

## 2023-05-07 RX ADMIN — INSULIN DETEMIR 15 UNITS: 100 INJECTION, SOLUTION SUBCUTANEOUS at 20:18

## 2023-05-07 RX ADMIN — INSULIN LISPRO 5 UNITS: 100 INJECTION, SOLUTION INTRAVENOUS; SUBCUTANEOUS at 08:04

## 2023-05-07 RX ADMIN — Medication 10 ML: at 20:18

## 2023-05-07 RX ADMIN — BUDESONIDE AND FORMOTEROL FUMARATE DIHYDRATE 2 PUFF: 160; 4.5 AEROSOL RESPIRATORY (INHALATION) at 09:00

## 2023-05-07 RX ADMIN — LEVOTHYROXINE SODIUM 112 MCG: 112 TABLET ORAL at 06:43

## 2023-05-07 RX ADMIN — LORAZEPAM 0.5 MG: 0.5 TABLET ORAL at 23:01

## 2023-05-07 RX ADMIN — NYSTATIN 1 APPLICATION: 100000 CREAM TOPICAL at 08:05

## 2023-05-07 RX ADMIN — LOSARTAN POTASSIUM 25 MG: 25 TABLET, FILM COATED ORAL at 08:04

## 2023-05-07 NOTE — PROGRESS NOTES
"  [facility]  PROGRESS NOTE    Date of Admission: 5/5/2023  Date of Service: 05/07/23    Primary Care Physician: Marychuy Atwood APRN    Chief Complaint: f/u PAF, tachy-simón   Problem List:   PAF with RVR this admission    Acquired hypothyroidism    COPD on home oxygen    Type 2 diabetes mellitus with diabetic peripheral angiopathy without gangrene, with long-term current use of insulin    Gastroesophageal reflux disease    Hyperlipidemia    Hypertension    Chronic respiratory failure with hypercapnia    Syncope, unspecified syncope type    Sick sinus syndrome (tachybradycardia) with pauses and syncope      Subjective      HPI: Feels better this morning but did have an episode of A-fib with RVR lasting about 20 minutes around 9:45 AM      Objective   Vitals: /62   Pulse 64   Temp 97.8 °F (36.6 °C) (Oral)   Resp 16   Ht 152.4 cm (60\")   Wt 78.9 kg (173 lb 15.1 oz)   LMP  (LMP Unknown)   SpO2 97%   BMI 33.97 kg/m²     Physical Exam:  GENERAL: Alert, cooperative, in no acute distress.   HEENT: Normocephalic, no jugular venous distention  HEART: Regular rhythm, normal rate, and no murmurs, gallops, or rubs.   LUNGS: No wheezing, rales or rhonchi.  NEUROLOGIC: No focal abnormalities involving strength or sensation are noted.   EXTREMITIES: No clubbing, cyanosis, or edema noted.     Results:  Results from last 7 days   Lab Units 05/07/23  0451 05/06/23  0622 05/05/23  2155   WBC 10*3/mm3 9.49 9.52 10.33   HEMOGLOBIN g/dL 13.1 12.5 13.3   HEMATOCRIT % 40.7 38.8 41.4   PLATELETS 10*3/mm3 220 237 256     Results from last 7 days   Lab Units 05/07/23  0451 05/06/23  0622 05/05/23  2155   SODIUM mmol/L 138 139 141   POTASSIUM mmol/L 4.4 4.4 4.5   CHLORIDE mmol/L 97* 98 100   CO2 mmol/L 34.0* 35.0* 33.0*   BUN mg/dL 15 12 12   CREATININE mg/dL 0.65 0.62 0.62   GLUCOSE mg/dL 255* 228* 255*      Lab Results   Component Value Date    TRIG 137 12/29/2021    HDL 57 12/29/2021 "     (H) 12/29/2021    AST 22 05/05/2023    ALT 28 05/05/2023     Results from last 7 days   Lab Units 05/05/23  2155   HEMOGLOBIN A1C % 9.10*         Results from last 7 days   Lab Units 05/06/23  0622   TSH uIU/mL 1.870         Results from last 7 days   Lab Units 05/05/23  2155   HSTROP T ng/L 18*     Results from last 7 days   Lab Units 05/06/23  0622   PROBNP pg/mL 889.6       Intake/Output Summary (Last 24 hours) at 5/7/2023 0847  Last data filed at 5/7/2023 0600  Gross per 24 hour   Intake 1190.4 ml   Output 1750 ml   Net -559.6 ml       I personally reviewed the patient's EKG/Telemetry data    Radiology Data:   Echo 5/6/23:  Interpretation Summary       •  Left ventricular systolic function is normal. Calculated left ventricular EF = 55.1% Left ventricular ejection fraction appears to be 56 - 60%.  •  Left ventricular diastolic function was indeterminate.  •  Estimated right ventricular systolic pressure from tricuspid regurgitation is normal (<35 mmHg).      Current Medications:  aspirin, 81 mg, Oral, Daily  budesonide-formoterol, 2 puff, Inhalation, BID - RT  enoxaparin, 40 mg, Subcutaneous, Daily  insulin detemir, 15 Units, Subcutaneous, Nightly  insulin lispro, 0-14 Units, Subcutaneous, TID AC  levothyroxine, 112 mcg, Oral, Q AM  losartan, 25 mg, Oral, Q24H  nystatin, 1 application, Topical, Q12H  sodium chloride, 10 mL, Intravenous, Q12H           Assessment and Plan:     1. Tachy-simón syndrome  EP eval Monday morning for possible pacemaker  - unable to obtain ER strips showing pauses, no recurrent pauses since transfer to ICU   - agree with discontinuation of Bisoprolol 2.5mg and monitor for recurrent simón/ pauses  - echo normal, continue to monitor for recurrent pauses      2. PAF with RVR  -Another episode of A-fib with RVR with rates in the 140s range around 9:45 AM 5/7  Pictures consistent with tachybradycardia syndrome, EP eval on Monday for possible pacemaker    - CHADsVASC=5 , has  previously declined anticoagulation and hesitant to proceed with Watchman        3. Hypertension   - BPs slightly elevated  - resumed ARB and increase as needed  - discontinued Clonidine as she was not taking at home, and could cause bradycardia       4. Hypothyroidism  - TSH WNL     Okay for transfer to telemetry, n.p.o. after midnight in case EP wants to place pacemaker Monday     Electronically signed by Jessica Hudson PA-C, 05/07/23, 8:49 AM EDT.    I have seen and examined the patient, performing a face-to-face diagnostic evaluation with plan of care reviewed and developed with the Advanced Practice Clinician and nursing staff. I have addended and modified the above history of present illness, physical examination, and assessment and plan to reflect my findings and impressions. > 50% of the combined encounter time was performed by Dr. Campo.    Shan Campo MD, [facility], [facility]  05/07/23

## 2023-05-07 NOTE — PLAN OF CARE
Goal Outcome Evaluation:  - Anxious and reluctant to try new things including medications   - Patient had concerns with taking levemir in fear of it dropping her glucose and initially refused. After explanation by primary nurse and NP she did agree to take a partial dose. The order was then changed from 25u to 15u.   - Glucose levels trending up  - 1 L NC continued for comfort   - Prn hydralazine given for systolic BP above 180   - Up to bedside commode   - Adequate UOP  - Up in chair

## 2023-05-07 NOTE — SIGNIFICANT NOTE
"Patient  Refusing levemir 2/t concern for hypoglycemia overnight.  We discussed the differences in her usual formulation and regimen and that which she is receiving here.  Overall, she is receiving less insulin unde more stringent monitoring then her home dosing (which, given her HA1c of 9.1 is inadequate coverage).  She is quite concerned with hypoglycemia.      We reached a compromise that she would take 15 units of levemir and we would check her FSBS @ MN.  If she does not \"bottom out\" over the evening, she would be willing to discuss increasing her dosage.            "

## 2023-05-07 NOTE — PLAN OF CARE
Goal Outcome Evaluation:  Plan of Care Reviewed With: patient, daughter        Progress: improving  Outcome Evaluation: Pt in SR/SB majority of shift, Pt had 20 min episode of Afib RVR in which she self converted to SR. Pt continues on 1L NC, transferred to tele this evening. Plan for EP evaluation tomorrow, NPO after MN in case of procedure.

## 2023-05-07 NOTE — CASE MANAGEMENT/SOCIAL WORK
Continued Stay Note  [facility]     Patient Name: Marielena Moeller  MRN: [id number]  Today's Date: 5/7/2023    Admit Date: 5/5/2023    Plan: IDP   Discharge Plan     Row Name 05/07/23 1214       Plan    Plan Comments CM received a consult today for Ms Moeller. Consult requests that social work see the patient related to her care while in the hospital. CM to pass along the information to social work for Monday review. CM follow and will assist as needed.               Discharge Codes    No documentation.                     Jocelyn Michelle RN

## 2023-05-07 NOTE — PROGRESS NOTES
Intensivist Note     5/7/2023  Hospital Day: 1  * No surgery found *  ICU Stays Timeline            Hospital Admission: 05/05/23 2129 - Current  ICU stays: 1      In Date/Time Event Department ICU Stay Duration     05/05/23 2129 Admission  [facility]      05/06/23 0602 Transfer In  [facility] ICU 2 hours             Ms. Marielena Moeller, 77 y.o. female is followed for:    PAF with RVR this admission    Acquired hypothyroidism    COPD on home oxygen    Type 2 diabetes mellitus with diabetic peripheral angiopathy without gangrene, with long-term current use of insulin    Gastroesophageal reflux disease    Hyperlipidemia    Hypertension    Chronic respiratory failure with hypercapnia    Syncope, unspecified syncope type    Sick sinus syndrome (tachybradycardia) with pauses and syncope       SUBJECTIVE     77-year-old white female former smoker with PMH of PAF (not on anticoagulation due to patient refusal and also declined watchman.patient presently followed by Dr. Pathak.patient also has COPD with chronic hypoxemic/hypercarbic respiratory failure on 2.5 to 3 L oxygen at home, diabetes mellitus, hypertension, hyperlipidemia, and hypothyroidism.  Patient presented to [facility] ER complaining of palpitations and syncope.      Patient has a long history of PAF with intermittent presyncopal episodes, palpitations and weakness.  Episodes have become more frequent and patient was seen 1 week PTA with a similar presentation but less severe.  Over the last 24 to 48 hours patient has had at least 20 episodes of presyncope with symptoms of lightheadedness but has not fully lost consciousness.  Upon presentation patient was in atrial fibrillation with RVR.  She then had 3-4 episodes of pauses (asystole) which correlated with presyncope (unfortunately these were not documented although I am told one pause lasted 45 seconds).  Documented heart rate on arrival was 132 (although I was told at 1 point it was 175).  Patient  "spontaneously converted to sinus rhythm while in the ER.  Note that patient has previously been tried on low-dose bisoprolol but developed bradycardia and dose was just decreased to 2.5 mg daily 1 to 2 weeks PTA.    Interval history:   Patient had a BM this morning.  She is up sitting up in the chair.  Went into A fib with RVR this morning.  Cards following.  She refused a full dose of Levemir last night and BG remains 200-300s.  She takes 70/30 at home.  D/w her the difference in the 2 insulins.  She is agreeable to taking Levemir.  I told her that I was changing it to every 12 hours. Diabetes educator to see her.     Wears 2.5L Home O2 all the time.     ROS: Per subjective, all other systems reviewed and were negative.    The patient's relevant PMH, PSH, FH, and SH were reviewed and updated in Epic as appropriate. Allergies and Medications reviewed.    OBJECTIVE     /48 (BP Location: Left arm, Patient Position: Lying)   Pulse 58   Temp 97.8 °F (36.6 °C) (Oral)   Resp 16   Ht 152.4 cm (60\")   Wt 78.9 kg (173 lb 15.1 oz)   LMP  (LMP Unknown)   SpO2 97%   BMI 33.97 kg/m²      Flow (L/min): 1    Flowsheet Rows    Flowsheet Row First Filed Value   Admission Height 152.4 cm (60\") Documented at 05/05/2023 2127   Admission Weight 78.9 kg (174 lb) Documented at 05/05/2023 2127        Intake & Output (last day)       05/06 0701  05/07 0700 05/07 0701  05/08 0700    P.O. 240     I.V. (mL/kg) 950.4 (12)     Total Intake(mL/kg) 1190.4 (15.1)     Urine (mL/kg/hr) 2150 (1.1)     Stool 0     Total Output 2150     Net -959.6           Urine Unmeasured Occurrence 1 x     Stool Unmeasured Occurrence 2 x           Exam:  General Exam:  Overweight white female, sitting up in chair, in NAD  HEENT: Pupils equal and reactive. Nose and throat clear.  Neck:                          Supple, no JVD, thyromegaly, or adenopathy  Lungs: Clear to auscultation and percussion anteriorly and posteriorly.  No wheezes, rales, " rhonchi  Cardiovascular: RRR without murmurs or gallops.  HR only 50 bpm  Abdomen: Soft nontender without organomegaly or masses.  Somewhat obese   and rectal: Deferred.  Extremities: No cyanosis clubbing edema.  Neurologic:                 Symmetric strength without focal deficits.  Alert and oriented.  Cooperates and follows all commands.      CXR 5/5/2023: Heart size normal.  Pulmonary arteries appear slightly prominent.  No pulmonary parenchymal or pleural abnormalities although very slight increase in interstitial markings that is unchanged from previous films    Echo 1/30/2023: Normal LVEF.  Mild hypertrophy of LV wall.  Otherwise unremarkable      Results from last 7 days   Lab Units 05/07/23  0451 05/06/23 0622 05/05/23 2155   WBC 10*3/mm3 9.49 9.52 10.33   HEMOGLOBIN g/dL 13.1 12.5 13.3   HEMATOCRIT % 40.7 38.8 41.4   PLATELETS 10*3/mm3 220 237 256     Results from last 7 days   Lab Units 05/07/23 0451 05/06/23 0622   SODIUM mmol/L 138 139   POTASSIUM mmol/L 4.4 4.4   CHLORIDE mmol/L 97* 98   CO2 mmol/L 34.0* 35.0*   BUN mg/dL 15 12   CREATININE mg/dL 0.65 0.62   GLUCOSE mg/dL 255* 228*   CALCIUM mg/dL 9.5 9.3     Results from last 7 days   Lab Units 05/07/23 0451 05/06/23 0622 05/05/23 2155   MAGNESIUM mg/dL 2.0 2.5* 1.9   PHOSPHORUS mg/dL 2.6 3.6  --      Results from last 7 days   Lab Units 05/05/23 2155   ALK PHOS U/L 87   BILIRUBIN mg/dL 0.2   ALT (SGPT) U/L 28   AST (SGOT) U/L 22       Lab Results   Component Value Date    SEDRATE 18 08/05/2020       Lab Results   Component Value Date    PROBNP 889.6 05/06/2023    PROBNP 192.2 04/28/2023        COVID LABS:  Results From Last 14 Days   Lab Units 05/06/23 0622 05/05/23 2155 04/28/23  0946 04/28/23  0725   PROBNP pg/mL 889.6  --   --  192.2   HSTROP T ng/L  --  18* 20* 20*        Lab Results   Component Value Date    TROPONINI <0.012 07/31/2019    TROPONINT 18 (H) 05/05/2023     Lab Results   Component Value Date    TSH 1.870 05/06/2023      Lab Results   Component Value Date    LACTATE 1.8 05/12/2018     No results found for: CORTISOL      I reviewed the patient's results, images and medication.    Assessment & Plan   ASSESSMENT        PAF with RVR this admission    Acquired hypothyroidism    COPD on home oxygen    Type 2 diabetes mellitus with diabetic peripheral angiopathy without gangrene, with long-term current use of insulin    Gastroesophageal reflux disease    Hyperlipidemia    Hypertension    Chronic respiratory failure with hypercapnia    Syncope, unspecified syncope type    Sick sinus syndrome (tachybradycardia) with pauses and syncope      DISCUSSION: No further episodes of severe bradycardia or pauses since admission off her home dose of bisoprolol.  Hypertension now under control on ARB.  Cardiology wishes to watch over the weekend on telemetry and make a decision as to whether or not she will need ablation +/- PPM for her tachybradycardia syndrome.  I think she will always have difficulty with the tachy component in view of her severe COPD and further attempts to control it with medication will likely lead to significant bradycardia.    PLAN     1.  Monitor on telemetry and patient to have EP eval on Monday for PPM placement.  NPO after MN in case of PPM placement  2.  Will transfer to telemetry and ask the hospitalists to assume attending role  3.  Because of chronic hypercarbia will try to maintain O2 sats between 88 to 92% to maximize respiratory drive.  4.  Change Levemir to every 12 hours and continue SSI  5.  AM labs  6.  Continue Mycostatin cream to areas of intertrigo  7.  DVT prophylaxis  8.  GI prophylaxis  9.  Continue Levothyroxine  10.  Continue antihypertensives    Plan of care and goals reviewed with multidisciplinary/antibiotic stewardship team during rounds.   I discussed the patient's findings and my recommendations with patient, nursing staff and primary care team     Time: 35 minutes, face to face, with the patient  and family or on the padgett coordinating care with other health care providers.     I spent > 50% percent of this time, counseling and discussing evaluation, current status and management.    LETICIA Downs, AGACNP-BC, FNP-BC  Pulmonary and Critical Care Service

## 2023-05-08 ENCOUNTER — TELEPHONE (OUTPATIENT)
Dept: FAMILY MEDICINE CLINIC | Facility: CLINIC | Age: 78
End: 2023-05-08

## 2023-05-08 LAB
ANION GAP SERPL CALCULATED.3IONS-SCNC: 8 MMOL/L (ref 5–15)
BUN SERPL-MCNC: 13 MG/DL (ref 8–23)
BUN/CREAT SERPL: 21.3 (ref 7–25)
CALCIUM SPEC-SCNC: 9.1 MG/DL (ref 8.6–10.5)
CHLORIDE SERPL-SCNC: 102 MMOL/L (ref 98–107)
CO2 SERPL-SCNC: 32 MMOL/L (ref 22–29)
CREAT SERPL-MCNC: 0.61 MG/DL (ref 0.57–1)
DEPRECATED RDW RBC AUTO: 42.1 FL (ref 37–54)
EGFRCR SERPLBLD CKD-EPI 2021: 93.4 ML/MIN/1.73
ERYTHROCYTE [DISTWIDTH] IN BLOOD BY AUTOMATED COUNT: 12.1 % (ref 12.3–15.4)
GLUCOSE BLDC GLUCOMTR-MCNC: 125 MG/DL (ref 70–130)
GLUCOSE BLDC GLUCOMTR-MCNC: 149 MG/DL (ref 70–130)
GLUCOSE BLDC GLUCOMTR-MCNC: 196 MG/DL (ref 70–130)
GLUCOSE SERPL-MCNC: 145 MG/DL (ref 65–99)
HCT VFR BLD AUTO: 35.9 % (ref 34–46.6)
HGB BLD-MCNC: 11.6 G/DL (ref 12–15.9)
MAGNESIUM SERPL-MCNC: 1.8 MG/DL (ref 1.6–2.4)
MCH RBC QN AUTO: 30.6 PG (ref 26.6–33)
MCHC RBC AUTO-ENTMCNC: 32.3 G/DL (ref 31.5–35.7)
MCV RBC AUTO: 94.7 FL (ref 79–97)
PHOSPHATE SERPL-MCNC: 3.6 MG/DL (ref 2.5–4.5)
PLATELET # BLD AUTO: 226 10*3/MM3 (ref 140–450)
PMV BLD AUTO: 10.2 FL (ref 6–12)
POTASSIUM SERPL-SCNC: 3.9 MMOL/L (ref 3.5–5.2)
QT INTERVAL: 276 MS
QT INTERVAL: 412 MS
QT INTERVAL: 422 MS
QTC INTERVAL: 408 MS
QTC INTERVAL: 445 MS
QTC INTERVAL: 486 MS
RBC # BLD AUTO: 3.79 10*6/MM3 (ref 3.77–5.28)
SODIUM SERPL-SCNC: 142 MMOL/L (ref 136–145)
WBC NRBC COR # BLD: 7.13 10*3/MM3 (ref 3.4–10.8)

## 2023-05-08 PROCEDURE — 25010000002 MIDAZOLAM PER 1 MG: Performed by: STUDENT IN AN ORGANIZED HEALTH CARE EDUCATION/TRAINING PROGRAM

## 2023-05-08 PROCEDURE — 85027 COMPLETE CBC AUTOMATED: CPT | Performed by: NURSE PRACTITIONER

## 2023-05-08 PROCEDURE — 25010000002 ENOXAPARIN PER 10 MG: Performed by: NURSE PRACTITIONER

## 2023-05-08 PROCEDURE — 82948 REAGENT STRIP/BLOOD GLUCOSE: CPT

## 2023-05-08 PROCEDURE — 93005 ELECTROCARDIOGRAM TRACING: CPT | Performed by: INTERNAL MEDICINE

## 2023-05-08 PROCEDURE — 99233 SBSQ HOSP IP/OBS HIGH 50: CPT | Performed by: INTERNAL MEDICINE

## 2023-05-08 PROCEDURE — 99153 MOD SED SAME PHYS/QHP EA: CPT | Performed by: STUDENT IN AN ORGANIZED HEALTH CARE EDUCATION/TRAINING PROGRAM

## 2023-05-08 PROCEDURE — 84100 ASSAY OF PHOSPHORUS: CPT | Performed by: NURSE PRACTITIONER

## 2023-05-08 PROCEDURE — 63710000001 INSULIN LISPRO (HUMAN) PER 5 UNITS: Performed by: NURSE PRACTITIONER

## 2023-05-08 PROCEDURE — C1898 LEAD, PMKR, OTHER THAN TRANS: HCPCS | Performed by: STUDENT IN AN ORGANIZED HEALTH CARE EDUCATION/TRAINING PROGRAM

## 2023-05-08 PROCEDURE — 25010000002 CEFAZOLIN IN DEXTROSE 2-4 GM/100ML-% SOLUTION: Performed by: STUDENT IN AN ORGANIZED HEALTH CARE EDUCATION/TRAINING PROGRAM

## 2023-05-08 PROCEDURE — 25010000002 ONDANSETRON PER 1 MG: Performed by: STUDENT IN AN ORGANIZED HEALTH CARE EDUCATION/TRAINING PROGRAM

## 2023-05-08 PROCEDURE — 0JH606Z INSERTION OF PACEMAKER, DUAL CHAMBER INTO CHEST SUBCUTANEOUS TISSUE AND FASCIA, OPEN APPROACH: ICD-10-PCS | Performed by: INTERNAL MEDICINE

## 2023-05-08 PROCEDURE — C1892 INTRO/SHEATH,FIXED,PEEL-AWAY: HCPCS | Performed by: STUDENT IN AN ORGANIZED HEALTH CARE EDUCATION/TRAINING PROGRAM

## 2023-05-08 PROCEDURE — 94664 DEMO&/EVAL PT USE INHALER: CPT

## 2023-05-08 PROCEDURE — C1785 PMKR, DUAL, RATE-RESP: HCPCS | Performed by: STUDENT IN AN ORGANIZED HEALTH CARE EDUCATION/TRAINING PROGRAM

## 2023-05-08 PROCEDURE — 02HK3JZ INSERTION OF PACEMAKER LEAD INTO RIGHT VENTRICLE, PERCUTANEOUS APPROACH: ICD-10-PCS | Performed by: INTERNAL MEDICINE

## 2023-05-08 PROCEDURE — 83735 ASSAY OF MAGNESIUM: CPT | Performed by: NURSE PRACTITIONER

## 2023-05-08 PROCEDURE — 63710000001 INSULIN DETEMIR PER 5 UNITS: Performed by: NURSE PRACTITIONER

## 2023-05-08 PROCEDURE — 0 LIDOCAINE 1 % SOLUTION: Performed by: STUDENT IN AN ORGANIZED HEALTH CARE EDUCATION/TRAINING PROGRAM

## 2023-05-08 PROCEDURE — 94799 UNLISTED PULMONARY SVC/PX: CPT

## 2023-05-08 PROCEDURE — 25010000002 CEFAZOLIN IN DEXTROSE 2-4 GM/100ML-% SOLUTION

## 2023-05-08 PROCEDURE — 25010000002 FENTANYL CITRATE (PF) 50 MCG/ML SOLUTION: Performed by: STUDENT IN AN ORGANIZED HEALTH CARE EDUCATION/TRAINING PROGRAM

## 2023-05-08 PROCEDURE — 33208 INSRT HEART PM ATRIAL & VENT: CPT | Performed by: STUDENT IN AN ORGANIZED HEALTH CARE EDUCATION/TRAINING PROGRAM

## 2023-05-08 PROCEDURE — 80048 BASIC METABOLIC PNL TOTAL CA: CPT | Performed by: NURSE PRACTITIONER

## 2023-05-08 PROCEDURE — 3E013GC INTRODUCTION OF OTHER THERAPEUTIC SUBSTANCE INTO SUBCUTANEOUS TISSUE, PERCUTANEOUS APPROACH: ICD-10-PCS | Performed by: INTERNAL MEDICINE

## 2023-05-08 PROCEDURE — 99152 MOD SED SAME PHYS/QHP 5/>YRS: CPT | Performed by: STUDENT IN AN ORGANIZED HEALTH CARE EDUCATION/TRAINING PROGRAM

## 2023-05-08 PROCEDURE — 93005 ELECTROCARDIOGRAM TRACING: CPT | Performed by: STUDENT IN AN ORGANIZED HEALTH CARE EDUCATION/TRAINING PROGRAM

## 2023-05-08 PROCEDURE — 02H63JZ INSERTION OF PACEMAKER LEAD INTO RIGHT ATRIUM, PERCUTANEOUS APPROACH: ICD-10-PCS | Performed by: INTERNAL MEDICINE

## 2023-05-08 DEVICE — IMPLANTABLE DEVICE
Type: IMPLANTABLE DEVICE | Site: CHEST | Status: FUNCTIONAL
Brand: EDORA 8 DR-T

## 2023-05-08 DEVICE — LD PM SOLIA S 45: Type: IMPLANTABLE DEVICE | Site: CHEST | Status: FUNCTIONAL

## 2023-05-08 DEVICE — LD PM SOLIA S 53: Type: IMPLANTABLE DEVICE | Site: CHEST | Status: FUNCTIONAL

## 2023-05-08 RX ORDER — CEFAZOLIN SODIUM 2 G/100ML
2 INJECTION, SOLUTION INTRAVENOUS ONCE
Status: COMPLETED | OUTPATIENT
Start: 2023-05-08 | End: 2023-05-08

## 2023-05-08 RX ORDER — LIDOCAINE HYDROCHLORIDE 10 MG/ML
INJECTION, SOLUTION INFILTRATION; PERINEURAL
Status: DISCONTINUED | OUTPATIENT
Start: 2023-05-08 | End: 2023-05-08 | Stop reason: HOSPADM

## 2023-05-08 RX ORDER — SODIUM CHLORIDE 0.9 % (FLUSH) 0.9 %
10 SYRINGE (ML) INJECTION AS NEEDED
Status: DISCONTINUED | OUTPATIENT
Start: 2023-05-08 | End: 2023-05-09 | Stop reason: HOSPADM

## 2023-05-08 RX ORDER — BUPIVACAINE HYDROCHLORIDE 5 MG/ML
INJECTION, SOLUTION PERINEURAL
Status: DISCONTINUED | OUTPATIENT
Start: 2023-05-08 | End: 2023-05-08 | Stop reason: HOSPADM

## 2023-05-08 RX ORDER — SODIUM CHLORIDE 0.9 % (FLUSH) 0.9 %
3 SYRINGE (ML) INJECTION EVERY 12 HOURS SCHEDULED
Status: DISCONTINUED | OUTPATIENT
Start: 2023-05-08 | End: 2023-05-09 | Stop reason: HOSPADM

## 2023-05-08 RX ORDER — ONDANSETRON 2 MG/ML
INJECTION INTRAMUSCULAR; INTRAVENOUS
Status: DISCONTINUED | OUTPATIENT
Start: 2023-05-08 | End: 2023-05-08 | Stop reason: HOSPADM

## 2023-05-08 RX ORDER — SODIUM CHLORIDE 9 MG/ML
40 INJECTION, SOLUTION INTRAVENOUS AS NEEDED
Status: DISCONTINUED | OUTPATIENT
Start: 2023-05-08 | End: 2023-05-09 | Stop reason: HOSPADM

## 2023-05-08 RX ORDER — ACETAMINOPHEN 650 MG/1
650 SUPPOSITORY RECTAL EVERY 4 HOURS PRN
Status: DISCONTINUED | OUTPATIENT
Start: 2023-05-08 | End: 2023-05-09 | Stop reason: HOSPADM

## 2023-05-08 RX ORDER — AMLODIPINE BESYLATE 10 MG/1
10 TABLET ORAL
Status: DISCONTINUED | OUTPATIENT
Start: 2023-05-09 | End: 2023-05-09 | Stop reason: HOSPADM

## 2023-05-08 RX ORDER — METOPROLOL TARTRATE 5 MG/5ML
2.5 INJECTION INTRAVENOUS EVERY 4 HOURS PRN
Status: DISCONTINUED | OUTPATIENT
Start: 2023-05-08 | End: 2023-05-09 | Stop reason: HOSPADM

## 2023-05-08 RX ORDER — ACETAMINOPHEN 325 MG/1
650 TABLET ORAL EVERY 4 HOURS PRN
Status: DISCONTINUED | OUTPATIENT
Start: 2023-05-08 | End: 2023-05-09 | Stop reason: HOSPADM

## 2023-05-08 RX ORDER — SODIUM CHLORIDE 9 MG/ML
INJECTION, SOLUTION INTRAVENOUS
Status: COMPLETED | OUTPATIENT
Start: 2023-05-08 | End: 2023-05-08

## 2023-05-08 RX ORDER — FENTANYL CITRATE 50 UG/ML
INJECTION, SOLUTION INTRAMUSCULAR; INTRAVENOUS
Status: DISCONTINUED | OUTPATIENT
Start: 2023-05-08 | End: 2023-05-08 | Stop reason: HOSPADM

## 2023-05-08 RX ORDER — MIDAZOLAM HYDROCHLORIDE 1 MG/ML
INJECTION INTRAMUSCULAR; INTRAVENOUS
Status: DISCONTINUED | OUTPATIENT
Start: 2023-05-08 | End: 2023-05-08 | Stop reason: HOSPADM

## 2023-05-08 RX ADMIN — LORAZEPAM 0.5 MG: 0.5 TABLET ORAL at 21:57

## 2023-05-08 RX ADMIN — CEFAZOLIN SODIUM 2 G: 2 INJECTION, SOLUTION INTRAVENOUS at 17:14

## 2023-05-08 RX ADMIN — ENOXAPARIN SODIUM 40 MG: 40 INJECTION SUBCUTANEOUS at 11:21

## 2023-05-08 RX ADMIN — NYSTATIN 1 APPLICATION: 100000 CREAM TOPICAL at 21:45

## 2023-05-08 RX ADMIN — ACETAMINOPHEN 325MG 650 MG: 325 TABLET ORAL at 21:57

## 2023-05-08 RX ADMIN — INSULIN DETEMIR 15 UNITS: 100 INJECTION, SOLUTION SUBCUTANEOUS at 11:18

## 2023-05-08 RX ADMIN — Medication 10 ML: at 21:45

## 2023-05-08 RX ADMIN — INSULIN LISPRO 3 UNITS: 100 INJECTION, SOLUTION INTRAVENOUS; SUBCUTANEOUS at 11:18

## 2023-05-08 RX ADMIN — BUDESONIDE AND FORMOTEROL FUMARATE DIHYDRATE 2 PUFF: 160; 4.5 AEROSOL RESPIRATORY (INHALATION) at 20:49

## 2023-05-08 RX ADMIN — NYSTATIN 1 APPLICATION: 100000 CREAM TOPICAL at 11:19

## 2023-05-08 RX ADMIN — Medication 3 ML: at 21:45

## 2023-05-08 RX ADMIN — CEFAZOLIN SODIUM 2 G: 2 INJECTION, SOLUTION INTRAVENOUS at 11:18

## 2023-05-08 RX ADMIN — BUDESONIDE AND FORMOTEROL FUMARATE DIHYDRATE 2 PUFF: 160; 4.5 AEROSOL RESPIRATORY (INHALATION) at 09:06

## 2023-05-08 NOTE — PROGRESS NOTES
[facility] Medicine Services  PROGRESS NOTE    Patient Name: Marielena Moeller  : 1945  MRN: [id number]    Date of Admission: 2023  Primary Care Physician: Marychuy Atwood APRN    Subjective   Subjective     CC:  presyncope    HPI:  Complaining of L breast pain which is intermittent for years. Has not had a screening mammogram since . Otherwise, she denies any presyncope since admission.     ROS:  Gen- No fevers, chills  CV- No chest pain, palpitations  Resp- No cough, dyspnea  GI- No N/V/D, abd pain        Objective   Objective     Vital Signs:   Temp:  [97.9 °F (36.6 °C)-98.4 °F (36.9 °C)] 98.1 °F (36.7 °C)  Heart Rate:  [64-75] 69  Resp:  [16-20] 18  BP: (145-173)/(58-77) 157/67  Flow (L/min):  [1-2] 1     Physical Exam:  Constitutional: No acute distress, awake, alert  HENT: NCAT, mucous membranes moist  Respiratory: Clear to auscultation bilaterally, respiratory effort normal   Cardiovascular: RRR, no murmurs, rubs, or gallops  Gastrointestinal: Positive bowel sounds, soft, nontender, nondistended  Musculoskeletal: No bilateral ankle edema  Psychiatric: Appropriate affect, cooperative  Neurologic: Oriented x 3, strength symmetric in all extremities, Cranial Nerves grossly intact to confrontation, speech clear  Skin: No rashes    Results Reviewed:  LAB RESULTS:      Lab 23  04523  0622 23  2155   WBC 7.13 9.49 9.52 10.33   HEMOGLOBIN 11.6* 13.1 12.5 13.3   HEMATOCRIT 35.9 40.7 38.8 41.4   PLATELETS 226 220 237 256   NEUTROS ABS  --  7.42*  --  8.36*   IMMATURE GRANS (ABS)  --  0.05  --  0.05   LYMPHS ABS  --  1.31  --  1.26   MONOS ABS  --  0.49  --  0.54   EOS ABS  --  0.18  --  0.07   MCV 94.7 96.9 97.5* 96.7         Lab 23  0343 05/07/23  0451 23  0622 23  2155   SODIUM 142 138 139 141   POTASSIUM 3.9 4.4 4.4 4.5   CHLORIDE 102 97* 98 100   CO2 32.0* 34.0* 35.0* 33.0*   ANION GAP 8.0 7.0 6.0 8.0   BUN 13 15 12 12    CREATININE 0.61 0.65 0.62 0.62   EGFR 93.4 91.9 93.0 93.0   GLUCOSE 145* 255* 228* 255*   CALCIUM 9.1 9.5 9.3 9.7   MAGNESIUM 1.8 2.0 2.5* 1.9   PHOSPHORUS 3.6 2.6 3.6  --    HEMOGLOBIN A1C  --   --   --  9.10*   TSH  --   --  1.870  --          Lab 05/07/23  0451 05/05/23  2155   TOTAL PROTEIN  --  6.9   ALBUMIN 3.9 4.2   GLOBULIN  --  2.7   ALT (SGPT)  --  28   AST (SGOT)  --  22   BILIRUBIN  --  0.2   ALK PHOS  --  87         Lab 05/06/23  0622 05/05/23  2155   PROBNP 889.6  --    HSTROP T  --  18*                 Lab 05/06/23  1715   PH, ARTERIAL 7.400   PCO2, ARTERIAL 58.7*   PO2 ART 99.8   FIO2 28   HCO3 ART 36.3*   BASE EXCESS ART 9.6*   CARBOXYHEMOGLOBIN 1.3     Brief Urine Lab Results  (Last result in the past 365 days)      Color   Clarity   Blood   Leuk Est   Nitrite   Protein   CREAT   Urine HCG        04/28/23 0829 Yellow   Clear   Negative   Negative   Negative   30 mg/dL (1+)                 Microbiology Results Abnormal     None          Adult Transthoracic Echo Complete W/ Cont if Necessary Per Protocol    Result Date: 5/7/2023  •  Left ventricular systolic function is normal. Calculated left ventricular EF = 55.1% Left ventricular ejection fraction appears to be 56 - 60%. •  Left ventricular diastolic function was indeterminate. •  Estimated right ventricular systolic pressure from tricuspid regurgitation is normal (<35 mmHg).       Results for orders placed during the hospital encounter of 05/05/23    Adult Transthoracic Echo Complete W/ Cont if Necessary Per Protocol    Interpretation Summary  •  Left ventricular systolic function is normal. Calculated left ventricular EF = 55.1% Left ventricular ejection fraction appears to be 56 - 60%.  •  Left ventricular diastolic function was indeterminate.  •  Estimated right ventricular systolic pressure from tricuspid regurgitation is normal (<35 mmHg).      Current medications:  Scheduled Meds:[START ON 5/9/2023] amLODIPine, 10 mg, Oral, Q24H  aspirin,  81 mg, Oral, Daily  budesonide-formoterol, 2 puff, Inhalation, BID - RT  enoxaparin, 40 mg, Subcutaneous, Daily  insulin detemir, 15 Units, Subcutaneous, Q12H  insulin lispro, 0-14 Units, Subcutaneous, 4x Daily With Meals & Nightly  levothyroxine, 112 mcg, Oral, Q AM  losartan, 100 mg, Oral, Q24H  nystatin, 1 application, Topical, Q12H  sodium chloride, 10 mL, Intravenous, Q12H      Continuous Infusions:   PRN Meds:.•  acetaminophen  •  dextrose  •  dextrose  •  glucagon (human recombinant)  •  hydrALAZINE  •  ipratropium-albuterol  •  LORazepam  •  sodium chloride  •  sodium chloride  •  sodium chloride    Assessment & Plan   Assessment & Plan     Active Hospital Problems    Diagnosis  POA   • **PAF with RVR this admission [I48.0]  Yes   • Chronic respiratory failure with hypercapnia [J96.12]  Yes   • Syncope, unspecified syncope type [R55]  Yes   • Sick sinus syndrome (tachybradycardia) with pauses and syncope [I49.5]  Yes   • Hyperlipidemia [E78.00]  Yes   • Type 2 diabetes mellitus with diabetic peripheral angiopathy without gangrene, with long-term current use of insulin [E11.51, Z79.4]  Not Applicable   • Gastroesophageal reflux disease [K21.9]  Yes   • Hypertension [I10]  Yes   • COPD on home oxygen [J44.9]  Yes   • Acquired hypothyroidism [E03.9]  Yes      Resolved Hospital Problems   No resolved problems to display.        Brief Hospital Course to date:  Marielena Moeller is a 77 y.o. female former smoker with PMH of PAF (not on anticoagulation due to refusal, also decline Watchman device), COPD on chronic supplemental O2 2.5-3L continously, T2DM, HTN, HLD, hypothyroidism who presented with presyncopal episodes for one week.  She was found to be in Afib with RVR upon presentation to the ED but had multiple pauses as well which coincided with her presyncopal episodes. She was initially admitted to the ICU but has since been transferred to telemetry floor. She is awaiting EP evaluation for PPM placement.      Plan:    Afib with RVR  Tachybrady syndrome  -- [id number] of 5, not on AC due to pt refusal  -- no further pauses since stopping bisoprolol upon admission. Of note, dose was recently (1-2 weeks PTA) decreased.  -- EP to evaluate today for possible ablation vs PPM placement. NPO for procedure  -- continue to monitor on tele, replace electrolytes as needed    HTN  -- continue ARB and amlodipine    COPD  -- not in acute exacerbation  -- O2 sats goal 88-92%    T2DM  -- poorly controlled with HbA1C of 9.1%  -- continue Levemir BID with moderate dose SSI  --diabetes educator to see     Hypothyroidism  -- continue Synthroid, TSH wnl    Left breast pain  -- intermittent for years, NOT new this admission  -- needs follow up outpatient with her PCP and will need scheduled breast cancer screening as this has been missed due to pt non-compliance for the last two years.       Total time spent: 35 minutes  Time spent includes time reviewing chart, face-to-face time, counseling patient/family/caregiver, ordering medications/tests/procedures, communicating with other health care professionals, documenting clinical information in the electronic health record, and coordination of care.       Expected Discharge Location and Transportation: home  Expected Discharge   Expected Discharge Date: 5/9/2023; Expected Discharge Time:      DVT prophylaxis:  Medical DVT prophylaxis orders are present.          CODE STATUS:   Code Status and Medical Interventions:   Ordered at: 05/05/23 3255     Code Status (Patient has no pulse and is not breathing):    CPR (Attempt to Resuscitate)     Medical Interventions (Patient has pulse or is breathing):    Full Support       Jody Allan MD  05/08/23

## 2023-05-08 NOTE — CASE MANAGEMENT/SOCIAL WORK
Continued Stay Note  [facility]     Patient Name: Marielena Moeller  MRN: [id number]  Today's Date: 5/8/2023    Admit Date: 5/5/2023    Plan: Home with Home Health   Discharge Plan     Row Name 05/08/23 1540       Plan    Plan Home with Home Health    Patient/Family in Agreement with Plan yes    Plan Comments I have met with Ms. Moeller at the bedside today to discuss the discharge plan.  She plans to return home at discharge and requests a referral to [facility].  I have submitted this referral and confirmed with Dc that she will review.  CM will cont to follow the plan of care and assist with discharge needs as recommendations become available.    Final Discharge Disposition Code 06 - home with home health care    Row Name 05/08/23 1328       Plan    Patient/Family in Agreement with Plan yes               Discharge Codes    No documentation.               Expected Discharge Date and Time     Expected Discharge Date Expected Discharge Time    May 9, 2023             Noris Queen RN

## 2023-05-08 NOTE — PROGRESS NOTES
"                    Clinical Nutrition       Patient Name: Marielena Moeller  YOB: 1945  MRN: [id number]  Date of Encounter: 05/08/23 15:40 EDT  Admission date: 5/5/2023      Reason for Visit   Reduced oral intake    EMR Reviewed     EMR Reviewed: yes     Admission Diagnosis:  Syncope, unspecified syncope type [R55]    Problem List:    PAF with RVR this admission    Acquired hypothyroidism    COPD on home oxygen    Type 2 diabetes mellitus with diabetic peripheral angiopathy without gangrene, with long-term current use of insulin    Gastroesophageal reflux disease    Hyperlipidemia    Hypertension    Chronic respiratory failure with hypercapnia    Syncope, unspecified syncope type    Sick sinus syndrome (tachybradycardia) with pauses and syncope      Anthropometric      Flowsheet Rows    Flowsheet Row First Filed Value   Admission Height 152.4 cm (60\") Documented at 05/05/2023 2127   Admission Weight 78.9 kg (174 lb) Documented at 05/05/2023 2127          Height: 152.4 cm (60\")  Last Filed Weight: Weight: 83.2 kg (183 lb 6.4 oz) (05/08/23 0400)  Weight Method: Bed scale  BMI: BMI (Calculated): 35.8  BMI classification: Obese Class II: 35-39.9kg/m2   IBW:      UBW: Pt reports 174 lb  Weight change:   Weight Weight (kg) Weight (lbs) Weight Method Visit Report                  5/8/2023     83.19 kg     183 lb 6.4 oz     Bed scale            5/6/2023     78.9 kg     173 lb 15.1 oz                 5/5/2023     78.926 kg     79.379 kg     174 lb     175 lb     Stated     Report       4/28/2023     78.926 kg     174 lb                 4/21/2023     80.287 kg     177 lb          Report       1/30/2023     80.74 kg     178 lb                 10/28/2022     80.74 kg     178 lb          Report       2/25/2022     82.101 kg     181 lb                 12/29/2021     --     --          Report       2/5/2021     --     --          Report       2/12/2020     79.833 kg     176 lb     Stated            1/17/2020    " " 79.89 kg     176 lb 2 oz          Report       11/26/2019     --     --          Report       10/22/2019     81.194 kg     179 lb          Report       10/2/2019     82.101 kg     181 lb          Report          Reported/Observed/Food/Nutrition Related - Comments     Pt lying in bed with family at bedside at time of visit. Pt NPO per procedure today. Pt reports baseline 2-3 meals/day. Reports intentional reduced intake x 1 mo. States pt attempting \"lifestyle changes\" to better manage diabetes. Pt reports trying to eat healthier. Denies recent wt loss. Pt awaiting procedure to have diet order again. Denies difficulty chewing/swallowing. Denies N/V. NKFA.      Current Nutrition Prescription     NPO Diet NPO Type: Strict NPO  No active supplement orders    Average Intake from Charting: Insufficient data     Nutrition Diagnosis     Problem Inadequate energy intake   Etiology Est intake < est needs    Signs/Symptoms Pt fluctuating between NPO & diet since admission for tests/procedures.    Status:    Actions     Follow treatment progress, Care plan reviewed, Interview for preferences, Await begin PO, Supplement offered/refused    Monitor Per Protocol      Yolanda Chowdhury,   Time Spent: 35 min        "

## 2023-05-08 NOTE — PROGRESS NOTES
"[facility] Progress Note     LOS: 2 days   Patient Care Team:  Marychuy Atwood APRN as PCP - General (Family Medicine)  Wu Pathak MD as Consulting Physician (Cardiology)  Siomara Ferreira, ISAK as Ambulatory  (Population Health)  PCP:  Marychuy Atwood APRN    Chief Complaint: Follow-up atrial fibrillation, hypertension, tachybradycardia syndrome.    Subjective: Patient came in presyncopal and was found to have intermittent A-fib with RVR as well as postconversion pauses lasting up to 5 seconds associated with presyncope.  She continues to have intermittent A-fib with RVR.  Also hypertensive.  Her case has been discussed with the EP service and she is pending pacemaker placement later today.      Review of Systems:   All systems have been reviewed and are negative with the exception of those mentioned above.      Objective:    Vital Sign Min/Max for last 24 hours  Temp  Min: 97.9 °F (36.6 °C)  Max: 98.4 °F (36.9 °C)   BP  Min: 145/63  Max: 173/70   Pulse  Min: 64  Max: 75   Resp  Min: 16  Max: 20   SpO2  Min: 94 %  Max: 96 %   No data recorded   Weight  Min: 83.2 kg (183 lb 6.4 oz)  Max: 83.2 kg (183 lb 6.4 oz)     Flowsheet Rows    Flowsheet Row First Filed Value   Admission Height 152.4 cm (60\") Documented at 05/05/2023 2127   Admission Weight 78.9 kg (174 lb) Documented at 05/05/2023 2127          Telemetry: Sinus rhythm alternating with periods of A-fib with RVR.      Intake/Output Summary (Last 24 hours) at 5/8/2023 1511  Last data filed at 5/7/2023 1600  Gross per 24 hour   Intake --   Output 400 ml   Net -400 ml     Intake & Output (last 3 days)       05/05 0701 05/06 0700 05/06 0701 05/07 0700 05/07 0701 05/08 0700 05/08 0701 05/09 0700    P.O.  240 360     I.V. (mL/kg) 50 (0.6) 950.4 (12)      Total Intake(mL/kg) 50 (0.6) 1190.4 (15.1) 360 (4.3)     Urine (mL/kg/hr) 180 2150 (1.1) 725 (0.4)     Stool  0 0     Total Output 180 " 2150 725     [facility] -130 -959.6 -365             Urine Unmeasured Occurrence  1 x 2 x 1 x    Stool Unmeasured Occurrence  2 x 1 x            Physical Exam:  Constitutional:       Appearance: Not in distress.   Pulmonary:      Effort: Pulmonary effort is normal.      Comments: On chronic supplementary O2  Cardiovascular:      Regular rhythm.      Murmurs: There is no murmur.   Edema:     Peripheral edema absent.   Neurological:      General: No focal deficit present.          LABS/DIAGNOSTIC DATA:  Results from last 7 days   Lab Units 05/08/23 0343 05/07/23  0451 05/06/23 0622   WBC 10*3/mm3 7.13 9.49 9.52   HEMOGLOBIN g/dL 11.6* 13.1 12.5   HEMATOCRIT % 35.9 40.7 38.8   PLATELETS 10*3/mm3 226 220 237     Lab Results   Lab Value Date/Time    TROPONINT 18 (H) 05/05/2023 2155    TROPONINT 20 (H) 04/28/2023 0946    TROPONINT 20 (H) 04/28/2023 0725    TROPONINT <0.010 02/12/2020 1206    TROPONINT <0.010 09/10/2019 1809         Results from last 7 days   Lab Units 05/08/23 0343 05/07/23  0451 05/06/23 0622 05/05/23 2155   SODIUM mmol/L 142 138 139 141   POTASSIUM mmol/L 3.9 4.4 4.4 4.5   CHLORIDE mmol/L 102 97* 98 100   CO2 mmol/L 32.0* 34.0* 35.0* 33.0*   BUN mg/dL 13 15 12 12   CREATININE mg/dL 0.61 0.65 0.62 0.62   CALCIUM mg/dL 9.1 9.5 9.3 9.7   BILIRUBIN mg/dL  --   --   --  0.2   ALK PHOS U/L  --   --   --  87   ALT (SGPT) U/L  --   --   --  28   AST (SGOT) U/L  --   --   --  22   GLUCOSE mg/dL 145* 255* 228* 255*     Results from last 7 days   Lab Units 05/05/23 2155   HEMOGLOBIN A1C % 9.10*         Results from last 7 days   Lab Units 05/06/23 0622   TSH uIU/mL 1.870            Echo:  •  Left ventricular systolic function is normal. Calculated left ventricular EF = 55.1% Left ventricular ejection fraction appears to be 56 - 60%.  •  Left ventricular diastolic function was indeterminate.  •  Estimated right ventricular systolic pressure from tricuspid regurgitation is normal (<35 mmHg).      Medication Review:    [START ON 5/9/2023] amLODIPine, 10 mg, Oral, Q24H  aspirin, 81 mg, Oral, Daily  budesonide-formoterol, 2 puff, Inhalation, BID - RT  enoxaparin, 40 mg, Subcutaneous, Daily  insulin detemir, 15 Units, Subcutaneous, Q12H  insulin lispro, 0-14 Units, Subcutaneous, 4x Daily With Meals & Nightly  levothyroxine, 112 mcg, Oral, Q AM  losartan, 100 mg, Oral, Q24H  nystatin, 1 application, Topical, Q12H  sodium chloride, 10 mL, Intravenous, Q12H               PAF with RVR this admission    Acquired hypothyroidism    COPD on home oxygen    Type 2 diabetes mellitus with diabetic peripheral angiopathy without gangrene, with long-term current use of insulin    Gastroesophageal reflux disease    Hyperlipidemia    Hypertension    Chronic respiratory failure with hypercapnia    Syncope, unspecified syncope type    Sick sinus syndrome (tachybradycardia) with pauses and syncope      Assessment/Plan:  1. Tachy-simón syndrome  -Patient has been symptomatic both from A-fib with RVR as well as postconversion pauses  - Was able to review ER strips with Dr. Arriaga  -Patient is planned for dual-chamber pacemaker today with Dr. Aguiar  - echo normal     2. PAF with RVR  -Patient planned for pacemaker placement today  -After pacemaker placement we will plan to resume beta-blockade for rate control  - CHADsVASC=5 , has previously declined anticoagulation she is now agreeable to a trial of Eliquis.  Would recommend Eliquis 5 mg twice daily to be started after pacemaker has been placed and site assessed.  Likely start in 48 hours.  Discussed with patient and daughter and patient is agreeable  -    3. Hypertension   -Continue losartan 100 mg.  Increased amlodipine to 10 mg daily       4. Hypothyroidism  - TSH WNL      5.  Pulmonary fibrosis/COPD/chronic hypoxic respiratory failure  -At baseline    Further recommendations after pacemaker placement today    Wu Pathak MD [facility]  05/08/23

## 2023-05-08 NOTE — TELEPHONE ENCOUNTER
Caller: DANIELLE WITH [facility]    Relationship: Provider    Best call back number: [phone] EXT [phone]    What orders are you requesting (i.e. lab or imaging): LAB ORDERS    In what timeframe would the patient need to come in: 5/5/23 ALREADY DRAWN    Where will you receive your lab/imaging services: [facility] MAIN LAB OFFICE- LABS WERE ALREADY DRAWN    Additional notes: MAGNESIUM, T-4 FREE, TSH, LIPID PANEL WAS RECEIVED. AN EXTRA TUBE OF BLOOD WITH NO ORDER. PLEASE ADVISE WHAT OTHER LABS ARE TO BE DONE FOR THIS EXTRA TUBE OR IF THIS IS EXTRA. IF NOT EXTRA FILL OUT THE FORM THAT IS BEING FAXED AND RETURN TO FAX LISTED ON FORM.

## 2023-05-09 ENCOUNTER — READMISSION MANAGEMENT (OUTPATIENT)
Dept: CALL CENTER | Facility: HOSPITAL | Age: 78
End: 2023-05-09
Payer: MEDICARE

## 2023-05-09 ENCOUNTER — APPOINTMENT (OUTPATIENT)
Dept: GENERAL RADIOLOGY | Facility: HOSPITAL | Age: 78
DRG: 243 | End: 2023-05-09
Payer: MEDICARE

## 2023-05-09 ENCOUNTER — TELEPHONE (OUTPATIENT)
Dept: FAMILY MEDICINE CLINIC | Facility: CLINIC | Age: 78
End: 2023-05-09

## 2023-05-09 ENCOUNTER — HOME HEALTH ADMISSION (OUTPATIENT)
Dept: HOME HEALTH SERVICES | Facility: HOME HEALTHCARE | Age: 78
End: 2023-05-09
Payer: MEDICARE

## 2023-05-09 VITALS
TEMPERATURE: 98.5 F | SYSTOLIC BLOOD PRESSURE: 150 MMHG | OXYGEN SATURATION: 96 % | RESPIRATION RATE: 18 BRPM | HEIGHT: 60 IN | BODY MASS INDEX: 36.01 KG/M2 | DIASTOLIC BLOOD PRESSURE: 80 MMHG | WEIGHT: 183.4 LBS | HEART RATE: 74 BPM

## 2023-05-09 LAB
ALBUMIN SERPL-MCNC: 3.5 G/DL (ref 3.5–5.2)
ALBUMIN/GLOB SERPL: 1.8 G/DL
ALP SERPL-CCNC: 83 U/L (ref 39–117)
ALT SERPL W P-5'-P-CCNC: 15 U/L (ref 1–33)
ANION GAP SERPL CALCULATED.3IONS-SCNC: 5 MMOL/L (ref 5–15)
AST SERPL-CCNC: 16 U/L (ref 1–32)
BASOPHILS # BLD AUTO: 0.03 10*3/MM3 (ref 0–0.2)
BASOPHILS NFR BLD AUTO: 0.4 % (ref 0–1.5)
BILIRUB SERPL-MCNC: 0.3 MG/DL (ref 0–1.2)
BUN SERPL-MCNC: 16 MG/DL (ref 8–23)
BUN/CREAT SERPL: 21.3 (ref 7–25)
CALCIUM SPEC-SCNC: 9.2 MG/DL (ref 8.6–10.5)
CHLORIDE SERPL-SCNC: 103 MMOL/L (ref 98–107)
CHOLEST SERPL-MCNC: 242 MG/DL (ref 0–200)
CHOLEST SERPL-MCNC: 258 MG/DL (ref 100–199)
CO2 SERPL-SCNC: 34 MMOL/L (ref 22–29)
CONV .: NORMAL
CREAT SERPL-MCNC: 0.75 MG/DL (ref 0.57–1)
DEPRECATED RDW RBC AUTO: 44 FL (ref 37–54)
EGFRCR SERPLBLD CKD-EPI 2021: 83.1 ML/MIN/1.73
EOSINOPHIL # BLD AUTO: 0.16 10*3/MM3 (ref 0–0.4)
EOSINOPHIL NFR BLD AUTO: 2 % (ref 0.3–6.2)
ERYTHROCYTE [DISTWIDTH] IN BLOOD BY AUTOMATED COUNT: 12.3 % (ref 12.3–15.4)
GLOBULIN UR ELPH-MCNC: 2 GM/DL
GLUCOSE BLDC GLUCOMTR-MCNC: 223 MG/DL (ref 70–130)
GLUCOSE BLDC GLUCOMTR-MCNC: 242 MG/DL (ref 70–130)
GLUCOSE SERPL-MCNC: 281 MG/DL (ref 65–99)
HCT VFR BLD AUTO: 37.1 % (ref 34–46.6)
HDLC SERPL-MCNC: 42 MG/DL (ref 40–60)
HDLC SERPL-MCNC: 61 MG/DL
HGB BLD-MCNC: 11.8 G/DL (ref 12–15.9)
IMM GRANULOCYTES # BLD AUTO: 0.02 10*3/MM3 (ref 0–0.05)
IMM GRANULOCYTES NFR BLD AUTO: 0.3 % (ref 0–0.5)
LDLC SERPL CALC-MCNC: 163 MG/DL (ref 0–99)
LDLC SERPL CALC-MCNC: 172 MG/DL (ref 0–100)
LDLC/HDLC SERPL: 4.03 {RATIO}
LYMPHOCYTES # BLD AUTO: 1.26 10*3/MM3 (ref 0.7–3.1)
LYMPHOCYTES NFR BLD AUTO: 15.8 % (ref 19.6–45.3)
Lab: NORMAL
MAGNESIUM SERPL-MCNC: 2.1 MG/DL (ref 1.6–2.3)
MCH RBC QN AUTO: 31.1 PG (ref 26.6–33)
MCHC RBC AUTO-ENTMCNC: 31.8 G/DL (ref 31.5–35.7)
MCV RBC AUTO: 97.9 FL (ref 79–97)
MONOCYTES # BLD AUTO: 0.7 10*3/MM3 (ref 0.1–0.9)
MONOCYTES NFR BLD AUTO: 8.8 % (ref 5–12)
NEUTROPHILS NFR BLD AUTO: 5.82 10*3/MM3 (ref 1.7–7)
NEUTROPHILS NFR BLD AUTO: 72.7 % (ref 42.7–76)
NRBC BLD AUTO-RTO: 0 /100 WBC (ref 0–0.2)
PLATELET # BLD AUTO: 213 10*3/MM3 (ref 140–450)
PMV BLD AUTO: 10.4 FL (ref 6–12)
POTASSIUM SERPL-SCNC: 4.6 MMOL/L (ref 3.5–5.2)
PROT SERPL-MCNC: 5.5 G/DL (ref 6–8.5)
RBC # BLD AUTO: 3.79 10*6/MM3 (ref 3.77–5.28)
SODIUM SERPL-SCNC: 142 MMOL/L (ref 136–145)
T4 FREE SERPL-MCNC: 1.52 NG/DL (ref 0.82–1.77)
TRIGL SERPL-MCNC: 154 MG/DL (ref 0–150)
TRIGL SERPL-MCNC: 185 MG/DL (ref 0–149)
TSH SERPL DL<=0.005 MIU/L-ACNC: 1.75 UIU/ML (ref 0.45–4.5)
VLDLC SERPL CALC-MCNC: 34 MG/DL (ref 5–40)
VLDLC SERPL-MCNC: 28 MG/DL (ref 5–40)
WBC NRBC COR # BLD: 7.99 10*3/MM3 (ref 3.4–10.8)

## 2023-05-09 PROCEDURE — 99239 HOSP IP/OBS DSCHRG MGMT >30: CPT | Performed by: INTERNAL MEDICINE

## 2023-05-09 PROCEDURE — 94664 DEMO&/EVAL PT USE INHALER: CPT

## 2023-05-09 PROCEDURE — 94640 AIRWAY INHALATION TREATMENT: CPT

## 2023-05-09 PROCEDURE — 82948 REAGENT STRIP/BLOOD GLUCOSE: CPT

## 2023-05-09 PROCEDURE — 80053 COMPREHEN METABOLIC PANEL: CPT | Performed by: INTERNAL MEDICINE

## 2023-05-09 PROCEDURE — 85025 COMPLETE CBC W/AUTO DIFF WBC: CPT | Performed by: INTERNAL MEDICINE

## 2023-05-09 PROCEDURE — 94799 UNLISTED PULMONARY SVC/PX: CPT

## 2023-05-09 PROCEDURE — 63710000001 INSULIN LISPRO (HUMAN) PER 5 UNITS: Performed by: NURSE PRACTITIONER

## 2023-05-09 PROCEDURE — 63710000001 INSULIN DETEMIR PER 5 UNITS: Performed by: NURSE PRACTITIONER

## 2023-05-09 PROCEDURE — 80061 LIPID PANEL: CPT | Performed by: INTERNAL MEDICINE

## 2023-05-09 PROCEDURE — 71046 X-RAY EXAM CHEST 2 VIEWS: CPT

## 2023-05-09 PROCEDURE — 25010000002 ENOXAPARIN PER 10 MG: Performed by: INTERNAL MEDICINE

## 2023-05-09 RX ORDER — AMLODIPINE BESYLATE 10 MG/1
10 TABLET ORAL
Qty: 30 TABLET | Refills: 0 | Status: SHIPPED | OUTPATIENT
Start: 2023-05-10

## 2023-05-09 RX ORDER — ATORVASTATIN CALCIUM 20 MG/1
20 TABLET, FILM COATED ORAL DAILY
Qty: 90 TABLET | Refills: 0 | Status: SHIPPED | OUTPATIENT
Start: 2023-05-09

## 2023-05-09 RX ADMIN — BUDESONIDE AND FORMOTEROL FUMARATE DIHYDRATE 2 PUFF: 160; 4.5 AEROSOL RESPIRATORY (INHALATION) at 11:02

## 2023-05-09 RX ADMIN — Medication 10 ML: at 08:38

## 2023-05-09 RX ADMIN — NYSTATIN 1 APPLICATION: 100000 CREAM TOPICAL at 08:38

## 2023-05-09 RX ADMIN — LOSARTAN POTASSIUM 100 MG: 50 TABLET, FILM COATED ORAL at 08:37

## 2023-05-09 RX ADMIN — INSULIN DETEMIR 15 UNITS: 100 INJECTION, SOLUTION SUBCUTANEOUS at 08:37

## 2023-05-09 RX ADMIN — ASPIRIN 81 MG: 81 TABLET, COATED ORAL at 08:37

## 2023-05-09 RX ADMIN — AMLODIPINE BESYLATE 10 MG: 10 TABLET ORAL at 08:37

## 2023-05-09 RX ADMIN — ACETAMINOPHEN 325MG 650 MG: 325 TABLET ORAL at 05:47

## 2023-05-09 RX ADMIN — LEVOTHYROXINE SODIUM 112 MCG: 112 TABLET ORAL at 05:47

## 2023-05-09 RX ADMIN — Medication 3 ML: at 08:38

## 2023-05-09 RX ADMIN — INSULIN LISPRO 3 UNITS: 100 INJECTION, SOLUTION INTRAVENOUS; SUBCUTANEOUS at 08:37

## 2023-05-09 RX ADMIN — INSULIN LISPRO 5 UNITS: 100 INJECTION, SOLUTION INTRAVENOUS; SUBCUTANEOUS at 11:59

## 2023-05-09 RX ADMIN — ENOXAPARIN SODIUM 40 MG: 40 INJECTION SUBCUTANEOUS at 08:36

## 2023-05-09 NOTE — TELEPHONE ENCOUNTER
Caller: [facility]    Relationship:    Best call back number: [phone]    Additional notes:     WILL RENETTA BASS FOLLOW PATIENT  BEING DISMISSED FROM [facility] 5/9/23.  THERE FOR CHRONIC RESPIRATORY FAILURE, SYNCOPE, A-FIB

## 2023-05-09 NOTE — DISCHARGE SUMMARY
[facility] Medicine Services  DISCHARGE SUMMARY    Patient Name: Marielena Moeller  : 1945  MRN: [id number]    Date of Admission: 2023  9:29 PM  Date of Discharge:  2023  Primary Care Physician: Marychuy Atwood APRN    Consults     Date and Time Order Name Status Description    2023 12:34 AM Inpatient Cardiology Consult Completed           Hospital Course     Presenting Problem:   Syncope, unspecified syncope type [R55]    Active Hospital Problems    Diagnosis  POA   • **PAF with RVR this admission [I48.0]  Yes   • Chronic respiratory failure with hypercapnia [J96.12]  Yes   • Syncope, unspecified syncope type [R55]  Yes   • Sick sinus syndrome (tachybradycardia) with pauses and syncope [I49.5]  Yes   • Hyperlipidemia [E78.00]  Yes   • Type 2 diabetes mellitus with diabetic peripheral angiopathy without gangrene, with long-term current use of insulin [E11.51, Z79.4]  Not Applicable   • Gastroesophageal reflux disease [K21.9]  Yes   • Hypertension [I10]  Yes   • COPD on home oxygen [J44.9]  Yes   • Acquired hypothyroidism [E03.9]  Yes      Resolved Hospital Problems   No resolved problems to display.          Hospital Course:  Marielena Moeller is a 77 y.o. female former smoker with PMH of PAF (not on anticoagulation due to refusal, also declined Watchman device), COPD on chronic supplemental O2 2.5-3L continously, T2DM, HTN, HLD, hypothyroidism who presented with presyncopal episodes for one week.  She was found to be in Afib with RVR upon presentation to the ED but had multiple pauses as well which coincided with her presyncopal episodes. She was initially admitted to the ICU but has since been transferred to telemetry floor. She underwent PPM placement on  with Dr. Aguiar.        Afib with RVR  Tachybrady syndrome  -- [id number] of 5, not on AC due to pt refusal  -- no further pauses since stopping bisoprolol upon admission. Of note, dose was recently (1-2 weeks  PTA) decreased.  -- EP saw the patient and she underwent PPM placement by Dr. Aguiar on 5/8  -- doing well post-procedure today. If okay with Cardiology, will d/c home    HLD  -- will add on LDL to this am's labs.  Last LDL in December 2021 was 175.    -- add on Atorvastatin 20 mg daily upon d/c      HTN  -- continue ARB and amlodipine  -- stop Bisoprolol due to above     COPD  -- not in acute exacerbation  -- O2 sats goal 88-92%     T2DM  -- poorly controlled with HbA1C of 9.1%  -- continue Levemir BID with moderate dose SSI  --diabetes educator to see     Hypothyroidism  -- continue Synthroid, TSH wnl     Left breast pain  -- intermittent for years, NOT new this admission  -- needs follow up outpatient with her PCP and will need scheduled breast cancer screening as this has been missed due to pt non-compliance for the last two years.       Discharge Follow Up Recommendations for outpatient labs/diagnostics:   Follow up with PCP within one week- needs repeat mammogram   Follow up with Cardiology as per Dr. Pathak  Follow up with EP/Dr. Aguiar as per his instructions    Day of Discharge     HPI:   Doing okay this am, left chest wall is sore but otherwise she is okay. Wants to go home today.     Review of Systems  Gen- No fevers, chills  CV- No chest pain, palpitations  Resp- No cough, dyspnea  GI- No N/V/D, abd pain        Vital Signs:   Temp:  [97.9 °F (36.6 °C)-98.5 °F (36.9 °C)] 98.5 °F (36.9 °C)  Heart Rate:  [63-96] 74  Resp:  [16-20] 18  BP: (101-193)/(53-84) 150/80  Flow (L/min):  [1-2] 2      Physical Exam:  Constitutional: No acute distress, awake, alert  HENT: NCAT, mucous membranes moist  Respiratory: Clear to auscultation bilaterally, respiratory effort normal   Cardiovascular: RRR, no murmurs, rubs, or gallops, left chest wall with PPM site with ecchymosis, LUE in sling  Gastrointestinal: Positive bowel sounds, soft, nontender, nondistended  Musculoskeletal: No bilateral ankle  edema  Psychiatric: Appropriate affect, cooperative  Neurologic: Oriented x 3, strength symmetric in all extremities, Cranial Nerves grossly intact to confrontation, speech clear  Skin: No rashes    Pertinent  and/or Most Recent Results     LAB RESULTS:      Lab 05/09/23 0454 05/08/23 0343 05/07/23 0451 05/06/23 0622 05/05/23 2155   WBC 7.99 7.13 9.49 9.52 10.33   HEMOGLOBIN 11.8* 11.6* 13.1 12.5 13.3   HEMATOCRIT 37.1 35.9 40.7 38.8 41.4   PLATELETS 213 226 220 237 256   NEUTROS ABS 5.82  --  7.42*  --  8.36*   IMMATURE GRANS (ABS) 0.02  --  0.05  --  0.05   LYMPHS ABS 1.26  --  1.31  --  1.26   MONOS ABS 0.70  --  0.49  --  0.54   EOS ABS 0.16  --  0.18  --  0.07   MCV 97.9* 94.7 96.9 97.5* 96.7         Lab 05/09/23 0454 05/08/23 0343 05/07/23 0451 05/06/23 0622 05/05/23 2155 05/05/23  0000   SODIUM 142 142 138 139 141  --    POTASSIUM 4.6 3.9 4.4 4.4 4.5  --    CHLORIDE 103 102 97* 98 100  --    CO2 34.0* 32.0* 34.0* 35.0* 33.0*  --    ANION GAP 5.0 8.0 7.0 6.0 8.0  --    BUN 16 13 15 12 12  --    CREATININE 0.75 0.61 0.65 0.62 0.62  --    EGFR 83.1 93.4 91.9 93.0 93.0  --    GLUCOSE 281* 145* 255* 228* 255*  --    CALCIUM 9.2 9.1 9.5 9.3 9.7  --    MAGNESIUM  --  1.8 2.0 2.5* 1.9 2.1   PHOSPHORUS  --  3.6 2.6 3.6  --   --    HEMOGLOBIN A1C  --   --   --   --  9.10*  --    TSH  --   --   --  1.870  --  1.750         Lab 05/09/23 0454 05/07/23  0451 05/05/23  2155   TOTAL PROTEIN 5.5*  --  6.9   ALBUMIN 3.5 3.9 4.2   GLOBULIN 2.0  --  2.7   ALT (SGPT) 15  --  28   AST (SGOT) 16  --  22   BILIRUBIN 0.3  --  0.2   ALK PHOS 83  --  87         Lab 05/06/23  0622 05/05/23  2155   PROBNP 889.6  --    HSTROP T  --  18*         Lab 05/09/23  0454 05/05/23  0000   CHOLESTEROL 242*  --    LDL CHOL 172* 163*   HDL CHOL 42 61   TRIGLYCERIDES 154* 185*             Lab 05/06/23  1715   PH, ARTERIAL 7.400   PCO2, ARTERIAL 58.7*   PO2 ART 99.8   FIO2 28   HCO3 ART 36.3*   BASE EXCESS ART 9.6*   CARBOXYHEMOGLOBIN 1.3      Brief Urine Lab Results  (Last result in the past 365 days)      Color   Clarity   Blood   Leuk Est   Nitrite   Protein   CREAT   Urine HCG        04/28/23 0829 Yellow   Clear   Negative   Negative   Negative   30 mg/dL (1+)               Microbiology Results (last 10 days)     ** No results found for the last 240 hours. **          Adult Transthoracic Echo Complete W/ Cont if Necessary Per Protocol    Result Date: 5/7/2023  •  Left ventricular systolic function is normal. Calculated left ventricular EF = 55.1% Left ventricular ejection fraction appears to be 56 - 60%. •  Left ventricular diastolic function was indeterminate. •  Estimated right ventricular systolic pressure from tricuspid regurgitation is normal (<35 mmHg).     CT Head Without Contrast    Result Date: 5/6/2023  EXAMINATION: CT HEAD WO CONTRAST DATE: 5/5/2023 11:41 PM  HISTORY: Syncope COMPARISON: None available. TECHNIQUE: Thin section noncontrast axial images were obtained through the head. Coronal reformatted images were then created. CT dose lowering techniques including automated exposure control, adjustment for patient size, and/or use of iterative reconstruction were used. FINDINGS: Ventricles and Extra-axial Spaces: Mildly enlarged by age-related volume loss. Parenchyma: Senescent changes of the hemispheric white matter are noted, roughly commensurate with age.   No CT evidence of an acute large-vessel territory infarct.   No mass effect or midline shift. Intracranial Hemorrhage: None. Bones/Extracranial soft tissues: No acute calvarial fracture. Visualized Sinuses/Mastoids: Clear.       1.  No acute intracranial abnormality.  Electronically signed by:  Dejuan Betancur M.D.  5/6/2023 12:48 AM Mountain Time    XR Chest 1 View    Result Date: 5/6/2023  EXAMINATION: XR CHEST 1 VW DATE: 5/5/2023 11:37 PM INDICATION:  Syncope; COMPARISON:  February 12, 2020 FINDINGS: Defibrillator pads overlying left lung moderately limits evaluation. Minor  diffuse coarse interstitial lung markings/at the lung bases, similar to prior. No pleural effusion or pneumothorax. Cardiomediastinal silhouette  unremarkable. No acute osseous abnormality.     1.  Bibasilar predominant pulmonary interstitial opacities, similar prior exams. 2.  Otherwise, no acute cardiopulmonary process. Electronically signed by:  Milena Davison M.D.  5/5/2023 10:07 PM Mountain Time    EP/CRM Study    Result Date: 5/8/2023    Successful implantation of permanent pacemaker system.      XR Chest PA & Lateral    Result Date: 5/9/2023  XR CHEST PA AND LATERAL Date of Exam: 5/9/2023 12:43 AM EDT Indication: post pacemaker Comparison: May 5, 2023 Findings: A cardiac pacemaker device is present with leads in the right atrium and ventricle respectively. The heart not definitely enlarged. A pneumothorax is not apparent. The lungs seem relatively clear. There is a radiopaque clip projecting over the inferolateral aspect of the left hemithorax which could reflect something extraneous to the patient     Impression: 1.Cardiac pacemaker device is present 2.Lungs seem relatively clear. Electronically Signed: Zachary Craig  5/9/2023 7:09 AM EDT  Workstation ID: [id number]              Results for orders placed during the hospital encounter of 05/05/23    Adult Transthoracic Echo Complete W/ Cont if Necessary Per Protocol    Interpretation Summary  •  Left ventricular systolic function is normal. Calculated left ventricular EF = 55.1% Left ventricular ejection fraction appears to be 56 - 60%.  •  Left ventricular diastolic function was indeterminate.  •  Estimated right ventricular systolic pressure from tricuspid regurgitation is normal (<35 mmHg).      Plan for Follow-up of Pending Labs/Results:     Discharge Details        Discharge Medications      New Medications      Instructions Start Date   amLODIPine 10 MG tablet  Commonly known as: NORVASC   10 mg, Oral, Every 24 Hours Scheduled   Start Date: May 10, 2023    "  apixaban 5 MG tablet tablet  Commonly known as: ELIQUIS   5 mg, Oral, 2 Times Daily   Start Date: May 10, 2023     atorvastatin 20 MG tablet  Commonly known as: LIPITOR   20 mg, Oral, Daily      metoprolol tartrate 25 MG tablet  Commonly known as: LOPRESSOR   12.5 mg, Oral, 2 Times Daily         Continue These Medications      Instructions Start Date   Accu-Chek Anne Plus test strip  Generic drug: glucose blood   USE AS DIRECTED THREE TIMES DAILY      Accu-Chek Anne Plus w/Device kit   USE AS DIRECTED TO CHECK GLUCOSE 3 TIMES DAILY FOR DM E11.65      albuterol sulfate  (90 Base) MCG/ACT inhaler  Commonly known as: Ventolin HFA   2 puffs, Inhalation, Every 6 Hours PRN      albuterol 0.63 MG/3ML nebulizer solution  Commonly known as: ACCUNEB   0.63 mg, Nebulization, Every 6 Hours PRN      aspirin 81 MG EC tablet   81 mg, Oral, Daily      BD Veo Insulin Syringe U/F 31G X 15/64\" 0.5 ML misc  Generic drug: Insulin Syringe-Needle U-100   as directed      Insulin Syringe 31G X 5/16\" 1 ML misc   3 Times Daily      cloNIDine 0.1 MG tablet  Commonly known as: CATAPRES   0.1 mg, Oral, 2 Times Daily PRN      Dexcom G6  device   1 each, Does not apply, Continuous      Dexcom G6 Sensor   Does not apply, Every 10 Days, Apply as directed      insulin aspart prot-insulin aspart (70-30) 100 UNIT/ML injection  Commonly known as: novoLOG 70/30   50 Units, Subcutaneous, 3 Times Daily      irbesartan 75 MG tablet  Commonly known as: AVAPRO   75 mg, Oral, Daily      levothyroxine 112 MCG tablet  Commonly known as: SYNTHROID, LEVOTHROID   112 mcg, Oral, Daily      LORazepam 0.5 MG tablet  Commonly known as: ATIVAN   0.5 mg, Oral, Every 8 Hours PRN      MAGnesium-Oxide 400 (241.3 Mg) MG tablet tablet  Generic drug: magnesium oxide   TK 1 T PO QHS      O2  Commonly known as: OXYGEN   3 L/min, Inhalation, As Needed      Vitamin D3 1.25 MG (14114 UT) capsule   TAKE 1 CAPSULE BY MOUTH EVERY 7 DAYS      Zinc 50 MG tablet   0.5 " "tablets, Oral, Every Other Day         Stop These Medications    bisoprolol-hydrochlorothiazide 2.5-6.25 MG per tablet  Commonly known as: ZIAC            Allergies   Allergen Reactions   • Doxycycline Nausea And Vomiting     \"Pain worse than pancreatitis\"   • Diltiazem Other (See Comments)     nausea   • Sotalol Other (See Comments)     nausea   • Spironolactone Other (See Comments)     Dropped magnesium     • Warfarin Other (See Comments)     Nose bleed   • Ciprofloxacin Hives   • Contrast Dye (Echo Or Unknown Ct/Mr) Rash     Breaks her out   • Erythromycin Hives and Rash   • Hydrochlorothiazide Other (See Comments)        pt developed pancreastitis   • Levaquin [Levofloxacin] Swelling   • Prednisone Mental Status Change     ITCHING AND NERVOUS   • Shellfish Allergy Rash     Breaks her out         Discharge Disposition:  Home or Self Care    Diet:  Hospital:  Diet Order   Procedures   • Diet: Cardiac Diets; Healthy Heart (2-3 Na+); Texture: Regular Texture (IDDSI 7); Fluid Consistency: Thin (IDDSI 0)       Activity:      Restrictions or Other Recommendations:         CODE STATUS:    Code Status and Medical Interventions:   Ordered at: 05/05/23 8482     Code Status (Patient has no pulse and is not breathing):    CPR (Attempt to Resuscitate)     Medical Interventions (Patient has pulse or is breathing):    Full Support       Future Appointments   Date Time Provider Department Center   5/12/2023  9:30 AM Marychuy Atwood APRN [facility]   5/19/2023  3:00 PM [facility]   5/23/2023 10:30 AM Adriano Jean APRN [facility]   7/13/2023  9:30 AM Wu Pathak MD [facility]       Additional Instructions for the Follow-ups that You Need to Schedule     Ambulatory Referral to Home Health   As directed      Face to Face Visit Date: 5/8/2023    Follow-up provider for Plan of Care?: I treated the patient in an acute care facility and will not continue treatment after discharge.  "   Follow-up provider: RENETTA ATWOOD [[id number]]    Reason/Clinical Findings: COPD, Paroxysmal atrial Fibrillation, hypertension    Describe mobility limitations that make leaving home difficult: impaired functional mobility, weakness    Nursing/Therapeutic Services Requested: Skilled Nursing Physical Therapy    Skilled nursing orders: O2 instruction Cardiopulmonary assessments    PT orders: Therapeutic exercise Gait Training Transfer training Strengthening Home safety assessment    Weight Bearing Status: As Tolerated    Frequency: 1 Week 1         Discharge Follow-up with PCP   As directed       Currently Documented PCP:    Renetta Atwood APRN    PCP Phone Number:    [phone]     Follow Up Details: in one week with PCP         Discharge Follow-up with Specialty: Dr. Arriaga; 3 Months   As directed      Specialty: Dr. Arriaga    Follow Up: 3 Months    Follow Up Details: Wound check in 7-10 days. Follow up with Dr. Arriaga 3 months s/p PPM                     Jody Allan MD  05/09/23      Time Spent on Discharge:  I spent  35  minutes on this discharge activity which included: face-to-face encounter with the patient, reviewing the data in the system, coordination of the care with the nursing staff as well as consultants, documentation, and entering orders.

## 2023-05-09 NOTE — CONSULTS
Consult received for education. Chart reviewed. Attempted to see patient. Patient already discharged.   Called patient's cell phone. Patient stated she has attended diabetes education through a local hospital. She stated she did not have any questions or concerns at this time. Offered to send patient educational materials. She stated she did not need any at this time. Provided our contact number and encouraged to call as needed.

## 2023-05-09 NOTE — CASE MANAGEMENT/SOCIAL WORK
Case Management Discharge Note      Final Note: Pt is being discharged today.  I met with pt and 2 family members in room regarding discharge plan.  Plan is home with HH arranged with [facility] for skilled nursing and PT. [facility] will be in contact with pt to arrange HH visits. Pt's family will provide transportation home. Pt denies further discharge needs.         Selected Continued Care - Admitted Since 5/5/2023     Destination    No services have been selected for the patient.              Durable Medical Equipment    No services have been selected for the patient.              Dialysis/Infusion    No services have been selected for the patient.              Home Medical Care Coordination complete.    Service Provider Selected Services Address Phone Fax Patient Preferred    [facility] ,  Home Nursing ,  Home Rehabilitation [address] [address] [phone] --          Therapy    No services have been selected for the patient.              Community Resources    No services have been selected for the patient.              Community & DME    No services have been selected for the patient.                Selected Continued Care - Episodes Includes continued care and service providers with selected services from the active episodes listed below    Chronic Care Management Episode start date: 5/1/2023   There are no active outsourced providers for this episode.                    Final Discharge Disposition Code: 06 - home with home health care

## 2023-05-09 NOTE — PROGRESS NOTES
[facility] at [facility]  Progress Note     LOS: 3 days   Patient Care Team:  Marychuy Atwood APRN as PCP - General (Family Medicine)  Wu Pathak MD as Consulting Physician (Cardiology)  Siomara Ferreira, RN as Ambulatory  ([facility])    Chief Complaint:  syncope    Subjective     Interval History:   Doing well. Denies dizziness, syncope, near syncope since PPM implantation. She does report short episodes of palpitations overnight and associated SOB. She is ready to go home.       Review of Systems:   Pertinent positives in HPI, all others reviewed and negative.      Current Facility-Administered Medications:   •  acetaminophen (TYLENOL) tablet 650 mg, 650 mg, Oral, Q4H PRN, 650 mg at 05/09/23 0547 **OR** acetaminophen (TYLENOL) suppository 650 mg, 650 mg, Rectal, Q4H PRN, Brice Arriaga MD  •  amLODIPine (NORVASC) tablet 10 mg, 10 mg, Oral, Q24H, Wu Pathak MD, 10 mg at 05/09/23 0837  •  aspirin EC tablet 81 mg, 81 mg, Oral, Daily, Denzel Lombardo MD, 81 mg at 05/09/23 0837  •  budesonide-formoterol (SYMBICORT) 160-4.5 MCG/ACT inhaler 2 puff, 2 puff, Inhalation, BID - RT, Denzel Lombardo MD, 2 puff at 05/09/23 1102  •  dextrose (D50W) (25 g/50 mL) IV injection 25 g, 25 g, Intravenous, Q15 Min PRN, Denzel Lombardo MD  •  dextrose (GLUTOSE) oral gel 15 g, 15 g, Oral, Q15 Min PRN, Denzel Lombardo MD  •  Enoxaparin Sodium (LOVENOX) syringe 40 mg, 40 mg, Subcutaneous, Daily, Denzel Lombardo MD, 40 mg at 05/09/23 0836  •  glucagon (GLUCAGEN) injection 1 mg, 1 mg, Intramuscular, Q15 Min PRN, Denzel Lombardo MD  •  hydrALAZINE (APRESOLINE) tablet 50 mg, 50 mg, Oral, PRN, Denzel Lombardo MD, 50 mg at 05/07/23 0019  •  insulin detemir (LEVEMIR) injection 15 Units, 15 Units, Subcutaneous, Q12H, Maryam Dutton, APRN, 15 Units at 05/09/23 0837  •  Insulin Lispro (humaLOG) injection 0-14 Units, 0-14 Units, Subcutaneous, 4x Daily  "With Meals & Nightly, Maryam Dutton APRN, 3 Units at 05/09/23 0837  •  ipratropium-albuterol (DUO-NEB) nebulizer solution 3 mL, 3 mL, Nebulization, Q4H PRN, Denzel Lombardo MD  •  levothyroxine (SYNTHROID, LEVOTHROID) tablet 112 mcg, 112 mcg, Oral, Q AM, Denzel Lombardo MD, 112 mcg at 05/09/23 0547  •  LORazepam (ATIVAN) tablet 0.5 mg, 0.5 mg, Oral, Q12H PRN, Denzel Lombardo MD, 0.5 mg at 05/08/23 2157  •  losartan (COZAAR) tablet 100 mg, 100 mg, Oral, Q24H, Maryam Dutton APRN, 100 mg at 05/09/23 0837  •  metoprolol tartrate (LOPRESSOR) injection 2.5 mg, 2.5 mg, Intravenous, Q4H PRN, Wu Pathak MD  •  nystatin (MYCOSTATIN) 128286 UNIT/GM cream 1 application, 1 application, Topical, Q12H, Denzel Lombardo MD, 1 application at 05/09/23 0838  •  sodium chloride 0.9 % flush 10 mL, 10 mL, Intravenous, PRN, Denzel Lombardo MD  •  sodium chloride 0.9 % flush 10 mL, 10 mL, Intravenous, Q12H, Denzel Lombardo MD, 10 mL at 05/09/23 0838  •  sodium chloride 0.9 % flush 10 mL, 10 mL, Intravenous, PRN, Denzel Lombardo MD  •  sodium chloride 0.9 % flush 10 mL, 10 mL, Intravenous, PRN, Brice Arriaga MD  •  sodium chloride 0.9 % flush 3 mL, 3 mL, Intravenous, Q12H, Brice Arriaga MD, 3 mL at 05/09/23 0838  •  sodium chloride 0.9 % infusion 40 mL, 40 mL, Intravenous, PRN, Denzel Lombardo MD  •  sodium chloride 0.9 % infusion 40 mL, 40 mL, Intravenous, PRN, Brice Arriaga MD      Objective     Vital Sign Min/Max for last 24 hours  Temp  Min: 97.9 °F (36.6 °C)  Max: 98.5 °F (36.9 °C)   BP  Min: 101/53  Max: 193/84   Pulse  Min: 63  Max: 96   Resp  Min: 16  Max: 20   SpO2  Min: 93 %  Max: 99 %   Flow (L/min)  Min: 1  Max: 2   No data recorded     Flowsheet Rows    Flowsheet Row First Filed Value   Admission Height 152.4 cm (60\") Documented at 05/05/2023 2127   Admission Weight 78.9 kg (174 lb) Documented at 05/05/2023 2127          Physical Exam:     General Appearance:    " Alert, cooperative, in no acute distress   Lungs:     Clear to auscultation,respirations regular, even and                unlabored    Heart:    Regular rhythm and normal rate, normal S1 and S2, no          murmur, no gallop, no rub, no click   Chest Wall:    No abnormalities observed   Abdomen:     Normal bowel sounds, no masses,  soft nontender, nondistended,    Extremities:   No gross deformities, no edema, no cyanosis, LUE in sling   Pulses:   Pulses palpable and equal bilaterally   Skin:   Implant site with minimal ecchymosis, otherwise clean dry intact without signs of hematoma or infection.         Results Review:   Results from last 7 days   Lab Units 05/09/23  0454 05/08/23  0343 05/07/23  0451   WBC 10*3/mm3 7.99 7.13 9.49   HEMOGLOBIN g/dL 11.8* 11.6* 13.1   HEMATOCRIT % 37.1 35.9 40.7   PLATELETS 10*3/mm3 213 226 220     Results from last 7 days   Lab Units 05/09/23  0454 05/08/23  0343 05/07/23  0451   SODIUM mmol/L 142 142 138   POTASSIUM mmol/L 4.6 3.9 4.4   CHLORIDE mmol/L 103 102 97*   CO2 mmol/L 34.0* 32.0* 34.0*   BUN mg/dL 16 13 15   CREATININE mg/dL 0.75 0.61 0.65   GLUCOSE mg/dL 281* 145* 255*      Results from last 7 days   Lab Units 05/05/23  2155   HEMOGLOBIN A1C % 9.10*     Results from last 7 days   Lab Units 05/09/23  0454   CHOLESTEROL mg/dL 242*   TRIGLYCERIDES mg/dL 154*   HDL CHOL mg/dL 42     Results from last 7 days   Lab Units 05/06/23  0622   TSH uIU/mL 1.870     Results from last 7 days   Lab Units 05/06/23  0622   PROBNP pg/mL 889.6         Results from last 7 days   Lab Units 05/05/23  2155   HSTROP T ng/L 18*     No intake or output data in the 24 hours ending 05/09/23 1154    I personally viewed and interpreted the patient's EKG/Telemetry data    Chest X-ray: no penumothorax; acceptable lead position.     Telemetry: NSR, HR 63-96 bpm        Present on Admission:  • Gastroesophageal reflux disease  • Hyperlipidemia  • Hypertension  • Acquired hypothyroidism  • COPD on home  oxygen  • PAF with RVR this admission  • Chronic respiratory failure with hypercapnia  • Syncope, unspecified syncope type  • Sick sinus syndrome (tachybradycardia) with pauses and syncope    Assessment & Plan   1. Tachy-simón syndrome:   -s/p PPM implant 5/8/23 by Dr. Arriaga. Pt has done well overnight. The chest Xray and chest incision site are unremarkable. Wound care and post device care have been reviewed with the patient in detail. Pt will have a wound check in 7-10 days then a follow up with  in 3 months.   - echo normal     2. PAF with RVR  -episodes of afib RVR while here in hospital  -Now that patient has pacemaker, will initiate low dose BB for rate control   - CHADsVASC=5 , has previously declined anticoagulation she is now agreeable to a trial of Eliquis.  Will initiate Eliquis 5 mg twice daily to be started tomorrow 48 hours post pacemaker implantation. Discussed with patient and daughter and patient is agreeable    3. Hypertension   -Continue losartan 100 mg.  Increased amlodipine to 10 mg daily     4. Hypothyroidism  - TSH WNL        5.  Pulmonary fibrosis/COPD/chronic hypoxic respiratory failure  -At baseline    Plan for disposition:The patient is stable to be discharged to home today     Electronically signed by LETICIA Ramirez, 05/09/23, 12:39 PM EDT.

## 2023-05-09 NOTE — OUTREACH NOTE
Prep Survey    Flowsheet Row Responses   [facility] patient discharged from? [place]   Is LACE score < 7 ? No   Eligibility [facility]   Date of Admission 05/05/23   Date of Discharge 05/09/23   Discharge Disposition Home or Self Care   Discharge diagnosis Afib with RVR,   Tachybrady syndrome   Does the patient have one of the following disease processes/diagnoses(primary or secondary)? Other   Does the patient have Home health ordered? Yes   What is the Home health agency?   [facility]     Is there a DME ordered? No   Prep survey completed? Yes          Renea LOZA - Registered Nurse

## 2023-05-10 ENCOUNTER — HOME CARE VISIT (OUTPATIENT)
Dept: HOME HEALTH SERVICES | Facility: HOME HEALTHCARE | Age: 78
End: 2023-05-10
Payer: MEDICARE

## 2023-05-10 ENCOUNTER — TRANSITIONAL CARE MANAGEMENT TELEPHONE ENCOUNTER (OUTPATIENT)
Dept: CALL CENTER | Facility: HOSPITAL | Age: 78
End: 2023-05-10
Payer: MEDICARE

## 2023-05-10 VITALS
SYSTOLIC BLOOD PRESSURE: 159 MMHG | WEIGHT: 177 LBS | HEIGHT: 60 IN | BODY MASS INDEX: 34.75 KG/M2 | RESPIRATION RATE: 15 BRPM | HEART RATE: 70 BPM | OXYGEN SATURATION: 97 % | DIASTOLIC BLOOD PRESSURE: 82 MMHG

## 2023-05-10 PROCEDURE — G0299 HHS/HOSPICE OF RN EA 15 MIN: HCPCS

## 2023-05-10 NOTE — Clinical Note
SOC Note:    Home Health ordered for: disciplines SN, 2 week 2 then weekly for 4 weeks.  PT and OT evals.  Patient needs HHA for bath/shower assistance.     Reason for Hosp/Primary Dx/Co-morbidities: Patient was inpatient at [facility] from 5/5 with discharge to home on 5/9.  DX of PAF with RVR.  PMH: chronic respiratory failure with hypercapnia, syncope, sick sinus syndrome with pauses and syncope, hyperlipidemia, Type 2 DM, long term insulin use, GERD, HTN, COPD with oxygen use, hypothroidism,  pacemaker placement on 5/8.  Left hip and left leg issues (unknown). afib    Focus of Care: Educiation needed for PAF, medication education  and new high risk anticoagulants.  Patients blood sugar was 59 when writer arrived.  Niece was fixing her a sandwich and she was drinking orange juice.  It went up to 89.  She has Dexcom and understands its use.  She is on Insulin.  She had refused blood thinners previously but allowed them to start them on discharge from hospital.  Educated on Eliquis and need for it with afib.   She VU and repeated back for verification.  She is also on baby aspirin.     Skilled Need: Patient education following pacemaker placement, PAF with RVR, wound care once nonremovable bandage is removed by  surgeon, and assessments.      Current Functional status/mobility/DME: Patient is ambulatory with cane.  She has walker in home and states she doesn't use it because it doesn't fit through her doors right.  She uses a three tiered cart and pushes it around with items she needs on it.  Educated extensively on why this is dangerous and that she should use cane or walker until she is evaluated by OT and PT for safety reasons.  She VU.  She is unable to get into her bed she states without climbing due to hip and leg issues. Fall safety is a concern at this time due to noncompliance with safety instructions.  DME:  Walker, cane, oxygen concentrator, grab bars in bathroom.      HB status/Living  Arrangements: She lives alone in one story home.  Her son lives next door.  Educated her that either him or another family member needs to stop by daily at this time to check on her.  Her niece is staying with her currently.  She is homebound due to fall risk, weakness, oxygen use and it is a taxing effort to leave home without assistance of another individual.     Skin Integrity/wound status: She has incision on left chest from pacemaker placement on 5/8.  It has a nonremovable dressing on it that surgeon will remove on office visit.  She states pain is 7 out of 10 at times but 0 when she isn't moving.  Shows no redness around bandage and dressing is dry.     Code Status: full    Fall Risk: high    POC confirmed with patient on date 5/10    Plan for next visit: Patient has do appointment on Friday.  Nursing visits will be twice weekly starting next week for two weeks, then weekly for 4 weeks or until goals are met.  Next visit assessment, and education.

## 2023-05-10 NOTE — OUTREACH NOTE
Call Center TCM Note    Flowsheet Row Responses   [facility] patient discharged from? [place]   Does the patient have one of the following disease processes/diagnoses(primary or secondary)? Other   TCM attempt successful? Yes  [no verbal release]   Call start time [phone]   Call end time 1259   Discharge diagnosis Afib with RVR,   Tachybrady syndrome   Medication alerts for this patient ELIQUIS--bleeding precautions advised   Meds reviewed with patient/caregiver? Yes   Is the patient having any side effects they believe may be caused by any medication additions or changes? No   Does the patient have all medications ordered at discharge? Yes   Is the patient taking all medications as directed (includes completed medication regime)? Yes   Comments  PCP FOLLOW UP APPOINTMENT IS 5/16/23@400pm   Does the patient have an appointment with their PCP within 7 days of discharge? Yes   What is the Home health agency?   [facility]    Has home health visited the patient within 72 hours of discharge? Yes   Has all DME been delivered? No   DME comments O2 at 1.5 lpm per nc--pt O2 was decreased as her sats were in high 90s, pt has a goal for sats to be 88-92%.  Pt wears a sat watch to bed that vibrates if her sats drop to 90%.   Psychosocial issues? No   Did the patient receive a copy of their discharge instructions? Yes   Nursing interventions Reviewed instructions with patient   What is the patient's perception of their health status since discharge? Improving  [Pt is doing well--pt has a sling to wear and is aware of her activity and post procedure precautions.  Pt has no chest pain, palpitations, monitoring BP and pulse.  Encouraged to bring readings to f/u appts.]   Is the patient/caregiver able to teach back signs and symptoms related to disease process for when to call PCP? Yes   Is the patient/caregiver able to teach back signs and symptoms related to disease process for when to call 911? Yes   TCM call completed? Yes    Call end time [id number]          Felicity Scherer RN    5/10/2023, 13:05 EDT

## 2023-05-11 ENCOUNTER — PATIENT OUTREACH (OUTPATIENT)
Dept: CASE MANAGEMENT | Facility: OTHER | Age: 78
End: 2023-05-11
Payer: MEDICARE

## 2023-05-11 DIAGNOSIS — E11.65 TYPE 2 DIABETES MELLITUS WITH HYPERGLYCEMIA, WITH LONG-TERM CURRENT USE OF INSULIN: Primary | ICD-10-CM

## 2023-05-11 DIAGNOSIS — I10 HYPERTENSION, UNSPECIFIED TYPE: ICD-10-CM

## 2023-05-11 DIAGNOSIS — Z79.4 TYPE 2 DIABETES MELLITUS WITH HYPERGLYCEMIA, WITH LONG-TERM CURRENT USE OF INSULIN: Primary | ICD-10-CM

## 2023-05-11 NOTE — OUTREACH NOTE
AMBULATORY CASE MANAGEMENT NOTE    Name and Relationship of Patient/Support Person: Marielena Moeller F - Self    Patient Outreach    AMB CM outreach with patient. Patient had an ED visit with [facility] on 5/5/23 and was admitted for PAF with RVR. Patient underwent PPM placement on 5/8/23 and was discharged home 5/9/23. Medication changes at discharge include the addition of Amlodipine, Atorvastation, Eliquis, and Metoprolol and the discontinuation of bisoprolol-hydrochlorothiazide.    Patient states overall she is doing better and feels she is improving. Patient states has new medications from hospital discharge, they were provided via meds to beds. States home health came out yesterday and did an assessment. Stated home health told her that physical therapy should start coming to. Stated it is difficult for her to bath because of abdominal hernia and hopes to get assistance with that.     Patient feels that her blood pressure is up and down. Reports compliance with new medications. Patient is not currently monitoring her heart rate or blood pressure at this time. Does have blood pressure cuff at home. Encouraged to keep log and bring to primary care follow up. Patient denies any chest pain or palpitations. Stated she does have a sore arm and chest related to the ppm placement. States she is wearing her sling and understands the post op restrictions. Reports started Eliquis yesterday. No side effects noted so far. Educated on bleeding precautions and verbalized understanding.     Patient states her blood sugar has been fair. Does have a Dexcom and reported the highest reading as 250 and lowest in the 60's. Reports one hypoglycemic event due to forgetting to eat because she was busy with company. Stated the dexcom alerted her and she was able to drink apple juice and eat a sandwich and felt better.     ALEXIS CM reviewed CCM and potential benefits. Patient verbalized understanding and is agreeable to  services. No other needs voiced at this time. Provided with direct line to [facility].     Adult Patient Profile  Questions/Answers    Flowsheet Row Most Recent Value   Symptoms/Conditions Managed at Home cardiovascular, diabetes, type 2   Barriers to Managing Health stress of chronic illness   Cardiovascular Symptoms/Conditions hypertension, other (see comments)  [pacemaker placement 5/5/23]   Cardiovascular Management Strategies medical device, medication therapy, diet modification   Cardiovascular Self-Management Outcome 3 (uncertain)   Diabetes Management Strategies insulin therapy, medication therapy, diet modification   Diabetes Self-Management Outcome 3 (uncertain)   Identifying Health Goals keep illness under control          Education Documentation  Self-Care, taught by Siomara Ferreira, RN at 5/11/2023  2:06 PM.  Learner: Patient  Readiness: Acceptance  Method: Explanation  Response: Verbalizes Understanding    Provider Follow-Up, taught by Siomara Ferreria RN at 5/11/2023  2:06 PM.  Learner: Patient  Readiness: Acceptance  Method: Explanation  Response: Verbalizes Understanding    Medication Management, taught by Siomara Ferreira RN at 5/11/2023  2:06 PM.  Learner: Patient  Readiness: Acceptance  Method: Explanation  Response: Verbalizes Understanding    Blood Pressure Monitoring, taught by Siomara Ferreira, RN at 5/11/2023  2:06 PM.  Learner: Patient  Readiness: Acceptance  Method: Explanation  Response: Verbalizes Understanding    Hypoglycemia Identification and Treatment, taught by Siomara Ferreira RN at 5/11/2023  2:06 PM.  Learner: Patient  Readiness: Acceptance  Method: Explanation  Response: Verbalizes Understanding    Hyperglycemia Identification and Treatment, taught by Siomara Ferreira RN at 5/11/2023  2:06 PM.  Learner: Patient  Readiness: Acceptance  Method: Explanation  Response: Verbalizes Understanding    Importance of Glycemic Management, taught by Siomara Ferreira RN at 5/11/2023  2:06 PM.  Learner:  Patient  Readiness: Acceptance  Method: Explanation  Response: Verbalizes Understanding    Blood Glucose Monitor Use, taught by Siomara Ferreira, RN at 5/11/2023  2:06 PM.  Learner: Patient  Readiness: Acceptance  Method: Explanation  Response: Verbalizes Understanding    Blood Glucose Monitoring, taught by Siomara Ferreira, RN at 5/11/2023  2:06 PM.  Learner: Patient  Readiness: Acceptance  Method: Explanation  Response: Verbalizes Understanding    Blood Glucose Goal, taught by Siomara Ferreira, RN at 5/11/2023  2:06 PM.  Learner: Patient  Readiness: Acceptance  Method: Explanation  Response: Verbalizes Understanding        Siomara ROCKWELL  Ambulatory Case Management    5/11/2023, 14:07 EDT

## 2023-05-12 ENCOUNTER — HOME CARE VISIT (OUTPATIENT)
Dept: HOME HEALTH SERVICES | Facility: HOME HEALTHCARE | Age: 78
End: 2023-05-12
Payer: MEDICARE

## 2023-05-12 PROCEDURE — G0151 HHCP-SERV OF PT,EA 15 MIN: HCPCS

## 2023-05-12 NOTE — HOME HEALTH
SOC Note:    Home Health ordered for: disciplines SN, 2 week 2 then weekly for 4 weeks.  PT and OT evals.  Patient also would benefit from HHA due to fall risk in shower.    Reason for Hosp/Primary Dx/Co-morbidities: Patient was inpatient at [facility] from 5/5 with discharge to home on 5/9.  DX of PAF with RVR.  PMH: chronic respiratorey failure with hypercapnia, syncope, sick sinus syndrome with pauses and syncope, hyperlipidemia, Type 2 DM, long term insulin use, GERD, HTN, COPD with oxygen use, hypothroidism,  pacemaker placement on 5/8.  Left hip and left leg issues (unknown). afib    We discussed Life alert.  She has a home security system that has a remote she can use to call 911.   Explained that unless she has this with her at all times, it is useless in a medical emergency.  She is going to discuss this with her son.  She agrees that she needs something for when she is alone.      Focus of Care: Educiation needed for PAF, medication education  and new high risk anticoagulants.  Patients blood sugar was 59 when writer arrived.  Niece was fixing her a sandwich and she was drinking orange juice.  It went up to 89.  She has Dexcom and understands its use.  She is on Insulin.  She had refused blood thinners previously but allowed them to start them on discharge from hospital.  Educated on Eliquis and need for it with afib.   She VU and repeated back for verification.  She is also on baby aspirin.     Skilled Need: Patient education following pacemaker placement, PAF with RVR, wound care once nonremovable bandage is removed by  surgeon, and assessments.      Current Functional status/mobility/DME: Patient is ambulatory with cane.  She has walker in home and states she doesn't use it because it doesn't fit through her doors right.  She uses a three tiered cart and pushes it around with items she needs on it.  Educated extensively on why this is dangerous and that she should use cane or walker until she is  evaluated by OT and PT for safety reasons.  She VU.  She is unable to get into her bed she states without climbing due to hip and leg issues. Fall safety is a concern at this time due to noncompliance with safety instructions.  DME:  Walker, cane, oxygen concentrator, grab bars in bathroom.      HB status/Living Arrangements: She lives alone in one story home.  Her son lives next door.  Educated her that either him or another family member needs to stop by daily at this time to check on her.  Her niece is staying with her currently.  She is homebound due to fall risk, weakness, oxygen use and it is a taxing effort to leave home without assistance of another individual.     Skin Integrity/wound status: She has incision on left chest from pacemaker placement on 5/8.  It has a nonremovable dressing on it that surgeon will remove on office visit.  She states pain is 7 out of 10 at times but 0 when she isn't moving.  Shows no redness around bandage and dressing is dry.     Code Status: full    Fall Risk: high    POC confirmed with patient on date 5/10    Plan for next visit: Patient has do appointment on Friday.  Nursing visits will be twice weekly starting next week for two weeks, then weekly for 4 weeks or until goals are met.  Next visit assessment, and education (oxygen, medication and safety).

## 2023-05-13 VITALS
TEMPERATURE: 97.6 F | OXYGEN SATURATION: 96 % | HEART RATE: 71 BPM | DIASTOLIC BLOOD PRESSURE: 71 MMHG | SYSTOLIC BLOOD PRESSURE: 138 MMHG | RESPIRATION RATE: 16 BRPM

## 2023-05-13 NOTE — HOME HEALTH
Initial Visit Note:    Patient lives alone in 1 story home, on continuous oxygen via NC, amb with RW, history of falls.  Son lives on the property in a separate house. S/p acute hospitalization secondary to acute episode of paroxysmal afib.  She has had homecare before, but is no longer performing any type of regular ambulation or HEP.     Skill/education provided: patient education regarding goals and role of home care PT services.     Patient/caregiver response: patient agreeable with initiation of PT services.     Plan for next visit: HEP, bilateral LE strengthening, gait and balance training

## 2023-05-16 ENCOUNTER — HOME CARE VISIT (OUTPATIENT)
Dept: HOME HEALTH SERVICES | Facility: HOME HEALTHCARE | Age: 78
End: 2023-05-16
Payer: MEDICARE

## 2023-05-16 ENCOUNTER — OFFICE VISIT (OUTPATIENT)
Dept: FAMILY MEDICINE CLINIC | Facility: CLINIC | Age: 78
End: 2023-05-16
Payer: MEDICARE

## 2023-05-16 VITALS
HEART RATE: 69 BPM | DIASTOLIC BLOOD PRESSURE: 68 MMHG | HEIGHT: 60 IN | SYSTOLIC BLOOD PRESSURE: 114 MMHG | RESPIRATION RATE: 16 BRPM | TEMPERATURE: 97.9 F | OXYGEN SATURATION: 92 % | WEIGHT: 177 LBS | BODY MASS INDEX: 34.75 KG/M2

## 2023-05-16 DIAGNOSIS — I48.0 PAROXYSMAL ATRIAL FIBRILLATION: ICD-10-CM

## 2023-05-16 DIAGNOSIS — J44.9 CHRONIC OBSTRUCTIVE PULMONARY DISEASE, UNSPECIFIED COPD TYPE: Primary | ICD-10-CM

## 2023-05-16 DIAGNOSIS — B37.2 YEAST INFECTION OF THE SKIN: ICD-10-CM

## 2023-05-16 DIAGNOSIS — N64.4 BREAST PAIN, LEFT: ICD-10-CM

## 2023-05-16 DIAGNOSIS — Z99.81 ON HOME OXYGEN THERAPY: ICD-10-CM

## 2023-05-16 RX ORDER — NYSTATIN 100000 [USP'U]/G
POWDER TOPICAL 3 TIMES DAILY
Qty: 30 G | Refills: 1 | Status: SHIPPED | OUTPATIENT
Start: 2023-05-16

## 2023-05-16 RX ORDER — NYSTATIN 100000 U/G
1 CREAM TOPICAL 2 TIMES DAILY
Qty: 30 G | Refills: 1 | Status: SHIPPED | OUTPATIENT
Start: 2023-05-16

## 2023-05-16 NOTE — Clinical Note
Patient missed a nursing  visit from [facility] on 5/16/23.    Reason: Left message for patient on Monday evening for visit on Tuesday with no response.   Called again on Tuesday and she stated she will not be home because she has a doctor's appointment.   Will plan to see on Friday's scheduled visit day,.       For your records only.   As per home health protocol, MD must be notified of missed/cancelled visits; therefore the prescribed frequency was not met.

## 2023-05-16 NOTE — HOME HEALTH
Patient missed a nursing  visit from [facility] on 5/16/23.    Reason: Left message for patient on Monday evening for visit on Tuesday with no response.   Called again on Tuesday and she stated she will not be home because she has a doctor's appointment.   Will plan to see on Friday's scheduled visit day,.       For your records only.   As per home health protocol, MD must be notified of missed/cancelled visits; therefore the prescribed frequency was not met.

## 2023-05-16 NOTE — PROGRESS NOTES
"                      Established Patient        Chief Complaint:   Chief Complaint   Patient presents with   • Irregular Heart Beat     HOSPITAL FOLLOW UP FROM [facility]. 5/8/2023  PACEMAKER PUT IN          History of Present Illness:    Marielena Moeller is a 77 y.o. female who presents today for hospital follow up. Patient presented to [facility] ED on 5/5 for complaints of dizziness, syncope. Patient states that she had experienced this in the past but typically the feeling subsides on its own, on this day it did not. Pacemaker placed.     Patient reports that her afib still kicks in intermittently, feels fatigued intermittently--follow up with cardiothoracic surgery for 3 months and cardiology in July Subjective     The following portions of the patient's history were reviewed and updated as appropriate: allergies, current medications, past family history, past medical history, past social history, past surgical history and problem list.    ALLERGIES  Allergies   Allergen Reactions   • Diltiazem Other (See Comments)     nausea   • Spironolactone Other (See Comments)     Dropped magnesium     • Doxycycline Nausea And Vomiting     \"Pain worse than pancreatitis\"   • Sotalol Other (See Comments)     nausea   • Warfarin Other (See Comments)     Nose bleed   • Ciprofloxacin Hives   • Contrast Dye (Echo Or Unknown Ct/Mr) Rash     Breaks her out   • Erythromycin Hives and Rash   • Hydrochlorothiazide Other (See Comments)        pt developed pancreastitis   • Levaquin [Levofloxacin] Swelling   • Prednisone Mental Status Change     ITCHING AND NERVOUS   • Shellfish Allergy Rash     Breaks her out       ROS  Review of Systems   Constitutional: Positive for fatigue.   Respiratory: Positive for shortness of breath. Negative for cough.    Genitourinary:        Left breast pain   Skin: Positive for rash (skin folds).   Neurological: Positive for dizziness and weakness.       Objective     Vital Signs:   /68   Pulse 69   Temp " "97.9 °F (36.6 °C)   Resp 16   Ht 152.4 cm (60\")   Wt 80.3 kg (177 lb)   LMP  (LMP Unknown)   SpO2 92%   BMI 34.57 kg/m²     BMI is >= 30 and <35. (Class 1 Obesity). The following options were offered after discussion;: weight loss educational material (shared in after visit summary)       Physical Exam   Physical Exam  Vitals and nursing note reviewed.   Constitutional:       General: She is not in acute distress.     Appearance: She is obese. She is not ill-appearing.   Eyes:      Pupils: Pupils are equal, round, and reactive to light.   Cardiovascular:      Rate and Rhythm: Normal rate.      Heart sounds: Normal heart sounds.   Pulmonary:      Breath sounds: Normal breath sounds. No wheezing.   Skin:     Findings: Bruising, erythema and rash present.             Comments: Surgical dressing in place   Neurological:      Mental Status: She is alert and oriented to person, place, and time.   Psychiatric:         Mood and Affect: Mood normal.         Behavior: Behavior normal.         Assessment and Plan      Assessment/Plan:   Diagnoses and all orders for this visit:    1. Chronic obstructive pulmonary disease, unspecified COPD type (Primary)    2. Breast pain, left  -     Mammo Diagnostic Digital Tomosynthesis Bilateral With CAD; Future    3. Yeast infection of the skin  -     nystatin (MYCOSTATIN) 157432 UNIT/GM powder; Apply  topically to the appropriate area as directed 3 (Three) Times a Day.  Dispense: 30 g; Refill: 1  -     nystatin (MYCOSTATIN) 215357 UNIT/GM cream; Apply 1 application topically to the appropriate area as directed 2 (Two) Times a Day. Alternate with nystatin powder  Dispense: 30 g; Refill: 1    4. Paroxysmal atrial fibrillation    5. On home oxygen therapy      --patient to follow up with cardiology and pulmonology as scheduled    Discussion Summary:  Discussed plan of care in detail with pt today; pt verb understanding and agrees.      I spent 35 minutes caring for Marielena on this date " of service. This time includes time spent by me in the following activities:preparing for the visit, reviewing tests, obtaining and/or reviewing a separately obtained history, performing a medically appropriate examination and/or evaluation , counseling and educating the patient/family/caregiver, ordering medications, tests, or procedures and documenting information in the medical record      I have reviewed and updated all copied forward information, as appropriate.  I attest to the accuracy and relevance of any unchanged information.      Follow up:  Return in about 1 month (around 6/16/2023) for Annual, Medicare Wellness.     Patient Education:  There are no Patient Instructions on file for this visit.    Marychuy Atwood, LETICIA  05/26/23  14:35 EDT          Please note that portions of this note may have been completed with a voice recognition program.

## 2023-05-17 ENCOUNTER — HOME CARE VISIT (OUTPATIENT)
Dept: HOME HEALTH SERVICES | Facility: HOME HEALTHCARE | Age: 78
End: 2023-05-17
Payer: MEDICARE

## 2023-05-17 ENCOUNTER — TELEPHONE (OUTPATIENT)
Dept: FAMILY MEDICINE CLINIC | Facility: CLINIC | Age: 78
End: 2023-05-17

## 2023-05-17 PROCEDURE — G0157 HHC PT ASSISTANT EA 15: HCPCS

## 2023-05-17 NOTE — TELEPHONE ENCOUNTER
Caller: TETE ROGERS    Relationship:     Best call back number:   EXT [phone]    What form or medical record are you requesting:     LIST OF PATIENTS MEDICATIONS    How would you like to receive the form or medical records (pick-up, mail, fax): FAX  If fax, what is the fax number: [phone]    Timeframe paperwork needed: AS SOON AS POSSIBLE

## 2023-05-18 ENCOUNTER — READMISSION MANAGEMENT (OUTPATIENT)
Dept: CALL CENTER | Facility: HOSPITAL | Age: 78
End: 2023-05-18
Payer: MEDICARE

## 2023-05-18 ENCOUNTER — PATIENT OUTREACH (OUTPATIENT)
Dept: CASE MANAGEMENT | Facility: OTHER | Age: 78
End: 2023-05-18
Payer: MEDICARE

## 2023-05-18 VITALS
RESPIRATION RATE: 16 BRPM | TEMPERATURE: 97.6 F | DIASTOLIC BLOOD PRESSURE: 68 MMHG | SYSTOLIC BLOOD PRESSURE: 143 MMHG | OXYGEN SATURATION: 94 % | HEART RATE: 82 BPM

## 2023-05-18 DIAGNOSIS — I10 HYPERTENSION, UNSPECIFIED TYPE: ICD-10-CM

## 2023-05-18 DIAGNOSIS — E11.65 TYPE 2 DIABETES MELLITUS WITH HYPERGLYCEMIA, WITH LONG-TERM CURRENT USE OF INSULIN: Primary | ICD-10-CM

## 2023-05-18 DIAGNOSIS — Z79.4 TYPE 2 DIABETES MELLITUS WITH HYPERGLYCEMIA, WITH LONG-TERM CURRENT USE OF INSULIN: Primary | ICD-10-CM

## 2023-05-18 NOTE — OUTREACH NOTE
AMBULATORY CASE MANAGEMENT NOTE    Name and Relationship of Patient/Support Person: Sajan Marielena F - Self    CCM Interim Update    AMB CM outreach with patient. Patient states had PCP visit and it went well. Feels like her blood pressure is more controlled and she is adjusting to her medications. Ask if she is to continue taking aspirin with her blood thinner. Reviewed PCP noted and Discharge AVS and this medication was not discontinued. Advised to continue taking. Does have Cardiology follow up 5/26/23. Patient verbalized understanding.     Stated blood sugar is still around 200, feels this is due to her starting new medications and needing time to adjust. States taking insulin as prescribed.     Stated was given Nystatin cream and powder at PCP visit and feels it is helping. Also stated she has a ultrasound scheduled tomorrow on her elbow for knots.     No other needs verbalized at this time.       Education Documentation  Healthy Food Choices, taught by Siomara Ferreira, RN at 5/18/2023  1:59 PM.  Learner: Patient  Readiness: Acceptance  Method: Explanation  Response: Verbalizes Understanding    Blood Glucose Monitoring, taught by Siomara Ferreira, RN at 5/18/2023  1:59 PM.  Learner: Patient  Readiness: Acceptance  Method: Explanation  Response: Verbalizes Understanding          Siomara ROCKWELL  Ambulatory Case Management    5/18/2023, 14:06 EDT

## 2023-05-18 NOTE — HOME HEALTH
"Routine Visit Note: Greeted at door by patient, who was first seen walking with a push cart with no visible signs of distress. Patient reports feeling “not good“ due to having pacemaker put in recently and states that she gets \"tired easily\". Patient reports no compliance HEP.     Skill/education provided: Instructed therapeutic exercises and gait training. Educated patient on pain management strategies and HEP.     Patient/caregiver response: Patient was easily fatigued today and did not tolerate exercise very well. She required frequent seated rest break due to faituge and often reports discomfort with therapeutic exercises. Progressions held with gait training and balance today due to fatigue and discomfort with exercise. Patient verbalized understanding of all provided education.      Plan for next visit: Patient would benefit from continued skilled physical therapy to address deficits in strength, endurance, balance, and to improve overall functional mobility/independence with ADL's. Progress with standing balance exercises next visit as tolerated. Provided written copy of HEP."

## 2023-05-18 NOTE — OUTREACH NOTE
Medical Week 2 Survey    Flowsheet Row Responses   [facility] patient discharged from? [facility]   Does the patient have one of the following disease processes/diagnoses(primary or secondary)? Other   Week 2 attempt successful? Yes   Call start time 1003   Discharge diagnosis Afib with RVR,   Tachybrady syndrome   Call end time 1006   Person spoke with today (if not patient) and relationship Patient   Meds reviewed with patient/caregiver? Yes   Is the patient taking all medications as directed (includes completed medication regime)? Yes   Does the patient have a primary care provider?  Yes   Comments regarding PCP Patient seen her pcp on 05/16   Has the patient kept scheduled appointments due by today? Yes   What is the Home health agency?   [facility]    Has home health visited the patient within 72 hours of discharge? Yes   Home health comments Home health is still coming.   Psychosocial issues? No   Did the patient receive a copy of their discharge instructions? Yes   Nursing interventions Reviewed instructions with patient   What is the patient's perception of their health status since discharge? Improving   Is the patient/caregiver able to teach back signs and symptoms related to disease process for when to call PCP? Yes   Is the patient/caregiver able to teach back signs and symptoms related to disease process for when to call 911? Yes   Is the patient/caregiver able to teach back the hierarchy of who to call/visit for symptoms/problems? PCP, Specialist, Home health nurse, Urgent Care, ED, 911 Yes   Week 2 Call Completed? Yes   Graduated Yes   Is the patient interested in additional calls from an ambulatory ?  NOTE:  applies to high risk patients requiring additional follow-up. No   Graduated/Revoked comments Patient is improving   Wrap up additional comments Patient states she is doing okay at this time, has seen her pcp yesterday. Home health is still coming. No other concerns or questions  noted.          Renita EAST - Registered Nurse

## 2023-05-19 ENCOUNTER — HOSPITAL ENCOUNTER (OUTPATIENT)
Dept: ULTRASOUND IMAGING | Facility: HOSPITAL | Age: 78
Discharge: HOME | End: 2023-05-19
Payer: MEDICARE

## 2023-05-19 ENCOUNTER — HOME CARE VISIT (OUTPATIENT)
Dept: HOME HEALTH SERVICES | Facility: HOME HEALTHCARE | Age: 78
End: 2023-05-19
Payer: MEDICARE

## 2023-05-19 DIAGNOSIS — M79.89 NODULE OF SOFT TISSUE: ICD-10-CM

## 2023-05-19 DIAGNOSIS — M79.601 RIGHT ARM PAIN: ICD-10-CM

## 2023-05-19 PROCEDURE — 76882 US LMTD JT/FCL EVL NVASC XTR: CPT

## 2023-05-22 ENCOUNTER — HOME CARE VISIT (OUTPATIENT)
Dept: HOME HEALTH SERVICES | Facility: HOME HEALTHCARE | Age: 78
End: 2023-05-22
Payer: MEDICARE

## 2023-05-22 VITALS
SYSTOLIC BLOOD PRESSURE: 136 MMHG | HEART RATE: 72 BPM | TEMPERATURE: 98.6 F | RESPIRATION RATE: 14 BRPM | BODY MASS INDEX: 33.98 KG/M2 | OXYGEN SATURATION: 98 % | WEIGHT: 174 LBS | DIASTOLIC BLOOD PRESSURE: 72 MMHG

## 2023-05-22 PROCEDURE — G0299 HHS/HOSPICE OF RN EA 15 MIN: HCPCS

## 2023-05-22 NOTE — HOME HEALTH
Routine Visit Note:    Skill/education provided: Patient educated on fall risk and oxygen tubing safety as well as Aspirin and Plavix.  Patient understands both medications and bleeding risk.  Cardiology on phone during visit and she has appointment on Friday with Cardiology to have wound checked and dressing removed.  Patient purchaed pulse ox that vibrates when her oxygen drops below 90.   O2 concentrator is set on 1.5 and her o2 today is 98%.   Denies chest pain and SOB.   Patient will start sentences and forget what she is saying. She admits to being forgetful at times.     Reviewed with patient pressure ulcer prevention and she VU.  We talked abut afib and she states she knows when her heart is out of rhythm.  She takes anxiety medication when this happnes and rest.    Patient has hernia.  She states Covid has messed up all appointments she has had for this.  No edema today.  Educated on elevation.      Her heart doctor took her off of Clonidine because they stated it can react with other medications.    Her daughter wants her to go live with her and patient is thinking about this.   She doesn't want her to have to take care of her.      Patient VU of all education.  She tolerated assessment with no complications.  She still has nonremovable dressing on left upper chest from pacemaker placement.  She states it isn't sore anymore.  Cardiology to remove dressing at next visit.     Plan for next visit: Education and assessments

## 2023-05-23 ENCOUNTER — PATIENT OUTREACH (OUTPATIENT)
Dept: CASE MANAGEMENT | Facility: OTHER | Age: 78
End: 2023-05-23
Payer: MEDICARE

## 2023-05-23 ENCOUNTER — TELEPHONE (OUTPATIENT)
Dept: FAMILY MEDICINE CLINIC | Facility: CLINIC | Age: 78
End: 2023-05-23
Payer: MEDICARE

## 2023-05-23 DIAGNOSIS — I10 HYPERTENSION, UNSPECIFIED TYPE: ICD-10-CM

## 2023-05-23 DIAGNOSIS — Z79.4 TYPE 2 DIABETES MELLITUS WITH HYPERGLYCEMIA, WITH LONG-TERM CURRENT USE OF INSULIN: Primary | ICD-10-CM

## 2023-05-23 DIAGNOSIS — E11.65 TYPE 2 DIABETES MELLITUS WITH HYPERGLYCEMIA, WITH LONG-TERM CURRENT USE OF INSULIN: Primary | ICD-10-CM

## 2023-05-23 NOTE — OUTREACH NOTE
AMBULATORY CASE MANAGEMENT NOTE    Name and Relationship of Patient/Support Person: Abbey: [facility] - Other      Care Coordination  Spoke with Abbey at [facility], with the Home and Community based waiver program. Give patient information for referral.       Siomara ROCKWELL  Ambulatory Case Management    5/23/2023, 09:41 EDT

## 2023-05-23 NOTE — TELEPHONE ENCOUNTER
Dante said they did a visit today with patient, and her blood sugar was 323. Patient says it usually runs in the 300's. She said she normally doesn't take the night dose because her sugar drops too low. Please advise.

## 2023-05-24 ENCOUNTER — HOME CARE VISIT (OUTPATIENT)
Dept: HOME HEALTH SERVICES | Facility: HOME HEALTHCARE | Age: 78
End: 2023-05-24
Payer: MEDICARE

## 2023-05-24 ENCOUNTER — PATIENT OUTREACH (OUTPATIENT)
Dept: CASE MANAGEMENT | Facility: OTHER | Age: 78
End: 2023-05-24
Payer: MEDICARE

## 2023-05-24 VITALS — DIASTOLIC BLOOD PRESSURE: 78 MMHG | HEART RATE: 82 BPM | OXYGEN SATURATION: 94 % | SYSTOLIC BLOOD PRESSURE: 118 MMHG

## 2023-05-24 DIAGNOSIS — I10 HYPERTENSION, UNSPECIFIED TYPE: ICD-10-CM

## 2023-05-24 DIAGNOSIS — Z79.4 TYPE 2 DIABETES MELLITUS WITH HYPERGLYCEMIA, WITH LONG-TERM CURRENT USE OF INSULIN: Primary | ICD-10-CM

## 2023-05-24 DIAGNOSIS — E11.65 TYPE 2 DIABETES MELLITUS WITH HYPERGLYCEMIA, WITH LONG-TERM CURRENT USE OF INSULIN: Primary | ICD-10-CM

## 2023-05-24 PROCEDURE — G0152 HHCP-SERV OF OT,EA 15 MIN: HCPCS

## 2023-05-24 PROCEDURE — G0157 HHC PT ASSISTANT EA 15: HCPCS

## 2023-05-24 NOTE — HOME HEALTH
Patient is a 77 year old female who is a former smoker with PMH of PAF (not on anticoagulation due to refusal, also declined Watchman device), COPD on chronic supplemental O2 2.5-3L continously, T2DM, HTN, HLD, hypothyroidism who presented with presyncopal episodes for one week. She was found to be in Afib with RVR upon presentation to the ED but had multiple pauses as well which coincided with her presyncopal episodes. She was initially admitted to the ICU but has since been transferred to telemetry floor. She underwent PPM placement on 5/8 with Dr. Aguiar. Patient has since returned home and was seen today for an OT evaluation. She presents with decreased strength, balance, and endurance affecting her independence with ADL's, and functional mobility. Pt with poor activity tolerance, and easily fatigues. She will benefit from continued education on EC/WS techniques. OT edcuated pt on pacemaker precautions including not pushing or pulling, or lifting LUE above 90 degrees. Pt seen using LUE for transfers, encouraged her to not do this as it could strain area. Pt verbalized understanding. OT to see pt an additional 1w3 in order to improve independence with all functional tasks and mobility, and return to PLOF. Pt's goal is to return to getting in the shower.

## 2023-05-25 VITALS
OXYGEN SATURATION: 93 % | SYSTOLIC BLOOD PRESSURE: 118 MMHG | DIASTOLIC BLOOD PRESSURE: 79 MMHG | HEART RATE: 82 BPM | TEMPERATURE: 97 F | RESPIRATION RATE: 16 BRPM

## 2023-05-25 NOTE — HOME HEALTH
"Routine Visit Note: Greeted by patient at door. Patient first seen ambulating with walker, showing no visable signs of distess. Patient states that she has been \"up and  moving a lot today\" and states that has been completing \"some\" of her HEP.     Skill/education provided: Instructed gait training, balance training, bed mobility, and therapeutic exercises. Educated patient on HEP and pain managment strategies.     Patient/caregiver response: Patient tolerated all treatment well today, with no visable signs of distress / required occasional seated rest break throughout treatment session to recover due to fatigue. Patient exhibits progress overall this visit and appears on track to meet her goals.     Plan for next visit: Patient would benefit from continued skilled PT to address deficits in BLE strength, balance, endurance, and to improve overall functional mobility / reduce fall risk with ambulation and ADL's. Progress with standing balance exercise and gait training as tolerated next visit."

## 2023-05-26 ENCOUNTER — HOME CARE VISIT (OUTPATIENT)
Dept: HOME HEALTH SERVICES | Facility: HOME HEALTHCARE | Age: 78
End: 2023-05-26
Payer: MEDICARE

## 2023-05-26 ENCOUNTER — OFFICE VISIT (OUTPATIENT)
Dept: CARDIOLOGY | Facility: CLINIC | Age: 78
End: 2023-05-26
Payer: MEDICARE

## 2023-05-26 DIAGNOSIS — I48.91 ATRIAL FIBRILLATION, UNSPECIFIED TYPE: Primary | ICD-10-CM

## 2023-05-26 PROCEDURE — 99024 POSTOP FOLLOW-UP VISIT: CPT | Performed by: STUDENT IN AN ORGANIZED HEALTH CARE EDUCATION/TRAINING PROGRAM

## 2023-05-26 NOTE — PROGRESS NOTES
2023    Marielena VALERI Moeller, : 1945      Fever: No    Temperature if indicated: 98.1    Wound Location: Left Infraclavicular    Dressing Removed: Removed by MA/RN      Old Dressing Appearance:  Clean, dry    Wound Appearance: Redness []                  Drainage []                  Culture obtained []        Color: N/A     Consistency: na     Amount: none         Gloves used, wound cleansed with sterile 4x4 and peroxide [x]       MD notified []     MD orders:     Antibiotic started []      If checked, type     Other:       Appointment for follow-up scheduled for 3 months post procedure [x]    Future Appointments   Date Time Provider Department Center   2023  2:30 PM WOUND CHECK E [facility]   2023 To Be Determined Tatyana Mendiola, OT [facility] None   2023 To Be Determined Claudia Lundberg, RN [facility] None   2023 To Be Determined Dorothy Gomez, PCT [facility] None   2023  2:00 PM Anish Andrews, PTA [facility] None   2023 To Be Determined Dorothy Gomez, PCT [facility] None   2023 To Be Determined Tatyana Mendiola, OT [facility] None   2023 To Be Determined Claudia Lundberg, RN [facility] None   2023 To Be Determined Claudia Lundberg, RN [facility] None   2023 To Be Determined Anish Andrews, PTA [facility] None   2023 To Be Determined Tatyana Mendiola, OT [facility] None   2023 To Be Determined Claudia Lundberg, RN [facility] None   6/15/2023 To Be Determined Luci Blanco, PT [facility] None   6/15/2023 To Be Determined Luci Blanco, PT [facility] None   2023 To Be Determined Claudia Lundberg, RN [facility] None   2023 To Be Determined Claudia Lundberg, RN [facility] None   2023  2:45 PM Marychuy Atwood APRN E  [facility]   2023  9:30 AM Wu Pathak MD [facility]   8/15/2023 11:30 AM Brice Arriaga MD [facility]           April HECTOR Bazan, 23      MD Signature:______________________________ Completed By/Date:

## 2023-05-30 ENCOUNTER — HOME CARE VISIT (OUTPATIENT)
Dept: HOME HEALTH SERVICES | Facility: HOME HEALTHCARE | Age: 78
End: 2023-05-30
Payer: MEDICARE

## 2023-05-30 VITALS — DIASTOLIC BLOOD PRESSURE: 71 MMHG | OXYGEN SATURATION: 97 % | SYSTOLIC BLOOD PRESSURE: 146 MMHG | HEART RATE: 70 BPM

## 2023-05-30 LAB
QT INTERVAL: 412 MS
QTC INTERVAL: 486 MS

## 2023-05-30 PROCEDURE — G0152 HHCP-SERV OF OT,EA 15 MIN: HCPCS

## 2023-05-30 NOTE — HOME HEALTH
Pt. tolerated OT session well. Pt participated in 50 mins of ADLs/IADLs and theraputic exercises: Pt. demonstrated increased activity tolerance for all OOB ADLs today with understanding of pacemaker precuations, energy conservation techniques and importance of HEP. Continue OT per POC to increase independence and safety with ADLs/IADLs within the home.

## 2023-05-31 ENCOUNTER — HOME CARE VISIT (OUTPATIENT)
Dept: HOME HEALTH SERVICES | Facility: HOME HEALTHCARE | Age: 78
End: 2023-05-31
Payer: MEDICARE

## 2023-05-31 ENCOUNTER — PATIENT OUTREACH (OUTPATIENT)
Dept: CASE MANAGEMENT | Facility: OTHER | Age: 78
End: 2023-05-31

## 2023-05-31 VITALS
HEART RATE: 67 BPM | DIASTOLIC BLOOD PRESSURE: 65 MMHG | OXYGEN SATURATION: 97 % | RESPIRATION RATE: 16 BRPM | TEMPERATURE: 98.3 F | SYSTOLIC BLOOD PRESSURE: 130 MMHG

## 2023-05-31 VITALS
TEMPERATURE: 97.8 F | RESPIRATION RATE: 16 BRPM | OXYGEN SATURATION: 96 % | SYSTOLIC BLOOD PRESSURE: 130 MMHG | HEART RATE: 67 BPM | DIASTOLIC BLOOD PRESSURE: 68 MMHG

## 2023-05-31 DIAGNOSIS — I10 HYPERTENSION, UNSPECIFIED TYPE: ICD-10-CM

## 2023-05-31 DIAGNOSIS — E11.65 TYPE 2 DIABETES MELLITUS WITH HYPERGLYCEMIA, WITH LONG-TERM CURRENT USE OF INSULIN: Primary | ICD-10-CM

## 2023-05-31 DIAGNOSIS — Z79.4 TYPE 2 DIABETES MELLITUS WITH HYPERGLYCEMIA, WITH LONG-TERM CURRENT USE OF INSULIN: Primary | ICD-10-CM

## 2023-05-31 PROCEDURE — G0157 HHC PT ASSISTANT EA 15: HCPCS

## 2023-05-31 PROCEDURE — G0156 HHCP-SVS OF AIDE,EA 15 MIN: HCPCS

## 2023-05-31 NOTE — OUTREACH NOTE
[facility] End of Month Documentation    This Chronic Medical Management Care Plan for Marielena Moeller, 77 y.o. female, has been monitored and managed; a new plan of care implemented and a new plan of care implemented for the month of May.  A cumulative time of 91 minutes was spent on this patient record this month, including phone call with patient; chart review; electronic communication with primary care provider; phone call with other providers.    Regarding the patient's problems: has Type 2 diabetes mellitus with hyperglycemia, with long-term current use of insulin; Hypertension associated with diabetes; Osteoporosis; Class 2 obesity with body mass index (BMI) of 35.0 to 35.9 in adult; Acquired hypothyroidism; Vitamin D deficiency; History of colon polyps; COPD on home oxygen; Pneumococcal vaccination declined by patient; Anxiety; Type 2 diabetes mellitus with circulatory disorder, with long-term current use of insulin; PAF with RVR this admission; Interstitial lung disease; Myalgia; Warfarin anticoagulation; Type 2 diabetes mellitus with diabetic peripheral angiopathy without gangrene, with long-term current use of insulin; Influenza vaccination declined; Gastroesophageal reflux disease; Hyperlipidemia; Hypertension; Type 2 diabetes mellitus; Chronic respiratory failure with hypercapnia; Syncope, unspecified syncope type; and Sick sinus syndrome (tachybradycardia) with pauses and syncope on their problem list., the following items were addressed: medical records; medications and any changes can be found within the plan section of the note.  A detailed listing of time spent for chronic care management is tracked within each outreach encounter.  Current medications include:  has a current medication list which includes the following prescription(s): albuterol, albuterol sulfate hfa, amlodipine, apixaban, aspirin, atorvastatin, accu-chek heron plus, vitamin d3, clonidine, dexcom g6 , dexcom g6 sensor, accu-chek  heron plus, insulin aspart prot-insulin aspart, insulin syringe, bd veo insulin syringe u/f, irbesartan, levothyroxine, lorazepam, magnesium-oxide, metoprolol tartrate, nystatin, nystatin, o2, and zinc. and the patient is reported to be patient is compliant with medication protocol,  Medications are reported to be non-effective in controlling symptoms and changes have been made to the medication protocol.  Regarding these diagnoses, referrals were made to the following provider(s):  n/a.  All notes on chart for PCP to review.    The patient was monitored remotely for blood pressure; blood glucose; medications.    The patient's physical needs include:  medication education; needs assistance with ADLs; help taking medications as prescribed.     The patient's mental support needs include:  needs met    The patient's cognitive support needs include:  needs met    The patient's psychosocial support needs include:  medication management or adherence    The patient's functional needs include: needs assistance for ADLs; medication education; health care coverage    The patient's environmental needs include:  not applicable    Care Plan overall comments:  No data recorded    Refer to previous outreach notes for more information on the areas listed above.    Monthly Billing Diagnoses  (E11.65,  Z79.4) Type 2 diabetes mellitus with hyperglycemia, with long-term current use of insulin    (I10) Hypertension, unspecified type    Medications   · Medications have been reconciled    Care Plan progress this month:      Recently Modified Care Plans Updates made since 4/30/2023 12:00 AM     Diabetes Type 2 (Adult)         Problem Priority Last Modified     Glycemic Management (Diabetes, Type 2) --  5/11/2023  1:40 PM by Siomara Ferreira RN              Goal Recent Progress Last Modified     Glycemic Management Optimized --  5/11/2023  1:40 PM by Siomara Ferreira RN     Evidence-based guidance:   Anticipate A1C testing (point-of-care)  every 3 to 6 months based on goal attainment.   Review mutually-set A1C goal or target range.   Anticipate use of antihyperglycemic with or without insulin and periodic adjustments; consider active involvement of pharmacist.   Provide medical nutrition therapy and development of individualized eating.   Compare self-reported symptoms of hypo or hyperglycemia to blood glucose levels, diet and fluid intake, current medications, psychosocial and physiologic stressors, change in activity and barriers to care adherence.   Promote self-monitoring of blood glucose levels.   Assess and address barriers to management plan, such as food insecurity, age, developmental ability, depression, anxiety, fear of hypoglycemia or weight gain, as well as medication cost, side effects and complicated regimen.   Consider referral to community-based diabetes education program, visiting nurse, community health worker or health .   Encourage regular dental care for treatment of periodontal disease; refer to dental provider when needed.    Notes:            Task Due Date Last Modified     Alleviate Barriers to Glycemic Management --  5/11/2023  1:49 PM by Siomara Ferreira RN     Care Management Activities:      - blood glucose monitoring encouraged  - self-awareness of signs/symptoms of hypo or hyperglycemia encouraged  - use of blood glucose monitoring log promoted      Notes:              Problem Priority Last Modified     Disease Progression (Diabetes, Type 2) --  5/11/2023  1:40 PM by Siomara Ferreira RN              Goal Recent Progress Last Modified     Disease Progression Prevented or Minimized --  5/11/2023  1:40 PM by iSomara Ferreira RN     Evidence-based guidance:   Prepare patient for laboratory and diagnostic exams based on risk and presentation.   Encourage lifestyle changes, such as increased intake of plant-based foods, stress reduction, consistent physical activity and smoking cessation to prevent long-term complications  "and chronic disease.    Individualize activity and exercise recommendations while considering potential limitations, such as neuropathy, retinopathy or the ability to prevent hyperglycemia or hypoglycemia.    Prepare patient for use of pharmacologic therapy that may include antihypertensive, analgesic, prostaglandin E1 with periodic adjustments, based on presenting chronic condition and laboratory results.   Assess signs/symptoms and risk factors for hypertension, sleep-disordered breathing, neuropathy (including changes in gait and balance), retinopathy, nephropathy and sexual dysfunction.   Address pregnancy planning and contraceptive choice, especially when prescribing antihypertensive or statin.   Ensure completion of annual comprehensive foot exam and dilated eye exam.    Implement additional individualized goals and interventions based on identified risk factors.   Prepare patient for consultation or referral for specialist care, such as ophthalmology, neurology, cardiology, podiatry, nephrology or perinatology.    Notes:            Task Due Date Last Modified     Monitor and Manage Follow-up for Comorbidities --  5/11/2023  1:51 PM by Siomara Ferreira RN     Care Management Activities:      - response to pharmacologic therapy monitored      Notes:                 Hypertension (Adult)         Problem Priority Last Modified     Hypertension (Hypertension) --  5/11/2023  1:41 PM by Siomara Ferreira RN              Goal Recent Progress Last Modified     Hypertension Monitored --  5/11/2023  1:41 PM by Siomara Ferreira RN     Evidence-based guidance:   Promote initial use of ambulatory blood pressure measurements (for 3 days) to rule out \"white-coat\" effect; identify masked hypertension and presence or absence of nocturnal \"dipping\" of blood pressure.    Encourage continued use of home blood pressure monitoring and recording in blood pressure log; include symptoms of hypotension or potential medication side effects " in log.   Review blood pressure measurements taken inside and outside of the provider office; establish baseline and monitor trends; compare to target ranges or patient goal.   Share overall cardiovascular risk with patient; encourage changes to lifestyle risk factors, including alcohol consumption, smoking, inadequate exercise, poor dietary habits and stress.    Notes:            Task Due Date Last Modified     Identify and Monitor Blood Pressure Elevation --  5/11/2023  1:51 PM by Siomara Ferreira RN     Care Management Activities:      - home or ambulatory blood pressure monitoring encouraged      Notes:              Problem Priority Last Modified     Disease Progression (Hypertension) --  5/11/2023  1:41 PM by Siomara Ferreira RN              Goal Recent Progress Last Modified     Disease Progression Prevented or Minimized --  5/11/2023  1:41 PM by Siomara Ferreira RN     Evidence-based guidance:   Tailor lifestyle advice to individual; review progress regularly; give frequent encouragement and respond positively to incremental successes.   Assess for and promote awareness of worsening disease or development of comorbidity.   Prepare patient for laboratory and diagnostic exams based on risk and presentation.   Prepare patient for use of pharmacologic therapy that may include diuretic, beta-blocker, beta-blocker/thiazide combination, angiotensin-converting enzyme inhibitor, renin-angiotensin blocker or calcium-channel blocker.   Expect periodic adjustments to pharmacologic therapy; manage side effects.   Promote a healthy diet that includes primarily plant-based foods, such as fruits, vegetables, whole grains, beans and legumes, low-fat dairy and lean meats.    Consider moderate reduction in sodium intake by avoiding the addition of salt to prepared foods and limiting processed meats, canned soup, frozen meals and salty snacks.    Promote a regular, daily exercise goal of 150 minutes per week of moderate exercise  based on tolerance, ability and patient choice; consider referral to physical therapist, community wellness and/or activity program.   Encourage the avoidance of no more than 2 hours per day of sedentary activity, such as recreational screen time.   Review sources of stress; explore current coping strategies and encourage use of mindfulness, yoga, meditation or exercise to manage stress.    Notes:            Task Due Date Last Modified     Alleviate Barriers to Hypertension Treatment --  5/11/2023  1:52 PM by Siomara Ferreira RN     Care Management Activities:      - not discussed during this outreach      Notes:              Problem Priority Last Modified     Resistant Hypertension (Hypertension) --  5/11/2023  1:41 PM by Siomara Ferreira RN              Goal Recent Progress Last Modified     Response to Treatment Maximized --  5/11/2023  1:41 PM by Siomara Ferreira RN     Evidence-based guidance:   Assess patient response to treatment, including presence or absence of medication side effects, degree of blood pressure control and patient satisfaction.   Assess technique (including cuff size and placement), measurement times, condition and calibration of blood pressure cuff set (both at-home and in-office equipment).   Assess factors that may influence response to treatment, including nonadherence to pharmacologic treatment plan, diet or activity changes and/or presence of pain, stress or sleep disturbance.   Screen for signs and symptoms of depression; if present, refer for or complete a comprehensive assessment.   Evaluate social and economic barriers that may affect adherence to treatment plan   Address pharmacologic nonadherence by simplifying dosing regimen, counseling or support by pharmacist, financial assistance, self-monitoring of blood pressure, use of motivational interviewing, voice or text messages.   Encourage behavioral adherence strategies, like habit-based interventions that link medication taking with  existing daily routines.   Assess barriers to regular, daily physical activity; support family or support person-oriented activity changes and utilization of community activity or sports program.   Address barriers to dietary changes, especially sodium restriction, with referrals to community programs, like cooking classes, meal services or intensive education when available.   Refer to community-based peer support program or nurse home-visiting program.   Assess for chronic pain; when present add additional goals (Chronic Pain Care Plan Guide) as needed.   Provide frequent follow-up by telephone, telemonitoring, patient-practice portal or with home visit.   Review alcohol use screen; address using brief intervention beginning with risk that interferes with blood pressure control; refer for treatment when excessive alcohol use is noted.   Screen for obstructive sleep apnea; prepare patient for polysomnography based on risk and presentation and use of noninvasive ventilation to relieve obstructive sleep apnea when present.    Notes:            Task Due Date Last Modified     Facilitate Adherence to Lifestyle Change --  5/11/2023  1:53 PM by Siomara Ferreira RN     Care Management Activities:      - support and encouragement provided      Notes:                      · Current Specialty Plan of Care Status signed by patient    Instructions   · Patient was provided an electronic copy of care plan  · CCM services were explained and offered and patient has accepted these services.  · Patient has given their written consent to receive CCM services and understands that this includes the authorization of electronic communication of medical information with the other treating providers.  · Patient understands that they may stop CCM services at any time and these changes will be effective at the end of the calendar month and will effectively revocate the agreement of CCM services.  · Patient understands that only one  practitioner can furnish and be paid for CCM services during one calendar month.  Patient also understands that there may be co-payment or deductible fees in association with CCM services.  · Patient will continue with at least monthly follow-up calls with the Ambulatory .    Siomara ROCKWELL  Ambulatory Case Management    5/31/2023, 10:50 EDT

## 2023-05-31 NOTE — HOME HEALTH
"Routine Visit Note: Let into home by patient, who was first seen sitting in recliner with no visible signs of distress. Patient reports feeling “about the same“ and states that she has been completing \"some\" of her HEP on her own. Patient states that she is fatigued today due to several medical appointments earlier in day.    Skill/education provided: Instructed therapeutic exercises, gait training, and balance training today. Educated patient on pain management strategies and HEP.     Patient/caregiver response: Patient tolerated all treatment well today, with no visible signs of distress / verbalizes understanding of all provided education. Patient exhibits improvement overall today in functional mobility/activity tolerance and appears on track to meet her goals. Complained of minor increase in back pain following gait training, but mentions that it improved following seated rest break.     Plan for next visit: Patient would benefit from continued skilled physical therapy to address deficits in strength, balance, and overall functional mobility / to reduce fall risk and improve independence with ADL's. Progress with standing balance exercises and gait training as tolerated next visit."

## 2023-06-01 ENCOUNTER — TELEPHONE (OUTPATIENT)
Dept: CASE MANAGEMENT | Facility: OTHER | Age: 78
End: 2023-06-01

## 2023-06-01 ENCOUNTER — TELEPHONE (OUTPATIENT)
Dept: FAMILY MEDICINE CLINIC | Facility: CLINIC | Age: 78
End: 2023-06-01

## 2023-06-01 ENCOUNTER — TELEPHONE (OUTPATIENT)
Dept: CARDIOLOGY | Facility: CLINIC | Age: 78
End: 2023-06-01
Payer: MEDICARE

## 2023-06-01 NOTE — TELEPHONE ENCOUNTER
Caller: TETE    Relationship:     Best call back number:    [phone]       What was the call regarding: TETE CALLED STATING THAT THE PATIENT FASTING BLOOD SUGAR OVER 200, USUALLY RUNS 200 .  NOT TAKING NIGHT TIME INSULIN. YESTERDAY HAD FEVER 101.1, COMPLAINING OF LOWER BACK PAIN, URINE IS CLEAR AND NOT CLOUDY, NOT HAVING CHEST PAIN OR SHORTNESS OF BREATH. PATIENT WAS GIVEN AMLODIPINE  10 MG WHEN SHE WAS DISCHARGED FROM THE HOSPITAL.  PATIENT HAS A WEEK LEFT WITH NOT REFILLS. PATIENT USE TO TAKE TRIAMATRENE,  WANTED TO KNOW IF RENETTA MURRY WANTED HER TO TAKE AGAIN. TETE IS CONCERNED WITH THE FEVER, HIGH BLOOD SUGAR AND STOMACH PAIN RELATED TO HERNIA.

## 2023-06-01 NOTE — TELEPHONE ENCOUNTER
I received a call from patient's RN regarding some concerns.    I called patient to discuss.  She states she does have episodes of afib at times, she notices fluttering in her chest. During these episodes she rests and they go away.  She also continues to have swelling in her feet at times, but baseline for her.  She is also have fevers (101.1 F) off and on, this correlates to when her teeth hurt. She is wanting to have her teeth pulled in the near future. She is concerned about this because she is on Eliquis. We discussed importance of staying on Eliquis as prescribed however she can call when this time get closer to see how long she can hold it for.  She did have questions about blood pressure medications, I transferred her to Dr. Pathak's nurse.   MURJ report in for you to review.

## 2023-06-02 ENCOUNTER — HOME CARE VISIT (OUTPATIENT)
Dept: HOME HEALTH SERVICES | Facility: HOME HEALTHCARE | Age: 78
End: 2023-06-02
Payer: MEDICARE

## 2023-06-02 VITALS
DIASTOLIC BLOOD PRESSURE: 85 MMHG | TEMPERATURE: 98.1 F | SYSTOLIC BLOOD PRESSURE: 138 MMHG | OXYGEN SATURATION: 95 % | RESPIRATION RATE: 16 BRPM | HEART RATE: 79 BPM

## 2023-06-02 PROCEDURE — G0156 HHCP-SVS OF AIDE,EA 15 MIN: HCPCS

## 2023-06-02 RX ORDER — AMLODIPINE BESYLATE 10 MG/1
10 TABLET ORAL
Qty: 30 TABLET | Refills: 0 | Status: SHIPPED | OUTPATIENT
Start: 2023-06-02

## 2023-06-05 ENCOUNTER — HOME CARE VISIT (OUTPATIENT)
Dept: HOME HEALTH SERVICES | Facility: HOME HEALTHCARE | Age: 78
End: 2023-06-05
Payer: MEDICARE

## 2023-06-05 VITALS
OXYGEN SATURATION: 94 % | HEART RATE: 76 BPM | RESPIRATION RATE: 16 BRPM | DIASTOLIC BLOOD PRESSURE: 80 MMHG | SYSTOLIC BLOOD PRESSURE: 154 MMHG | TEMPERATURE: 97.8 F

## 2023-06-05 VITALS — DIASTOLIC BLOOD PRESSURE: 70 MMHG | SYSTOLIC BLOOD PRESSURE: 140 MMHG | HEART RATE: 63 BPM | OXYGEN SATURATION: 99 %

## 2023-06-05 PROCEDURE — G0156 HHCP-SVS OF AIDE,EA 15 MIN: HCPCS

## 2023-06-05 PROCEDURE — G0152 HHCP-SERV OF OT,EA 15 MIN: HCPCS

## 2023-06-05 NOTE — TELEPHONE ENCOUNTER
I called patient and updated her. She is going to think about this and talk it over with her daughter. She is going to call the office back.

## 2023-06-05 NOTE — HOME HEALTH
Pt. tolerated OT session well. Pt participated in 31 mins of ADLs/IADLs and theraputic exercises: Pt. demonstrated increased activity tolerance for all OOB IADLs (laundry) today with understanding of energy conservation techniques and importance of HEP. Continue OT per POC to increase independence and safety with ADLs/IADLs within the home.

## 2023-06-07 ENCOUNTER — HOME CARE VISIT (OUTPATIENT)
Dept: HOME HEALTH SERVICES | Facility: HOME HEALTHCARE | Age: 78
End: 2023-06-07
Payer: MEDICARE

## 2023-06-07 VITALS
OXYGEN SATURATION: 97 % | TEMPERATURE: 96.7 F | HEART RATE: 67 BPM | RESPIRATION RATE: 18 BRPM | DIASTOLIC BLOOD PRESSURE: 64 MMHG | SYSTOLIC BLOOD PRESSURE: 144 MMHG

## 2023-06-07 PROCEDURE — G0157 HHC PT ASSISTANT EA 15: HCPCS

## 2023-06-07 PROCEDURE — G0300 HHS/HOSPICE OF LPN EA 15 MIN: HCPCS

## 2023-06-08 VITALS
TEMPERATURE: 96.7 F | RESPIRATION RATE: 18 BRPM | OXYGEN SATURATION: 97 % | DIASTOLIC BLOOD PRESSURE: 54 MMHG | SYSTOLIC BLOOD PRESSURE: 144 MMHG

## 2023-06-08 DIAGNOSIS — J84.9 INTERSTITIAL LUNG DISEASE: ICD-10-CM

## 2023-06-08 DIAGNOSIS — R06.09 DYSPNEA ON EXERTION: Primary | ICD-10-CM

## 2023-06-08 DIAGNOSIS — J96.12 CHRONIC RESPIRATORY FAILURE WITH HYPERCAPNIA: ICD-10-CM

## 2023-06-08 DIAGNOSIS — J44.9 CHRONIC OBSTRUCTIVE PULMONARY DISEASE, UNSPECIFIED COPD TYPE: ICD-10-CM

## 2023-06-08 NOTE — HOME HEALTH
"Routine Visit Note: Let into home by patient, who was first seen sitting in kitchen with no visible signs of distress. Patient reports feeling “not too good“ and states that she \"didn't sleep good\" due to pain in back/abdomen. Patient reports occasional compliance with HEP.     Skill/education provided: Instructed therapeutic exercises, gait training, and balance training today. Educated patient on pain management strategies and HEP.     Patient/caregiver response: Patient tolerated all treatment fairly well today, but complained of pain in addomen/back with standing exercises / tolerated seated exercise with no visable signs of distress or exacerbation of pain symptoms.      Plan for next visit: Patient would benefit from continued skilled physical therapy to address deficits in BLE strength, balance, and to improve functional mobility / reduce fall risk and improve independence with ADL's. Progress with exercise and gait training as tolerated next visit. Scheduled for re-assessment with supervising PT."

## 2023-06-09 ENCOUNTER — HOME CARE VISIT (OUTPATIENT)
Dept: HOME HEALTH SERVICES | Facility: HOME HEALTHCARE | Age: 78
End: 2023-06-09
Payer: MEDICARE

## 2023-06-09 VITALS
SYSTOLIC BLOOD PRESSURE: 156 MMHG | DIASTOLIC BLOOD PRESSURE: 76 MMHG | TEMPERATURE: 97.9 F | HEART RATE: 69 BPM | RESPIRATION RATE: 16 BRPM | OXYGEN SATURATION: 96 %

## 2023-06-09 PROCEDURE — G0156 HHCP-SVS OF AIDE,EA 15 MIN: HCPCS

## 2023-06-13 ENCOUNTER — HOME CARE VISIT (OUTPATIENT)
Dept: HOME HEALTH SERVICES | Facility: HOME HEALTHCARE | Age: 78
End: 2023-06-13
Payer: MEDICARE

## 2023-06-13 VITALS
DIASTOLIC BLOOD PRESSURE: 54 MMHG | RESPIRATION RATE: 16 BRPM | SYSTOLIC BLOOD PRESSURE: 130 MMHG | OXYGEN SATURATION: 98 % | HEART RATE: 68 BPM

## 2023-06-13 VITALS
DIASTOLIC BLOOD PRESSURE: 61 MMHG | SYSTOLIC BLOOD PRESSURE: 124 MMHG | TEMPERATURE: 98.1 F | HEART RATE: 69 BPM | RESPIRATION RATE: 16 BRPM | OXYGEN SATURATION: 97 %

## 2023-06-13 VITALS
TEMPERATURE: 97.2 F | HEART RATE: 71 BPM | SYSTOLIC BLOOD PRESSURE: 105 MMHG | DIASTOLIC BLOOD PRESSURE: 68 MMHG | RESPIRATION RATE: 16 BRPM | OXYGEN SATURATION: 92 %

## 2023-06-13 PROCEDURE — G0300 HHS/HOSPICE OF LPN EA 15 MIN: HCPCS

## 2023-06-13 PROCEDURE — G0156 HHCP-SVS OF AIDE,EA 15 MIN: HCPCS

## 2023-06-13 PROCEDURE — G0151 HHCP-SERV OF PT,EA 15 MIN: HCPCS

## 2023-06-13 NOTE — HOME HEALTH
"Ms. Moeller discharged from PT services on 6/13/23 with goals partially met. Pt declines additional PT visits as she states she is \"doing well\" and at baseline function. Will d/c to HEP and ambulation program."

## 2023-06-13 NOTE — Clinical Note
"Ms. Moeller discharged from PT services on 6/13/23 with goals partially met. Pt declines additional PT visits as she states she is \"doing well\" and at baseline function. Will d/c to HEP and ambulation program.     Thanks,  Luci Blanco, PT, DPT  "

## 2023-06-13 NOTE — HOME HEALTH
Routine Visit Note: LPN    On assessment, I noticed patient had 2+ non pitting edema in her bilateral LLE. During medication review patient's triamterene 50mg was noted to be . I called the office of the Homehealth Attending Provider, Rai KELLY and spoke to Sydney about getting a new prescription. Sydney stated she was going to give the message to the provider. I left my number and the patient's mumber so the can notify us about a new prescription.          Skill/education provided: Assess wound on upper left chest at the pacemaker site. Education on medication. Diabetes and insulin. Education on oxygen safety practices.     Patient/caregiver response: Patient verbalized understanding of instruction.     Plan for next visit: Instruction on high risk medication and  medication. Instruct on oxygen safety diabetic foot care.    Other pertinent info: N/A

## 2023-06-13 NOTE — HOME HEALTH
Routine Visit Note: LPN    Skill/education provided: Assess wound on upper left chest. Education on diabetes and insulin. Education on oxygen safety practices.     Patient/caregiver response: Patient verbalized understanding of instruction.     Plan for next visit: Instruction on high risk medication management.    Other pertinent info: N/A

## 2023-06-14 ENCOUNTER — TELEPHONE (OUTPATIENT)
Dept: CASE MANAGEMENT | Facility: OTHER | Age: 78
End: 2023-06-14
Payer: MEDICARE

## 2023-06-15 ENCOUNTER — TELEPHONE (OUTPATIENT)
Dept: CASE MANAGEMENT | Facility: OTHER | Age: 78
End: 2023-06-15
Payer: MEDICARE

## 2023-06-15 ENCOUNTER — HOME CARE VISIT (OUTPATIENT)
Dept: HOME HEALTH SERVICES | Facility: HOME HEALTHCARE | Age: 78
End: 2023-06-15
Payer: MEDICARE

## 2023-06-15 VITALS — OXYGEN SATURATION: 99 % | DIASTOLIC BLOOD PRESSURE: 65 MMHG | SYSTOLIC BLOOD PRESSURE: 139 MMHG | HEART RATE: 70 BPM

## 2023-06-15 PROCEDURE — G0152 HHCP-SERV OF OT,EA 15 MIN: HCPCS

## 2023-06-16 ENCOUNTER — HOME CARE VISIT (OUTPATIENT)
Dept: HOME HEALTH SERVICES | Facility: HOME HEALTHCARE | Age: 78
End: 2023-06-16
Payer: MEDICARE

## 2023-06-16 VITALS
TEMPERATURE: 98.1 F | HEART RATE: 89 BPM | OXYGEN SATURATION: 95 % | RESPIRATION RATE: 16 BRPM | SYSTOLIC BLOOD PRESSURE: 147 MMHG | DIASTOLIC BLOOD PRESSURE: 74 MMHG

## 2023-06-16 PROCEDURE — G0156 HHCP-SVS OF AIDE,EA 15 MIN: HCPCS

## 2023-06-19 ENCOUNTER — HOME CARE VISIT (OUTPATIENT)
Dept: HOME HEALTH SERVICES | Facility: HOME HEALTHCARE | Age: 78
End: 2023-06-19
Payer: MEDICARE

## 2023-06-19 VITALS
OXYGEN SATURATION: 98 % | RESPIRATION RATE: 16 BRPM | DIASTOLIC BLOOD PRESSURE: 74 MMHG | TEMPERATURE: 98.1 F | HEART RATE: 62 BPM | SYSTOLIC BLOOD PRESSURE: 147 MMHG

## 2023-06-19 PROCEDURE — G0495 RN CARE TRAIN/EDU IN HH: HCPCS

## 2023-06-29 ENCOUNTER — TELEPHONE (OUTPATIENT)
Dept: FAMILY MEDICINE CLINIC | Facility: CLINIC | Age: 78
End: 2023-06-29

## 2023-06-29 NOTE — TELEPHONE ENCOUNTER
Caller: GREGORY - [facility]    Relationship:     Best call back number: [phone]    Who is requesting this form or medical record from you:  [facility]    How would you like to receive the form or medical records (pick-up, mail, fax): FAX  If fax, what is the fax number: [phone]     Additional notes:     GREGORY FROM [facility]    RENETTA MURRY SIGNED FOR  BUT IT HAS HALLE RANDLOPH'S NAME ON IT    GREGORY IS GOING TO FAX OVER A FORM WITH RENETTA'S NAME ON IT    JUST FILL OUT THE FORM AND SIGN AND DATE IT     BE SURE TO LACEY PATIENT'S USING DEXCOM SUPPLY SECTION    FAX - [phone]

## 2023-07-06 NOTE — TELEPHONE ENCOUNTER
Henna called and said she faxed a new form and wanted to know if we received it. It's for the patients Dexcom supplies. If we have the old one, we can put a line through Dr. Peters's name (AND initial/date), add Marychuy's name/NPI + initial/date, and add Dexcom supplies (which was not marked), and initial/date. Please give her a call or fax back asap please.

## 2023-07-11 NOTE — TELEPHONE ENCOUNTER
GREGORY WITH [facility] IS CALLING TO SEE IF THE PROVIDER HAS SIGNED THE NEW FORM     GREGORY CALL BACK     [phone]

## 2023-07-23 DIAGNOSIS — E11.65 TYPE 2 DIABETES MELLITUS WITH HYPERGLYCEMIA, WITH LONG-TERM CURRENT USE OF INSULIN: ICD-10-CM

## 2023-07-23 DIAGNOSIS — Z79.4 TYPE 2 DIABETES MELLITUS WITH HYPERGLYCEMIA, WITH LONG-TERM CURRENT USE OF INSULIN: ICD-10-CM

## 2023-07-24 RX ORDER — BLOOD SUGAR DIAGNOSTIC
STRIP MISCELLANEOUS
OUTPATIENT
Start: 2023-07-24

## 2023-07-31 ENCOUNTER — OFFICE VISIT (OUTPATIENT)
Dept: FAMILY MEDICINE CLINIC | Facility: CLINIC | Age: 78
End: 2023-07-31
Payer: MEDICARE

## 2023-07-31 VITALS
HEIGHT: 60 IN | OXYGEN SATURATION: 98 % | HEART RATE: 75 BPM | WEIGHT: 181.6 LBS | DIASTOLIC BLOOD PRESSURE: 76 MMHG | TEMPERATURE: 97 F | BODY MASS INDEX: 35.65 KG/M2 | SYSTOLIC BLOOD PRESSURE: 110 MMHG

## 2023-07-31 DIAGNOSIS — R60.9 2+ PITTING EDEMA: Primary | ICD-10-CM

## 2023-07-31 DIAGNOSIS — E66.01 MORBID OBESITY: ICD-10-CM

## 2023-07-31 DIAGNOSIS — R29.818 SUSPECTED SLEEP APNEA: ICD-10-CM

## 2023-07-31 DIAGNOSIS — E03.9 ACQUIRED HYPOTHYROIDISM: ICD-10-CM

## 2023-07-31 DIAGNOSIS — R06.09 DYSPNEA ON EXERTION: ICD-10-CM

## 2023-07-31 DIAGNOSIS — R53.82 CHRONIC FATIGUE: ICD-10-CM

## 2023-07-31 PROCEDURE — 1159F MED LIST DOCD IN RCRD: CPT | Performed by: FAMILY MEDICINE

## 2023-07-31 PROCEDURE — 99214 OFFICE O/P EST MOD 30 MIN: CPT | Performed by: FAMILY MEDICINE

## 2023-07-31 PROCEDURE — 1160F RVW MEDS BY RX/DR IN RCRD: CPT | Performed by: FAMILY MEDICINE

## 2023-07-31 PROCEDURE — 3078F DIAST BP <80 MM HG: CPT | Performed by: FAMILY MEDICINE

## 2023-07-31 PROCEDURE — 3046F HEMOGLOBIN A1C LEVEL >9.0%: CPT | Performed by: FAMILY MEDICINE

## 2023-07-31 PROCEDURE — 3074F SYST BP LT 130 MM HG: CPT | Performed by: FAMILY MEDICINE

## 2023-07-31 RX ORDER — FUROSEMIDE 20 MG/1
TABLET ORAL
Qty: 15 TABLET | Refills: 0 | Status: SHIPPED | OUTPATIENT
Start: 2023-07-31

## 2023-08-01 LAB
BNP SERPL-MCNC: 45.2 PG/ML (ref 0–100)
BUN SERPL-MCNC: 12 MG/DL (ref 8–27)
BUN/CREAT SERPL: 20 (ref 12–28)
CALCIUM SERPL-MCNC: 9.4 MG/DL (ref 8.7–10.3)
CHLORIDE SERPL-SCNC: 101 MMOL/L (ref 96–106)
CO2 SERPL-SCNC: 30 MMOL/L (ref 20–29)
CREAT SERPL-MCNC: 0.59 MG/DL (ref 0.57–1)
EGFRCR SERPLBLD CKD-EPI 2021: 94 ML/MIN/1.73
GLUCOSE SERPL-MCNC: 210 MG/DL (ref 70–99)
POTASSIUM SERPL-SCNC: 4.6 MMOL/L (ref 3.5–5.2)
SODIUM SERPL-SCNC: 143 MMOL/L (ref 134–144)
TSH SERPL DL<=0.005 MIU/L-ACNC: 2.68 UIU/ML (ref 0.27–4.2)
VIT B12 SERPL-MCNC: 771 PG/ML (ref 211–946)

## 2023-08-14 ENCOUNTER — TELEPHONE (OUTPATIENT)
Dept: FAMILY MEDICINE CLINIC | Facility: CLINIC | Age: 78
End: 2023-08-14

## 2023-08-14 NOTE — TELEPHONE ENCOUNTER
Caller: TREVOR WITH [employer]    Relationship: Provider    Best call back number: [phone]     What form or medical record are you requesting: FAXED OVER REQUEST FOR [employer] OXYGEN AT HOME SYSTEM     Who is requesting this form or medical record from you: NATHAN     How would you like to receive the form or medical records (pick-up, mail, fax): FAX   If fax, what is the fax number: [phone]    Timeframe paperwork needed: ASAP     Additional notes: REQUEST FOR [employer] AT HOME STATIONARY OXYGEN FAXED OVER TODAY PLEASE RESPOND ASAP PLEASE CALL IF NOT RECEIVED

## 2023-08-15 ENCOUNTER — OFFICE VISIT (OUTPATIENT)
Dept: CARDIOLOGY | Facility: CLINIC | Age: 78
End: 2023-08-15
Payer: MEDICARE

## 2023-08-15 VITALS
RESPIRATION RATE: 20 BRPM | SYSTOLIC BLOOD PRESSURE: 132 MMHG | BODY MASS INDEX: 34.55 KG/M2 | HEIGHT: 60 IN | WEIGHT: 176 LBS | HEART RATE: 65 BPM | DIASTOLIC BLOOD PRESSURE: 78 MMHG | OXYGEN SATURATION: 98 %

## 2023-08-15 DIAGNOSIS — I48.0 PAROXYSMAL ATRIAL FIBRILLATION: ICD-10-CM

## 2023-08-15 DIAGNOSIS — Z95.0 PRESENCE OF CARDIAC PACEMAKER: Primary | ICD-10-CM

## 2023-08-15 PROCEDURE — 3078F DIAST BP <80 MM HG: CPT | Performed by: STUDENT IN AN ORGANIZED HEALTH CARE EDUCATION/TRAINING PROGRAM

## 2023-08-15 PROCEDURE — 3075F SYST BP GE 130 - 139MM HG: CPT | Performed by: STUDENT IN AN ORGANIZED HEALTH CARE EDUCATION/TRAINING PROGRAM

## 2023-08-15 PROCEDURE — 99214 OFFICE O/P EST MOD 30 MIN: CPT | Performed by: STUDENT IN AN ORGANIZED HEALTH CARE EDUCATION/TRAINING PROGRAM

## 2023-08-15 PROCEDURE — 93280 PM DEVICE PROGR EVAL DUAL: CPT | Performed by: STUDENT IN AN ORGANIZED HEALTH CARE EDUCATION/TRAINING PROGRAM

## 2023-08-15 NOTE — PROGRESS NOTES
Cardiac Electrophysiology Outpatient Note  [facility]    Office Visit     Marielena Moeller  [id number]  08/15/2023    Primary Care Physician: Marychuy Atwood APRN    Referred By: No ref. provider found    Subjective     Chief Complaint   Patient presents with    Atrial Fibrillation    Follow-up     Atrial fibrillation, unspecified type       History of Present Illness:   Marielena Moeller is a 77 y.o. female who presents to my electrophysiology clinic for follow up of paroxysmal atrial fibrillation, tachybradycardia syndrome, status post dual-chamber pacemaker.  She was admitted to the hospital found to have significant postconversion pauses with atrial fibrillation up to 6 seconds.  This limited her rate control therapy.  We ended up implanting a pacemaker and she is done quite well with this.  She presents now for follow-up.    He does continue to have intermittent episodes of atrial fibrillation.  She is symptomatic with these.  She can feel her heart racing with these.  Most episodes are short but some of them have been longer.  Denies any other new symptoms..      Past Medical History:   Diagnosis Date    Abdominal hernia     Arthritis     Asthma     Blood in stool     Body piercing     EARS ONLY    Bronchitis     Bulging of lumbar intervertebral disc     Colon polyp     COVID-19 07/2022    Deaf, left     Diabetes mellitus     Diverticulitis     GERD (gastroesophageal reflux disease)     Heart murmur     Hyperlipidemia     Hypertension     Leaky heart valve     Lung nodules     Osteoporosis     Palpitations     Pancreatitis     Supplemental oxygen dependent     NIGHTLY AND WHEN EXERTED    Thyroid disease     LEFT SIDE REMOVED    Tinnitus, left        Past Surgical History:   Procedure Laterality Date    APPENDECTOMY  1966    CARDIAC ELECTROPHYSIOLOGY PROCEDURE N/A 5/8/2023    Procedure: Device Implant PPM;  Surgeon: Brice Arriaga MD;  Location: [facility]  LOCATION;  Service: Cardiology;  Laterality: N/A;     SECTION      CHOLECYSTECTOMY      COLONOSCOPY N/A 8/10/2018    Procedure: COLONOSCOPY WITH HOT SNARE POLYPECTOMY X 3; COLD SNARE POLYPECTOMY, COLD BIOPSY POLYPECTOMY X 4; CLIP PLACEMENT X 1;  Surgeon: Glenn Modi MD;  Location: [facility] ENDOSCOPY;  Service: Gastroenterology    THYROIDECTOMY, PARTIAL Left 2011    TUBAL ABDOMINAL LIGATION         Family History   Problem Relation Age of Onset    Arthritis Mother     Diabetes Mother     Osteoporosis Mother     Arthritis Father     Diabetes Father     Heart attack Father     Hyperlipidemia Father     Diabetes Sister     Thyroid disease Sister     Diabetes Brother     Diabetes Maternal Grandmother     Diabetes Maternal Grandfather     Diabetes Paternal Grandmother     Diabetes Paternal Grandfather        Social History     Socioeconomic History    Marital status:    Tobacco Use    Smoking status: Former     Packs/day: 2.00     Years: 15.00     Pack years: 30.00     Types: Cigarettes     Quit date:      Years since quittin.6     Passive exposure: Past    Smokeless tobacco: Never   Vaping Use    Vaping Use: Never used   Substance and Sexual Activity    Alcohol use: Not Currently    Drug use: Never    Sexual activity: Defer         Current Outpatient Medications:     albuterol (ACCUNEB) 0.63 MG/3ML nebulizer solution, Take 3 mL by nebulization Every 6 (Six) Hours As Needed for Wheezing., Disp: 60 each, Rfl: 11    albuterol sulfate HFA (Ventolin HFA) 108 (90 Base) MCG/ACT inhaler, Inhale 2 puffs Every 6 (Six) Hours As Needed for Wheezing or Shortness of Air., Disp: 54 g, Rfl: 3    apixaban (ELIQUIS) 5 MG tablet tablet, Take 1 tablet by mouth 2 (Two) Times a Day. Indications: Atrial Fibrillation, Disp: 180 tablet, Rfl: 3    Blood Glucose Monitoring Suppl (ACCU-CHEK CHIO PLUS) w/Device kit, USE AS DIRECTED TO CHECK GLUCOSE 3 TIMES DAILY FOR DM E11.65, Disp: 1 kit, Rfl: 0    cholecalciferol  "(VITAMIN D3) 25 MCG (1000 UT) tablet, Take 1 tablet by mouth Daily., Disp: , Rfl:     Continuous Blood Gluc  (Dexcom G6 ) device, 1 each Continuous., Disp: 1 each, Rfl: 0    Continuous Blood Gluc Sensor (Dexcom G6 Sensor), Every 10 (Ten) Days. Apply as directed, Disp: 3 each, Rfl: 11    furosemide (Lasix) 20 MG tablet, 1 po daily as needed for swelling, Disp: 15 tablet, Rfl: 0    glucose blood (Accu-Chek Anne Plus) test strip, USE AS DIRECTED THREE TIMES DAILY, Disp: 300 each, Rfl: 3    insulin aspart prot-insulin aspart (novoLOG 70/30) (70-30) 100 UNIT/ML injection, Inject 50 Units under the skin into the appropriate area as directed 3 (Three) Times a Day., Disp: 100 mL, Rfl: 2    Insulin Syringe 31G X 5/16\" 1 ML misc, 3 (Three) Times a Day., Disp: , Rfl:     Insulin Syringe-Needle U-100 (BD Veo Insulin Syringe U/F) 31G X 15/64\" 0.5 ML misc, as directed, Disp: 300 each, Rfl: 2    levothyroxine (SYNTHROID, LEVOTHROID) 112 MCG tablet, Take 1 tablet by mouth Daily., Disp: 90 tablet, Rfl: 1    LORazepam (ATIVAN) 0.5 MG tablet, Take 1 tablet by mouth Every 8 (Eight) Hours As Needed for Anxiety., Disp: 45 tablet, Rfl: 3    MAGNESIUM-OXIDE 400 (241.3 Mg) MG tablet tablet, Take 1 tablet by mouth Daily., Disp: , Rfl: 3    metoprolol tartrate (LOPRESSOR) 25 MG tablet, Take 1 tablet by mouth 2 (Two) Times a Day., Disp: 60 tablet, Rfl: 11    O2 (OXYGEN), Inhale 2 L/min Continuous., Disp: , Rfl:     Zinc 50 MG tablet, Take 0.5 tablets by mouth As Needed. Indications: Zinc Deficiency, Disp: , Rfl:     Allergies:   Allergies   Allergen Reactions    Diltiazem Other (See Comments)     nausea    Spironolactone Other (See Comments)     Dropped magnesium      Amlodipine Swelling     Leg edema     Doxycycline Nausea And Vomiting     \"Pain worse than pancreatitis\"    Sotalol Other (See Comments)     nausea    Warfarin Other (See Comments)     Nose bleed    Ciprofloxacin Hives    Contrast Dye (Echo Or Unknown Ct/Mr) Rash " "    Breaks her out    Erythromycin Hives and Rash    Hydrochlorothiazide Other (See Comments)        pt developed pancreastitis    Levaquin [Levofloxacin] Swelling    Prednisone Mental Status Change     ITCHING AND NERVOUS       Objective   Vital Signs: Blood pressure 132/78, pulse 65, resp. rate 20, height 152.4 cm (60\"), weight 79.8 kg (176 lb), SpO2 98 %.    PHYSICAL EXAM  General appearance: Awake, alert, cooperative  Head: Normocephalic, without obvious abnormality, atraumatic  Neck: No JVD  Lungs: Clear to ascultation bilaterally  Heart: Regular rate and rhythm, no murmurs, 2+ LE pulses, no lower extremity swelling  Skin: Skin color, turgor normal, no rashes or lesions  Neurologic: Grossly normal     Lab Results   Component Value Date    GLUCOSE 210 (H) 07/31/2023    CALCIUM 9.4 07/31/2023     07/31/2023    K 4.6 07/31/2023    CO2 30 (H) 07/31/2023     07/31/2023    BUN 12 07/31/2023    CREATININE 0.59 07/31/2023    EGFRIFAFRI 104 12/29/2021    EGFRIFNONA 90 12/29/2021    BCR 20 07/31/2023    ANIONGAP 10.0 07/14/2023     Lab Results   Component Value Date    WBC 9.55 07/14/2023    HGB 12.5 07/14/2023    HCT 38.2 07/14/2023    MCV 94.3 07/14/2023     07/14/2023     Lab Results   Component Value Date    INR 0.92 11/11/2022    INR 1.4 (A) 02/05/2021    INR 1.80 (A) 12/30/2020    PROTIME 9.3 11/11/2022    PROTIME 19.5 (H) 02/12/2020    PROTIME 22.0 (H) 09/10/2019     Lab Results   Component Value Date    TSH 2.680 07/31/2023    THYROIDAB <9 02/05/2021          Results for orders placed during the hospital encounter of 05/05/23    Adult Transthoracic Echo Complete W/ Cont if Necessary Per Protocol    Interpretation Summary    Left ventricular systolic function is normal. Calculated left ventricular EF = 55.1% Left ventricular ejection fraction appears to be 56 - 60%.    Left ventricular diastolic function was indeterminate.    Estimated right ventricular systolic pressure from tricuspid " regurgitation is normal (<35 mmHg).         I personally viewed and interpreted the patient's EKG/Telemetry/lab data    Procedures    Marielena Moeller  reports that she quit smoking about 48 years ago. Her smoking use included cigarettes. She has a 30.00 pack-year smoking history. She has been exposed to tobacco smoke. She has never used smokeless tobacco..  Assessment & Plan    1. Presence of cardiac pacemaker  Her Biotronik dual-chamber pacemaker was interrogated is functioning well.  She has approximately 8 years of battery life remaining.  She is pacing 38% of time the atrium and 1% of the ventricle.  Her pacing and sensing thresholds are all within normal limits.  She had 6% atrial fibrillation.  She had a few long episodes, but also had frequent short episodes.  No changes were made to her settings today.    2.  Paroxysmal atrial fibrillation  She has history of paroxysmal atrial fibrillation.  She does have symptoms with this.  She currently is on low-dose metoprolol for rate control.  We discussed different options including increasing her metoprolol versus starting antiarrhythmic agent.  She is somewhat hesitant to start any new medicines at this time.  For now we will have her take an extra dose of metoprolol should she have palpitations and to continue to monitor.  We also discussed the option of AV node ablation, however given her low burden would not pursue this at the current time.    She is on Eliquis for anticoagulation.  She is on aspirin prior.  I see no reason for her to be on both and we will go ahead and stop her aspirin.       Follow Up:  Return in about 6 months (around 2/15/2024) for Recheck.      Thank you for allowing me to participate in the care of your patient. Please do not hesitate to contact me with additional questions or concerns.      Brice Arriaga M.D.  Cardiac Electrophysiologist  [facility]

## 2023-08-16 NOTE — TELEPHONE ENCOUNTER
Caller: TREVOR WITH [employer]ATHAN    Relationship: Provider    Best call back number: [phone]      What form or medical record are you requesting: REQUEST FORM SENT OVER TO YOU TO BE SIGNED AND RETURNED   (2ND ) REQUEST    Who is requesting this form or medical record from you: PROVIDER    How would you like to receive the form or medical records (pick-up, mail, fax): FAX  If fax, what is the fax number: [phone]  If mail, what is the address:   If pick-up, provide patient with address and location details    Timeframe paperwork needed: ASAP    Additional notes: RE FAXING TODAY

## 2023-08-17 ENCOUNTER — TELEPHONE (OUTPATIENT)
Dept: CASE MANAGEMENT | Facility: OTHER | Age: 78
End: 2023-08-17
Payer: MEDICARE

## 2023-08-17 ENCOUNTER — PATIENT OUTREACH (OUTPATIENT)
Dept: CASE MANAGEMENT | Facility: OTHER | Age: 78
End: 2023-08-17
Payer: MEDICARE

## 2023-08-17 DIAGNOSIS — J44.9 CHRONIC OBSTRUCTIVE PULMONARY DISEASE, UNSPECIFIED COPD TYPE: Primary | ICD-10-CM

## 2023-08-17 DIAGNOSIS — Z79.4 TYPE 2 DIABETES MELLITUS WITH HYPERGLYCEMIA, WITH LONG-TERM CURRENT USE OF INSULIN: ICD-10-CM

## 2023-08-17 DIAGNOSIS — E11.65 TYPE 2 DIABETES MELLITUS WITH HYPERGLYCEMIA, WITH LONG-TERM CURRENT USE OF INSULIN: ICD-10-CM

## 2023-08-17 NOTE — TELEPHONE ENCOUNTER
Patient called requesting something for yeast/redness under her abdominal folds. Stated the Nystatin cream and powder are not working. Requesting something else. Stated can not come in for appointment due to no transportation.

## 2023-08-17 NOTE — OUTREACH NOTE
AMBULATORY CASE MANAGEMENT NOTE    Name and Relationship of Patient/Support Person: Sajan, Marielena F - Self    Patient Outreach    Patient outreach to [facility]. Patient stated she is buying an oxygen concentrator from [employer] to keep at her daughters house. Stated [employer] has sent forms to the pcp office to be signed off. Patient requesting update on this. Patient also stated she is having yeast/redness under her abdominal folds and the nystatin cream and powder isn't working. See telephone called routed to PCP for med supplies.     Next outreach scheduled.     Care Coordination    [facility] outreach to  to assist in requested paperwork.     Siomara ROCKWELL  Ambulatory Case Management    8/17/2023, 12:08 EDT

## 2023-08-22 NOTE — TELEPHONE ENCOUNTER
NEVER RECEIVED FAX FROM 8/17    PLEASE RE-SEND     FAX- [phone]    THEY HAVE REQUESTED THREE TIMES

## 2023-08-28 ENCOUNTER — PATIENT OUTREACH (OUTPATIENT)
Dept: CASE MANAGEMENT | Facility: OTHER | Age: 78
End: 2023-08-28
Payer: MEDICARE

## 2023-08-28 NOTE — TELEPHONE ENCOUNTER
Patient called again to check on status of prior request for yeast/redness under abdominal folds.

## 2023-08-28 NOTE — OUTREACH NOTE
AMBULATORY CASE MANAGEMENT NOTE    Name and Relationship of Patient/Support Person: Marielena Moeller F - Self    Patient Outreach    Patient outreach to [facility]. Patient called and stated she is still having yeast/redness under abdominal folds. States the nystatin powder and cream is not working. Will pend telephone call to pcp. Instructed to keep area clean and dry as much as possible. Patient states she can not come in for appointment to be seen because of lack of transportation. States her daughter lives 2 hours away and her son has a new job and can not take off work. States her niece use to help but she has recently moved away. Offered referral to  to help assisting in medical transportation but declines at this time.       Siomara ROCKWELL  Ambulatory Case Management    8/28/2023, 11:29 EDT

## 2023-08-29 ENCOUNTER — TELEPHONE (OUTPATIENT)
Dept: CASE MANAGEMENT | Facility: OTHER | Age: 78
End: 2023-08-29
Payer: MEDICARE

## 2023-08-29 RX ORDER — KETOCONAZOLE 20 MG/G
1 CREAM TOPICAL DAILY
Qty: 30 G | Refills: 1 | OUTPATIENT
Start: 2023-08-29

## 2023-09-07 RX ORDER — FUROSEMIDE 20 MG/1
TABLET ORAL
Qty: 15 TABLET | Refills: 0 | Status: SHIPPED | OUTPATIENT
Start: 2023-09-07

## 2023-09-07 NOTE — TELEPHONE ENCOUNTER
Caller: Sajan Marielena VALERI    Relationship: Self    Best call back number: [phone]     Requested Prescriptions:   Requested Prescriptions     Pending Prescriptions Disp Refills    furosemide (Lasix) 20 MG tablet 15 tablet 0     Si po daily as needed for swelling        Pharmacy where request should be sent: [id number] - [place], [place] - [address] AT [address] - [phone]  - [phone] FX     Last office visit with prescribing clinician: 2023   Last telemedicine visit with prescribing clinician: Visit date not found   Next office visit with prescribing clinician: 2023     Additional details provided by patient: legs swelling again, would like lasix again.    Does the patient have less than a 3 day supply:  [x] Yes  [] No    Would you like a call back once the refill request has been completed: [x] Yes [] No    If the office needs to give you a call back, can they leave a voicemail: [] Yes [] No    Ines Moss Rep   23 13:34 EDT

## 2023-09-08 ENCOUNTER — TELEPHONE (OUTPATIENT)
Dept: FAMILY MEDICINE CLINIC | Facility: CLINIC | Age: 78
End: 2023-09-08
Payer: MEDICARE

## 2023-09-08 DIAGNOSIS — Z78.9 IMPAIRED MOBILITY AND ADLS: ICD-10-CM

## 2023-09-08 DIAGNOSIS — R53.82 CHRONIC FATIGUE: ICD-10-CM

## 2023-09-08 DIAGNOSIS — R06.09 DYSPNEA ON EXERTION: Primary | ICD-10-CM

## 2023-09-08 DIAGNOSIS — Z74.09 IMPAIRED MOBILITY AND ADLS: ICD-10-CM

## 2023-09-08 NOTE — TELEPHONE ENCOUNTER
PATIENTS DAUGHTER STATES THAT SHE HAD TO SEE DR. RANDOLPH [place] [facility] WAS[facility]'T AVAILABLE, SO SHE HAS EDEMA AND HAD GIVE HER furosemide (Lasix) 20 MG tablet . BUT ONLY GIVE HER 15 AND ITS STARTING TO COME BACK CAN [facility] PRESCRIBE THEM AGAIN FOR HER SHE IS HAVING SOME SWELLING AND SAID SHE NEEDS SOMETHING

## 2023-09-08 NOTE — TELEPHONE ENCOUNTER
After patient's daughter spoke to Felicity, she transferred her to me. Patient daughter is requesting a Home Health referral for patient if possible. States that she is getting less and less able to perform home and personal care tasks and they would like to see if someone could come and assist her with minor home chores and personal care. She has used [facility] a few months ago after hospital discharge, but is OK with any Home Health agency if [facility]  cannot reopen her.

## 2023-09-12 ENCOUNTER — HOME HEALTH ADMISSION (OUTPATIENT)
Dept: HOME HEALTH SERVICES | Facility: HOME HEALTHCARE | Age: 78
End: 2023-09-12
Payer: MEDICARE

## 2023-09-12 NOTE — TELEPHONE ENCOUNTER
Becky from [facility] called and said insurance won't pay for what she needs. It would have to be a waiver program or private help. So can someone call her and let he know that home health can not help her with that. And that they are sorry but insurance won't pay for it.

## 2023-09-13 NOTE — TELEPHONE ENCOUNTER
Spoke w pt daughter. Notified her of inability to get personal care assistance through Home Health covered by Medicare. Voiced understanding.

## 2023-09-29 ENCOUNTER — OFFICE VISIT (OUTPATIENT)
Dept: FAMILY MEDICINE CLINIC | Facility: CLINIC | Age: 78
End: 2023-09-29
Payer: MEDICARE

## 2023-09-29 VITALS
RESPIRATION RATE: 16 BRPM | HEIGHT: 60 IN | TEMPERATURE: 97.6 F | HEART RATE: 97 BPM | OXYGEN SATURATION: 94 % | BODY MASS INDEX: 33.96 KG/M2 | WEIGHT: 173 LBS | DIASTOLIC BLOOD PRESSURE: 84 MMHG | SYSTOLIC BLOOD PRESSURE: 128 MMHG

## 2023-09-29 DIAGNOSIS — Z79.4 TYPE 2 DIABETES MELLITUS WITH HYPERGLYCEMIA, WITH LONG-TERM CURRENT USE OF INSULIN: Primary | ICD-10-CM

## 2023-09-29 DIAGNOSIS — E03.9 ACQUIRED HYPOTHYROIDISM: ICD-10-CM

## 2023-09-29 DIAGNOSIS — R60.9 2+ PITTING EDEMA: ICD-10-CM

## 2023-09-29 DIAGNOSIS — B37.2 CANDIDAL INTERTRIGO: ICD-10-CM

## 2023-09-29 DIAGNOSIS — E11.65 TYPE 2 DIABETES MELLITUS WITH HYPERGLYCEMIA, WITH LONG-TERM CURRENT USE OF INSULIN: Primary | ICD-10-CM

## 2023-09-29 DIAGNOSIS — F41.9 ANXIETY: ICD-10-CM

## 2023-09-29 RX ORDER — FUROSEMIDE 20 MG/1
TABLET ORAL
Qty: 30 TABLET | Refills: 2 | Status: SHIPPED | OUTPATIENT
Start: 2023-09-29

## 2023-09-29 RX ORDER — INSULIN ASPART 100 [IU]/ML
50 INJECTION, SUSPENSION SUBCUTANEOUS 3 TIMES DAILY
Qty: 300 ML | Refills: 1 | Status: SHIPPED | OUTPATIENT
Start: 2023-09-29

## 2023-09-29 RX ORDER — LEVOTHYROXINE SODIUM 112 UG/1
112 TABLET ORAL DAILY
Qty: 90 TABLET | Refills: 1 | Status: SHIPPED | OUTPATIENT
Start: 2023-09-29

## 2023-09-29 RX ORDER — NYSTATIN 100000 U/G
1 CREAM TOPICAL 2 TIMES DAILY
Qty: 30 G | Refills: 1 | Status: SHIPPED | OUTPATIENT
Start: 2023-09-29

## 2023-09-29 RX ORDER — GINSENG 100 MG
1 CAPSULE ORAL 2 TIMES DAILY
Qty: 14 G | Refills: 1 | Status: SHIPPED | OUTPATIENT
Start: 2023-09-29

## 2023-09-29 RX ORDER — LORAZEPAM 0.5 MG/1
0.5 TABLET ORAL EVERY 8 HOURS PRN
Qty: 45 TABLET | Refills: 2 | Status: SHIPPED | OUTPATIENT
Start: 2023-09-29

## 2023-09-29 RX ORDER — SYRING-NEEDL,DISP,INSUL,0.3 ML 31GX15/64"
SYRINGE, EMPTY DISPOSABLE MISCELLANEOUS
Qty: 300 EACH | Refills: 2 | Status: SHIPPED | OUTPATIENT
Start: 2023-09-29

## 2023-09-29 NOTE — PROGRESS NOTES
"                      Established Patient        Chief Complaint:   Chief Complaint   Patient presents with    Med Refill     3 month follow up          History of Present Illness:    Marielena Moeller is a 77 y.o. female who presents today for follow up and med refills.    Patient states that she is doing well. Would like to increase ativan. Patient reports that she takes it when afib starts and reports that it helps when taken with hibiscus tea    RLE edema    Yeast in fold under abdomen  Subjective     The following portions of the patient's history were reviewed and updated as appropriate: allergies, current medications, past family history, past medical history, past social history, past surgical history and problem list.    ALLERGIES  Allergies   Allergen Reactions    Diltiazem Other (See Comments)     nausea    Spironolactone Other (See Comments)     Dropped magnesium      Amlodipine Swelling     Leg edema     Doxycycline Nausea And Vomiting     \"Pain worse than pancreatitis\"    Sotalol Other (See Comments)     nausea    Warfarin Other (See Comments)     Nose bleed    Ciprofloxacin Hives    Contrast Dye (Echo Or Unknown Ct/Mr) Rash     Breaks her out    Erythromycin Hives and Rash    Hydrochlorothiazide Other (See Comments)        pt developed pancreastitis    Levaquin [Levofloxacin] Swelling    Prednisone Mental Status Change     ITCHING AND NERVOUS       ROS  Review of Systems   Constitutional:  Positive for fatigue. Negative for fever.   Respiratory:  Positive for cough and shortness of breath.    Cardiovascular:  Positive for palpitations and leg swelling. Negative for chest pain.   Gastrointestinal:  Negative for diarrhea, nausea and vomiting.   Neurological:  Positive for dizziness and weakness.       Objective     Vital Signs:   /84   Pulse 97   Temp 97.6 øF (36.4 øC)   Resp 16   Ht 152.4 cm (60\")   Wt 78.5 kg (173 lb)   LMP  (LMP Unknown)   SpO2 94%   BMI 33.79 kg/mý     BMI is >= 30 and " "<35. (Class 1 Obesity). The following options were offered after discussion;: exercise counseling/recommendations and nutrition counseling/recommendations       Physical Exam   Physical Exam  Vitals and nursing note reviewed.   Constitutional:       Appearance: She is obese.   Eyes:      Pupils: Pupils are equal, round, and reactive to light.   Cardiovascular:      Rate and Rhythm: Regular rhythm. Tachycardia present.   Pulmonary:      Breath sounds: No decreased air movement. Decreased breath sounds and wheezing present. No rhonchi.   Abdominal:      General: Bowel sounds are normal.      Palpations: Abdomen is soft.   Neurological:      Mental Status: She is alert and oriented to person, place, and time.   Psychiatric:         Mood and Affect: Mood normal.         Behavior: Behavior normal.         Assessment and Plan      Assessment/Plan:   Diagnoses and all orders for this visit:    1. Type 2 diabetes mellitus with hyperglycemia, with long-term current use of insulin (Primary)  -     insulin aspart prot-insulin aspart (novoLOG 70/30) (70-30) 100 UNIT/ML injection; Inject 50 Units under the skin into the appropriate area as directed 3 (Three) Times a Day.  Dispense: 300 mL; Refill: 1  -     Insulin Syringe-Needle U-100 (BD Veo Insulin Syringe U/F) 31G X 15/64\" 0.5 ML misc; as directed  Dispense: 300 each; Refill: 2    2. Acquired hypothyroidism  -     levothyroxine (SYNTHROID, LEVOTHROID) 112 MCG tablet; Take 1 tablet by mouth Daily. Indications: Underactive Thyroid  Dispense: 90 tablet; Refill: 1    3. Anxiety  -     LORazepam (ATIVAN) 0.5 MG tablet; Take 1 tablet by mouth Every 8 (Eight) Hours As Needed for Anxiety.  Dispense: 45 tablet; Refill: 2    4. 2+ pitting edema  -     furosemide (Lasix) 20 MG tablet; 1 po daily as needed for swelling  Dispense: 30 tablet; Refill: 2    5. Candidal intertrigo  -     silver sulfadiazine (Silvadene) 1 % cream; Apply 1 application  topically to the appropriate area as " directed 2 (Two) Times a Day.  Dispense: 25 g; Refill: 1  -     nystatin (MYCOSTATIN) 917093 UNIT/GM cream; Apply 1 application  topically to the appropriate area as directed 2 (Two) Times a Day.  Dispense: 30 g; Refill: 1  -     bacitracin 500 UNIT/GM ointment; Apply 1 application  topically to the appropriate area as directed 2 (Two) Times a Day.  Dispense: 14 g; Refill: 1    Risks, benefits, and potential side effects of current/new medications reviewed with patient.  Patient voiced understanding and wished to proceed with silvadene, nystatin, bacitracin combination ointment for treatment of candidal intertrigo.     Patient was encouraged to keep me informed of any acute changes, lack of improvement, or any new concerning symptoms.    Patient voiced understanding of all instructions and denied further questions.    Discussion Summary:  Discussed plan of care in detail with pt today; pt verb understanding and agrees.      I spent 30 minutes caring for Marielena on this date of service. This time includes time spent by me in the following activities:preparing for the visit, reviewing tests, obtaining and/or reviewing a separately obtained history, performing a medically appropriate examination and/or evaluation , counseling and educating the patient/family/caregiver, ordering medications, tests, or procedures and documenting information in the medical record      I have reviewed and updated all copied forward information, as appropriate.  I attest to the accuracy and relevance of any unchanged information.      Follow up:  Return for Next scheduled follow up.     Patient Education:  There are no Patient Instructions on file for this visit.    LETICIA Moreira  10/13/23  15:26 EDT          Please note that portions of this note may have been completed with a voice recognition program.

## 2023-11-08 ENCOUNTER — OFFICE VISIT (OUTPATIENT)
Dept: FAMILY MEDICINE CLINIC | Facility: CLINIC | Age: 78
End: 2023-11-08
Payer: MEDICARE

## 2023-11-08 VITALS
OXYGEN SATURATION: 96 % | DIASTOLIC BLOOD PRESSURE: 80 MMHG | SYSTOLIC BLOOD PRESSURE: 160 MMHG | WEIGHT: 174 LBS | BODY MASS INDEX: 34.16 KG/M2 | HEIGHT: 60 IN | HEART RATE: 79 BPM

## 2023-11-08 DIAGNOSIS — R50.81 FEVER IN OTHER DISEASES: ICD-10-CM

## 2023-11-08 DIAGNOSIS — H92.02 EAR PAIN, LEFT: ICD-10-CM

## 2023-11-08 DIAGNOSIS — N30.00 ACUTE CYSTITIS WITHOUT HEMATURIA: Primary | ICD-10-CM

## 2023-11-08 DIAGNOSIS — M54.50 CHRONIC BILATERAL LOW BACK PAIN WITHOUT SCIATICA: ICD-10-CM

## 2023-11-08 DIAGNOSIS — G89.29 CHRONIC BILATERAL LOW BACK PAIN WITHOUT SCIATICA: ICD-10-CM

## 2023-11-08 DIAGNOSIS — K02.9 DENTAL CARIES: ICD-10-CM

## 2023-11-08 LAB
BILIRUB BLD-MCNC: NEGATIVE MG/DL
CLARITY, POC: CLEAR
COLOR UR: YELLOW
EXPIRATION DATE: ABNORMAL
GLUCOSE UR STRIP-MCNC: ABNORMAL MG/DL
KETONES UR QL: NEGATIVE
LEUKOCYTE EST, POC: NEGATIVE
Lab: ABNORMAL
NITRITE UR-MCNC: NEGATIVE MG/ML
PH UR: 6.5 [PH] (ref 5–8)
PROT UR STRIP-MCNC: ABNORMAL MG/DL
RBC # UR STRIP: ABNORMAL /UL
SP GR UR: 1.02 (ref 1–1.03)
UROBILINOGEN UR QL: NORMAL

## 2023-11-08 PROCEDURE — 81003 URINALYSIS AUTO W/O SCOPE: CPT | Performed by: NURSE PRACTITIONER

## 2023-11-08 PROCEDURE — 3046F HEMOGLOBIN A1C LEVEL >9.0%: CPT | Performed by: NURSE PRACTITIONER

## 2023-11-08 PROCEDURE — 3079F DIAST BP 80-89 MM HG: CPT | Performed by: NURSE PRACTITIONER

## 2023-11-08 PROCEDURE — 99214 OFFICE O/P EST MOD 30 MIN: CPT | Performed by: NURSE PRACTITIONER

## 2023-11-08 PROCEDURE — 3077F SYST BP >= 140 MM HG: CPT | Performed by: NURSE PRACTITIONER

## 2023-11-08 PROCEDURE — 1160F RVW MEDS BY RX/DR IN RCRD: CPT | Performed by: NURSE PRACTITIONER

## 2023-11-08 PROCEDURE — 1159F MED LIST DOCD IN RCRD: CPT | Performed by: NURSE PRACTITIONER

## 2023-11-08 RX ORDER — AMOXICILLIN 875 MG/1
875 TABLET, COATED ORAL 2 TIMES DAILY
Qty: 14 TABLET | Refills: 0 | Status: SHIPPED | OUTPATIENT
Start: 2023-11-08 | End: 2023-11-15

## 2023-11-08 RX ORDER — LIDOCAINE 50 MG/G
1 PATCH TOPICAL EVERY 24 HOURS
Qty: 30 PATCH | Refills: 2 | Status: SHIPPED | OUTPATIENT
Start: 2023-11-08

## 2023-11-08 RX ORDER — AMOXICILLIN AND CLAVULANATE POTASSIUM 875; 125 MG/1; MG/1
1 TABLET, FILM COATED ORAL 2 TIMES DAILY
Qty: 14 TABLET | Refills: 0 | Status: SHIPPED | OUTPATIENT
Start: 2023-11-08 | End: 2023-11-08

## 2023-11-08 NOTE — PROGRESS NOTES
Subjective     Chief Complaint:    Chief Complaint   Patient presents with    Cystitis     Pt sts she thinks she has one    Earache     Pt sts L is worse    Fever       History of Present Illness:   Marielena Moeller is a 77 year old female that presents to clinic today with c/o fever and left ear pain.     102.5 fever, relieved by Tylenol 3-4   Denies nasal congestion  Baseline O2 2.5 L NC- no acute changes. Denies SOA.   Reports left sided facial pain. Pain comes and goes 3-4 days  Pain is described as a dull ache, tender to touch  Also has tooth pain, needs extractions. Pain is worse with ear pain.   Denies sore throat  Daughter reports strep throat infections within family.   Denies N/V/D      Frequency, urgency, dysuria, denies hematuria       Review of Systems  Gen- No fevers, chills  CV- No chest pain, palpitations  Resp- No cough, dyspnea  GI- No N/V/D, abd pain  Neuro-No dizziness, headaches      I have reviewed and/or updated the patient's past medical, surgical, family, social history and problem list as appropriate.     Medications:    Current Outpatient Medications:     albuterol (ACCUNEB) 0.63 MG/3ML nebulizer solution, Take 3 mL by nebulization Every 6 (Six) Hours As Needed for Wheezing., Disp: 60 each, Rfl: 11    albuterol sulfate HFA (Ventolin HFA) 108 (90 Base) MCG/ACT inhaler, Inhale 2 puffs Every 6 (Six) Hours As Needed for Wheezing or Shortness of Air., Disp: 54 g, Rfl: 3    apixaban (ELIQUIS) 5 MG tablet tablet, Take 1 tablet by mouth 2 (Two) Times a Day. Indications: Atrial Fibrillation, Disp: 180 tablet, Rfl: 3    bacitracin 500 UNIT/GM ointment, Apply 1 application  topically to the appropriate area as directed 2 (Two) Times a Day., Disp: 14 g, Rfl: 1    Blood Glucose Monitoring Suppl (ACCU-CHEK CHIO PLUS) w/Device kit, USE AS DIRECTED TO CHECK GLUCOSE 3 TIMES DAILY FOR DM E11.65, Disp: 1 kit, Rfl: 0    cholecalciferol (VITAMIN D3) 25 MCG (1000 UT) tablet, Take 1 tablet by mouth Daily.,  "Disp: , Rfl:     Continuous Blood Gluc  (Dexcom G6 ) device, 1 each Continuous., Disp: 1 each, Rfl: 0    Continuous Blood Gluc Sensor (Dexcom G6 Sensor), Every 10 (Ten) Days. Apply as directed, Disp: 3 each, Rfl: 11    furosemide (Lasix) 20 MG tablet, 1 po daily as needed for swelling, Disp: 30 tablet, Rfl: 2    glucose blood (Accu-Chek Anne Plus) test strip, USE AS DIRECTED THREE TIMES DAILY, Disp: 300 each, Rfl: 3    insulin aspart prot-insulin aspart (novoLOG 70/30) (70-30) 100 UNIT/ML injection, Inject 50 Units under the skin into the appropriate area as directed 3 (Three) Times a Day., Disp: 300 mL, Rfl: 1    Insulin Syringe 31G X 5/16\" 1 ML misc, 3 (Three) Times a Day., Disp: , Rfl:     Insulin Syringe-Needle U-100 (BD Veo Insulin Syringe U/F) 31G X 15/64\" 0.5 ML misc, as directed, Disp: 300 each, Rfl: 2    levothyroxine (SYNTHROID, LEVOTHROID) 112 MCG tablet, Take 1 tablet by mouth Daily. Indications: Underactive Thyroid, Disp: 90 tablet, Rfl: 1    LORazepam (ATIVAN) 0.5 MG tablet, Take 1 tablet by mouth Every 8 (Eight) Hours As Needed for Anxiety., Disp: 45 tablet, Rfl: 2    MAGNESIUM-OXIDE 400 (241.3 Mg) MG tablet tablet, Take 1 tablet by mouth Daily., Disp: , Rfl: 3    metoprolol tartrate (LOPRESSOR) 25 MG tablet, TAKE 1/2 TABLET BY MOUTH TWICE DAILY, Disp: 90 tablet, Rfl: 0    nystatin (MYCOSTATIN) 812457 UNIT/GM cream, Apply 1 application  topically to the appropriate area as directed 2 (Two) Times a Day., Disp: 30 g, Rfl: 1    O2 (OXYGEN), Inhale 2 L/min Continuous., Disp: , Rfl:     silver sulfadiazine (Silvadene) 1 % cream, Apply 1 application  topically to the appropriate area as directed 2 (Two) Times a Day., Disp: 25 g, Rfl: 1    Zinc 50 MG tablet, Take 0.5 tablets by mouth As Needed. Indications: Zinc Deficiency, Disp: , Rfl:     amoxicillin (AMOXIL) 875 MG tablet, Take 1 tablet by mouth 2 (Two) Times a Day for 7 days., Disp: 14 tablet, Rfl: 0    Diclofenac Sodium (VOLTAREN) 1 % " "gel gel, Apply 4 g topically to the appropriate area as directed 4 (Four) Times a Day As Needed (back pain)., Disp: 100 g, Rfl: 2    ketoconazole (NIZORAL) 2 % cream, Apply 1 application  topically to the appropriate area as directed Daily. Apply under folds, Disp: 30 g, Rfl: 1    lidocaine (LIDODERM) 5 %, Place 1 patch on the skin as directed by provider Daily. Remove & Discard patch within 12 hours or as directed by MD, Disp: 30 patch, Rfl: 2    Allergies:  Allergies   Allergen Reactions    Diltiazem Other (See Comments)     nausea    Spironolactone Other (See Comments)     Dropped magnesium      Amlodipine Swelling     Leg edema     Doxycycline Nausea And Vomiting     \"Pain worse than pancreatitis\"    Sotalol Other (See Comments)     nausea    Warfarin Other (See Comments)     Nose bleed    Ciprofloxacin Hives    Contrast Dye (Echo Or Unknown Ct/Mr) Rash     Breaks her out    Erythromycin Hives and Rash    Hydrochlorothiazide Other (See Comments)        pt developed pancreastitis    Levaquin [Levofloxacin] Swelling    Prednisone Mental Status Change     ITCHING AND NERVOUS       Objective     Vital Signs:   Vitals:    11/08/23 1639   BP: 160/80   Pulse: 79   SpO2: 96%   Weight: 78.9 kg (174 lb)   Height: 152.4 cm (60\")     Body mass index is 33.98 kg/m².    Physical Exam:    Physical Exam  Constitutional:       Appearance: Normal appearance.   HENT:      Head: Normocephalic and atraumatic.      Right Ear: Hearing, tympanic membrane, ear canal and external ear normal.      Left Ear: Tympanic membrane, ear canal and external ear normal. Decreased hearing noted.      Mouth/Throat:      Mouth: Mucous membranes are moist.      Dentition: Abnormal dentition. Dental abscesses present.   Neck:      Comments: Shotty cervical adenopathy on left side   Cardiovascular:      Rate and Rhythm: Normal rate and regular rhythm.   Pulmonary:      Effort: Pulmonary effort is normal.   Skin:     General: Skin is warm and dry. "   Neurological:      General: No focal deficit present.      Mental Status: She is alert and oriented to person, place, and time.   Psychiatric:         Mood and Affect: Mood normal.         Behavior: Behavior normal.         Assessment / Plan     Assessment/Plan:   Problem List Items Addressed This Visit    None  Visit Diagnoses       Acute cystitis without hematuria    -  Primary    Relevant Medications    amoxicillin (AMOXIL) 875 MG tablet    Fever in other diseases        Relevant Orders    POCT urinalysis dipstick, automated (Completed)    Ear pain, left        Dental caries        Relevant Medications    amoxicillin (AMOXIL) 875 MG tablet    Chronic bilateral low back pain without sciatica        Relevant Medications    Diclofenac Sodium (VOLTAREN) 1 % gel gel    lidocaine (LIDODERM) 5 %          -- empiric amoxil  -- voltaren and lidoderm patches for her back, discussed she will have to bring up chronic pain management to her PCP    Discussed plan of care in detail with pt today; pt verb understanding and agrees.    Follow up:  As scheduled with PCP    Electronically signed by LETICIA Moser   11/08/2023 16:44 EST      Please note that portions of this note were completed with a voice recognition program.

## 2023-11-09 ENCOUNTER — TELEPHONE (OUTPATIENT)
Dept: FAMILY MEDICINE CLINIC | Facility: CLINIC | Age: 78
End: 2023-11-09

## 2023-11-09 NOTE — TELEPHONE ENCOUNTER
Caller: Marielena Moeller    Relationship: Self    Best call back number: [phone]     What test was performed: LABS     When was the test performed: 11/08/2023    Where was the test performed: IN OFFICE     Additional notes:   PATIENT WOULD LIKE A CALL BACK REGARDING THE RESULTS OF HER LABS DRAWN IN OFFICE 11/08/2023

## 2023-11-09 NOTE — TELEPHONE ENCOUNTER
Caller: Marielena Moeller    Relationship: Self    Best call back number: [phone]     What form or medical record are you requesting: ORDER FOR MEDICAL BATH TRANSFER CHAIR    Who is requesting this form or medical record from you: INSURANCE     How would you like to receive the form or medical records (pick-up, mail, fax):    Timeframe paperwork needed: TODAY     Additional notes: DISCUSSED THIS WITH PCP WHILE IN OFFICE BUT FORGOT TO GET THE ORDER SO SHE CAN HAVE IT FILLED. NEEDS TO HAVE WRITTEN PRESCRIPTION FOR IT DUE TO INSURANCE. CAN  TODAY, PLEASE CALL WHEN READY

## 2023-11-24 DIAGNOSIS — E03.9 ACQUIRED HYPOTHYROIDISM: ICD-10-CM

## 2023-11-28 RX ORDER — LEVOTHYROXINE SODIUM 112 UG/1
112 TABLET ORAL DAILY
Qty: 90 TABLET | Refills: 1 | Status: SHIPPED | OUTPATIENT
Start: 2023-11-28

## 2024-01-27 NOTE — TELEPHONE ENCOUNTER
ICC VISIT NOTE   NAME: Susanne Quinones                51 year old female   : 1972  PMD: Sean Linares,      SUBJECTIVE:    Chief Complaint:  Chief Complaint   Patient presents with    Vaginal Problem     Pt c/o burning and itching to the vaginal area over last 2-3 days,denies fever or abdominal pain,states she has a new partner and usually uses protection but skipped 1 time. Denies uti.      HPI:  Susanne is a 51 year old female with PMH of gastric sleeve/lap band presents for vaginal problem. Patient reports 2-3 days of vaginal burning and itching, worse with sitting and urination. No fevers, abdominal pain, dysuria, hematuria, vaginal discharge, or other complaints. Recently shaved the pubic area and started new fragrance perfume/body wash. Also with new male partner after several years, typically uses protection but did not one time last week. Asking for STI testing  S/p total hysterectomy/oophorectomy sparing cervix    Historian: Patient    PAST MEDICAL HISTORY:  Past Medical History:   Diagnosis Date    Allergy     Vitamin D deficiency      Patient Active Problem List    Diagnosis Date Noted    Back pain, lumbosacral 2022     Priority: Low    Chronic left hip pain 2022     Priority: Low    S/P gastric sleeve procedure 2022     Priority: Low    NSVT (nonsustained ventricular tachycardia) (CMD) 2020     Priority: Low    Chronic migraine without aura with status migrainosus, not intractable 2019     Priority: Low    LAP-BAND surgery status 2008     Priority: Low     Formatting of this note might be different from the original.  Caused erosions so was removed Sep 2010      Hypertension 10/16/2006     Priority: Low      PAST SURGICAL HISTORY:  Past Surgical History:   Procedure Laterality Date    Bariatric surgery      lap band    Bariatric surgery  2018    sleeve    Breast reduction      Cosmetic abdominoplasty tummy tuck      Hernia repair      Hysterectomy    Spoke with Keegan at [facility], he states pt had labs done on 11/12/2020. Questioned because I have called everyday looking for these results. He is not sure what has a time and date of when the pt came in. Faxing results now.       Orif tibia & fibula fractures Right 1991     FAMILY HISTORY:  Not contributory unless stated in HPI  SOCIAL HISTORY:  Social History     Tobacco Use    Smoking status: Never    Smokeless tobacco: Never   Vaping Use    Vaping Use: never used   Substance Use Topics    Alcohol use: Yes     Alcohol/week: 2.0 standard drinks of alcohol     Types: 2 Standard drinks or equivalent per week     Comment: socially    Drug use: Never     MEDICATIONS:  Current Outpatient Medications   Medication Sig    fluconazole (DIFLUCAN) 150 MG tablet Take 1 tablet by mouth 1 time for 1 dose. Repeat dosing in 72 hours if symptoms persist.    albuterol 108 (90 Base) MCG/ACT inhaler Inhale 2 puffs into the lungs every 4 hours as needed for Shortness of Breath.    benzonatate (TESSALON PERLES) 100 MG capsule Take 1 capsule by mouth 3 times daily as needed for Cough.    topiramate (TOPAMAX) 25 MG tablet TAKE 1 TABLET BY MOUTH IN THE MORNING AND IN THE EVENING    cyclobenzaprine (FLEXERIL) 5 MG tablet Take 1 tablet by mouth 3 times daily as needed for Muscle spasms.    acetaminophen (TYLENOL) 500 MG tablet Take 1,000 mg by mouth every 6 hours as needed for Pain.    Calcium Citrate-Vitamin D 315-5 MG-MCG Tab Take 2 tablets by mouth.     No current facility-administered medications for this visit.     ALLERGIES:  ALLERGIES:   Allergen Reactions    Latex PRURITUS and RASH     Patient's medications, allergies, past medical, surgical, and social history  were reviewed and updated as appropriate.    REVIEW OF SYSTEMS:  Review of Systems   Constitutional:  Negative for fever.   Gastrointestinal:  Negative for abdominal pain.   Genitourinary:  Positive for vaginal pain.   Musculoskeletal:  Negative for back pain.   Skin:  Negative for rash.   Neurological:  Negative for weakness.   All other systems reviewed and are negative.    Vitals:    01/27/24 0821   BP: 128/76   BP Location: RUE - Right upper extremity   Patient Position: Sitting   Cuff Size:  Large Adult   Pulse: 94   Resp: 14   Temp: 98.8 °F (37.1 °C)   TempSrc: Temporal   SpO2: 100%   Weight: 100.7 kg (222 lb)   PainSc: 9    PainLoc: Vagina        OBJECTIVE:  PHYSICAL EXAM:  Physical Exam  Vitals and nursing note reviewed. Exam conducted with a chaperone present (Female ICC RN No present).   Constitutional:       General: She is not in acute distress.     Appearance: Normal appearance. She is not ill-appearing or toxic-appearing.   HENT:      Head: Normocephalic and atraumatic.      Neck: Neck supple.   Eyes:      Extraocular Movements: Extraocular movements intact.      Conjunctiva/sclera: Conjunctivae normal.   Cardiovascular:      Rate and Rhythm: Normal rate and regular rhythm.      Heart sounds: No murmur heard.  Pulmonary:      Effort: Pulmonary effort is normal.      Breath sounds: Normal breath sounds.   Abdominal:      General: Abdomen is flat.      Palpations: Abdomen is soft.      Tenderness: There is no abdominal tenderness. There is no right CVA tenderness, left CVA tenderness, guarding or rebound.   Genitourinary:     Exam position: Knee-chest position.      Comments: Right labia majora and minora diffuse erythema small maculopapular rash, no obvious vesicles or ulcerations, area mildly tender on palpation. No secondary signs of bacterial infection  Internal exam: thick, clumpy white discharge throughout vaginal vault. No odor. Cervix visualized with closed os, no lesions, friability, erythema  Normal bimanual exam, no CMT  Skin:     General: Skin is warm and dry.      Findings: No rash.   Neurological:      General: No focal deficit present.      Mental Status: She is alert and oriented to person, place, and time. Mental status is at baseline.   Psychiatric:         Mood and Affect: Mood normal.         Behavior: Behavior normal.         Judgment: Judgment normal.       ASSESSMENT:  MDM:  Susanne is a 51 year old female who presents for vaginal problem. Patient is stable at this time,  non-toxic appearing  Patient with 2-3 days of vaginal pain/itching with recently new sexual partner as well as new body soap and shaving to the pubic area  Exam reveals no signs of PID, intraabdominal pathology. Questionable folliculitis vs contact derm also consider yeast or c/gc  Pelvic swabs obtained c/gc, wet mount, HSV 1/2  Will empirically treat for yeast given clinical appearance and low SE profile of medication  Offered empiric c/gc treatment which she would like to hold on  Urine dip small blood, no other concern for UTI will not obtain culture  Will follow-up with positive results  All questions answered    Results for orders placed or performed in visit on 01/27/24   URINALYSIS, MACROSCOPIC -POINT OF CARE   Result Value Ref Range    COLOR - POINT OF CARE Yellow     APPEARANCE, URINALYSIS - POINT OF CARE Clear     GLUCOSE, URINALYSIS - POINT OF CARE Negative Negative mg/dL    BILIRUBIN, URINALYSIS - POINT OF CARE Negative Negative    KETONES, URINALYSIS - POINT OF CARE Negative Negative mg/dL    SPECIFIC GRAVITY, URINALYSIS - POINT OF CARE >1.030 (H) 1.005 - 1.030 NULL    OCCULT BLOOD, URINALYSIS - POINT OF CARE Small (A) Negative    PH, URINALYSIS - POINT OF CARE 6.0 5.0 - 7.0 Units    PROTEIN, URINALYSIS - POINT OF CARE Negative Negative mg/dL    UROBILINOGEN, URINALYSIS - POINT OF CARE 0.2 0.2, 1.0 mg/dL    NITRITE, URINALYSIS - POINT OF CARE Negative Negative    WBC ESTERASE, URINALYSIS - POINT OF CARE Negative Negative   HCG POC   Result Value Ref Range    HCG, URINE - POINT OF CARE Negative Negative     Discussed with Collaborating Physician regarding patient presentation, physical exam, lab results and physician is in agreement with the patient's work-up and plan of action.     CLINICAL IMPRESSION:  ED Diagnosis   1. Vaginal pain  WET MOUNT    Chlamydia/Gonorrhea by Nucleic Acid Amplification    Herpes Simplex Virus 1 and 2 by PCR    CANCELED: Chlamydia/Gonorrhea by Nucleic Acid Amplification     CANCELED: Herpes Simplex Virus 1 and 2 by PCR      2. Vaginal itching          FOLLOW UP:  Discharge: Home  Condition: Stable  New Prescriptions    FLUCONAZOLE (DIFLUCAN) 150 MG TABLET    Take 1 tablet by mouth 1 time for 1 dose. Repeat dosing in 72 hours if symptoms persist.        Patient Instructions   Pelvic swab results are pending. You will be called with positive results with treatment changes as needed.  Start yeast medication Diflucan. Take as directed.  NO sexual intercourse until symptom results and completion of medication as appropriate. Use protection in future.  No fragrance body washes/skin products to the area. Wear loose fitting undergarments/pants.  Follow-up with PCP and Gynecology for further STI testing as warranted (Syphilis, HIV, etc.).  Return to ICC/Go to ED for worsening pains/symptoms, new vaginal/genital rash, blood in urine/stools, abdominal pain, fevers, or vomiting.     Viridiana Sánchez PA-C   1/27/2024      The 21st Century Cures Act makes medical notes like these available to patients in the interest of transparency. Please be advised that this is a medical document. Medical documents are intended to carry relevant information and the clinical opinion of the practitioner. The medical note is intended as medical provider to provider communication, and may appear blunt or direct. It is written in medical language, and may contain abbreviations or verbiage that are unfamiliar.

## 2024-02-02 ENCOUNTER — TELEPHONE (OUTPATIENT)
Dept: FAMILY MEDICINE CLINIC | Facility: CLINIC | Age: 79
End: 2024-02-02

## 2024-02-02 NOTE — TELEPHONE ENCOUNTER
Caller: Marielena Moeller    Relationship: Self    Best call back number: [phone]     What is the medical concern/diagnosis: DIABETES     What specialty or service is being requested: ENDOCRINOLOGY     Any additional details: PATIENT IS NOT SURE WHO TO SEE FOR THIS BUT WOULD LIKE SOMEONE WITHIN [facility].

## 2024-02-08 DIAGNOSIS — Z79.4 TYPE 2 DIABETES MELLITUS WITH HYPERGLYCEMIA, WITH LONG-TERM CURRENT USE OF INSULIN: Primary | ICD-10-CM

## 2024-02-08 DIAGNOSIS — E11.65 TYPE 2 DIABETES MELLITUS WITH HYPERGLYCEMIA, WITH LONG-TERM CURRENT USE OF INSULIN: Primary | ICD-10-CM

## 2024-04-04 ENCOUNTER — APPOINTMENT (OUTPATIENT)
Dept: CT IMAGING | Facility: HOSPITAL | Age: 79
DRG: 379 | End: 2024-04-04
Payer: MEDICARE

## 2024-04-04 ENCOUNTER — HOSPITAL ENCOUNTER (INPATIENT)
Facility: HOSPITAL | Age: 79
LOS: 1 days | Discharge: HOME | DRG: 379 | End: 2024-04-06
Attending: EMERGENCY MEDICINE | Admitting: INTERNAL MEDICINE
Payer: MEDICARE

## 2024-04-04 DIAGNOSIS — K92.2 GASTROINTESTINAL HEMORRHAGE, UNSPECIFIED GASTROINTESTINAL HEMORRHAGE TYPE: Primary | ICD-10-CM

## 2024-04-04 DIAGNOSIS — K57.91 GASTROINTESTINAL HEMORRHAGE ASSOCIATED WITH INTESTINAL DIVERTICULOSIS: ICD-10-CM

## 2024-04-04 DIAGNOSIS — R73.9 HYPERGLYCEMIA: ICD-10-CM

## 2024-04-04 LAB
ABO GROUP BLD: NORMAL
ABO GROUP BLD: NORMAL
ALBUMIN SERPL-MCNC: 4.1 G/DL (ref 3.5–5.2)
ALBUMIN/GLOB SERPL: 1.4 G/DL
ALP SERPL-CCNC: 100 U/L (ref 39–117)
ALT SERPL W P-5'-P-CCNC: 19 U/L (ref 1–33)
ANION GAP SERPL CALCULATED.3IONS-SCNC: 10.3 MMOL/L (ref 5–15)
AST SERPL-CCNC: 13 U/L (ref 1–32)
BASOPHILS # BLD AUTO: 0.06 10*3/MM3 (ref 0–0.2)
BASOPHILS NFR BLD AUTO: 0.7 % (ref 0–1.5)
BILIRUB SERPL-MCNC: 0.2 MG/DL (ref 0–1.2)
BLD GP AB SCN SERPL QL: NEGATIVE
BUN SERPL-MCNC: 17 MG/DL (ref 8–23)
BUN/CREAT SERPL: 23.6 (ref 7–25)
CALCIUM SPEC-SCNC: 9.4 MG/DL (ref 8.6–10.5)
CHLORIDE SERPL-SCNC: 93 MMOL/L (ref 98–107)
CO2 SERPL-SCNC: 30.7 MMOL/L (ref 22–29)
CREAT SERPL-MCNC: 0.72 MG/DL (ref 0.57–1)
DEPRECATED RDW RBC AUTO: 42 FL (ref 37–54)
EGFRCR SERPLBLD CKD-EPI 2021: 86.8 ML/MIN/1.73
EOSINOPHIL # BLD AUTO: 0.24 10*3/MM3 (ref 0–0.4)
EOSINOPHIL NFR BLD AUTO: 2.7 % (ref 0.3–6.2)
ERYTHROCYTE [DISTWIDTH] IN BLOOD BY AUTOMATED COUNT: 12.1 % (ref 12.3–15.4)
GLOBULIN UR ELPH-MCNC: 3 GM/DL
GLUCOSE SERPL-MCNC: 361 MG/DL (ref 65–99)
HCT VFR BLD AUTO: 40.2 % (ref 34–46.6)
HGB BLD-MCNC: 13.2 G/DL (ref 12–15.9)
HOLD SPECIMEN: NORMAL
HOLD SPECIMEN: NORMAL
IMM GRANULOCYTES # BLD AUTO: 0.04 10*3/MM3 (ref 0–0.05)
IMM GRANULOCYTES NFR BLD AUTO: 0.4 % (ref 0–0.5)
INR PPP: 1 (ref 0.9–1.1)
LIPASE SERPL-CCNC: 35 U/L (ref 13–60)
LYMPHOCYTES # BLD AUTO: 1.58 10*3/MM3 (ref 0.7–3.1)
LYMPHOCYTES NFR BLD AUTO: 17.8 % (ref 19.6–45.3)
MCH RBC QN AUTO: 30.9 PG (ref 26.6–33)
MCHC RBC AUTO-ENTMCNC: 32.8 G/DL (ref 31.5–35.7)
MCV RBC AUTO: 94.1 FL (ref 79–97)
MONOCYTES # BLD AUTO: 0.56 10*3/MM3 (ref 0.1–0.9)
MONOCYTES NFR BLD AUTO: 6.3 % (ref 5–12)
NEUTROPHILS NFR BLD AUTO: 6.41 10*3/MM3 (ref 1.7–7)
NEUTROPHILS NFR BLD AUTO: 72.1 % (ref 42.7–76)
NRBC BLD AUTO-RTO: 0 /100 WBC (ref 0–0.2)
PLATELET # BLD AUTO: 227 10*3/MM3 (ref 140–450)
PMV BLD AUTO: 9.7 FL (ref 6–12)
POTASSIUM SERPL-SCNC: 4.2 MMOL/L (ref 3.5–5.2)
PROT SERPL-MCNC: 7.1 G/DL (ref 6–8.5)
PROTHROMBIN TIME: 13.7 SECONDS (ref 12.3–15.1)
RBC # BLD AUTO: 4.27 10*6/MM3 (ref 3.77–5.28)
RH BLD: POSITIVE
RH BLD: POSITIVE
SODIUM SERPL-SCNC: 134 MMOL/L (ref 136–145)
T&S EXPIRATION DATE: NORMAL
WBC NRBC COR # BLD AUTO: 8.89 10*3/MM3 (ref 3.4–10.8)
WHOLE BLOOD HOLD COAG: NORMAL
WHOLE BLOOD HOLD SPECIMEN: NORMAL

## 2024-04-04 PROCEDURE — 86901 BLOOD TYPING SEROLOGIC RH(D): CPT

## 2024-04-04 PROCEDURE — 25810000003 SODIUM CHLORIDE 0.9 % SOLUTION: Performed by: EMERGENCY MEDICINE

## 2024-04-04 PROCEDURE — 86900 BLOOD TYPING SEROLOGIC ABO: CPT

## 2024-04-04 PROCEDURE — 25010000002 ONDANSETRON PER 1 MG: Performed by: EMERGENCY MEDICINE

## 2024-04-04 PROCEDURE — 74176 CT ABD & PELVIS W/O CONTRAST: CPT

## 2024-04-04 PROCEDURE — 85025 COMPLETE CBC W/AUTO DIFF WBC: CPT | Performed by: EMERGENCY MEDICINE

## 2024-04-04 PROCEDURE — 86900 BLOOD TYPING SEROLOGIC ABO: CPT | Performed by: EMERGENCY MEDICINE

## 2024-04-04 PROCEDURE — 99285 EMERGENCY DEPT VISIT HI MDM: CPT

## 2024-04-04 PROCEDURE — 80053 COMPREHEN METABOLIC PANEL: CPT | Performed by: EMERGENCY MEDICINE

## 2024-04-04 PROCEDURE — 86901 BLOOD TYPING SEROLOGIC RH(D): CPT | Performed by: EMERGENCY MEDICINE

## 2024-04-04 PROCEDURE — 36415 COLL VENOUS BLD VENIPUNCTURE: CPT

## 2024-04-04 PROCEDURE — 85610 PROTHROMBIN TIME: CPT | Performed by: EMERGENCY MEDICINE

## 2024-04-04 PROCEDURE — 86850 RBC ANTIBODY SCREEN: CPT | Performed by: EMERGENCY MEDICINE

## 2024-04-04 PROCEDURE — 25010000002 MORPHINE PER 10 MG: Performed by: EMERGENCY MEDICINE

## 2024-04-04 PROCEDURE — 83690 ASSAY OF LIPASE: CPT | Performed by: EMERGENCY MEDICINE

## 2024-04-04 RX ORDER — MORPHINE SULFATE 2 MG/ML
2 INJECTION, SOLUTION INTRAMUSCULAR; INTRAVENOUS ONCE
Status: DISCONTINUED | OUTPATIENT
Start: 2024-04-04 | End: 2024-04-05

## 2024-04-04 RX ORDER — ONDANSETRON 2 MG/ML
4 INJECTION INTRAMUSCULAR; INTRAVENOUS ONCE
Status: COMPLETED | OUTPATIENT
Start: 2024-04-04 | End: 2024-04-04

## 2024-04-04 RX ORDER — SODIUM CHLORIDE 0.9 % (FLUSH) 0.9 %
10 SYRINGE (ML) INJECTION AS NEEDED
Status: DISCONTINUED | OUTPATIENT
Start: 2024-04-04 | End: 2024-04-06 | Stop reason: HOSPADM

## 2024-04-04 RX ADMIN — SODIUM CHLORIDE 500 ML: 9 INJECTION, SOLUTION INTRAVENOUS at 22:23

## 2024-04-04 RX ADMIN — ONDANSETRON 4 MG: 2 INJECTION INTRAMUSCULAR; INTRAVENOUS at 22:23

## 2024-04-05 PROBLEM — K92.2 GI BLEED: Status: ACTIVE | Noted: 2024-04-05

## 2024-04-05 LAB
ANION GAP SERPL CALCULATED.3IONS-SCNC: 8.9 MMOL/L (ref 5–15)
BASOPHILS # BLD AUTO: 0.04 10*3/MM3 (ref 0–0.2)
BASOPHILS NFR BLD AUTO: 0.5 % (ref 0–1.5)
BUN SERPL-MCNC: 17 MG/DL (ref 8–23)
BUN/CREAT SERPL: 24.6 (ref 7–25)
CALCIUM SPEC-SCNC: 8.6 MG/DL (ref 8.6–10.5)
CHLORIDE SERPL-SCNC: 100 MMOL/L (ref 98–107)
CO2 SERPL-SCNC: 30.1 MMOL/L (ref 22–29)
CREAT SERPL-MCNC: 0.69 MG/DL (ref 0.57–1)
DEPRECATED RDW RBC AUTO: 42.9 FL (ref 37–54)
EGFRCR SERPLBLD CKD-EPI 2021: 90.1 ML/MIN/1.73
EOSINOPHIL # BLD AUTO: 0.12 10*3/MM3 (ref 0–0.4)
EOSINOPHIL NFR BLD AUTO: 1.4 % (ref 0.3–6.2)
ERYTHROCYTE [DISTWIDTH] IN BLOOD BY AUTOMATED COUNT: 12.2 % (ref 12.3–15.4)
GLUCOSE BLDC GLUCOMTR-MCNC: 213 MG/DL (ref 70–130)
GLUCOSE BLDC GLUCOMTR-MCNC: 288 MG/DL (ref 70–130)
GLUCOSE BLDC GLUCOMTR-MCNC: 299 MG/DL (ref 70–130)
GLUCOSE BLDC GLUCOMTR-MCNC: 328 MG/DL (ref 70–130)
GLUCOSE SERPL-MCNC: 324 MG/DL (ref 65–99)
HBA1C MFR BLD: 10.3 % (ref 4.8–5.6)
HCT VFR BLD AUTO: 34.2 % (ref 34–46.6)
HGB BLD-MCNC: 11.2 G/DL (ref 12–15.9)
IMM GRANULOCYTES # BLD AUTO: 0.03 10*3/MM3 (ref 0–0.05)
IMM GRANULOCYTES NFR BLD AUTO: 0.4 % (ref 0–0.5)
LYMPHOCYTES # BLD AUTO: 1.58 10*3/MM3 (ref 0.7–3.1)
LYMPHOCYTES NFR BLD AUTO: 19 % (ref 19.6–45.3)
MCH RBC QN AUTO: 31 PG (ref 26.6–33)
MCHC RBC AUTO-ENTMCNC: 32.7 G/DL (ref 31.5–35.7)
MCV RBC AUTO: 94.7 FL (ref 79–97)
MONOCYTES # BLD AUTO: 0.52 10*3/MM3 (ref 0.1–0.9)
MONOCYTES NFR BLD AUTO: 6.3 % (ref 5–12)
NEUTROPHILS NFR BLD AUTO: 6.02 10*3/MM3 (ref 1.7–7)
NEUTROPHILS NFR BLD AUTO: 72.4 % (ref 42.7–76)
NRBC BLD AUTO-RTO: 0 /100 WBC (ref 0–0.2)
PLATELET # BLD AUTO: 205 10*3/MM3 (ref 140–450)
PMV BLD AUTO: 10 FL (ref 6–12)
POTASSIUM SERPL-SCNC: 4.6 MMOL/L (ref 3.5–5.2)
RBC # BLD AUTO: 3.61 10*6/MM3 (ref 3.77–5.28)
SODIUM SERPL-SCNC: 139 MMOL/L (ref 136–145)
WBC NRBC COR # BLD AUTO: 8.31 10*3/MM3 (ref 3.4–10.8)

## 2024-04-05 PROCEDURE — 99232 SBSQ HOSP IP/OBS MODERATE 35: CPT | Performed by: NURSE PRACTITIONER

## 2024-04-05 PROCEDURE — 80048 BASIC METABOLIC PNL TOTAL CA: CPT | Performed by: INTERNAL MEDICINE

## 2024-04-05 PROCEDURE — 99222 1ST HOSP IP/OBS MODERATE 55: CPT | Performed by: INTERNAL MEDICINE

## 2024-04-05 PROCEDURE — 25010000002 MORPHINE PER 10 MG: Performed by: INTERNAL MEDICINE

## 2024-04-05 PROCEDURE — 85025 COMPLETE CBC W/AUTO DIFF WBC: CPT | Performed by: INTERNAL MEDICINE

## 2024-04-05 PROCEDURE — 83036 HEMOGLOBIN GLYCOSYLATED A1C: CPT | Performed by: INTERNAL MEDICINE

## 2024-04-05 PROCEDURE — 63710000001 INSULIN REGULAR HUMAN PER 5 UNITS: Performed by: INTERNAL MEDICINE

## 2024-04-05 PROCEDURE — 82948 REAGENT STRIP/BLOOD GLUCOSE: CPT

## 2024-04-05 PROCEDURE — 99222 1ST HOSP IP/OBS MODERATE 55: CPT | Performed by: SURGERY

## 2024-04-05 PROCEDURE — 97162 PT EVAL MOD COMPLEX 30 MIN: CPT

## 2024-04-05 PROCEDURE — 25010000002 PIPERACILLIN SOD-TAZOBACTAM PER 1 G: Performed by: INTERNAL MEDICINE

## 2024-04-05 RX ORDER — POLYETHYLENE GLYCOL 3350 17 G/17G
0.5 POWDER, FOR SOLUTION ORAL EVERY 8 HOURS SCHEDULED
Status: COMPLETED | OUTPATIENT
Start: 2024-04-05 | End: 2024-04-06

## 2024-04-05 RX ORDER — SODIUM CHLORIDE 9 MG/ML
40 INJECTION, SOLUTION INTRAVENOUS AS NEEDED
Status: DISCONTINUED | OUTPATIENT
Start: 2024-04-05 | End: 2024-04-06 | Stop reason: HOSPADM

## 2024-04-05 RX ORDER — BISACODYL 5 MG/1
5 TABLET, DELAYED RELEASE ORAL DAILY PRN
Status: DISCONTINUED | OUTPATIENT
Start: 2024-04-05 | End: 2024-04-06 | Stop reason: HOSPADM

## 2024-04-05 RX ORDER — CHOLECALCIFEROL (VITAMIN D3) 125 MCG
5 CAPSULE ORAL NIGHTLY PRN
Status: DISCONTINUED | OUTPATIENT
Start: 2024-04-05 | End: 2024-04-06 | Stop reason: HOSPADM

## 2024-04-05 RX ORDER — LEVOTHYROXINE SODIUM 112 UG/1
112 TABLET ORAL DAILY
Status: DISCONTINUED | OUTPATIENT
Start: 2024-04-05 | End: 2024-04-06 | Stop reason: HOSPADM

## 2024-04-05 RX ORDER — LORAZEPAM 0.5 MG/1
0.5 TABLET ORAL EVERY 8 HOURS PRN
Status: DISCONTINUED | OUTPATIENT
Start: 2024-04-05 | End: 2024-04-06 | Stop reason: HOSPADM

## 2024-04-05 RX ORDER — BISACODYL 10 MG
10 SUPPOSITORY, RECTAL RECTAL DAILY PRN
Status: DISCONTINUED | OUTPATIENT
Start: 2024-04-05 | End: 2024-04-06 | Stop reason: HOSPADM

## 2024-04-05 RX ORDER — NITROGLYCERIN 0.4 MG/1
0.4 TABLET SUBLINGUAL
Status: DISCONTINUED | OUTPATIENT
Start: 2024-04-05 | End: 2024-04-06 | Stop reason: HOSPADM

## 2024-04-05 RX ORDER — NICOTINE POLACRILEX 4 MG
15 LOZENGE BUCCAL
Status: DISCONTINUED | OUTPATIENT
Start: 2024-04-05 | End: 2024-04-06 | Stop reason: HOSPADM

## 2024-04-05 RX ORDER — MORPHINE SULFATE 2 MG/ML
2 INJECTION, SOLUTION INTRAMUSCULAR; INTRAVENOUS EVERY 4 HOURS PRN
Status: DISCONTINUED | OUTPATIENT
Start: 2024-04-05 | End: 2024-04-06 | Stop reason: HOSPADM

## 2024-04-05 RX ORDER — NALOXONE HCL 0.4 MG/ML
0.4 VIAL (ML) INJECTION
Status: DISCONTINUED | OUTPATIENT
Start: 2024-04-05 | End: 2024-04-06 | Stop reason: HOSPADM

## 2024-04-05 RX ORDER — DEXTROSE MONOHYDRATE 25 G/50ML
25 INJECTION, SOLUTION INTRAVENOUS
Status: DISCONTINUED | OUTPATIENT
Start: 2024-04-05 | End: 2024-04-06 | Stop reason: HOSPADM

## 2024-04-05 RX ORDER — POLYETHYLENE GLYCOL 3350 17 G/17G
17 POWDER, FOR SOLUTION ORAL DAILY PRN
Status: DISCONTINUED | OUTPATIENT
Start: 2024-04-05 | End: 2024-04-06 | Stop reason: HOSPADM

## 2024-04-05 RX ORDER — ACETAMINOPHEN 325 MG/1
650 TABLET ORAL EVERY 4 HOURS PRN
Status: DISCONTINUED | OUTPATIENT
Start: 2024-04-05 | End: 2024-04-06 | Stop reason: HOSPADM

## 2024-04-05 RX ORDER — SODIUM CHLORIDE 0.9 % (FLUSH) 0.9 %
10 SYRINGE (ML) INJECTION AS NEEDED
Status: DISCONTINUED | OUTPATIENT
Start: 2024-04-05 | End: 2024-04-06 | Stop reason: HOSPADM

## 2024-04-05 RX ORDER — ACETAMINOPHEN 160 MG/5ML
650 SOLUTION ORAL EVERY 4 HOURS PRN
Status: DISCONTINUED | OUTPATIENT
Start: 2024-04-05 | End: 2024-04-06 | Stop reason: HOSPADM

## 2024-04-05 RX ORDER — ACETAMINOPHEN 650 MG/1
650 SUPPOSITORY RECTAL EVERY 4 HOURS PRN
Status: DISCONTINUED | OUTPATIENT
Start: 2024-04-05 | End: 2024-04-06 | Stop reason: HOSPADM

## 2024-04-05 RX ORDER — SODIUM CHLORIDE 0.9 % (FLUSH) 0.9 %
10 SYRINGE (ML) INJECTION EVERY 12 HOURS SCHEDULED
Status: DISCONTINUED | OUTPATIENT
Start: 2024-04-05 | End: 2024-04-06 | Stop reason: HOSPADM

## 2024-04-05 RX ORDER — ONDANSETRON 2 MG/ML
4 INJECTION INTRAMUSCULAR; INTRAVENOUS EVERY 6 HOURS PRN
Status: DISCONTINUED | OUTPATIENT
Start: 2024-04-05 | End: 2024-04-06 | Stop reason: HOSPADM

## 2024-04-05 RX ORDER — AMOXICILLIN 250 MG
2 CAPSULE ORAL 2 TIMES DAILY PRN
Status: DISCONTINUED | OUTPATIENT
Start: 2024-04-05 | End: 2024-04-06 | Stop reason: HOSPADM

## 2024-04-05 RX ADMIN — LORAZEPAM 0.5 MG: 0.5 TABLET ORAL at 02:23

## 2024-04-05 RX ADMIN — METOPROLOL TARTRATE 12.5 MG: 25 TABLET, FILM COATED ORAL at 09:01

## 2024-04-05 RX ADMIN — PIPERACILLIN SODIUM AND TAZOBACTAM SODIUM 3.38 G: 3; .375 INJECTION, POWDER, FOR SOLUTION INTRAVENOUS at 16:53

## 2024-04-05 RX ADMIN — ACETAMINOPHEN 650 MG: 325 TABLET, FILM COATED ORAL at 22:38

## 2024-04-05 RX ADMIN — Medication 10 ML: at 09:01

## 2024-04-05 RX ADMIN — HUMAN INSULIN 6 UNITS: 100 INJECTION, SOLUTION SUBCUTANEOUS at 12:15

## 2024-04-05 RX ADMIN — PIPERACILLIN SODIUM AND TAZOBACTAM SODIUM 3.38 G: 3; .375 INJECTION, POWDER, FOR SOLUTION INTRAVENOUS at 02:23

## 2024-04-05 RX ADMIN — POLYETHYLENE GLYCOL 3350 0.5 BOTTLE: 17 POWDER, FOR SOLUTION ORAL at 17:12

## 2024-04-05 RX ADMIN — PIPERACILLIN SODIUM AND TAZOBACTAM SODIUM 3.38 G: 3; .375 INJECTION, POWDER, FOR SOLUTION INTRAVENOUS at 23:57

## 2024-04-05 RX ADMIN — PIPERACILLIN SODIUM AND TAZOBACTAM SODIUM 3.38 G: 3; .375 INJECTION, POWDER, FOR SOLUTION INTRAVENOUS at 09:06

## 2024-04-05 RX ADMIN — ACETAMINOPHEN 650 MG: 325 TABLET, FILM COATED ORAL at 12:18

## 2024-04-05 RX ADMIN — Medication 10 ML: at 02:24

## 2024-04-05 RX ADMIN — MORPHINE SULFATE 2 MG: 2 INJECTION, SOLUTION INTRAMUSCULAR; INTRAVENOUS at 02:23

## 2024-04-05 RX ADMIN — LEVOTHYROXINE SODIUM 112 MCG: 0.11 TABLET ORAL at 09:01

## 2024-04-05 NOTE — H&P
[facility] HOSPITALIST   HISTORY AND PHYSICAL      Name:  Marielena Moeller   Age:  78 y.o.  Sex:  female  :  1945  MRN:  [id number]   Visit Number:  [id number]  Admission Date:  2024  Date Of Service:  24  Primary Care Physician:  Marychuy Atwood APRN    Chief Complaint:     Bright red blood per rectum    History Of Presenting Illness:      Patient is a chronically ill 78-year-old female with history significant for type 2 diabetes, A-fib on Eliquis and obesity presents to the emergency room with complaints of bright red blood per rectum.  Patient also reports a history of diverticulitis.  States that she has been in her normal state of health until earlier in the day she noticed bright red blood per rectum with clots.  Does have associated left lower abdominal cramping.  Denies nausea/vomiting, fever.  Upon arrival to the ER patient afebrile, hypertensive 201/78 and nonhypoxic.  CMP fairly unremarkable, PT/INR normal.  Lipase normal.  CBC unremarkable.  Hemoglobin 13 hematocrit 40.  CT abdomen pelvis showed no acute findings.  Blood pressure improved after pain medication was administered.  Patient had witnessed episodes of hematochezia while in the ER.  Hospitalist asked to admit.    Review Of Systems:    All systems were reviewed and negative except as mentioned in history of presenting illness, assessment and plan.    Past Medical History: Patient  has a past medical history of Abdominal hernia, Arthritis, Asthma, Blood in stool, Body piercing, Bronchitis, Bulging of lumbar intervertebral disc, Colon polyp, COVID-19 (2022), Deaf, left, Diabetes mellitus, Diverticulitis, GERD (gastroesophageal reflux disease), Heart murmur, Hyperlipidemia, Hypertension, Leaky heart valve, Lung nodules, Osteoporosis, Palpitations, Pancreatitis, Supplemental oxygen dependent, Thyroid disease, and Tinnitus, left.    Past Surgical History: Patient  has a past surgical history that includes  Thyroidectomy, partial (Left, );  section; Appendectomy (); Tubal ligation (); Cholecystectomy (); Colonoscopy (N/A, 8/10/2018); and Cardiac electrophysiology procedure (N/A, 2023).    Social History: Patient  reports that she quit smoking about 49 years ago. Her smoking use included cigarettes. She started smoking about 64 years ago. She has a 30 pack-year smoking history. She has been exposed to tobacco smoke. She has never used smokeless tobacco. She reports that she does not currently use alcohol. She reports that she does not use drugs.    Family History:  Patient's family history has been reviewed and found to be noncontributory.     Allergies:      Diltiazem, Spironolactone, Amlodipine, Doxycycline, Sotalol, Warfarin, Ciprofloxacin, Contrast dye (echo or unknown ct/mr), Erythromycin, Hydrochlorothiazide, Levaquin [levofloxacin], and Prednisone    Home Medications:    Prior to Admission Medications       Prescriptions Last Dose Informant Patient Reported? Taking?    albuterol (ACCUNEB) 0.63 MG/3ML nebulizer solution   No No    Take 3 mL by nebulization Every 6 (Six) Hours As Needed for Wheezing.    albuterol sulfate HFA (Ventolin HFA) 108 (90 Base) MCG/ACT inhaler   No No    Inhale 2 puffs Every 6 (Six) Hours As Needed for Wheezing or Shortness of Air.    apixaban (ELIQUIS) 5 MG tablet tablet   No No    Take 1 tablet by mouth 2 (Two) Times a Day. Indications: Atrial Fibrillation    bacitracin 500 UNIT/GM ointment   No No    Apply 1 application  topically to the appropriate area as directed 2 (Two) Times a Day.    Blood Glucose Monitoring Suppl (ACCU-CHEK CHIO PLUS) w/Device kit   No No    USE AS DIRECTED TO CHECK GLUCOSE 3 TIMES DAILY FOR DM E11.65    cholecalciferol (VITAMIN D3) 25 MCG (1000 UT) tablet   Yes No    Take 1 tablet by mouth Daily.    Continuous Blood Gluc  (Dexcom G6 ) device   No No    1 each Continuous.    Continuous Blood Gluc Sensor (Dexcom G6  "Sensor)   No No    Every 10 (Ten) Days. Apply as directed    Diclofenac Sodium (VOLTAREN) 1 % gel gel   No No    Apply 4 g topically to the appropriate area as directed 4 (Four) Times a Day As Needed (back pain).    furosemide (Lasix) 20 MG tablet   No No    1 po daily as needed for swelling    glucose blood (Accu-Chek Anne Plus) test strip   No No    USE AS DIRECTED THREE TIMES DAILY    insulin aspart prot-insulin aspart (novoLOG 70/30) (70-30) 100 UNIT/ML injection   No No    Inject 50 Units under the skin into the appropriate area as directed 3 (Three) Times a Day.    Insulin Syringe 31G X 5/16\" 1 ML misc   Yes No    3 (Three) Times a Day.    Insulin Syringe-Needle U-100 (BD Veo Insulin Syringe U/F) 31G X 15/64\" 0.5 ML misc   No No    as directed    ketoconazole (NIZORAL) 2 % cream   No No    Apply 1 application  topically to the appropriate area as directed Daily. Apply under folds    levothyroxine (SYNTHROID, LEVOTHROID) 112 MCG tablet   No No    TAKE 1 TABLET BY MOUTH DAILY    lidocaine (LIDODERM) 5 %   No No    Place 1 patch on the skin as directed by provider Daily. Remove & Discard patch within 12 hours or as directed by MD    LORazepam (ATIVAN) 0.5 MG tablet   No No    Take 1 tablet by mouth Every 8 (Eight) Hours As Needed for Anxiety.    MAGNESIUM-OXIDE 400 (241.3 Mg) MG tablet tablet   Yes No    Take 1 tablet by mouth Daily.    metoprolol tartrate (LOPRESSOR) 25 MG tablet   No No    TAKE 1/2 TABLET BY MOUTH TWICE DAILY    nystatin (MYCOSTATIN) 969309 UNIT/GM cream   No No    Apply 1 application  topically to the appropriate area as directed 2 (Two) Times a Day.    O2 (OXYGEN)   Yes No    Inhale 2 L/min Continuous.    silver sulfadiazine (Silvadene) 1 % cream   No No    Apply 1 application  topically to the appropriate area as directed 2 (Two) Times a Day.    Zinc 50 MG tablet   Yes No    Take 0.5 tablets by mouth As Needed. Indications: Zinc Deficiency          ED Medications:    Medications   sodium " "chloride 0.9 % flush 10 mL (has no administration in time range)   Morphine sulfate (PF) injection 2 mg (2 mg Intravenous Not Given 4/4/24 2223)   Pharmacy to Dose Zosyn (has no administration in time range)   sodium chloride 0.9 % bolus 500 mL (0 mL Intravenous Stopped 4/4/24 2350)   ondansetron (ZOFRAN) injection 4 mg (4 mg Intravenous Given 4/4/24 2223)     Vital Signs:  Temp:  [98 °F (36.7 °C)] 98 °F (36.7 °C)  Heart Rate:  [87-89] 87  Resp:  [16-18] 17  BP: (159-201)/(78-99) 159/99        04/04/24 2137   Weight: 76.7 kg (169 lb)     Body mass index is 33.01 kg/m².    Physical Exam:     Most recent vital Signs: /99   Pulse 87   Temp 98 °F (36.7 °C) (Oral)   Resp 17   Ht 152.4 cm (60\")   Wt 76.7 kg (169 lb)   LMP  (LMP Unknown)   SpO2 94%   BMI 33.01 kg/m²     Physical Exam  Vitals reviewed.   Constitutional:       General: She is not in acute distress.     Appearance: She is obese.   HENT:      Head: Normocephalic and atraumatic.      Right Ear: External ear normal.      Left Ear: External ear normal.      Mouth/Throat:      Mouth: Mucous membranes are moist.      Pharynx: Oropharynx is clear.   Eyes:      Extraocular Movements: Extraocular movements intact.      Conjunctiva/sclera: Conjunctivae normal.   Cardiovascular:      Rate and Rhythm: Normal rate and regular rhythm.      Pulses: Normal pulses.      Heart sounds: Normal heart sounds.   Pulmonary:      Effort: Pulmonary effort is normal.      Breath sounds: Normal breath sounds.   Abdominal:      General: There is no distension.      Palpations: Abdomen is soft.      Comments: LLQ pain   Musculoskeletal:         General: Normal range of motion.   Skin:     General: Skin is warm and dry.   Neurological:      General: No focal deficit present.      Mental Status: She is alert and oriented to person, place, and time.   Psychiatric:         Behavior: Behavior normal.         Laboratory data:    I have reviewed the labs done in the emergency " "room.    Results from last 7 days   Lab Units 04/04/24  2151   SODIUM mmol/L 134*   POTASSIUM mmol/L 4.2   CHLORIDE mmol/L 93*   CO2 mmol/L 30.7*   BUN mg/dL 17   CREATININE mg/dL 0.72   CALCIUM mg/dL 9.4   BILIRUBIN mg/dL 0.2   ALK PHOS U/L 100   ALT (SGPT) U/L 19   AST (SGOT) U/L 13   GLUCOSE mg/dL 361*     Results from last 7 days   Lab Units 04/04/24  2151   WBC 10*3/mm3 8.89   HEMOGLOBIN g/dL 13.2   HEMATOCRIT % 40.2   PLATELETS 10*3/mm3 227     Results from last 7 days   Lab Units 04/04/24  2151   INR  1.00                 Results from last 7 days   Lab Units 04/04/24  2151   LIPASE U/L 35               Invalid input(s): \"USDES\", \"NITRITITE\", \"BACT\", \"EP\"    Pain Management Panel  More data exists         1/17/2020 1/28/2019   Pain Management Panel   Creatinine, Urine 50  72.7        EKG:          Radiology:    CT Abdomen Pelvis Without Contrast    Result Date: 4/4/2024  FINAL REPORT TECHNIQUE: Axial CT images were performed from the lung bases through the symphysis pubis without IV contrast.  This study was performed with techniques to keep radiation doses as low as reasonably achievable (ALARA). Individualized dose reduction techniques using automated exposure control or adjustment of mA and/or kV according to the patient's size were employed. CLINICAL HISTORY: Abdominal pain, acute, nonlocalized  rectal bleeding, BRBPR FINDINGS: Lack of intravenous contrast limits evaluation of solid abdominal and pelvic organs.  LOWER CHEST: The heart is normal size.  There is scattered tree-in-bud nodularity in the lung bases.    ABDOMEN/PELVIS:  Liver, gallbladder and bile ducts: The unenhanced liver is grossly unremarkable without focal abnormality.  There has been a prior cholecystectomy.  There is no definite biliary duct dilatation.  Adrenal glands: The adrenal glands are morphologically unremarkable without suspicious lesion.  Kidneys, ureter and urinary bladder: No nephrolithiasis.  No hydronephrosis.  Urinary " bladder is unremarkable.  Spleen: The spleen is normal size.  Pancreas: The pancreas is grossly unremarkable.  GI systems and mesentery: There is colonic diverticulosis without diverticulitis.  No evidence of bowel obstruction.  The appendix is not visualized but there are no secondary signs of appendicitis.    No significant mesenteric inflammation.  Lymph nodes: No definite pathologically enlarged abdominal or pelvic lymph nodes present within the limits of this noncontrast enhanced exam.  Vessels: The abdominal aorta is normal in caliber.  The inferior vena cava is unremarkable.  Peritoneum: No free intraperitoneal fluid or pneumoperitoneum.  Pelvic viscera: No acute findings.  Body wall: No body wall contusion.  Bones: No acute fracture.     No acute findings in the abdomen and pelvis to account for patient's symptoms.    Tree-in-bud nodularity in the lung bases most compatible with chronic endobronchial infection. Authenticated and Electronically Signed by Favio Mukherjee MD on 04/04/2024 11:44:19 PM     Assessment:    Hematochezia/lower GI bleeding POA  A-fib on Eliquis  Insulin-dependent type 2 diabetes  Hypertension  Hyperlipidemia  COPD  Hypothyroidism  Anxiety  Obesity  Impaired mobility and ADL    Plan:    Hematochezia/lower GI bleeding  -Hold chronic Eliquis  -Currently H&H stable, no indication for PRBC  -Patient requested to see Dr. Urban.  He has performed previous colonoscopies.  Will need to consult in the a.m.  -With history of diverticulitis, suspect diverticular bleeding.  Will add empiric Zosyn.  -No indication for IV Protonix as I suspect this is lower GI bleeding.    Type 2 diabetes, insulin-dependent  -Basal Levemir  -Moderate dose sliding scale correction  -A1c pending  -Diabetic education    Further orders as clinical course dictates.  Winston reviewed.  Home medications ordered as warranted.    Risk Assessment: Moderate  DVT Prophylaxis: SCD  Code Status: Full  Diet: N.p.o.                Norm Thakkar DO  04/05/24  00:37 EDT    Dictated utilizing Dragon dictation.

## 2024-04-05 NOTE — PLAN OF CARE
Goal Outcome Evaluation:  Plan of Care Reviewed With: patient        Progress: no change  Outcome Evaluation: Patient participates well in PT evaluation demonstrated decreased strength, balance and overall mobility.  She requires minimal assistance to perform bed mobility, transfers and gait.  She walked 3 feet with RW, verbal cues and minimal assistance.  She fatigues quickly and requires frequent rests.  She is expected to benefit from additional PT services while hospitalized and upon discharge to inpatient rehab.      Anticipated Discharge Disposition (PT): inpatient rehabilitation facility

## 2024-04-05 NOTE — ED PROVIDER NOTES
EMERGENCY DEPARTMENT ENCOUNTER    Pt Name: Marielena Moeller  MRN: [id number]  Pt :   1945  Room Number:  429/1  Date of encounter:  2024  PCP: Marychuy Atwood APRN  ED Provider: Shaan Joshi MD    Historian: Patient      HPI:  Chief Complaint: Rectal bleeding, abdominal pain        Context: Marielena Moeller is a 78 y.o. female who presents to the ED c/o rectal bleeding and abdominal pain.  The patient has a past medical history significant for atrial fibrillation for which she is on Eliquis.  Patient reports to me that this evening she began having left lower quadrant abdominal pain associated with 2-3 episodes of hematochezia with dark clots.  She denies having associated nausea, vomiting or hematemesis.  She says that she has had this happen to her in the past and turned out to be diverticulitis.  She says she has not taken anything for pain.  She says her last dose of Eliquis was this morning.      PAST MEDICAL HISTORY  Past Medical History:   Diagnosis Date    Abdominal hernia     Arthritis     Asthma     Blood in stool     Body piercing     EARS ONLY    Bronchitis     Bulging of lumbar intervertebral disc     Colon polyp     COVID-19 2022    Deaf, left     Diabetes mellitus     Diverticulitis     GERD (gastroesophageal reflux disease)     Heart murmur     Hyperlipidemia     Hypertension     Leaky heart valve     Lung nodules     Osteoporosis     Palpitations     Pancreatitis     Supplemental oxygen dependent     NIGHTLY AND WHEN EXERTED    Thyroid disease     LEFT SIDE REMOVED    Tinnitus, left          PAST SURGICAL HISTORY  Past Surgical History:   Procedure Laterality Date    APPENDECTOMY      CARDIAC ELECTROPHYSIOLOGY PROCEDURE N/A 2023    Procedure: Device Implant PPM;  Surgeon: Brice Arriaga MD;  Location: [facility] INVASIVE LOCATION;  Service: Cardiology;  Laterality: N/A;     SECTION      CHOLECYSTECTOMY      COLONOSCOPY N/A 8/10/2018    Procedure: COLONOSCOPY WITH  HOT SNARE POLYPECTOMY X 3; COLD SNARE POLYPECTOMY, COLD BIOPSY POLYPECTOMY X 4; CLIP PLACEMENT X 1;  Surgeon: Glenn Modi MD;  Location: [facility] ENDOSCOPY;  Service: Gastroenterology    THYROIDECTOMY, PARTIAL Left 2011    TUBAL ABDOMINAL LIGATION           FAMILY HISTORY  Family History   Problem Relation Age of Onset    Arthritis Mother     Diabetes Mother     Osteoporosis Mother     Arthritis Father     Diabetes Father     Heart attack Father     Hyperlipidemia Father     Diabetes Sister     Thyroid disease Sister     Diabetes Brother     Diabetes Maternal Grandmother     Diabetes Maternal Grandfather     Diabetes Paternal Grandmother     Diabetes Paternal Grandfather          SOCIAL HISTORY  Social History     Socioeconomic History    Marital status:    Tobacco Use    Smoking status: Former     Current packs/day: 0.00     Average packs/day: 2.0 packs/day for 15.0 years (30.0 ttl pk-yrs)     Types: Cigarettes     Start date:      Quit date:      Years since quittin.2     Passive exposure: Past    Smokeless tobacco: Never   Vaping Use    Vaping status: Never Used   Substance and Sexual Activity    Alcohol use: Not Currently    Drug use: Never    Sexual activity: Defer         ALLERGIES  Diltiazem, Spironolactone, Amlodipine, Doxycycline, Sotalol, Warfarin, Ciprofloxacin, Contrast dye (echo or unknown ct/mr), Erythromycin, Hydrochlorothiazide, Levaquin [levofloxacin], and Prednisone        REVIEW OF SYSTEMS    All systems reviewed and negative except for those discussed in HPI.       PHYSICAL EXAM    I have reviewed the triage vital signs and nursing notes.    ED Triage Vitals [247]   Temp Heart Rate Resp BP SpO2   98 °F (36.7 °C) 89 18 (!) 201/78 95 %      Temp src Heart Rate Source Patient Position BP Location FiO2 (%)   Oral -- Sitting Right arm --         General: Moderate acute distress  Skin: normal color, warm and dry  Head: normocephalic, atraumatic  Nose: normal nasal  mucosa, no visible deformity.  Mouth: moist mucous membranes.  Neck: supple.  Chest: no retractions, no visible deformity  Cardiovascular: Regular rate and rhythm.  Lungs: clear to auscultation bilaterally.  Abdomen: soft, tender to palpation in the left lower quadrant, non-distended. No rebound tenderness, no guarding.  No peritonitis.  Neuro:  alert and oriented x3, no focal neurological deficits.  Psych:  appropriate mood and behavior.        LAB RESULTS  Recent Results (from the past 24 hour(s))   Comprehensive Metabolic Panel    Collection Time: 04/04/24  9:51 PM    Specimen: Blood   Result Value Ref Range    Glucose 361 (H) 65 - 99 mg/dL    BUN 17 8 - 23 mg/dL    Creatinine 0.72 0.57 - 1.00 mg/dL    Sodium 134 (L) 136 - 145 mmol/L    Potassium 4.2 3.5 - 5.2 mmol/L    Chloride 93 (L) 98 - 107 mmol/L    CO2 30.7 (H) 22.0 - 29.0 mmol/L    Calcium 9.4 8.6 - 10.5 mg/dL    Total Protein 7.1 6.0 - 8.5 g/dL    Albumin 4.1 3.5 - 5.2 g/dL    ALT (SGPT) 19 1 - 33 U/L    AST (SGOT) 13 1 - 32 U/L    Alkaline Phosphatase 100 39 - 117 U/L    Total Bilirubin 0.2 0.0 - 1.2 mg/dL    Globulin 3.0 gm/dL    A/G Ratio 1.4 g/dL    BUN/Creatinine Ratio 23.6 7.0 - 25.0    Anion Gap 10.3 5.0 - 15.0 mmol/L    eGFR 86.8 >60.0 mL/min/1.73   Green Top (Gel)    Collection Time: 04/04/24  9:51 PM   Result Value Ref Range    Extra Tube Hold for add-ons.    Lavender Top    Collection Time: 04/04/24  9:51 PM   Result Value Ref Range    Extra Tube hold for add-on    Gold Top - SST    Collection Time: 04/04/24  9:51 PM   Result Value Ref Range    Extra Tube Hold for add-ons.    Light Blue Top    Collection Time: 04/04/24  9:51 PM   Result Value Ref Range    Extra Tube Hold for add-ons.    CBC Auto Differential    Collection Time: 04/04/24  9:51 PM    Specimen: Blood   Result Value Ref Range    WBC 8.89 3.40 - 10.80 10*3/mm3    RBC 4.27 3.77 - 5.28 10*6/mm3    Hemoglobin 13.2 12.0 - 15.9 g/dL    Hematocrit 40.2 34.0 - 46.6 %    MCV 94.1 79.0 -  97.0 fL    MCH 30.9 26.6 - 33.0 pg    MCHC 32.8 31.5 - 35.7 g/dL    RDW 12.1 (L) 12.3 - 15.4 %    RDW-SD 42.0 37.0 - 54.0 fl    MPV 9.7 6.0 - 12.0 fL    Platelets 227 140 - 450 10*3/mm3    Neutrophil % 72.1 42.7 - 76.0 %    Lymphocyte % 17.8 (L) 19.6 - 45.3 %    Monocyte % 6.3 5.0 - 12.0 %    Eosinophil % 2.7 0.3 - 6.2 %    Basophil % 0.7 0.0 - 1.5 %    Immature Grans % 0.4 0.0 - 0.5 %    Neutrophils, Absolute 6.41 1.70 - 7.00 10*3/mm3    Lymphocytes, Absolute 1.58 0.70 - 3.10 10*3/mm3    Monocytes, Absolute 0.56 0.10 - 0.90 10*3/mm3    Eosinophils, Absolute 0.24 0.00 - 0.40 10*3/mm3    Basophils, Absolute 0.06 0.00 - 0.20 10*3/mm3    Immature Grans, Absolute 0.04 0.00 - 0.05 10*3/mm3    nRBC 0.0 0.0 - 0.2 /100 WBC   Protime-INR    Collection Time: 04/04/24  9:51 PM    Specimen: Blood   Result Value Ref Range    Protime 13.7 12.3 - 15.1 Seconds    INR 1.00 0.90 - 1.10   ABO RH Specimen Verification    Collection Time: 04/04/24  9:51 PM    Specimen: Blood   Result Value Ref Range    ABO Type A     RH type Positive    Lipase    Collection Time: 04/04/24  9:51 PM    Specimen: Blood   Result Value Ref Range    Lipase 35 13 - 60 U/L   Type & Screen    Collection Time: 04/04/24 10:27 PM    Specimen: Blood   Result Value Ref Range    ABO Type A     RH type Positive     Antibody Screen Negative     T&S Expiration Date 4/7/2024 11:59:59 PM    Hemoglobin A1c    Collection Time: 04/05/24  1:31 AM    Specimen: Blood   Result Value Ref Range    Hemoglobin A1C 10.30 (H) 4.80 - 5.60 %       If labs were ordered, I independently reviewed the results and considered them in treating the patient.  See medical decision making discussion section for my interpretation of lab results.        RADIOLOGY  CT Abdomen Pelvis Without Contrast    Result Date: 4/4/2024  FINAL REPORT TECHNIQUE: Axial CT images were performed from the lung bases through the symphysis pubis without IV contrast.  This study was performed with techniques to keep  radiation doses as low as reasonably achievable (ALARA). Individualized dose reduction techniques using automated exposure control or adjustment of mA and/or kV according to the patient's size were employed. CLINICAL HISTORY: Abdominal pain, acute, nonlocalized  rectal bleeding, BRBPR FINDINGS: Lack of intravenous contrast limits evaluation of solid abdominal and pelvic organs.  LOWER CHEST: The heart is normal size.  There is scattered tree-in-bud nodularity in the lung bases.    ABDOMEN/PELVIS:  Liver, gallbladder and bile ducts: The unenhanced liver is grossly unremarkable without focal abnormality.  There has been a prior cholecystectomy.  There is no definite biliary duct dilatation.  Adrenal glands: The adrenal glands are morphologically unremarkable without suspicious lesion.  Kidneys, ureter and urinary bladder: No nephrolithiasis.  No hydronephrosis.  Urinary bladder is unremarkable.  Spleen: The spleen is normal size.  Pancreas: The pancreas is grossly unremarkable.  GI systems and mesentery: There is colonic diverticulosis without diverticulitis.  No evidence of bowel obstruction.  The appendix is not visualized but there are no secondary signs of appendicitis.    No significant mesenteric inflammation.  Lymph nodes: No definite pathologically enlarged abdominal or pelvic lymph nodes present within the limits of this noncontrast enhanced exam.  Vessels: The abdominal aorta is normal in caliber.  The inferior vena cava is unremarkable.  Peritoneum: No free intraperitoneal fluid or pneumoperitoneum.  Pelvic viscera: No acute findings.  Body wall: No body wall contusion.  Bones: No acute fracture.     No acute findings in the abdomen and pelvis to account for patient's symptoms.    Tree-in-bud nodularity in the lung bases most compatible with chronic endobronchial infection. Authenticated and Electronically Signed by Favio Mukherjee MD on 04/04/2024 11:44:19 PM     I ordered and independently reviewed the  above noted radiographic studies.  See radiologist's dictation for official interpretation.      PROCEDURES    Procedures    No orders to display       MEDICATIONS GIVEN IN ER    Medications   sodium chloride 0.9 % flush 10 mL (has no administration in time range)   Morphine sulfate (PF) injection 2 mg (2 mg Intravenous Not Given 4/4/24 2223)   levothyroxine (SYNTHROID, LEVOTHROID) tablet 112 mcg (has no administration in time range)   LORazepam (ATIVAN) tablet 0.5 mg (0.5 mg Oral Given 4/5/24 0223)   metoprolol tartrate (LOPRESSOR) tablet 12.5 mg (has no administration in time range)   sodium chloride 0.9 % flush 10 mL (10 mL Intravenous Given 4/5/24 0224)   sodium chloride 0.9 % flush 10 mL (has no administration in time range)   sodium chloride 0.9 % infusion 40 mL (has no administration in time range)   acetaminophen (TYLENOL) tablet 650 mg (has no administration in time range)     Or   acetaminophen (TYLENOL) 160 MG/5ML oral solution 650 mg (has no administration in time range)     Or   acetaminophen (TYLENOL) suppository 650 mg (has no administration in time range)   ondansetron (ZOFRAN) injection 4 mg (has no administration in time range)   nitroglycerin (NITROSTAT) SL tablet 0.4 mg (has no administration in time range)   sennosides-docusate (PERICOLACE) 8.6-50 MG per tablet 2 tablet (has no administration in time range)     And   polyethylene glycol (MIRALAX) packet 17 g (has no administration in time range)     And   bisacodyl (DULCOLAX) EC tablet 5 mg (has no administration in time range)     And   bisacodyl (DULCOLAX) suppository 10 mg (has no administration in time range)   melatonin tablet 5 mg (has no administration in time range)   Morphine sulfate (PF) injection 2 mg (2 mg Intravenous Given 4/5/24 0223)     And   naloxone (NARCAN) injection 0.4 mg (has no administration in time range)   Pharmacy to Dose Zosyn (has no administration in time range)   piperacillin-tazobactam (ZOSYN) IVPB 3.375 g in 100  mL NS (has no administration in time range)   dextrose (GLUTOSE) oral gel 15 g (has no administration in time range)   dextrose (D50W) (25 g/50 mL) IV injection 25 g (has no administration in time range)   glucagon (GLUCAGEN) injection 1 mg (has no administration in time range)   insulin detemir (LEVEMIR) injection 20 Units (20 Units Subcutaneous Not Given 4/5/24 0224)   insulin regular (humuLIN R,novoLIN R) injection 2-9 Units ( Subcutaneous Not Given 4/5/24 0223)   sodium chloride 0.9 % bolus 500 mL (0 mL Intravenous Stopped 4/4/24 2350)   ondansetron (ZOFRAN) injection 4 mg (4 mg Intravenous Given 4/4/24 2223)   piperacillin-tazobactam (ZOSYN) IVPB 3.375 g in 100 mL NS (3.375 g Intravenous New Bag 4/5/24 0223)         MEDICAL DECISION MAKING, PROGRESS, and CONSULTS    All labs, if obtained, have been independently reviewed by me.  All radiology studies, if obtained, have been reviewed by me and the radiologist dictating the report.  All EKG's, if obtained, have been independently viewed and interpreted by me/my attending physician.      Discussion below represents my analysis of pertinent findings related to patient's condition, differential diagnosis, treatment plan and final disposition.                         Differential diagnosis:    Differential diagnosis for this patient includes malignancy, AV malformation, diverticulitis, brisk upper GI bleeding, hepatitis, cholangitis, cholecystitis, pancreatitis, gastritis, enteritis, colitis, gastroenteritis, appendicitis, volvulus, obstruction, ischemia, torsion, cystitis, pyelonephritis, nephrolithiasis, uretolithiasis, other acute emergency.    Medical Decision Making Discussion:    Patient vitals are reviewed and are remarkable for hypertension but otherwise are normal.    Patient's labs are reviewed which demonstrate hyperglycemia but without acidosis or elevated anion gap.  Patient CBC is normal.    Given patient's left lower quadrant abdominal pain associated  with hematochezia CT scan was obtained.  She was not given IV contrast secondary to her reported allergy.  CT abdomen pelvis per teleradiology is negative for acute findings.    Given patient's acute hematochezia with chronic Eliquis use I think she is appropriate for hospitalization.    Case discussed with Dr. Thakkar who accepted the patient for hospitalization.      Additional sources:    - Discussed/ obtained information from independent historians:  Son    - External (non-ED) record review: Clinic note from September 2023 documenting history of diabetes, hypothyroidism.  Cardiology note from August 2023 documenting history of atrial fibrillation.  Is on Eliquis for anticoagulation.    - Chronic or social conditions impacting care:  Atrial fibrillation with eliquis use    Shared Decision Making:  After my consideration of clinical presentation and any laboratory/radiology studies obtained, I discussed the findings with the patient/patient representative who is in agreement with the treatment plan and the final disposition.   Risks and benefits of discharge and/or observation/admission were discussed.    Orders placed during this visit:  Orders Placed This Encounter   Procedures    CT Abdomen Pelvis Without Contrast    Holden Draw    Comprehensive Metabolic Panel    Occult Blood X 1, Stool - Stool, Per Rectum    CBC Auto Differential    Protime-INR    Lipase    Basic Metabolic Panel    CBC Auto Differential    Hemoglobin A1c    Diet: Liquid, Diabetic; Clear Liquid; Consistent Carbohydrate; Fluid Consistency: Thin (IDDSI 0)    Undress & Gown    Measure Blood Pressure    Vital Signs    Vital Signs    Intake & Output    Weigh Patient    Oral Care    Place Sequential Compression Device    Maintain Sequential Compression Device    Maintain IV Access    Telemetry - Place Orders & Notify Provider of Results When Patient Experiences Acute Chest Pain, Dysrhythmia or Respiratory Distress    May Be Off Telemetry for Tests     Up With Assistance    Code Status and Medical Interventions:    PT Consult: Eval & Treat Functional Mobility Below Baseline    Pulse Oximetry    Oxygen Therapy- Nasal Cannula; Titrate 1-6 LPM Per SpO2; 90 - 95%    Incentive Spirometry    POC Glucose Q6H    Type & Screen    ABO RH Specimen Verification    Insert Peripheral IV    Insert Peripheral IV    Inpatient Admission    CBC & Differential    Green Top (Gel)    Lavender Top    Gold Top - SST    Light Blue Top         AS OF 04:29 EDT VITALS:    BP - 180/70  HR - 97  TEMP - 97.8 °F (36.6 °C) (Oral)  O2 SATS - 96%      ELISABETH reviewed by Norm Thakkar DO, Shaan Joshi MD           DIAGNOSIS  Final diagnoses:   Gastrointestinal hemorrhage, unspecified gastrointestinal hemorrhage type   Hyperglycemia         DISPOSITION  Admit      Please note that portions of this document were completed with voice recognition software.        Shaan Joshi MD  04/05/24 [id number]

## 2024-04-05 NOTE — PLAN OF CARE
Goal Outcome Evaluation:  Plan of Care Reviewed With: patient        Progress: no change  Outcome Evaluation: VSS on 2L o2. Bowel prep started for colonoscopy in am. Will continue to monitor

## 2024-04-05 NOTE — PROGRESS NOTES
[facility] HOSPITALIST    PROGRESS NOTE    Name:  Marielena Moeller   Age:  78 y.o.  Sex:  female  :  1945  MRN:  [id number]   Visit Number:  [id number]  Admission Date:  2024  Date Of Service:  24  Primary Care Physician:  Marychuy Atwood APRN     LOS: 0 days :    Chief Complaint:      Bright red blood per rectum.    Subjective:    Patient seen and examined.  No family at bedside.  States she does have history of diverticulitis.  Does not want to take Eliquis any longer.  Updated Dr. Urban not currently on call.  Advised Dr. Hernandez will be seeing today.    Hospital Course:    Patient is a chronically ill 78-year-old female with history significant for type 2 diabetes, A-fib on Eliquis, COPD on 2 liters, and obesity who presented to the emergency room with complaints of bright red blood per rectum.  Patient also reported a history of diverticulitis.  States that she has been in her normal state of health until earlier in the day she noticed bright red blood per rectum with clots.  Does have associated left lower abdominal cramping.  Denies nausea/vomiting, fever. Currently lives alone.     Upon arrival to the ER patient afebrile, hypertensive 201/78 and nonhypoxic.  CMP fairly unremarkable, PT/INR normal.  Lipase normal.  CBC unremarkable.  Hemoglobin 13 hematocrit 40.  CT abdomen pelvis showed no acute findings.  Blood pressure improved after pain medication was administered.  Patient had witnessed episodes of hematochezia while in the ER.  Hospitalist consulted for further medical management.  Terri added given history of diverticulitis with left lower abdominal pain.  General surgeon Dr. Hernandez consulted.    Review of Systems:     All systems were reviewed and negative except as mentioned in subjective, assessment and plan.    Vital Signs:    Temp:  [97.3 °F (36.3 °C)-98 °F (36.7 °C)] 97.3 °F (36.3 °C)  Heart Rate:  [] 84  Resp:  [16-20] 20  BP: (126-201)/(58-99)  126/58    Intake and output:    I/O last 3 completed shifts:  In: 600 [IV Piggyback:600]  Out: -   I/O this shift:  In: -   Out: 200 [Stool:200]    Physical Examination:    General Appearance:  Alert and cooperative.  Chronically ill middle-aged female.  No acute distress noted.   Head:  Atraumatic and normocephalic.   Eyes: Conjunctivae and sclerae normal, no icterus. No pallor.   Throat: No oral lesions, no thrush, oral mucosa moist.   Neck: Supple, trachea midline, no thyromegaly.   Lungs:   Breath sounds heard bilaterally equally.  No wheezing or crackles. No Pleural rub or bronchial breathing.  On 2 L nasal cannula unlabored.   Heart:  Normal S1 and S2, no murmur, no gallop, no rub. No JVD.   Abdomen:   Normal bowel sounds, no masses, no organomegaly. Soft, slight tenderness to left lower quadrant, nondistended, no rebound tenderness.   Extremities: Supple, no edema, no cyanosis, no clubbing.   Skin: No bleeding or rash.   Neurologic: Alert and oriented x 3. No facial asymmetry. Moves all four limbs. No tremors.  Generalized weakness.     Laboratory results:    Results from last 7 days   Lab Units 04/05/24  0518 04/04/24  2151   SODIUM mmol/L 139 134*   POTASSIUM mmol/L 4.6 4.2   CHLORIDE mmol/L 100 93*   CO2 mmol/L 30.1* 30.7*   BUN mg/dL 17 17   CREATININE mg/dL 0.69 0.72   CALCIUM mg/dL 8.6 9.4   BILIRUBIN mg/dL  --  0.2   ALK PHOS U/L  --  100   ALT (SGPT) U/L  --  19   AST (SGOT) U/L  --  13   GLUCOSE mg/dL 324* 361*     Results from last 7 days   Lab Units 04/05/24  0518 04/04/24  2151   WBC 10*3/mm3 8.31 8.89   HEMOGLOBIN g/dL 11.2* 13.2   HEMATOCRIT % 34.2 40.2   PLATELETS 10*3/mm3 205 227     Results from last 7 days   Lab Units 04/04/24  2151   INR  1.00             Recent Labs     05/06/23  1715   PHART 7.400   KRZ2WIU 58.7*   PO2ART 99.8   COR0YZR 36.3*   BASEEXCESS 9.6*      I have reviewed the patient's laboratory results.    Radiology results:    CT Abdomen Pelvis Without Contrast    Result Date:  4/4/2024  FINAL REPORT TECHNIQUE: Axial CT images were performed from the lung bases through the symphysis pubis without IV contrast.  This study was performed with techniques to keep radiation doses as low as reasonably achievable (ALARA). Individualized dose reduction techniques using automated exposure control or adjustment of mA and/or kV according to the patient's size were employed. CLINICAL HISTORY: Abdominal pain, acute, nonlocalized  rectal bleeding, BRBPR FINDINGS: Lack of intravenous contrast limits evaluation of solid abdominal and pelvic organs.  LOWER CHEST: The heart is normal size.  There is scattered tree-in-bud nodularity in the lung bases.    ABDOMEN/PELVIS:  Liver, gallbladder and bile ducts: The unenhanced liver is grossly unremarkable without focal abnormality.  There has been a prior cholecystectomy.  There is no definite biliary duct dilatation.  Adrenal glands: The adrenal glands are morphologically unremarkable without suspicious lesion.  Kidneys, ureter and urinary bladder: No nephrolithiasis.  No hydronephrosis.  Urinary bladder is unremarkable.  Spleen: The spleen is normal size.  Pancreas: The pancreas is grossly unremarkable.  GI systems and mesentery: There is colonic diverticulosis without diverticulitis.  No evidence of bowel obstruction.  The appendix is not visualized but there are no secondary signs of appendicitis.    No significant mesenteric inflammation.  Lymph nodes: No definite pathologically enlarged abdominal or pelvic lymph nodes present within the limits of this noncontrast enhanced exam.  Vessels: The abdominal aorta is normal in caliber.  The inferior vena cava is unremarkable.  Peritoneum: No free intraperitoneal fluid or pneumoperitoneum.  Pelvic viscera: No acute findings.  Body wall: No body wall contusion.  Bones: No acute fracture.     Impression: No acute findings in the abdomen and pelvis to account for patient's symptoms.    Tree-in-bud nodularity in the lung  bases most compatible with chronic endobronchial infection. Authenticated and Electronically Signed by Favio Mukherjee MD on 04/04/2024 11:44:19 PM   I have reviewed the patient's radiology reports.    Medication Review:     I have reviewed the patient's active and prn medications.     Problem List:      GI bleed      Assessment:    Hematochezia/lower GI bleeding POA  A-fib on Eliquis  Insulin-dependent type 2 diabetes  Hypertension  Hyperlipidemia  COPD on 2 L nasal cannula  Hypothyroidism  Anxiety  Obesity  Impaired mobility and ADL    Plan:    Hematochezia/lower GI bleeding  -Hold chronic Eliquis  -Currently H&H stable, no indication for PRBC  -Patient requested to see Dr. Urban.  He has performed previous colonoscopies.  Dr. Hernandez covering. Appreciate recommendations.  -With history of diverticulitis, suspect diverticular bleeding.  Continue empiric Zosyn.  -No indication for IV Protonix as I suspect this is lower GI bleeding.     Type 2 diabetes, insulin-dependent  -Basal Levemir  -Moderate dose sliding scale correction  -A1c 10.3.  -Diabetic education  -Continue home medications as appropriate.    -Daughter Maryam called and updated on plan of care.    I have reviewed the copied text and it is accurate as of 4/5/2024      DVT Prophylaxis: SCD  Code Status: Full  Diet: N.p.o.  Discharge Plan: 1-2 days home    Laury Gold, APRN  04/05/24  12:31 EDT    Dictated utilizing Dragon dictation.

## 2024-04-05 NOTE — CONSULTS
"Diabetes Education  Assessment/Teaching    Patient Name:  Marielena Moeller  YOB: 1945  MRN: [id number]  Admit Date:  4/4/2024      Assessment Date:  4/5/2024  Flowsheet Row Most Recent Value   General Information     Height 152.4 cm (60\")   Height Method Stated   Weight 77.1 kg (169 lb 15.6 oz)   Weight Method Bed scale   Pregnancy Assessment    Diabetes History    What type of diabetes do you have? Type 2   Length of Diabetes Diagnosis 10 + years  [pt. states that she was diagnosed 20 years ago]   Current DM knowledge fair   Do you test your blood sugar at home? yes   Frequency of checks not consistently.  Discussed importance of always checking Bg prior to her insulin administartion.  Pt. states that she was wearing a Dexcom G6 CGM, however she has not met her deductible and is having to go bag to fingersticks until her deductible is met.   Meter type Accu chek Anne   Who performs the test? self   Typical readings not routinely checking   Have you had low blood sugar? (<70mg/dl) yes  [pt. feels that she is going low when she uses the 3 doses of Premix insulin.  pt. states that colors start changing and that's how she knows that she is too low..  pt. keeps glucose tablets in the bed with her.  pt. takes 2 tablets.]   How often do you have low blood sugar? --  [unknown]   Have you had high blood sugar? (>140mg/dl) yes   How often do you have high blood sugar? frequently  [A1c on 04/05/24 is 10.3%]   When was your last high blood sugar? on admit 361   How often do you check your feet? --  [daughter occasionally provides nail care.  pt. cannot bend over to see her feet.]   Do you perform your own nail care? no  [daughter]   Do you have any diabetes complications? heart disease   Education Preferences    What areas of diabetes would you like to learn about? avoiding high blood sugar, avoiding low blood sugar, diet information, medications for diabetes, testing my blood sugar at home, understanding " diabetes   Nutrition Information    Are you currently following a special meal plan? frequently  [pt. is drinking only sugar free beverages.  pt. states that she is a creature of habit and typically eats 2 eggs, Avocado and a pickle for Breakfast.  pt. does not always eat lunch.]   Assessment Topics    Healthy Eating - Assessment Needs education  [Pt. is not always eating 3 meals a day . discussed even if she is only doing Breakfast and supper on premix she still needs to eat 3 meals a day as the intermedicate insulin is active in her system at lunch.  Discusseed healthy plate Method for Carbs]   Being Active - Assessment Other (comment)  [pt. has been weak lately]   Taking Medication - Assessment Needs education  [Pt. states that she is not taking her Novolog 70/30 50 units 3 times a day.  pt. is taking it 2 times a day. pt. states that if she does the 3rd dose she is feeling like she has hypoglyemia through the night. Discussed contacting her physician.]   Problem Solving - Assessment --  [Discussed the need to always check BG at a minimum before her insulin is administered.  Discussed keeping a BG log and always take to her provider.]   Reducing Risk - Assessment Competent   Healthy Coping - Assessment Competent   Monitoring - Assessment Needs education  [Discussed need to check BG a minimum of prior to her insulin.]   DM Goals             Flowsheet Row Most Recent Value   DM Education Needs    Meter Has own   Meter Type --  [Accu chek Anne.  pt. will resume her Dexcom G6 when her deductible is met.]   Blood Glucose Target 150   Blood Glucose Target Range  fasting and premeal.  2 hours post meal no higher than 180   Medication Insulin, Vial, Actions, Side effects   Problem Solving Hypoglycemia, Hyperglycemia, Signs, Symptoms, Treatment   Reducing Risks Eye exam, Dental exam, Foot care  [pt. states that she is going to have eventually all of her teeth extracted related to pain.]   Healthy Eating Reviewed  "meal plan, Basic meal plan provided   Physical Activity None   Physical Activity Frequency Never   Healthy Coping Appropriate   Discharge Plan --  [pending]   Motivation Engaged   Teaching Method Explanation, Discussion, Handouts   Patient Response Verbalized understanding              Other Comments:  Pt. Is a 78 year old female with a 20 year history of type 2 DM.  Barriers to care pt. Has not been taking her Novolog 70/30 Premix 50 units 3 times a day related to feeling of going low through the night.  Pt. Does not check BG,goes by how she is feeling and then treats with 2 glucose tablets.  Discussed if she is feeling this way to check BG and treat with 4 glucose tabs and let the provider know the next day.  Discussed keeping a protein complex carb snack also by her bed to follow up on BG tablets once above 70 ..  Discussed the need to check BG prior to her insulin before Breakfast and prior to her insulin before supper.  Discussed trying to eat 3 meals a day while on premix insulin related to incidence of nocturnal hypoglycemia. Discussed that pt. Can add biscuit or piece of toast to her Breakfast as she may be getting no carbs at Breakfast and larger amounts for her lunch and /or supper. Pt. Was provided the \"Diabetes Basics\" book, BG logs and the Healthy Plate Method.  Pt. Had no further questions or concerns.        Electronically signed by:  Dulce Caballero RN  04/05/24 14:36 EDT  "

## 2024-04-05 NOTE — THERAPY EVALUATION
Patient Name: Marielena Moeller  : 1945    MRN: [id number]                              Today's Date: 2024       Admit Date: 2024    Visit Dx:     ICD-10-CM ICD-9-CM   1. Gastrointestinal hemorrhage, unspecified gastrointestinal hemorrhage type  K92.2 578.9   2. Hyperglycemia  R73.9 790.29     Patient Active Problem List   Diagnosis    Type 2 diabetes mellitus with hyperglycemia, with long-term current use of insulin    Hypertension associated with diabetes    Osteoporosis    Class 2 obesity with body mass index (BMI) of 35.0 to 35.9 in adult    Acquired hypothyroidism    Vitamin D deficiency    History of colon polyps    COPD on home oxygen    Pneumococcal vaccination declined by patient    Anxiety    Type 2 diabetes mellitus with circulatory disorder, with long-term current use of insulin    PAF with RVR this admission    Interstitial lung disease    Myalgia    Warfarin anticoagulation    Type 2 diabetes mellitus with diabetic peripheral angiopathy without gangrene, with long-term current use of insulin    Influenza vaccination declined    Gastroesophageal reflux disease    Hyperlipidemia    Hypertension    Type 2 diabetes mellitus    Chronic respiratory failure with hypercapnia    Syncope, unspecified syncope type    Sick sinus syndrome (tachybradycardia) with pauses and syncope    GI bleed     Past Medical History:   Diagnosis Date    Abdominal hernia     Arthritis     Asthma     Blood in stool     Body piercing     EARS ONLY    Bronchitis     Bulging of lumbar intervertebral disc     Colon polyp     COVID-19 2022    Deaf, left     Diabetes mellitus     Diverticulitis     GERD (gastroesophageal reflux disease)     Heart murmur     Hyperlipidemia     Hypertension     Impaired functional mobility, balance, gait, and endurance     Leaky heart valve     Lung nodules     Osteoporosis     Palpitations     Pancreatitis     Supplemental oxygen dependent     NIGHTLY AND WHEN EXERTED    Thyroid disease      LEFT SIDE REMOVED    Tinnitus, left      Past Surgical History:   Procedure Laterality Date    APPENDECTOMY      CARDIAC ELECTROPHYSIOLOGY PROCEDURE N/A 2023    Procedure: Device Implant PPM;  Surgeon: Brice Arriaga MD;  Location: [facility] INVASIVE LOCATION;  Service: Cardiology;  Laterality: N/A;     SECTION      CHOLECYSTECTOMY      COLONOSCOPY N/A 8/10/2018    Procedure: COLONOSCOPY WITH HOT SNARE POLYPECTOMY X 3; COLD SNARE POLYPECTOMY, COLD BIOPSY POLYPECTOMY X 4; CLIP PLACEMENT X 1;  Surgeon: Glenn Modi MD;  Location: [facility] ENDOSCOPY;  Service: Gastroenterology    THYROIDECTOMY, PARTIAL Left 2011    TUBAL ABDOMINAL LIGATION        General Information       Row Name 24 1006          Physical Therapy Time and Intention    Document Type evaluation  -JR     Mode of Treatment physical therapy  -JR       Row Name 24 1006          General Information    Patient Profile Reviewed yes  -JR     Prior Level of Function independent:;all household mobility;community mobility;driving  -JR     Existing Precautions/Restrictions fall;oxygen therapy device and L/min  -JR     Barriers to Rehab none identified  -JR       Row Name 24 1006          Living Environment    People in Home alone  -JR       Row Name 24 1006          Home Main Entrance    Number of Stairs, Main Entrance two  -JR     Stair Railings, Main Entrance railing on right side (ascending)  -JR       Row Name 24 1006          Stairs Within Home, Primary    Number of Stairs, Within Home, Primary none  -JR       Row Name 24 1006          Cognition    Orientation Status (Cognition) oriented x 4  -JR       Row Name 24 1006          Safety Issues, Functional Mobility    Safety Issues Affecting Function (Mobility) safety precautions follow-through/compliance;insight into deficits/self-awareness  -JR               User Key  (r) = Recorded By, (t) = Taken By, (c) = Cosigned By      Initials Name  Provider Type    Renea Ridley, PT Physical Therapist                   Mobility       Row Name 04/05/24 1006          Bed Mobility    Bed Mobility supine-sit;sit-supine  -JR     Supine-Sit [place] (Bed Mobility) minimum assist (75% patient effort)  -JR     Sit-Supine [place] (Bed Mobility) minimum assist (75% patient effort)  -JR     Assistive Device (Bed Mobility) head of bed elevated;bed rails  -       Row Name 04/05/24 1006          Sit-Stand Transfer    Sit-Stand [place] (Transfers) minimum assist (75% patient effort)  -JR     Assistive Device (Sit-Stand Transfers) walker, front-wheeled  -JR       Row Name 04/05/24 1006          Gait/Stairs (Locomotion)    [place] Level (Gait) minimum assist (75% patient effort)  -     Assistive Device (Gait) walker, front-wheeled  -JR     Patient was able to Ambulate yes  -JR     Distance in Feet (Gait) 3  -JR     Deviations/Abnormal Patterns (Gait) base of support, narrow;dat decreased;festinating/shuffling;stride length decreased  -JR     Bilateral Gait Deviations forward flexed posture;heel strike decreased  -JR               User Key  (r) = Recorded By, (t) = Taken By, (c) = Cosigned By      Initials Name Provider Type    Renea Ridley, PT Physical Therapist                   Obj/Interventions       Row Name 04/05/24 1006          Range of Motion Comprehensive    General Range of Motion no range of motion deficits identified  -       Row Name 04/05/24 1006          Strength Comprehensive (MMT)    Comment, General Manual Muscle Testing (MMT) Assessment Grossly 3-/5 throughout  -       Row Name 04/05/24 1006          Balance    Balance Assessment sitting static balance;sitting dynamic balance;sit to stand dynamic balance;standing static balance;standing dynamic balance  -     Static Sitting Balance supervision  -     Dynamic Sitting Balance contact guard  -JR     Position, Sitting Balance unsupported;sitting edge of bed  -JR     Sit to  Stand Dynamic Balance minimal assist  -JR     Static Standing Balance minimal assist  -JR     Dynamic Standing Balance minimal assist  -JR     Position/Device Used, Standing Balance walker, rolling  -JR               User Key  (r) = Recorded By, (t) = Taken By, (c) = Cosigned By      Initials Name Provider Type    Renea Ridley, PT Physical Therapist                   Goals/Plan       Row Name 04/05/24 1006          Bed Mobility Goal 1 (PT)    Activity/Assistive Device (Bed Mobility Goal 1, PT) bed mobility activities, all  -JR     [place] Level/Cues Needed (Bed Mobility Goal 1, PT) contact guard required  -JR     Time Frame (Bed Mobility Goal 1, PT) short term goal (STG)  -JR     Progress/Outcomes (Bed Mobility Goal 1, PT) goal ongoing  -       Row Name 04/05/24 1006          Transfer Goal 1 (PT)    Activity/Assistive Device (Transfer Goal 1, PT) sit-to-stand/stand-to-sit;bed-to-chair/chair-to-bed;walker, rolling  -JR     [place] Level/Cues Needed (Transfer Goal 1, PT) contact guard required  -JR     Time Frame (Transfer Goal 1, PT) long term goal (LTG)  -JR     Progress/Outcome (Transfer Goal 1, PT) goal ongoing  -JR       Row Name 04/05/24 1006          Gait Training Goal 1 (PT)    Activity/Assistive Device (Gait Training Goal 1, PT) gait (walking locomotion);walker, rolling;decrease fall risk;increase endurance/gait distance;improve balance and speed  -JR     [place] Level (Gait Training Goal 1, PT) contact guard required  -JR     Distance (Gait Training Goal 1, PT) 100  -JR     Time Frame (Gait Training Goal 1, PT) long term goal (LTG)  -JR     Progress/Outcome (Gait Training Goal 1, PT) goal ongoing  -JR       Row Name 04/05/24 1006          Patient Education Goal (PT)    Activity (Patient Education Goal, PT) Perform BLE standing exercises x 15  -JR     [place]/Cues/Accuracy (Memory Goal 2, PT) demonstrates adequately  -JR     Time Frame (Patient Education Goal, PT) 4 days  -JR      Progress/Outcome (Patient Education Goal, PT) new goal  -JR       Row Name 04/05/24 1006          Therapy Assessment/Plan (PT)    Planned Therapy Interventions (PT) strengthening;balance training;transfer training;bed mobility training;gait training;home exercise program;patient/family education  -               User Key  (r) = Recorded By, (t) = Taken By, (c) = Cosigned By      Initials Name Provider Type    JR Renea Yin, PT Physical Therapist                   Clinical Impression       Row Name 04/05/24 1006          Pain    Pretreatment Pain Rating 4/10  -JR     Posttreatment Pain Rating 4/10  -JR     Pain Location - abdomen  -JR       Row Name 04/05/24 1006          Plan of Care Review    Plan of Care Reviewed With patient  -JR     Progress no change  -JR     Outcome Evaluation Patient participates well in PT evaluation demonstrated decreased strength, balance and overall mobility.  She requires minimal assistance to perform bed mobility, transfers and gait.  She walked 3 feet with RW, verbal cues and minimal assistance.  She fatigues quickly and requires frequent rests.  She is expected to benefit from additional PT services while hospitalized and upon discharge to inpatient rehab.  -       Row Name 04/05/24 1006          Therapy Assessment/Plan (PT)    Patient/Family Therapy Goals Statement (PT) Patient wants to be able to return home, but understands she needs to go to rehab to get stronger first  -JR     Rehab Potential (PT) good, to achieve stated therapy goals  -     Criteria for Skilled Interventions Met (PT) yes;meets criteria;skilled treatment is necessary  -JR     Therapy Frequency (PT) 5 times/wk  -       Row Name 04/05/24 1006          Positioning and Restraints    Pre-Treatment Position in bed  -JR     Post Treatment Position bed  -JR     In Bed supine;call light within reach;encouraged to call for assist;notified nsg  -JR               User Key  (r) = Recorded By, (t) = Taken By, (c) =  Cosigned By      Initials Name Provider Type     Renea Yin, PT Physical Therapist                   Outcome Measures       Row Name 04/05/24 1006          How much help from another person do you currently need...    Turning from your back to your side while in flat bed without using bedrails? 3  -JR     Moving from lying on back to sitting on the side of a flat bed without bedrails? 3  -JR     Moving to and from a bed to a chair (including a wheelchair)? 3  -JR     Standing up from a chair using your arms (e.g., wheelchair, bedside chair)? 3  -JR     Climbing 3-5 steps with a railing? 2  -JR     To walk in hospital room? 3  -JR     AM-PAC 6 Clicks Score (PT) 17  -JR     Highest Level of Mobility Goal 5 --> Static standing  -       Row Name 04/05/24 1006          Functional Assessment    Outcome Measure Options AM-PAC 6 Clicks Basic Mobility (PT)  -               User Key  (r) = Recorded By, (t) = Taken By, (c) = Cosigned By      Initials Name Provider Type    Renea Ridley PT Physical Therapist                                 Physical Therapy Education       Title: PT OT SLP Therapies (In Progress)       Topic: Physical Therapy (In Progress)       Point: Mobility training (Done)       Learning Progress Summary             Patient Acceptance, E,TB, VU by  at 4/5/2024 1314    Comment: Role of PT and POC                         Point: Home exercise program (Not Started)       Learner Progress:  Not documented in this visit.              Point: Body mechanics (Not Started)       Learner Progress:  Not documented in this visit.              Point: Precautions (Not Started)       Learner Progress:  Not documented in this visit.                              User Key       Initials Effective Dates Name Provider Type Discipline     08/22/23 -  Renea Yin, ZOLTAN Physical Therapist PT                  PT Recommendation and Plan  Planned Therapy Interventions (PT): strengthening, balance training, transfer  training, bed mobility training, gait training, home exercise program, patient/family education  Plan of Care Reviewed With: patient  Progress: no change  Outcome Evaluation: Patient participates well in PT evaluation demonstrated decreased strength, balance and overall mobility.  She requires minimal assistance to perform bed mobility, transfers and gait.  She walked 3 feet with RW, verbal cues and minimal assistance.  She fatigues quickly and requires frequent rests.  She is expected to benefit from additional PT services while hospitalized and upon discharge to inpatient rehab.     Time Calculation:   PT Evaluation Complexity  History, PT Evaluation Complexity: 1-2 personal factors and/or comorbidities  Examination of Body Systems (PT Eval Complexity): total of 3 or more elements  Clinical Presentation (PT Evaluation Complexity): evolving  Clinical Decision Making (PT Evaluation Complexity): moderate complexity  Overall Complexity (PT Evaluation Complexity): moderate complexity     PT Charges       Row Name 04/05/24 1006             Time Calculation    Start Time 1006  -JR      PT Received On 04/05/24  -JR      PT Goal Re-Cert Due Date 04/15/24  -JR         Untimed Charges    PT Eval/Re-eval Minutes 55  -JR         Total Minutes    Untimed Charges Total Minutes 55  -JR       Total Minutes 55  -JR                User Key  (r) = Recorded By, (t) = Taken By, (c) = Cosigned By      Initials Name Provider Type    JR Renea Yin, PT Physical Therapist                  Therapy Charges for Today       Code Description Service Date Service Provider Modifiers Qty    [id number]  PT EVAL MOD COMPLEXITY 4 4/5/2024 Renea Yin, PT GP 1            PT G-Codes  Outcome Measure Options: AM-PAC 6 Clicks Basic Mobility (PT)  AM-PAC 6 Clicks Score (PT): 17  PT Discharge Summary  Anticipated Discharge Disposition (PT): inpatient rehabilitation facility    Renea Yin PT  4/5/2024

## 2024-04-05 NOTE — CASE MANAGEMENT/SOCIAL WORK
Case Management/Social Work    Patient Name:  Marielena Moeller  YOB: 1945  MRN: [id number]  Admit Date:  4/4/2024        15:35 EDT  Met with patient and family at bedside to discuss STR recommendation from PT. Patient's family stated she wouldn't want to go to STR, home rehab only. Answered patient's questions. Will speak with her again Monday morning to discuss her decision.      Electronically signed by:  Dennis Echevarria RN  04/05/24 15:35 EDT

## 2024-04-05 NOTE — CONSULTS
Inpatient General Surgery Consult  Consult performed by: Rodriguez Hernandez MD  Consult ordered by: Laury Gold APRN          Referring Provider: No ref. provider found    Reason for Consultation: GI bleed    Patient Care Team:  Marychuy Atwood APRN as PCP - General (Family Medicine)  Wu Pathak MD as Consulting Physician (Cardiology)    Chief complaint   Chief Complaint   Patient presents with    Black or Bloody Stool       SUBJECTIVE:    History of present illness: Patient with a significant past medical history for atrial fibrillation on Eliquis.  She reported having multiple episodes of bright red blood per rectum beginning yesterday.  Today she had a small bowel movement without bleeding.  Some crampy lower abdominal pain particularly on the left side but otherwise asymptomatic.  No nausea or vomiting.    Review of Systems:    Review of Systems - General ROS: negative for - chills, fatigue, fever, hot flashes, malaise or night sweats  Psychological ROS: negative for - Depression or anxiety  HEENT ROS: negative for -  No nasal/oral pharynx drainage or pain. No acute visual complaints.  Respiratory ROS: negative for - Shortness of breath, cough or hemoptysis.  Cardiovascular ROS: negative for - Chest pain or palpitations. No edema  Gastrointestinal ROS: As per HPI  Genito-Urinary ROS: negative for - dysuria or hematuria  Musculoskeletal ROS: negative for - gait disturbance or muscle pain  Neurological ROS: negative for - dizziness, gait disturbance, memory loss, numbness/tingling or seizures  Skin: no rash    History  Past Medical History:   Diagnosis Date    Abdominal hernia     Arthritis     Asthma     Blood in stool     Body piercing     EARS ONLY    Bronchitis     Bulging of lumbar intervertebral disc     Colon polyp     COVID-19 07/2022    Deaf, left     Diabetes mellitus     Diverticulitis     GERD (gastroesophageal reflux disease)     Heart murmur     Hyperlipidemia     Hypertension      Impaired functional mobility, balance, gait, and endurance     Leaky heart valve     Lung nodules     Osteoporosis     Palpitations     Pancreatitis     Supplemental oxygen dependent     NIGHTLY AND WHEN EXERTED    Thyroid disease     LEFT SIDE REMOVED    Tinnitus, left        Past Surgical History:   Procedure Laterality Date    APPENDECTOMY      CARDIAC ELECTROPHYSIOLOGY PROCEDURE N/A 2023    Procedure: Device Implant PPM;  Surgeon: Brice Arriaga MD;  Location: [facility] INVASIVE LOCATION;  Service: Cardiology;  Laterality: N/A;     SECTION      CHOLECYSTECTOMY      COLONOSCOPY N/A 8/10/2018    Procedure: COLONOSCOPY WITH HOT SNARE POLYPECTOMY X 3; COLD SNARE POLYPECTOMY, COLD BIOPSY POLYPECTOMY X 4; CLIP PLACEMENT X 1;  Surgeon: Glenn Modi MD;  Location: [facility] ENDOSCOPY;  Service: Gastroenterology    THYROIDECTOMY, PARTIAL Left 2011    TUBAL ABDOMINAL LIGATION         Family History   Problem Relation Age of Onset    Arthritis Mother     Diabetes Mother     Osteoporosis Mother     Arthritis Father     Diabetes Father     Heart attack Father     Hyperlipidemia Father     Diabetes Sister     Thyroid disease Sister     Diabetes Brother     Diabetes Maternal Grandmother     Diabetes Maternal Grandfather     Diabetes Paternal Grandmother     Diabetes Paternal Grandfather        Social History     Socioeconomic History    Marital status:    Tobacco Use    Smoking status: Former     Current packs/day: 0.00     Average packs/day: 2.0 packs/day for 15.0 years (30.0 ttl pk-yrs)     Types: Cigarettes     Start date:      Quit date:      Years since quittin.2     Passive exposure: Past    Smokeless tobacco: Never   Vaping Use    Vaping status: Never Used   Substance and Sexual Activity    Alcohol use: Not Currently    Drug use: Never    Sexual activity: Defer       Allergies   Allergen Reactions    Diltiazem Other (See Comments)     nausea    Spironolactone Other (See  "Comments)     Dropped magnesium      Amlodipine Swelling     Leg edema     Doxycycline Nausea And Vomiting     \"Pain worse than pancreatitis\"    Sotalol Other (See Comments)     nausea    Warfarin Other (See Comments)     Nose bleed    Ciprofloxacin Hives    Contrast Dye (Echo Or Unknown Ct/Mr) Rash     Breaks her out    Erythromycin Hives and Rash    Hydrochlorothiazide Other (See Comments)        pt developed pancreastitis    Levaquin [Levofloxacin] Swelling    Prednisone Mental Status Change     ITCHING AND NERVOUS       OBJECTIVE:    Medications  Current Facility-Administered Medications   Medication Dose Route Frequency Provider Last Rate Last Admin    acetaminophen (TYLENOL) tablet 650 mg  650 mg Oral Q4H PRN Norm Thakkar DO   650 mg at 04/05/24 1218    Or    acetaminophen (TYLENOL) 160 MG/5ML oral solution 650 mg  650 mg Oral Q4H PRN Norm Thakkar DO        Or    acetaminophen (TYLENOL) suppository 650 mg  650 mg Rectal Q4H PRN Norm Thakkar DO        sennosides-docusate (PERICOLACE) 8.6-50 MG per tablet 2 tablet  2 tablet Oral BID PRN Norm Thakakr DO        And    polyethylene glycol (MIRALAX) packet 17 g  17 g Oral Daily PRN Norm Thakkar DO        And    bisacodyl (DULCOLAX) EC tablet 5 mg  5 mg Oral Daily PRN Norm Thakkar DO        And    bisacodyl (DULCOLAX) suppository 10 mg  10 mg Rectal Daily PRN Norm Thakkar DO        dextrose (D50W) (25 g/50 mL) IV injection 25 g  25 g Intravenous Q15 Min PRN Norm Thakkar DO        dextrose (GLUTOSE) oral gel 15 g  15 g Oral Q15 Min PRN Norm Thakkar DO        glucagon (GLUCAGEN) injection 1 mg  1 mg Intramuscular Q15 Min PRN Norm Thakkar DO        insulin detemir (LEVEMIR) injection 20 Units  20 Units Subcutaneous Nightly Norm Thakkar DO        insulin regular (humuLIN R,novoLIN R) injection 2-9 Units  2-9 Units Subcutaneous Q6H Norm Thakkar DO   6 Units at 04/05/24 1215    levothyroxine (SYNTHROID, LEVOTHROID) tablet 112 " mcg  112 mcg Oral Daily Norm Thakkar DO   112 mcg at 04/05/24 0901    LORazepam (ATIVAN) tablet 0.5 mg  0.5 mg Oral Q8H PRN Norm Thakkar DO   0.5 mg at 04/05/24 0223    melatonin tablet 5 mg  5 mg Oral Nightly PRN Norm Thakkar DO        metoprolol tartrate (LOPRESSOR) tablet 12.5 mg  12.5 mg Oral Daily Norm Thakkar, DO   12.5 mg at 04/05/24 0901    Morphine sulfate (PF) injection 2 mg  2 mg Intravenous Q4H PRN Norm Thakkar DO   2 mg at 04/05/24 0223    And    naloxone (NARCAN) injection 0.4 mg  0.4 mg Intravenous Q5 Min PRN Norm Thakkar DO        nitroglycerin (NITROSTAT) SL tablet 0.4 mg  0.4 mg Sublingual Q5 Min PRN Norm Thakkar DO        ondansetron (ZOFRAN) injection 4 mg  4 mg Intravenous Q6H PRN Norm Thakkar DO        Pharmacy to Dose Zosyn   Does not apply Continuous PRN Norm Thakkar DO        piperacillin-tazobactam (ZOSYN) IVPB 3.375 g in 100 mL NS  3.375 g Intravenous Q8H Norm Thakkar DO   3.375 g at 04/05/24 0906    sodium chloride 0.9 % flush 10 mL  10 mL Intravenous PRN Shaan Joshi MD        sodium chloride 0.9 % flush 10 mL  10 mL Intravenous Q12H Norm Thakkar DO   10 mL at 04/05/24 0901    sodium chloride 0.9 % flush 10 mL  10 mL Intravenous PRN Norm Thakkar DO        sodium chloride 0.9 % infusion 40 mL  40 mL Intravenous PRN Norm Thakkar DO             Vital Signs   Temp:  [97.3 °F (36.3 °C)-98 °F (36.7 °C)] 97.5 °F (36.4 °C)  Heart Rate:  [] 75  Resp:  [16-20] 18  BP: (126-201)/(58-99) 142/76    Physical Exam:     General Appearance:  Alert and cooperative, not in any acute distress.   Head:  Atraumatic and normocephalic, without obvious abnormality.   Eyes:          PERRLA, conjunctivae and sclerae normal, no Icterus. No pallor. Extraocular movements are within normal limits.   Respiratory/Lungs:   Breath sounds heard bilaterally equally.  No crackles or wheezing. No Pleural rub or bronchial breathing. Normal respiratory effort.     Cardiovascular/Heart:  Normal S1 and S2,   GI/Abdomen:   Soft and nontender.              Musculoskeletal/ Extremities:   Moves all extremities well   Skin: No bleeding, bruising or rash, no induration   Psychiatric : Alert and oriented ×3.  No depression or anxiety    Neurologic: Cranial nerves 2 - 12 grossly intact, sensation intact, Motor power is normal and equal bilaterally.     Results Review:  Lab Results (last 24 hours)       Procedure Component Value Units Date/Time    POC Glucose Once [[id number]]  (Abnormal) Collected: 04/05/24 1623    Specimen: Blood Updated: 04/05/24 1627     Glucose 213 mg/dL      Comment: Serial Number: [id number]:  [id number]       POC Glucose Once [[id number]]  (Abnormal) Collected: 04/05/24 1131    Specimen: Blood Updated: 04/05/24 1135     Glucose 299 mg/dL      Comment: Serial Number: [id number]:  [id number]       POC Glucose Once [[id number]]  (Abnormal) Collected: 04/05/24 0725    Specimen: Blood Updated: 04/05/24 0729     Glucose 288 mg/dL      Comment: Serial Number: [id number]:  [id number]       CBC Auto Differential [[id number]]  (Abnormal) Collected: 04/05/24 0518    Specimen: Blood Updated: 04/05/24 0612     WBC 8.31 10*3/mm3      RBC 3.61 10*6/mm3      Hemoglobin 11.2 g/dL      Hematocrit 34.2 %      MCV 94.7 fL      MCH 31.0 pg      MCHC 32.7 g/dL      RDW 12.2 %      RDW-SD 42.9 fl      MPV 10.0 fL      Platelets 205 10*3/mm3      Neutrophil % 72.4 %      Lymphocyte % 19.0 %      Monocyte % 6.3 %      Eosinophil % 1.4 %      Basophil % 0.5 %      Immature Grans % 0.4 %      Neutrophils, Absolute 6.02 10*3/mm3      Lymphocytes, Absolute 1.58 10*3/mm3      Monocytes, Absolute 0.52 10*3/mm3      Eosinophils, Absolute 0.12 10*3/mm3      Basophils, Absolute 0.04 10*3/mm3      Immature Grans, Absolute 0.03 10*3/mm3      nRBC 0.0 /100 WBC     Basic Metabolic Panel [[id number]]  (Abnormal) Collected: 04/05/24 0518    Specimen: Blood Updated: 04/05/24 0608     Glucose  324 mg/dL      BUN 17 mg/dL      Creatinine 0.69 mg/dL      Sodium 139 mmol/L      Potassium 4.6 mmol/L      Chloride 100 mmol/L      CO2 30.1 mmol/L      Calcium 8.6 mg/dL      BUN/Creatinine Ratio 24.6     Anion Gap 8.9 mmol/L      eGFR 90.1 mL/min/1.73     Narrative:      GFR Normal >60  Chronic Kidney Disease <60  Kidney Failure <15    The GFR formula is only valid for adults with stable renal function between ages 18 and 70.    Hemoglobin A1c [[id number]]  (Abnormal) Collected: 04/05/24 0131    Specimen: Blood Updated: 04/05/24 0147     Hemoglobin A1C 10.30 %     Narrative:      Hemoglobin A1C Ranges:    Increased Risk for Diabetes  5.7% to 6.4%  Diabetes                     >= 6.5%  Diabetic Goal                < 7.0%    Gilbert Draw [[id number]] Collected: 04/04/24 2151    Specimen: Blood Updated: 04/04/24 2301    Narrative:      The following orders were created for panel order Gilbert Draw.  Procedure                               Abnormality         Status                     ---------                               -----------         ------                     Green Top (Gel)[[id number]]                                  Final result               Lavender Top[[id number]]                                     Final result               Gold Top - SST[[id number]]                                   Final result               Light Blue Top[[id number]]                                   Final result                 Please view results for these tests on the individual orders.    Green Top (Gel) [[id number]] Collected: 04/04/24 2151    Specimen: Blood Updated: 04/04/24 2301     Extra Tube Hold for add-ons.     Comment: Auto resulted.       Lavender Top [[id number]] Collected: 04/04/24 2151    Specimen: Blood Updated: 04/04/24 2301     Extra Tube hold for add-on     Comment: Auto resulted       Gold Top - SST [[id number]] Collected: 04/04/24 2151    Specimen: Blood Updated: 04/04/24 2301     Extra Tube Hold for add-ons.      Comment: Auto resulted.       Light Blue Top [[id number]] Collected: 04/04/24 2151    Specimen: Blood Updated: 04/04/24 2301     Extra Tube Hold for add-ons.     Comment: Auto resulted       Lipase [[id number]]  (Normal) Collected: 04/04/24 2151    Specimen: Blood Updated: 04/04/24 2222     Lipase 35 U/L     Comprehensive Metabolic Panel [[id number]]  (Abnormal) Collected: 04/04/24 2151    Specimen: Blood Updated: 04/04/24 2217     Glucose 361 mg/dL      Comment: Glucose >180, Hemoglobin A1C recommended.        BUN 17 mg/dL      Creatinine 0.72 mg/dL      Sodium 134 mmol/L      Potassium 4.2 mmol/L      Chloride 93 mmol/L      CO2 30.7 mmol/L      Calcium 9.4 mg/dL      Total Protein 7.1 g/dL      Albumin 4.1 g/dL      ALT (SGPT) 19 U/L      AST (SGOT) 13 U/L      Alkaline Phosphatase 100 U/L      Total Bilirubin 0.2 mg/dL      Globulin 3.0 gm/dL      A/G Ratio 1.4 g/dL      BUN/Creatinine Ratio 23.6     Anion Gap 10.3 mmol/L      eGFR 86.8 mL/min/1.73     Narrative:      GFR Normal >60  Chronic Kidney Disease <60  Kidney Failure <15    The GFR formula is only valid for adults with stable renal function between ages 18 and 70.    Protime-INR [[id number]]  (Normal) Collected: 04/04/24 2151    Specimen: Blood Updated: 04/04/24 2215     Protime 13.7 Seconds      INR 1.00    Narrative:      Suggested INR therapeutic range for stable oral anticoagulant therapy:    Low Intensity therapy:   1.5-2.0  Moderate Intensity therapy:   2.0-3.0  High Intensity therapy:   2.5-4.0    CBC & Differential [[id number]]  (Abnormal) Collected: 04/04/24 2151    Specimen: Blood Updated: 04/04/24 2201    Narrative:      The following orders were created for panel order CBC & Differential.  Procedure                               Abnormality         Status                     ---------                               -----------         ------                     CBC Auto Differential[[id number]]        Abnormal            Final result                  Please view results for these tests on the individual orders.    CBC Auto Differential [[id number]]  (Abnormal) Collected: 04/04/24 2151    Specimen: Blood Updated: 04/04/24 2201     WBC 8.89 10*3/mm3      RBC 4.27 10*6/mm3      Hemoglobin 13.2 g/dL      Hematocrit 40.2 %      MCV 94.1 fL      MCH 30.9 pg      MCHC 32.8 g/dL      RDW 12.1 %      RDW-SD 42.0 fl      MPV 9.7 fL      Platelets 227 10*3/mm3      Neutrophil % 72.1 %      Lymphocyte % 17.8 %      Monocyte % 6.3 %      Eosinophil % 2.7 %      Basophil % 0.7 %      Immature Grans % 0.4 %      Neutrophils, Absolute 6.41 10*3/mm3      Lymphocytes, Absolute 1.58 10*3/mm3      Monocytes, Absolute 0.56 10*3/mm3      Eosinophils, Absolute 0.24 10*3/mm3      Basophils, Absolute 0.06 10*3/mm3      Immature Grans, Absolute 0.04 10*3/mm3      nRBC 0.0 /100 WBC         I reviewed the CT scan including films and I agree with the interpretation    ASSESSMENT/PLAN:      GI bleed      Patient with hematochezia most likely secondary to diverticular bleed.  It has been almost 6 years since her last colonoscopy.  Although I think the bleeding has resolved since stopping the Eliquis I have offered her a colonoscopy specially to rule out other etiologies.  At this time she wishes to proceed with a colonoscopy.  I explained the procedure to the patient as well as the risks of bleeding and perforation and they understand the ramifications of these potential complications and they wish to proceed.    Rodriguez Hernandez MD  04/05/24  16:35 EDT

## 2024-04-05 NOTE — CASE MANAGEMENT/SOCIAL WORK
Discharge Planning Assessment  [facility]     Patient Name: Marielena Moeller  MRN: [id number]  Today's Date: 4/5/2024    Admit Date: 4/4/2024    Plan: Home   Discharge Needs Assessment       Row Name 04/05/24 0925       Living Environment    People in Home alone    Current Living Arrangements home    Potentially Unsafe Housing Conditions none    In the past 12 months has the electric, gas, oil, or water company threatened to shut off services in your home? No    Primary Care Provided by self    Provides Primary Care For no one    Able to Return to Prior Arrangements yes       Resource/Environmental Concerns    Resource/Environmental Concerns none    Transportation Concerns none       Transportation Needs    In the past 12 months, has lack of transportation kept you from medical appointments or from getting medications? no    In the past 12 months, has lack of transportation kept you from meetings, work, or from getting things needed for daily living? No       Food Insecurity    Within the past 12 months, you worried that your food would run out before you got the money to buy more. Never true    Within the past 12 months, the food you bought just didn't last and you didn't have money to get more. Never true       Transition Planning    Patient/Family Anticipates Transition to home with family    Patient/Family Anticipated Services at Transition none    Transportation Anticipated family or friend will provide       Discharge Needs Assessment    Readmission Within the Last 30 Days no previous admission in last 30 days    Equipment Currently Used at Home oxygen;walker, standard    Concerns to be Addressed no discharge needs identified    Anticipated Changes Related to Illness none    Equipment Needed After Discharge none                   Discharge Plan       Row Name 04/05/24 0927       Plan    Plan Home    Patient/Family in Agreement with Plan yes    Plan Comments Met with patient at bedside.Verified patient's  address, phone number, contacts, physician and pharmacy. Patient has adequate transportation. Reports no financial or food insecurity. Patient lives alone. Reports feeling lonely since her  passed. She uses a cane, walker, oxygen and wheelchair provided by [employer]. Patient uses [facility] pharmacy, agreeable to meds to beds. Wants to return home, possibly with home health. States she could use more help at home. Discussed homecare options with patient, stated they're not within her budget.    Final Discharge Disposition Code 01 - home or self-care                  Continued Care and Services - Admitted Since 4/4/2024    No active coordination exists for this encounter.          Demographic Summary       Row Name 04/05/24 0925       General Information    Admission Type inpatient    Arrived From emergency department    Required Notices Provided Important Message from Medicare    Referral Source admission list    Reason for Consult discharge planning    Preferred Language English       Contact Information    Permission Granted to Share Info With                    Functional Status       Row Name 04/05/24 0925       Functional Status    Usual Activity Tolerance moderate    Current Activity Tolerance moderate       Physical Activity    On average, how many days per week do you engage in moderate to strenuous exercise (like a brisk walk)? 0 days    On average, how many minutes do you engage in exercise at this level? 0 min    Number of minutes of exercise per week 0       Assessment of Health Literacy    How often do you have someone help you read hospital materials? Occasionally    How often do you have problems learning about your medical condition because of difficulty understanding written information? Occasionally    How often do you have a problem understanding what is told to you about your medical condition? Occasionally    How confident are you filling out medical forms by yourself? Quite a  bit    Health Literacy Good       Functional Status, IADL    Medications assistive equipment    Meal Preparation assistive equipment    Housekeeping assistive equipment    Laundry assistive equipment    Shopping assistive equipment                   Psychosocial       Row Name 04/05/24 8024       Values/Beliefs    Spiritual, Cultural Beliefs, Yarsanism Practices, Values that Affect Care no       Mental Health    Little Interest or Pleasure in Doing Things 0-->not at all    Feeling Down, Depressed or Hopeless 0-->not at all       Stress    Do you feel stress - tense, restless, nervous, or anxious, or unable to sleep at night because your mind is troubled all the time - these days? Not at all       Coping/Stress    Major Change/Loss/Stressor illness    Patient Personal Strengths able to adapt;resilient    Sources of Support significant other    Techniques to [place] with Loss/Stress/Change diversional activities    Reaction to Health Status accepting    Understanding of Condition and Treatment adequate understanding of medical condition;adequate understanding of treatment       Developmental Stage (Eriksson's)    Developmental Stage Stage 8 (67 years-death/Late Adulthood) Integrity vs. Despair       C-SSRS (Recent)    Q1 Wished to be Dead (Past Month) no    Q2 Suicidal Thoughts (Past Month) no    Q6 Suicide Behavior (Lifetime) no       Violence Risk    Feels Like Hurting Others no    Previous Attempt to Harm Others no                   Abuse/Neglect    No documentation.                  Legal    No documentation.                  Substance Abuse    No documentation.                  Patient Forms    No documentation.                     Dennis Echevraria RN

## 2024-04-06 ENCOUNTER — ANESTHESIA (OUTPATIENT)
Dept: GASTROENTEROLOGY | Facility: HOSPITAL | Age: 79
DRG: 379 | End: 2024-04-06
Payer: MEDICARE

## 2024-04-06 ENCOUNTER — ANESTHESIA EVENT (OUTPATIENT)
Dept: GASTROENTEROLOGY | Facility: HOSPITAL | Age: 79
DRG: 379 | End: 2024-04-06
Payer: MEDICARE

## 2024-04-06 ENCOUNTER — READMISSION MANAGEMENT (OUTPATIENT)
Dept: CALL CENTER | Facility: HOSPITAL | Age: 79
End: 2024-04-06
Payer: MEDICARE

## 2024-04-06 VITALS
WEIGHT: 169.97 LBS | SYSTOLIC BLOOD PRESSURE: 167 MMHG | TEMPERATURE: 97.4 F | OXYGEN SATURATION: 96 % | HEIGHT: 60 IN | BODY MASS INDEX: 33.37 KG/M2 | HEART RATE: 80 BPM | DIASTOLIC BLOOD PRESSURE: 68 MMHG | RESPIRATION RATE: 16 BRPM

## 2024-04-06 LAB
ANION GAP SERPL CALCULATED.3IONS-SCNC: 10.4 MMOL/L (ref 5–15)
BASOPHILS # BLD AUTO: 0.05 10*3/MM3 (ref 0–0.2)
BASOPHILS NFR BLD AUTO: 0.7 % (ref 0–1.5)
BUN SERPL-MCNC: 13 MG/DL (ref 8–23)
BUN/CREAT SERPL: 18.1 (ref 7–25)
CALCIUM SPEC-SCNC: 8.9 MG/DL (ref 8.6–10.5)
CHLORIDE SERPL-SCNC: 98 MMOL/L (ref 98–107)
CO2 SERPL-SCNC: 30.6 MMOL/L (ref 22–29)
CREAT SERPL-MCNC: 0.72 MG/DL (ref 0.57–1)
DEPRECATED RDW RBC AUTO: 42.7 FL (ref 37–54)
EGFRCR SERPLBLD CKD-EPI 2021: 86.8 ML/MIN/1.73
EOSINOPHIL # BLD AUTO: 0.2 10*3/MM3 (ref 0–0.4)
EOSINOPHIL NFR BLD AUTO: 2.7 % (ref 0.3–6.2)
ERYTHROCYTE [DISTWIDTH] IN BLOOD BY AUTOMATED COUNT: 12.2 % (ref 12.3–15.4)
GLUCOSE BLDC GLUCOMTR-MCNC: 302 MG/DL (ref 70–130)
GLUCOSE BLDC GLUCOMTR-MCNC: 325 MG/DL (ref 70–130)
GLUCOSE SERPL-MCNC: 335 MG/DL (ref 65–99)
HCT VFR BLD AUTO: 35.6 % (ref 34–46.6)
HGB BLD-MCNC: 11.7 G/DL (ref 12–15.9)
IMM GRANULOCYTES # BLD AUTO: 0.03 10*3/MM3 (ref 0–0.05)
IMM GRANULOCYTES NFR BLD AUTO: 0.4 % (ref 0–0.5)
LYMPHOCYTES # BLD AUTO: 0.96 10*3/MM3 (ref 0.7–3.1)
LYMPHOCYTES NFR BLD AUTO: 13.1 % (ref 19.6–45.3)
MCH RBC QN AUTO: 31.5 PG (ref 26.6–33)
MCHC RBC AUTO-ENTMCNC: 32.9 G/DL (ref 31.5–35.7)
MCV RBC AUTO: 96 FL (ref 79–97)
MONOCYTES # BLD AUTO: 0.47 10*3/MM3 (ref 0.1–0.9)
MONOCYTES NFR BLD AUTO: 6.4 % (ref 5–12)
NEUTROPHILS NFR BLD AUTO: 5.63 10*3/MM3 (ref 1.7–7)
NEUTROPHILS NFR BLD AUTO: 76.7 % (ref 42.7–76)
NRBC BLD AUTO-RTO: 0 /100 WBC (ref 0–0.2)
PLATELET # BLD AUTO: 188 10*3/MM3 (ref 140–450)
PMV BLD AUTO: 10.1 FL (ref 6–12)
POTASSIUM SERPL-SCNC: 4.6 MMOL/L (ref 3.5–5.2)
RBC # BLD AUTO: 3.71 10*6/MM3 (ref 3.77–5.28)
SODIUM SERPL-SCNC: 139 MMOL/L (ref 136–145)
WBC NRBC COR # BLD AUTO: 7.34 10*3/MM3 (ref 3.4–10.8)

## 2024-04-06 PROCEDURE — 82948 REAGENT STRIP/BLOOD GLUCOSE: CPT

## 2024-04-06 PROCEDURE — 45385 COLONOSCOPY W/LESION REMOVAL: CPT | Performed by: SURGERY

## 2024-04-06 PROCEDURE — 99238 HOSP IP/OBS DSCHRG MGMT 30/<: CPT | Performed by: INTERNAL MEDICINE

## 2024-04-06 PROCEDURE — 0DBN8ZZ EXCISION OF SIGMOID COLON, VIA NATURAL OR ARTIFICIAL OPENING ENDOSCOPIC: ICD-10-PCS | Performed by: SURGERY

## 2024-04-06 PROCEDURE — 85025 COMPLETE CBC W/AUTO DIFF WBC: CPT | Performed by: NURSE PRACTITIONER

## 2024-04-06 PROCEDURE — 25010000002 PROPOFOL 10 MG/ML EMULSION: Performed by: NURSE ANESTHETIST, CERTIFIED REGISTERED

## 2024-04-06 PROCEDURE — 80048 BASIC METABOLIC PNL TOTAL CA: CPT | Performed by: NURSE PRACTITIONER

## 2024-04-06 PROCEDURE — 25010000002 PIPERACILLIN SOD-TAZOBACTAM PER 1 G: Performed by: INTERNAL MEDICINE

## 2024-04-06 PROCEDURE — 88305 TISSUE EXAM BY PATHOLOGIST: CPT

## 2024-04-06 PROCEDURE — 25810000003 LACTATED RINGERS PER 1000 ML: Performed by: NURSE ANESTHETIST, CERTIFIED REGISTERED

## 2024-04-06 DEVICE — DEV CLIP ENDO RESOLUTION360 CONTRL ROT 235CM: Type: IMPLANTABLE DEVICE | Site: SIGMOID COLON | Status: FUNCTIONAL

## 2024-04-06 RX ORDER — PROPOFOL 10 MG/ML
VIAL (ML) INTRAVENOUS AS NEEDED
Status: DISCONTINUED | OUTPATIENT
Start: 2024-04-06 | End: 2024-04-06 | Stop reason: SURG

## 2024-04-06 RX ORDER — SODIUM CHLORIDE, SODIUM LACTATE, POTASSIUM CHLORIDE, CALCIUM CHLORIDE 600; 310; 30; 20 MG/100ML; MG/100ML; MG/100ML; MG/100ML
INJECTION, SOLUTION INTRAVENOUS CONTINUOUS PRN
Status: DISCONTINUED | OUTPATIENT
Start: 2024-04-06 | End: 2024-04-06

## 2024-04-06 RX ORDER — ONDANSETRON 2 MG/ML
4 INJECTION INTRAMUSCULAR; INTRAVENOUS ONCE AS NEEDED
Status: DISCONTINUED | OUTPATIENT
Start: 2024-04-06 | End: 2024-04-06 | Stop reason: HOSPADM

## 2024-04-06 RX ORDER — SODIUM CHLORIDE, SODIUM LACTATE, POTASSIUM CHLORIDE, CALCIUM CHLORIDE 600; 310; 30; 20 MG/100ML; MG/100ML; MG/100ML; MG/100ML
INJECTION, SOLUTION INTRAVENOUS CONTINUOUS PRN
Status: DISCONTINUED | OUTPATIENT
Start: 2024-04-06 | End: 2024-04-06 | Stop reason: SURG

## 2024-04-06 RX ORDER — KETAMINE HCL IN NACL, ISO-OSM 100MG/10ML
SYRINGE (ML) INJECTION AS NEEDED
Status: DISCONTINUED | OUTPATIENT
Start: 2024-04-06 | End: 2024-04-06 | Stop reason: SURG

## 2024-04-06 RX ORDER — LIDOCAINE HCL/PF 100 MG/5ML
SYRINGE (ML) INJECTION AS NEEDED
Status: DISCONTINUED | OUTPATIENT
Start: 2024-04-06 | End: 2024-04-06 | Stop reason: SURG

## 2024-04-06 RX ADMIN — PIPERACILLIN SODIUM AND TAZOBACTAM SODIUM 3.38 G: 3; .375 INJECTION, POWDER, FOR SOLUTION INTRAVENOUS at 10:07

## 2024-04-06 RX ADMIN — POLYETHYLENE GLYCOL 3350 0.5 BOTTLE: 17 POWDER, FOR SOLUTION ORAL at 02:06

## 2024-04-06 RX ADMIN — Medication 50 MG: at 08:21

## 2024-04-06 RX ADMIN — SODIUM CHLORIDE, POTASSIUM CHLORIDE, SODIUM LACTATE AND CALCIUM CHLORIDE: 600; 310; 30; 20 INJECTION, SOLUTION INTRAVENOUS at 08:16

## 2024-04-06 RX ADMIN — METOPROLOL TARTRATE 12.5 MG: 25 TABLET, FILM COATED ORAL at 10:07

## 2024-04-06 RX ADMIN — Medication 60 MG: at 08:19

## 2024-04-06 RX ADMIN — PROPOFOL 30 MG: 10 INJECTION, EMULSION INTRAVENOUS at 08:19

## 2024-04-06 NOTE — PLAN OF CARE
Goal Outcome Evaluation:              Outcome Evaluation: Planned Discharge

## 2024-04-06 NOTE — ANESTHESIA POSTPROCEDURE EVALUATION
Patient: Marielena Moeller    Procedure Summary       Date: 04/06/24 Room / Location: [facility] ENDOSCOPY 2 / [facility] ENDOSCOPY    Anesthesia Start: 0816 Anesthesia Stop: 0854    Procedure: COLONOSCOPY (Anus) Diagnosis:       Gastrointestinal hemorrhage associated with intestinal diverticulosis      (Gastrointestinal hemorrhage associated with intestinal diverticulosis [K57.91])    Surgeons: Rodriguez Hernandez MD Provider: Home Nuñez CRNA    Anesthesia Type: MAC ASA Status: 4            Anesthesia Type: MAC    Vitals  No vitals data found for the desired time range.          Post Anesthesia Care and Evaluation    Patient location during evaluation: PACU  Patient participation: complete - patient participated  Level of consciousness: awake  Pain score: 0  Pain management: adequate    Airway patency: patent  Anesthetic complications: No anesthetic complications  PONV Status: controlled  Cardiovascular status: acceptable and stable  Respiratory status: acceptable and room air  Hydration status: acceptable    Comments: See nursing documentation for post op vital signs     Vancomycin Assessment    Isa Sandhu is a 82 y o  male who is currently receiving vancomycin 1000 mg q24h for bacteremia     Relevant clinical data and objective history reviewed:  Creatinine   Date Value Ref Range Status   07/26/2020 2 28 (H) 0 60 - 1 30 mg/dL Final     Comment:     Standardized to IDMS reference method   07/25/2020 2 24 (H) 0 60 - 1 30 mg/dL Final     Comment:     Standardized to IDMS reference method   07/24/2020 2 28 (H) 0 60 - 1 30 mg/dL Final     Comment:     Standardized to IDMS reference method   10/01/2015 1 88 (H) 0 60 - 1 30 mg/dL Final     Comment:     Standardized to IDMS reference method   07/14/2015 2 22 (H) 0 60 - 1 30 mg/dL Final     Comment:     Standardized to IDMS reference method   07/08/2015 2 05 (H) 0 60 - 1 30 mg/dL Final     Comment:     Standardized to IDMS reference method     /63 (BP Location: Right arm)   Pulse 60   Temp (!) 97 4 °F (36 3 °C) (Oral)   Resp (!) 11   Ht 5' 4" (1 626 m)   Wt 95 5 kg (210 lb 8 6 oz)   SpO2 94%   BMI 36 14 kg/m²   I/O last 3 completed shifts: In: 1140 [P O :1060; I V :30; IV Piggyback:50]  Out: 705 [Urine:705]  Lab Results   Component Value Date/Time    BUN 73 (H) 07/26/2020 04:55 AM    BUN 28 (H) 10/01/2015 08:32 AM    WBC 17 83 (H) 07/26/2020 04:55 AM    WBC 12 50 (H) 07/04/2015 05:01 AM    HGB 11 4 (L) 07/26/2020 04:55 AM    HGB 9 4 (L) 07/04/2015 05:01 AM    HCT 36 3 (L) 07/26/2020 04:55 AM    HCT 27 8 (L) 07/04/2015 05:01 AM    MCV 98 07/26/2020 04:55 AM    MCV 91 07/04/2015 05:01 AM     07/26/2020 04:55 AM     07/04/2015 05:01 AM     Temp Readings from Last 3 Encounters:   07/26/20 (!) 97 4 °F (36 3 °C) (Oral)   07/20/20 (!) 96 9 °F (36 1 °C) (Temporal)   10/07/19 (!) 96 3 °F (35 7 °C) (Tympanic)     Vancomycin Days of Therapy: Day 1    Assessment/Plan  The patient is currently on vancomycin utilizing scheduled dosing based on adjusted body weight (due to obesity)    Baseline risks associated with therapy include: pre-existing renal impairment and advanced age  The patient is currently receiving 1000 mg q24h and is clinically appropriate and dose will be continued  Pharmacy will also follow closely for s/sx of nephrotoxicity, infusion reactions and appropriateness of therapy  BMP and CBC will be ordered per protocol  Plan for trough as patient approaches steady state, prior to the 3rd  dose at approximately 1730 on 7-  Due to infection severity, will target a trough of 15-20 (appropriate for most indications)   Pharmacy will continue to follow the patients culture results and clinical progress daily      Lisa Steiner, Pharmacist

## 2024-04-06 NOTE — DISCHARGE SUMMARY
[facility]   INTERNAL MEDICINE DISCHARGE SUMMARY      Name:  Marielena Moeller   Age:  78 y.o.  Sex:  female  :  1945  MRN:  [id number]   Visit Number:  [id number]  Primary Care Physician:  Marychuy Atwood APRN  Date of Discharge:  2024  Admission Date:  2024      Discharge Diagnosis:         GI bleed    Type 2 diabetes mellitus with hyperglycemia, with long-term current use of insulin    Acquired hypothyroidism    Hematochezia/lower GI bleeding POA  A-fib on Eliquis  Insulin-dependent type 2 diabetes  Hypertension  Hyperlipidemia  COPD  Hypothyroidism  Anxiety  Obesity  Impaired mobility and ADL        Consults:     Consults       Date and Time Order Name Status Description    2024  7:49 AM Inpatient General Surgery Consult Completed             Procedures Performed:      Procedure(s):  COLONOSCOPY with polypectomy and clipping  736 Colonoscopy        Hospital Course:   The patient was admitted on 2024  Please see H&P for details on admission HPI and ROS.  Patient is a chronically ill 78-year-old female with history significant for type 2 diabetes, A-fib on Eliquis, COPD on 2 liters, and obesity who presented to the emergency room with complaints of bright red blood per rectum.  Patient also reported a history of diverticulitis.  States that she has been in her normal state of health until earlier in the day she noticed bright red blood per rectum with clots.  Does have associated left lower abdominal cramping.  Denies nausea/vomiting, fever. Currently lives alone.      Upon arrival to the ER patient afebrile, hypertensive 201/78 and nonhypoxic.  CMP fairly unremarkable, PT/INR normal.  Lipase normal.  CBC unremarkable.  Hemoglobin 13 hematocrit 40.  CT abdomen pelvis showed no acute findings.  Blood pressure improved after pain medication was administered.  Patient had witnessed episodes of hematochezia while in the ER.  Hospitalist consulted for further medical  management.  Hillarybetsyelva added given history of diverticulitis with left lower abdominal pain.      After admission Dr. Senior was consulted and he did colonoscopy and was found to have signs of for diverticular bleeding which had actually stopped her during the procedure.  So she had been stable hemoglobin has been stable and she has been eating and will be discharged.  Patient has been on Eliquis which will be held for the next 2 weeks and I discussed with patient and family that she should consult the cardiologist after 2 weeks to see what dose to be restarted and around what time.  Until then she will be discharged without Eliquis and all other medication will be as per the discharge summary ready for    Vital Signs:     Temp:  [97.4 °F (36.3 °C)-98.2 °F (36.8 °C)] 97.4 °F (36.3 °C)  Heart Rate:  [74-81] 80  Resp:  [11-20] 16  BP: (127-181)/(45-76) 167/68    Physical Exam:     General Appearance:    Alert and cooperative, not in any acute distress.   Head:    Atraumatic and normocephalic, without obvious abnormality.   Eyes:            PERRLA,  No pallor. Extraocular movements are within normal limits.   Neck:   Supple,  No lymph glands, no bruit   Lungs:     Chest shape is normal. Breath sounds heard bilaterally equally.  No crackles or wheezing.     Heart:    Normal S1 and S2, no murmur,  No JVD   Abdomen:     Normal bowel sounds, no masses, no organomegaly. Soft     nontender, no guarding, no rebound tenderness   Extremities:   Moves all extremities well, no edema, no cyanosis,    Skin:   No  bruising or rash.   Neurologic:   Grossly nonfocal and moves all extremities.        Pertinent Lab Results:     Results from last 7 days   Lab Units 04/06/24  0613 04/05/24  0518 04/04/24  2151   SODIUM mmol/L 139 139 134*   POTASSIUM mmol/L 4.6 4.6 4.2   CHLORIDE mmol/L 98 100 93*   CO2 mmol/L 30.6* 30.1* 30.7*   BUN mg/dL 13 17 17   CREATININE mg/dL 0.72 0.69 0.72   CALCIUM mg/dL 8.9 8.6 9.4   BILIRUBIN mg/dL  --   --  0.2  "  ALK PHOS U/L  --   --  100   ALT (SGPT) U/L  --   --  19   AST (SGOT) U/L  --   --  13   GLUCOSE mg/dL 335* 324* 361*     Results from last 7 days   Lab Units 04/06/24  0613 04/05/24  0518 04/04/24  2151   WBC 10*3/mm3 7.34 8.31 8.89   HEMOGLOBIN g/dL 11.7* 11.2* 13.2   HEMATOCRIT % 35.6 34.2 40.2   PLATELETS 10*3/mm3 188 205 227     Results from last 7 days   Lab Units 04/04/24  2151   INR  1.00     No results found for: \"BLOODCX\", \"URINECX\", \"WOUNDCX\", \"MRSACX\"    Pertinent Radiology Results:    Imaging Results (Most Recent)       Procedure Component Value Units Date/Time    CT Abdomen Pelvis Without Contrast [[id number]] Collected: 04/04/24 2335     Updated: 04/04/24 2345    Narrative:      FINAL REPORT    TECHNIQUE:  Axial CT images were performed from the lung bases through the  symphysis pubis without IV contrast.  This study was performed  with techniques to keep radiation doses as low as reasonably  achievable (ALARA). Individualized dose reduction techniques  using automated exposure control or adjustment of mA and/or kV  according to the patient's size were employed.    CLINICAL HISTORY:  Abdominal pain, acute, nonlocalized  rectal bleeding, BRBPR    FINDINGS:  Lack of intravenous contrast limits evaluation of solid  abdominal and pelvic organs.  LOWER CHEST: The heart is normal  size.  There is scattered tree-in-bud nodularity in the lung  bases.    ABDOMEN/PELVIS:  Liver, gallbladder and bile ducts:  The unenhanced liver is grossly unremarkable without focal  abnormality.  There has been a prior cholecystectomy.  There is  no definite biliary duct dilatation.  Adrenal glands: The  adrenal glands are morphologically unremarkable without  suspicious lesion.  Kidneys, ureter and urinary bladder: No  nephrolithiasis.  No hydronephrosis.  Urinary bladder is  unremarkable.  Spleen: The spleen is normal size.  Pancreas: The  pancreas is grossly unremarkable.  GI systems and mesentery:  There is colonic " diverticulosis without diverticulitis.  No  evidence of bowel obstruction.  The appendix is not visualized  but there are no secondary signs of appendicitis.    No  significant mesenteric inflammation.  Lymph nodes: No definite  pathologically enlarged abdominal or pelvic lymph nodes present  within the limits of this noncontrast enhanced exam.  Vessels:  The abdominal aorta is normal in caliber.  The inferior vena  cava is unremarkable.  Peritoneum: No free intraperitoneal fluid  or pneumoperitoneum.  Pelvic viscera: No acute findings.  Body  wall: No body wall contusion.  Bones: No acute fracture.      Impression:      No acute findings in the abdomen and pelvis to account for  patient's symptoms.    Tree-in-bud nodularity in the lung bases  most compatible with chronic endobronchial infection.    Authenticated and Electronically Signed by Favio Mukherjee MD on  04/04/2024 11:44:19 PM                    Discharge Disposition:      Home or Self Care    Discharge Medication:         Discharge Medications        Continue These Medications        Instructions Start Date   Accu-Chek Anne Plus test strip  Generic drug: glucose blood   USE AS DIRECTED THREE TIMES DAILY      Accu-Chek Anne Plus w/Device kit   USE AS DIRECTED TO CHECK GLUCOSE 3 TIMES DAILY FOR DM E11.65      albuterol sulfate  (90 Base) MCG/ACT inhaler  Commonly known as: Ventolin HFA   2 puffs, Inhalation, Every 6 Hours PRN      albuterol 0.63 MG/3ML nebulizer solution  Commonly known as: ACCUNEB   0.63 mg, Nebulization, Every 6 Hours PRN      cholecalciferol 25 MCG (1000 UT) tablet   1,000 Units, Oral, Daily      Dexcom G6  device   1 each, Does not apply, Continuous      Dexcom G6 Sensor   Does not apply, Every 10 Days, Apply as directed      furosemide 20 MG tablet  Commonly known as: Lasix   1 po daily as needed for swelling      insulin aspart prot-insulin aspart (70-30) 100 UNIT/ML injection  Commonly known as: novoLOG 70/30   50  "Units, Subcutaneous, 3 Times Daily      Insulin Syringe 31G X 5/16\" 1 ML misc   3 Times Daily      BD Veo Insulin Syringe U/F 31G X 15/64\" 0.5 ML misc  Generic drug: Insulin Syringe-Needle U-100   as directed      ketoconazole 2 % cream  Commonly known as: NIZORAL   1 application , Topical, Daily, Apply under folds      levothyroxine 112 MCG tablet  Commonly known as: SYNTHROID, LEVOTHROID   112 mcg, Oral, Daily      LORazepam 0.5 MG tablet  Commonly known as: ATIVAN   0.5 mg, Oral, Every 8 Hours PRN      metoprolol tartrate 25 MG tablet  Commonly known as: LOPRESSOR   TAKE 1/2 TABLET BY MOUTH TWICE DAILY      O2  Commonly known as: OXYGEN   2 L/min, Inhalation, Continuous             Stop These Medications      apixaban 5 MG tablet tablet  Commonly known as: ELIQUIS     bacitracin 500 UNIT/GM ointment     Diclofenac Sodium 1 % gel gel  Commonly known as: VOLTAREN     lidocaine 5 %  Commonly known as: LIDODERM     MAGnesium-Oxide 400 (241.3 Mg) MG tablet tablet  Generic drug: magnesium oxide     nystatin 920401 UNIT/GM cream  Commonly known as: MYCOSTATIN     silver sulfadiazine 1 % cream  Commonly known as: Silvadene     Zinc 50 MG tablet              Discharge Diet:             Follow-up Appointments:      Future Appointments   Date Time Provider Department Center   4/18/2024  1:30 PM Wu Pathak MD [facility]   4/19/2024  9:15 AM Veronica Mims PA [facility]   6/4/2024 11:30 AM Brice Arriaga MD [facility]     Follow-up with PCP within a week    Test Results Pending at Discharge:      Pending Labs       Order Current Status    TISSUE EXAM, [facility] ([facility]) Collected (04/06/24 0840)               Harvinder Mohan MD  04/06/24  14:04 EDT    Time:  Discharge 24 min    Please note that portions of this note were completed with a voice recognition program.      "

## 2024-04-06 NOTE — CASE MANAGEMENT/SOCIAL WORK
Case Management Discharge Note                Selected Continued Care - Admitted Since 4/4/2024       Destination    No services have been selected for the patient.                Durable Medical Equipment    No services have been selected for the patient.                Dialysis/Infusion    No services have been selected for the patient.                Home Medical Care    No services have been selected for the patient.                Therapy    No services have been selected for the patient.                Community Resources    No services have been selected for the patient.                Community & [facility]    No services have been selected for the patient.                    Transportation Services  Private: Car    Final Discharge Disposition Code: 01 - home or self-care

## 2024-04-06 NOTE — ANESTHESIA PREPROCEDURE EVALUATION
Anesthesia Evaluation     Patient summary reviewed and Nursing notes reviewed   no history of anesthetic complications:   NPO Solid Status: > 8 hours  NPO Liquid Status: > 8 hours           Airway   Mallampati: III  TM distance: >3 FB  Neck ROM: full  no difficulty expected  Dental - normal exam     Pulmonary - normal exam   (+) COPD, asthma,home oxygen  Cardiovascular - normal exam    (+) hypertension, valvular problems/murmurs, dysrhythmias, PVD, hyperlipidemia      Neuro/Psych  (+) syncope, psychiatric history  GI/Hepatic/Renal/Endo    (+) obesity, morbid obesity, GERD, GI bleeding , diabetes mellitus, thyroid problem hypothyroidism    Musculoskeletal     Abdominal    Substance History - negative use     OB/GYN negative ob/gyn ROS         Other   arthritis,                 Anesthesia Plan    ASA 4     MAC     intravenous induction     Anesthetic plan, risks, benefits, and alternatives have been provided, discussed and informed consent has been obtained with: patient.    CODE STATUS:    Code Status (Patient has no pulse and is not breathing): CPR (Attempt to Resuscitate)  Medical Interventions (Patient has pulse or is breathing): Full Support

## 2024-04-06 NOTE — OUTREACH NOTE
Prep Survey      Flowsheet Row Responses   [facility] facility patient discharged from? [place]   Is LACE score < 7 ? No   Eligibility Not Eligible   What are the reasons patient is not eligible? Readmitted   Does the patient have one of the following disease processes/diagnoses(primary or secondary)? Other   Prep survey completed? Yes            LULA TRIPLETT - Registered Nurse

## 2024-04-06 NOTE — PLAN OF CARE
Goal Outcome Evaluation:      No acute events overnight. Patient was able to finish second round of bowel prep at 0430. Did not start having bowel movements from 1st bowel prep until 0200. Episodes of VTach observed and MD made aware. Plan of care is ongoing.

## 2024-04-07 NOTE — PAYOR COMM NOTE
"TO:HUMANA  FROM:YWATT MERRITT, RN PHONE [phone]  [facility]      Marielena Moeller (78 y.o. Female)       Date of Birth   1945    Social Security Number       Address   [address] [place] [address] [place] [address]    Home Phone   [phone]    MRN   [id number]       Yazidism   Jain    Marital Status                               Admission Date   4/4/24    Admission Type   Emergency    Admitting Provider   Norm Thakkar DO    Attending Provider       Department, Room/Bed   [facility] TELEMETRY 4, 429/1       Discharge Date   4/6/2024    Discharge Disposition   Home or Self Care    Discharge Destination   Home                              Attending Provider: (none)   Allergies: Diltiazem, Spironolactone, Amlodipine, Doxycycline, Sotalol, Warfarin, Ciprofloxacin, Contrast Dye (Echo Or Unknown Ct/mr), Erythromycin, Hydrochlorothiazide, Levaquin [Levofloxacin], Prednisone    Isolation: None   Infection: None   Code Status: Prior    Ht: 152.4 cm (60\")   Wt: 77.1 kg (169 lb 15.6 oz)    Admission Cmt: None   Principal Problem: GI bleed [K92.2]                   Active Insurance as of 4/4/2024       Primary Coverage       Payor Plan Insurance Group Employer/Plan Group    HUMANA MEDICARE REPLACEMENT HUMANA MED ADV GROUP [id number]       Payor Plan Address Payor Plan Phone Number Payor Plan Fax Number Effective Dates    [address] [address] [phone]  1/1/2018 - None Entered    Formerly [facility] [id number]         Subscriber Name Subscriber Birth Date Member ID       MARIELENA MOELLER 1945 [id number]                     Emergency Contacts        (Rel.) Home Phone Work Phone Mobile Phone    Maryam Zheng (Daughter) [phone] -- [phone]    Dawit Adames (Son) [phone] -- [phone]    Pratik Moeller (Son) [phone] -- [phone]                 Discharge Summary        Harvinder Mohan MD at 04/06/24 1403              [facility] "   INTERNAL MEDICINE DISCHARGE SUMMARY      Name:  Marielena Moeller   Age:  78 y.o.  Sex:  female  :  1945  MRN:  [id number]   Visit Number:  [id number]  Primary Care Physician:  Marychuy Atwood APRN  Date of Discharge:  2024  Admission Date:  2024      Discharge Diagnosis:         GI bleed    Type 2 diabetes mellitus with hyperglycemia, with long-term current use of insulin    Acquired hypothyroidism    Hematochezia/lower GI bleeding POA  A-fib on Eliquis  Insulin-dependent type 2 diabetes  Hypertension  Hyperlipidemia  COPD  Hypothyroidism  Anxiety  Obesity  Impaired mobility and ADL        Consults:     Consults       Date and Time Order Name Status Description    2024  7:49 AM Inpatient General Surgery Consult Completed             Procedures Performed:      Procedure(s):  COLONOSCOPY with polypectomy and clipping  736 Colonoscopy        Hospital Course:   The patient was admitted on 2024  Please see H&P for details on admission HPI and ROS.  Patient is a chronically ill 78-year-old female with history significant for type 2 diabetes, A-fib on Eliquis, COPD on 2 liters, and obesity who presented to the emergency room with complaints of bright red blood per rectum.  Patient also reported a history of diverticulitis.  States that she has been in her normal state of health until earlier in the day she noticed bright red blood per rectum with clots.  Does have associated left lower abdominal cramping.  Denies nausea/vomiting, fever. Currently lives alone.      Upon arrival to the ER patient afebrile, hypertensive 201/78 and nonhypoxic.  CMP fairly unremarkable, PT/INR normal.  Lipase normal.  CBC unremarkable.  Hemoglobin 13 hematocrit 40.  CT abdomen pelvis showed no acute findings.  Blood pressure improved after pain medication was administered.  Patient had witnessed episodes of hematochezia while in the ER.  Hospitalist consulted for further medical management.  Zosyn added given  history of diverticulitis with left lower abdominal pain.      After admission Dr. Senior was consulted and he did colonoscopy and was found to have signs of for diverticular bleeding which had actually stopped her during the procedure.  So she had been stable hemoglobin has been stable and she has been eating and will be discharged.  Patient has been on Eliquis which will be held for the next 2 weeks and I discussed with patient and family that she should consult the cardiologist after 2 weeks to see what dose to be restarted and around what time.  Until then she will be discharged without Eliquis and all other medication will be as per the discharge summary ready for    Vital Signs:     Temp:  [97.4 °F (36.3 °C)-98.2 °F (36.8 °C)] 97.4 °F (36.3 °C)  Heart Rate:  [74-81] 80  Resp:  [11-20] 16  BP: (127-181)/(45-76) 167/68    Physical Exam:     General Appearance:    Alert and cooperative, not in any acute distress.   Head:    Atraumatic and normocephalic, without obvious abnormality.   Eyes:            PERRLA,  No pallor. Extraocular movements are within normal limits.   Neck:   Supple,  No lymph glands, no bruit   Lungs:     Chest shape is normal. Breath sounds heard bilaterally equally.  No crackles or wheezing.     Heart:    Normal S1 and S2, no murmur,  No JVD   Abdomen:     Normal bowel sounds, no masses, no organomegaly. Soft     nontender, no guarding, no rebound tenderness   Extremities:   Moves all extremities well, no edema, no cyanosis,    Skin:   No  bruising or rash.   Neurologic:   Grossly nonfocal and moves all extremities.        Pertinent Lab Results:     Results from last 7 days   Lab Units 04/06/24  0613 04/05/24  0518 04/04/24  2151   SODIUM mmol/L 139 139 134*   POTASSIUM mmol/L 4.6 4.6 4.2   CHLORIDE mmol/L 98 100 93*   CO2 mmol/L 30.6* 30.1* 30.7*   BUN mg/dL 13 17 17   CREATININE mg/dL 0.72 0.69 0.72   CALCIUM mg/dL 8.9 8.6 9.4   BILIRUBIN mg/dL  --   --  0.2   ALK PHOS U/L  --   --  100  "  ALT (SGPT) U/L  --   --  19   AST (SGOT) U/L  --   --  13   GLUCOSE mg/dL 335* 324* 361*     Results from last 7 days   Lab Units 04/06/24  0613 04/05/24  0518 04/04/24  2151   WBC 10*3/mm3 7.34 8.31 8.89   HEMOGLOBIN g/dL 11.7* 11.2* 13.2   HEMATOCRIT % 35.6 34.2 40.2   PLATELETS 10*3/mm3 188 205 227     Results from last 7 days   Lab Units 04/04/24  2151   INR  1.00     No results found for: \"BLOODCX\", \"URINECX\", \"WOUNDCX\", \"MRSACX\"    Pertinent Radiology Results:    Imaging Results (Most Recent)       Procedure Component Value Units Date/Time    CT Abdomen Pelvis Without Contrast [[id number]] Collected: 04/04/24 2335     Updated: 04/04/24 2345    Narrative:      FINAL REPORT    TECHNIQUE:  Axial CT images were performed from the lung bases through the  symphysis pubis without IV contrast.  This study was performed  with techniques to keep radiation doses as low as reasonably  achievable (ALARA). Individualized dose reduction techniques  using automated exposure control or adjustment of mA and/or kV  according to the patient's size were employed.    CLINICAL HISTORY:  Abdominal pain, acute, nonlocalized  rectal bleeding, BRBPR    FINDINGS:  Lack of intravenous contrast limits evaluation of solid  abdominal and pelvic organs.  LOWER CHEST: The heart is normal  size.  There is scattered tree-in-bud nodularity in the lung  bases.    ABDOMEN/PELVIS:  Liver, gallbladder and bile ducts:  The unenhanced liver is grossly unremarkable without focal  abnormality.  There has been a prior cholecystectomy.  There is  no definite biliary duct dilatation.  Adrenal glands: The  adrenal glands are morphologically unremarkable without  suspicious lesion.  Kidneys, ureter and urinary bladder: No  nephrolithiasis.  No hydronephrosis.  Urinary bladder is  unremarkable.  Spleen: The spleen is normal size.  Pancreas: The  pancreas is grossly unremarkable.  GI systems and mesentery:  There is colonic diverticulosis without " diverticulitis.  No  evidence of bowel obstruction.  The appendix is not visualized  but there are no secondary signs of appendicitis.    No  significant mesenteric inflammation.  Lymph nodes: No definite  pathologically enlarged abdominal or pelvic lymph nodes present  within the limits of this noncontrast enhanced exam.  Vessels:  The abdominal aorta is normal in caliber.  The inferior vena  cava is unremarkable.  Peritoneum: No free intraperitoneal fluid  or pneumoperitoneum.  Pelvic viscera: No acute findings.  Body  wall: No body wall contusion.  Bones: No acute fracture.      Impression:      No acute findings in the abdomen and pelvis to account for  patient's symptoms.    Tree-in-bud nodularity in the lung bases  most compatible with chronic endobronchial infection.    Authenticated and Electronically Signed by Favio Mukherjee MD on  04/04/2024 11:44:19 PM                    Discharge Disposition:      Home or Self Care    Discharge Medication:         Discharge Medications        Continue These Medications        Instructions Start Date   Accu-Chek Anne Plus test strip  Generic drug: glucose blood   USE AS DIRECTED THREE TIMES DAILY      Accu-Chek Anne Plus w/Device kit   USE AS DIRECTED TO CHECK GLUCOSE 3 TIMES DAILY FOR DM E11.65      albuterol sulfate  (90 Base) MCG/ACT inhaler  Commonly known as: Ventolin HFA   2 puffs, Inhalation, Every 6 Hours PRN      albuterol 0.63 MG/3ML nebulizer solution  Commonly known as: ACCUNEB   0.63 mg, Nebulization, Every 6 Hours PRN      cholecalciferol 25 MCG (1000 UT) tablet   1,000 Units, Oral, Daily      Dexcom G6  device   1 each, Does not apply, Continuous      Dexcom G6 Sensor   Does not apply, Every 10 Days, Apply as directed      furosemide 20 MG tablet  Commonly known as: Lasix   1 po daily as needed for swelling      insulin aspart prot-insulin aspart (70-30) 100 UNIT/ML injection  Commonly known as: novoLOG 70/30   50 Units, Subcutaneous, 3  "Times Daily      Insulin Syringe 31G X 5/16\" 1 ML misc   3 Times Daily      BD Veo Insulin Syringe U/F 31G X 15/64\" 0.5 ML misc  Generic drug: Insulin Syringe-Needle U-100   as directed      ketoconazole 2 % cream  Commonly known as: NIZORAL   1 application , Topical, Daily, Apply under folds      levothyroxine 112 MCG tablet  Commonly known as: SYNTHROID, LEVOTHROID   112 mcg, Oral, Daily      LORazepam 0.5 MG tablet  Commonly known as: ATIVAN   0.5 mg, Oral, Every 8 Hours PRN      metoprolol tartrate 25 MG tablet  Commonly known as: LOPRESSOR   TAKE 1/2 TABLET BY MOUTH TWICE DAILY      O2  Commonly known as: OXYGEN   2 L/min, Inhalation, Continuous             Stop These Medications      apixaban 5 MG tablet tablet  Commonly known as: ELIQUIS     bacitracin 500 UNIT/GM ointment     Diclofenac Sodium 1 % gel gel  Commonly known as: VOLTAREN     lidocaine 5 %  Commonly known as: LIDODERM     MAGnesium-Oxide 400 (241.3 Mg) MG tablet tablet  Generic drug: magnesium oxide     nystatin 188240 UNIT/GM cream  Commonly known as: MYCOSTATIN     silver sulfadiazine 1 % cream  Commonly known as: Silvadene     Zinc 50 MG tablet              Discharge Diet:             Follow-up Appointments:      Future Appointments   Date Time Provider Department Center   4/18/2024  1:30 PM Wu Pathak MD [facility]   4/19/2024  9:15 AM Veronica Mims PA [facility]   6/4/2024 11:30 AM Brice Arriaga MD [facility]     Follow-up with PCP within a week    Test Results Pending at Discharge:      Pending Labs       Order Current Status    TISSUE EXAM, [facility] ([facility]) Collected (04/06/24 1183)               Harvinder Mohan MD  04/06/24  14:04 EDT    Time:  Discharge 24 min    Please note that portions of this note were completed with a voice recognition program.        Electronically signed by Harvinder Mohan MD at 04/06/24 1405       "

## 2024-04-09 LAB — REF LAB TEST METHOD: NORMAL

## 2024-04-17 NOTE — PROGRESS NOTES
[facility] Cardiology  Follow Up Visit  Marielena Moeller  1945    VISIT DATE:  04/17/24    PCP:   Marychuy Atwood, APRN  [address] [facility] [place] [address]          CC:  No chief complaint on file.      Problem List:  1.  Paroxysmal atrial fibrillation/tachycardia-bradycardia syndrome  -Confirmed on previous Holter monitor with a burden of 1.3%  -Status Biotronik post pacemaker placement May 2023           2.  COPD/pulmonary fibrosis  3.  Hyperlipidemia  4.  Hypertension  5.  Hypothyroid  6.  Ventral hernia  7.  Diverticulosis     Cardiac testing: Echo 2019 hazard  EF 55%, normal RV, trace mitral and tricuspid regurgitation    History of Present Illness:  Marielena Moeller  Is a 78 y.o. female with pertinent cardiac history detailed above.  Patient recently treated for urinary GI bleeding and bright red blood per rectum.  Patient had a diverticular bleed.  She was recommended to hold Eliquis.  Prior to the GI bleeding she also had nosebleeds and she would like to remain off of Eliquis.  She is agreeable to taking an aspirin.  Her atrial fibrillation is somewhat rare.  She does have multiple other cardiac issues including hypertension, hyperlipidemia, diabetes.  A1c was 10.3.  She is going to meet with endocrinology tomorrow.  She is currently on insulin.  She is hesitant to make other changes in her medical regimen to address HTN, HLD or DM.  We did discuss Watchman device and its role in stroke prevention.  She will consider this.  She is at high risk for anesthesia given her chronic CO2      Patient Active Problem List    Diagnosis Date Noted    GI bleed 04/05/2024    Chronic respiratory failure with hypercapnia 05/06/2023    Syncope, unspecified syncope type 05/06/2023    Sick sinus syndrome (tachybradycardia) with pauses and syncope 05/06/2023    Hyperlipidemia 11/11/2022    Influenza vaccination declined 12/29/2021    Type 2 diabetes mellitus with diabetic peripheral angiopathy without  gangrene, with long-term current use of insulin 11/20/2020    Warfarin anticoagulation 08/10/2020    Gastroesophageal reflux disease 02/24/2020    Hypertension 02/24/2020    Interstitial lung disease 11/26/2019    Myalgia 11/26/2019     Note Last Updated: 11/26/2019     Recurrent muscle pains, often attributed to medicines. Unclear etiology.      PAF with [facility] this admission 09/13/2019    Type 2 diabetes mellitus with circulatory disorder, with long-term current use of insulin 05/13/2019     Note Last Updated: 5/13/2019     Associated with hypertension       Anxiety 05/08/2019    COPD on home oxygen 11/08/2018    Pneumococcal vaccination declined by patient 11/08/2018    History of colon polyps 07/27/2018     Note Last Updated: 7/27/2018     Added automatically from request for surgery [id number]      Acquired hypothyroidism 07/24/2018    Vitamin D deficiency 07/24/2018    Class 2 obesity with body mass index (BMI) of 35.0 to 35.9 in adult 04/21/2018     Note Last Updated: 11/26/2019     Associated with diabetes mellitus, hypertension       Type 2 diabetes mellitus with hyperglycemia, with long-term current use of insulin 09/19/2017     Note Last Updated: 11/8/2018     History of pancreatitis      Hypertension associated with diabetes 09/19/2017     Note Last Updated: 4/21/2018     No HCTZ due to pancreatitis early 2018.      Osteoporosis 09/19/2017    Type 2 diabetes mellitus 09/19/2017     Note Last Updated: 4/21/2023     Formatting of this note might be different from the original.  Formatting of this note might be different from the original.  · Associated with hypertension     Last Assessment & Plan:   Formatting of this note might be different from the original.  Diabetes is uncontrolled..   Patient wishes to continue current medicines. Declines addition of Jardiance, etc.  Diabetes will be reassessed in 3 months.  Formatting of this note might be different from the original.  · History of pancreatitis    Last  "Assessment & Plan:   Formatting of this note might be different from the original.  Diabetes is not controlled.   Work on diet, insulin dosing, goal under 200 on glucose levels  Diabetes will be reassessed in 3 months.         Allergies   Allergen Reactions    Diltiazem Other (See Comments)     nausea    Spironolactone Other (See Comments)     Dropped magnesium      Amlodipine Swelling     Leg edema     Doxycycline Nausea And Vomiting     \"Pain worse than pancreatitis\"    Sotalol Other (See Comments)     nausea    Warfarin Other (See Comments)     Nose bleed    Ciprofloxacin Hives    Contrast Dye (Echo Or Unknown Ct/Mr) Rash     Breaks her out    Erythromycin Hives and Rash    Hydrochlorothiazide Other (See Comments)        pt developed pancreastitis    Levaquin [Levofloxacin] Swelling    Prednisone Mental Status Change     ITCHING AND NERVOUS       Social History     Socioeconomic History    Marital status:    Tobacco Use    Smoking status: Former     Current packs/day: 0.00     Average packs/day: 2.0 packs/day for 15.0 years (30.0 ttl pk-yrs)     Types: Cigarettes     Start date:      Quit date:      Years since quittin.3     Passive exposure: Past    Smokeless tobacco: Never   Vaping Use    Vaping status: Never Used   Substance and Sexual Activity    Alcohol use: Not Currently    Drug use: Never    Sexual activity: Defer       Family History   Problem Relation Age of Onset    Arthritis Mother     Diabetes Mother     Osteoporosis Mother     Arthritis Father     Diabetes Father     Heart attack Father     Hyperlipidemia Father     Diabetes Sister     Thyroid disease Sister     Diabetes Brother     Diabetes Maternal Grandmother     Diabetes Maternal Grandfather     Diabetes Paternal Grandmother     Diabetes Paternal Grandfather        Current Medications:    Current Outpatient Medications:     albuterol (ACCUNEB) 0.63 MG/3ML nebulizer solution, Take 3 mL by nebulization Every 6 (Six) Hours As " "Needed for Wheezing., Disp: 60 each, Rfl: 11    albuterol sulfate HFA (Ventolin HFA) 108 (90 Base) MCG/ACT inhaler, Inhale 2 puffs Every 6 (Six) Hours As Needed for Wheezing or Shortness of Air., Disp: 54 g, Rfl: 3    Blood Glucose Monitoring Suppl (ACCU-CHEK CHIO PLUS) w/Device kit, USE AS DIRECTED TO CHECK GLUCOSE 3 TIMES DAILY FOR DM E11.65, Disp: 1 kit, Rfl: 0    cholecalciferol (VITAMIN D3) 25 MCG (1000 UT) tablet, Take 1 tablet by mouth Daily., Disp: , Rfl:     Continuous Blood Gluc  (Dexcom G6 ) device, 1 each Continuous., Disp: 1 each, Rfl: 0    Continuous Blood Gluc Sensor (Dexcom G6 Sensor), Every 10 (Ten) Days. Apply as directed, Disp: 3 each, Rfl: 11    furosemide (Lasix) 20 MG tablet, 1 po daily as needed for swelling, Disp: 30 tablet, Rfl: 2    glucose blood (Accu-Chek Chio Plus) test strip, USE AS DIRECTED THREE TIMES DAILY, Disp: 300 each, Rfl: 3    insulin aspart prot-insulin aspart (novoLOG 70/30) (70-30) 100 UNIT/ML injection, Inject 50 Units under the skin into the appropriate area as directed 3 (Three) Times a Day., Disp: 300 mL, Rfl: 1    Insulin Syringe 31G X 5/16\" 1 ML misc, 3 (Three) Times a Day., Disp: , Rfl:     Insulin Syringe-Needle U-100 (BD Veo Insulin Syringe U/F) 31G X 15/64\" 0.5 ML misc, as directed, Disp: 300 each, Rfl: 2    ketoconazole (NIZORAL) 2 % cream, Apply 1 application  topically to the appropriate area as directed Daily. Apply under folds, Disp: 30 g, Rfl: 1    levothyroxine (SYNTHROID, LEVOTHROID) 112 MCG tablet, TAKE 1 TABLET BY MOUTH DAILY, Disp: 90 tablet, Rfl: 1    LORazepam (ATIVAN) 0.5 MG tablet, Take 1 tablet by mouth Every 8 (Eight) Hours As Needed for Anxiety., Disp: 45 tablet, Rfl: 2    metoprolol tartrate (LOPRESSOR) 25 MG tablet, TAKE 1/2 TABLET BY MOUTH TWICE DAILY, Disp: 90 tablet, Rfl: 0    O2 (OXYGEN), Inhale 2 L/min Continuous., Disp: , Rfl:      Review of Systems   Cardiovascular:  Negative for chest pain.   Respiratory:  Positive for " shortness of breath.    Gastrointestinal:  Positive for hematochezia.       There were no vitals filed for this visit.    Physical Exam  Constitutional:       Appearance: Normal appearance.   Cardiovascular:      Rate and Rhythm: Normal rate and regular rhythm.   Pulmonary:      Effort: Pulmonary effort is normal.      Comments: On chronic supplementary O2  Musculoskeletal:      Right lower leg: No edema.      Left lower leg: No edema.   Neurological:      Mental Status: She is alert.         Diagnostic Data:  Procedures  Lab Results   Component Value Date    CHLPL 258 (H) 05/05/2023    TRIG 154 (H) 05/09/2023    HDL 42 05/09/2023     Lab Results   Component Value Date    GLUCOSE 335 (H) 04/06/2024    BUN 13 04/06/2024    CREATININE 0.72 04/06/2024     04/06/2024    K 4.6 04/06/2024    CL 98 04/06/2024    CO2 30.6 (H) 04/06/2024     Lab Results   Component Value Date    HGBA1C 10.30 (H) 04/05/2024     Lab Results   Component Value Date    WBC 7.34 04/06/2024    HGB 11.7 (L) 04/06/2024    HCT 35.6 04/06/2024     04/06/2024       Assessment:  No diagnosis found.    Plan:    1.  Paroxysmal A. Fib/tachycardia-bradycardia syndrome  -Biotronik pacemaker in place  -now off eliquis due to GI bleeding  -Metoprolol adjusted to 25 mg twice daily to prevent high rates    With recent GI bleeding we discussed Watchman device placement.  Will consider this and discuss further with Dr. Arriaga at appointment in June  -last afib burden 3%  -Able to starting aspirin 81 mg daily at this time.        2.  Hypertension  -metoprolol  With her diabetes recommend an ACE inhibitor but she declines.     3.  DM  -Per her primary  -A1c 10.3 she is meeting with endocrine tomorrow.  I discussed options like Jardiance or Ozempic but she defers.     4.  HLD  -Total cholesterol 242, , triglycerides 154  -she declined statin therapy, or alternatives     5.  Hypothyroid  -Last TSH was normal     6.  Hypoxic respiratory failure on  chronic 2 L O2     Patient with hesitancy/noncompliance to make changes regarding hyperlipidemia, hypertension which limits options for care.  She will consider consultation for Watchman device for stroke prevention      ACP discussion was held with the patient during this visit. Patient has an advance directive in EMR which is still valid.       Wu Pathak MD [facility]

## 2024-04-18 ENCOUNTER — OFFICE VISIT (OUTPATIENT)
Dept: CARDIOLOGY | Facility: CLINIC | Age: 79
End: 2024-04-18
Payer: MEDICARE

## 2024-04-18 VITALS
BODY MASS INDEX: 33.18 KG/M2 | DIASTOLIC BLOOD PRESSURE: 81 MMHG | HEART RATE: 81 BPM | WEIGHT: 169 LBS | HEIGHT: 60 IN | SYSTOLIC BLOOD PRESSURE: 158 MMHG | OXYGEN SATURATION: 93 %

## 2024-04-18 DIAGNOSIS — I48.0 PAROXYSMAL ATRIAL FIBRILLATION: Primary | ICD-10-CM

## 2024-04-18 PROCEDURE — 3077F SYST BP >= 140 MM HG: CPT | Performed by: INTERNAL MEDICINE

## 2024-04-18 PROCEDURE — 3079F DIAST BP 80-89 MM HG: CPT | Performed by: INTERNAL MEDICINE

## 2024-04-18 PROCEDURE — 99214 OFFICE O/P EST MOD 30 MIN: CPT | Performed by: INTERNAL MEDICINE

## 2024-04-19 ENCOUNTER — OFFICE VISIT (OUTPATIENT)
Dept: ENDOCRINOLOGY | Facility: CLINIC | Age: 79
End: 2024-04-19
Payer: MEDICARE

## 2024-04-19 VITALS
HEIGHT: 60 IN | TEMPERATURE: 97.3 F | BODY MASS INDEX: 33.49 KG/M2 | OXYGEN SATURATION: 92 % | RESPIRATION RATE: 18 BRPM | SYSTOLIC BLOOD PRESSURE: 138 MMHG | WEIGHT: 170.6 LBS | HEART RATE: 86 BPM | DIASTOLIC BLOOD PRESSURE: 76 MMHG

## 2024-04-19 DIAGNOSIS — E11.65 TYPE 2 DIABETES MELLITUS WITH HYPERGLYCEMIA, WITH LONG-TERM CURRENT USE OF INSULIN: Primary | ICD-10-CM

## 2024-04-19 DIAGNOSIS — E03.9 ACQUIRED HYPOTHYROIDISM: ICD-10-CM

## 2024-04-19 DIAGNOSIS — Z79.4 TYPE 2 DIABETES MELLITUS WITH HYPERGLYCEMIA, WITH LONG-TERM CURRENT USE OF INSULIN: Primary | ICD-10-CM

## 2024-04-19 LAB
EXPIRATION DATE: ABNORMAL
GLUCOSE BLDC GLUCOMTR-MCNC: 302 MG/DL (ref 70–130)
Lab: ABNORMAL

## 2024-04-19 PROCEDURE — 3075F SYST BP GE 130 - 139MM HG: CPT | Performed by: PHYSICIAN ASSISTANT

## 2024-04-19 PROCEDURE — 82947 ASSAY GLUCOSE BLOOD QUANT: CPT | Performed by: PHYSICIAN ASSISTANT

## 2024-04-19 PROCEDURE — 3078F DIAST BP <80 MM HG: CPT | Performed by: PHYSICIAN ASSISTANT

## 2024-04-19 PROCEDURE — 99204 OFFICE O/P NEW MOD 45 MIN: CPT | Performed by: PHYSICIAN ASSISTANT

## 2024-04-19 PROCEDURE — 36415 COLL VENOUS BLD VENIPUNCTURE: CPT | Performed by: PHYSICIAN ASSISTANT

## 2024-04-19 NOTE — PROGRESS NOTES
Office Note      Date: 2024  Patient Name: Marielena Moeller  MRN: [id number]  : 1945    Chief Complaint   Patient presents with    Diabetes     New pt       History of Present Illness:   Marielena Moeller is a 78 y.o. female who presents for Diabetes type 2. Diagnosed in: . Treated in past with oral agents. Current treatments: Novolog 70/30 50 units. Number of insulin shots per day: 1. Checks blood sugar: 2 times per day .  Has low blood sugar: occasional.     Diet and Exercise:  Meals per day: 2 working on improving diet, snacks in between  Minutes of exercise per week: walks around the house quite a bit    Pt is here with daughter. They were hoping to discuss the joint pain and swelling patient has been having. Misunderstood that a Rheumatologist would be the specialist she needs to see.    Was initially on metformin but had diarrhea. May have tried another oral med but did not tolerate it. Has been on insulin for years, since then.     Takes Novolog 50 units in the morning if her glucose is above 200. She does not take it in the evening because she gets overnight hypoglycemia.     Previously had a Dexcom but has not had one for a few months, since December. Would like to restart it, uses a reader.     Over the last couple of weeks she has been trying to eat better and reduce her carbohydrates.     Resistant to starting new medications because she has struggled with side effects before, sensitive stomach. Also had recent hospital admission for lower GI bleed, nervous to add meds to her regimen.    DM Health Maintenance:  Ophtho: pt will schedule  Monofilament / Foot exam: 24  Lipids/Statin: not taking a statin with last FLP showing LDL 24  WAYNE: 24  TSH: 24  Aspirin: taking  ACE/ARB: not taking    Last A1c:  Hemoglobin A1C   Date Value Ref Range Status   2024 10.30 (H) 4.80 - 5.60 % Final     Pt had left thyroidectomy years ago due to symptomatic thyroid nodule. All  "pathology was benign. Thyroid levels have been maintained with current dose of levothyroxine 112 mcg.    Thyroid US 2021: subcentimeter cyst in right lobe of thyroid, no solid nodule. Prior left thyroidectomy    Subjective      Diabetic Complications:  Eyes: No  Kidneys: No  Feet: No  Heart: Yes, describe: afib, pacemaker, no CAD    Review of Systems:   Review of Systems   Constitutional:  Positive for activity change. Negative for appetite change and unexpected weight change.   Respiratory:  Negative for chest tightness and shortness of breath.    Cardiovascular:  Negative for chest pain.   Gastrointestinal:  Negative for constipation, diarrhea and nausea.   Neurological:  Negative for weakness and numbness.       The following portions of the patient's history were reviewed and updated as appropriate: allergies, current medications, past family history, past medical history, past social history, past surgical history, and problem list.    Objective     Visit Vitals  /76 (BP Location: Right arm, Patient Position: Sitting, Cuff Size: Adult)   Pulse 86   Temp 97.3 °F (36.3 °C) (Temporal)   Resp 18   Ht 152.4 cm (60\")   Wt 77.4 kg (170 lb 9.6 oz)   LMP  (LMP Unknown)   SpO2 92% Comment: 2L   BMI 33.32 kg/m²           Physical Exam:  Physical Exam  Vitals and nursing note reviewed.   Constitutional:       Appearance: She is well-developed.   HENT:      Head: Normocephalic and atraumatic.   Eyes:      Conjunctiva/sclera: Conjunctivae normal.   Cardiovascular:      Rate and Rhythm: Normal rate and regular rhythm.      Pulses:           Dorsalis pedis pulses are 1+ on the right side and 1+ on the left side.        Posterior tibial pulses are 1+ on the right side and 1+ on the left side.   Pulmonary:      Effort: Pulmonary effort is normal.      Breath sounds: Normal breath sounds.   Musculoskeletal:         General: Normal range of motion.      Cervical back: Normal range of motion.   Feet:      Right foot:      " Protective Sensation: 10 sites tested.  10 sites sensed.      Skin integrity: Skin integrity normal.      Toenail Condition: Right toenails are abnormally thick.      Left foot:      Protective Sensation: 10 sites tested.  10 sites sensed.      Skin integrity: Skin integrity normal.      Toenail Condition: Left toenails are abnormally thick.      Comments: Diabetic Foot Exam Performed and Monofilament Test Performed      Skin:     General: Skin is warm and dry.   Psychiatric:         Behavior: Behavior normal.         Assessment / Plan      Assessment & Plan:    Diagnoses and all orders for this visit:    1. Type 2 diabetes mellitus with hyperglycemia, with long-term current use of insulin (Primary)  Assessment & Plan:  Diabetes is worsening.   Continue current treatment regimen.  Reminded to bring in blood sugar diary at next visit.  Recommended an ADA diet.  Regular aerobic exercise.  Discussed ways to avoid symptomatic hypoglycemia.  Discussed foot care.    Patient is making dramatic changes in lifestyle and would like a chance to continue this before adjusting medication. Will reorder a CGM, Dexcom G7, through medical supply to allow for closer monitoring of glucose. Plan to check A1c in 10 weeks.    Check fasting screening labs today.  Diabetes will be reassessed  2 months.    Orders:  -     POC Glucose, Blood  -     Microalbumin / Creatinine Urine Ratio - Urine, Clean Catch; Future  -     Lipid Panel; Future  -     Comprehensive Metabolic Panel; Future  -     Lipid Panel  -     Comprehensive Metabolic Panel  -     Microalbumin / Creatinine Urine Ratio - Urine, Clean Catch    2. Acquired hypothyroidism  Assessment & Plan:  Check thyroid function levels today.  Further recommendations based on results.  Will try to track down the results of the pathology from previous thryoidectomy.  Thyroid ultrasound 2021 showed right sided thyroid cyst <1 cm, no follow up imaging recommended.    Orders:  -     TSH Rfx On  Abnormal To Free T4; Future  -     Thyroid Peroxidase Antibody; Future  -     Thyroglobulin Antibody; Future  -     Thyroglobulin Antibody  -     Thyroid Peroxidase Antibody  -     TSH Rfx On Abnormal To Free T4          Return in about 10 weeks (around 6/28/2024).    Portions of this note were completed with voice recognition program.  Electronically signed by Veronica Mims PA-C  [facility]  04/19/2024

## 2024-04-20 LAB
ALBUMIN/CREAT UR: 1316 MG/G CREAT (ref 0–29)
CREAT UR-MCNC: 45.5 MG/DL
MICROALBUMIN UR-MCNC: 598.7 UG/ML

## 2024-04-22 LAB
ALBUMIN SERPL-MCNC: 3.8 G/DL (ref 3.8–4.8)
ALBUMIN/GLOB SERPL: 1.5 {RATIO} (ref 1.2–2.2)
ALP SERPL-CCNC: 87 IU/L (ref 44–121)
ALT SERPL-CCNC: 13 IU/L (ref 0–32)
AST SERPL-CCNC: 14 IU/L (ref 0–40)
BILIRUB SERPL-MCNC: 0.3 MG/DL (ref 0–1.2)
BUN SERPL-MCNC: 9 MG/DL (ref 8–27)
BUN/CREAT SERPL: 12 (ref 12–28)
CALCIUM SERPL-MCNC: 9.5 MG/DL (ref 8.7–10.3)
CHLORIDE SERPL-SCNC: 97 MMOL/L (ref 96–106)
CHOLEST SERPL-MCNC: 284 MG/DL (ref 100–199)
CO2 SERPL-SCNC: 32 MMOL/L (ref 20–29)
CREAT SERPL-MCNC: 0.75 MG/DL (ref 0.57–1)
EGFRCR SERPLBLD CKD-EPI 2021: 82 ML/MIN/1.73
GLOBULIN SER CALC-MCNC: 2.5 G/DL (ref 1.5–4.5)
GLUCOSE SERPL-MCNC: 311 MG/DL (ref 70–99)
HDLC SERPL-MCNC: 58 MG/DL
LABORATORY COMMENT REPORT: ABNORMAL
LDLC SERPL CALC-MCNC: 205 MG/DL (ref 0–99)
POTASSIUM SERPL-SCNC: 4.7 MMOL/L (ref 3.5–5.2)
PROT SERPL-MCNC: 6.3 G/DL (ref 6–8.5)
SODIUM SERPL-SCNC: 140 MMOL/L (ref 134–144)
THYROGLOB AB SERPL-ACNC: <1 IU/ML (ref 0–0.9)
THYROPEROXIDASE AB SERPL-ACNC: <9 IU/ML (ref 0–34)
TRIGL SERPL-MCNC: 119 MG/DL (ref 0–149)
TSH SERPL DL<=0.005 MIU/L-ACNC: 0.83 UIU/ML (ref 0.45–4.5)
VLDLC SERPL CALC-MCNC: 21 MG/DL (ref 5–40)

## 2024-04-22 NOTE — ASSESSMENT & PLAN NOTE
Diabetes is worsening.   Continue current treatment regimen.  Reminded to bring in blood sugar diary at next visit.  Recommended an ADA diet.  Regular aerobic exercise.  Discussed ways to avoid symptomatic hypoglycemia.  Discussed foot care.    Patient is making dramatic changes in lifestyle and would like a chance to continue this before adjusting medication. Will reorder a CGM, Dexcom G7, through medical supply to allow for closer monitoring of glucose. Plan to check A1c in 10 weeks.    Check fasting screening labs today.  Diabetes will be reassessed  2 months.

## 2024-04-22 NOTE — ASSESSMENT & PLAN NOTE
Check thyroid function levels today.  Further recommendations based on results.  Will try to track down the results of the pathology from previous thryoidectomy.  Thyroid ultrasound 2021 showed right sided thyroid cyst <1 cm, no follow up imaging recommended.

## 2024-04-24 RX ORDER — ROSUVASTATIN CALCIUM 5 MG/1
5 TABLET, COATED ORAL NIGHTLY
Qty: 30 TABLET | Refills: 11 | Status: SHIPPED | OUTPATIENT
Start: 2024-04-24 | End: 2025-04-24

## 2024-04-24 NOTE — PROGRESS NOTES
Your labs show that your cholesterol is very high. I see in your chart that you have been prescribed a cholesterol medicine before, atorvastatin, but it was discontinued. Having both diabetes and high cholesterol increases your risk for cardiovascular disease and it would be a good idea for you to be on cholesterol medication. In case you did not tolerate the atorvastatin, I will send in a different cholesterol medication, rosuvastatin 5mg, for you to try.    Your urine sample showed a severely increased level of protein. This means that there likely is some kidney damage from the diabetes. We will recheck this the next time you come in to make sure it is accurate. Please work on drinking plenty of water and avoid NSAIDs (ibuprofen, aleve, diclofenac, etc).     The rest of your labs showed that your liver function, kidney function (on bloodwork) and electrolytes are normal.    Your thyroid levels are within normal range and the antibodies are negative. This means your hypothyroidism is from the thyroid lobe removal you had and not from an autoimmune process.

## 2024-05-03 ENCOUNTER — OFFICE VISIT (OUTPATIENT)
Dept: FAMILY MEDICINE CLINIC | Facility: CLINIC | Age: 79
End: 2024-05-03
Payer: MEDICARE

## 2024-05-03 VITALS
DIASTOLIC BLOOD PRESSURE: 82 MMHG | BODY MASS INDEX: 32.67 KG/M2 | TEMPERATURE: 96.5 F | WEIGHT: 166.4 LBS | HEIGHT: 60 IN | SYSTOLIC BLOOD PRESSURE: 142 MMHG | HEART RATE: 84 BPM | RESPIRATION RATE: 16 BRPM | OXYGEN SATURATION: 93 %

## 2024-05-03 DIAGNOSIS — M25.50 ARTHRALGIA, UNSPECIFIED JOINT: ICD-10-CM

## 2024-05-03 DIAGNOSIS — I48.11 LONGSTANDING PERSISTENT ATRIAL FIBRILLATION: ICD-10-CM

## 2024-05-03 DIAGNOSIS — R60.9 2+ PITTING EDEMA: ICD-10-CM

## 2024-05-03 DIAGNOSIS — F41.9 ANXIETY: ICD-10-CM

## 2024-05-03 DIAGNOSIS — B37.2 CANDIDAL INTERTRIGO: Primary | ICD-10-CM

## 2024-05-03 DIAGNOSIS — E03.9 ACQUIRED HYPOTHYROIDISM: ICD-10-CM

## 2024-05-03 PROCEDURE — 99214 OFFICE O/P EST MOD 30 MIN: CPT | Performed by: NURSE PRACTITIONER

## 2024-05-03 PROCEDURE — 3079F DIAST BP 80-89 MM HG: CPT | Performed by: NURSE PRACTITIONER

## 2024-05-03 PROCEDURE — 1160F RVW MEDS BY RX/DR IN RCRD: CPT | Performed by: NURSE PRACTITIONER

## 2024-05-03 PROCEDURE — 1125F AMNT PAIN NOTED PAIN PRSNT: CPT | Performed by: NURSE PRACTITIONER

## 2024-05-03 PROCEDURE — 3077F SYST BP >= 140 MM HG: CPT | Performed by: NURSE PRACTITIONER

## 2024-05-03 PROCEDURE — 1159F MED LIST DOCD IN RCRD: CPT | Performed by: NURSE PRACTITIONER

## 2024-05-03 RX ORDER — FUROSEMIDE 20 MG/1
TABLET ORAL
Qty: 30 TABLET | Refills: 2 | Status: SHIPPED | OUTPATIENT
Start: 2024-05-03

## 2024-05-03 RX ORDER — LEVOTHYROXINE SODIUM 112 UG/1
112 TABLET ORAL DAILY
Qty: 90 TABLET | Refills: 1 | Status: SHIPPED | OUTPATIENT
Start: 2024-05-03

## 2024-05-03 RX ORDER — NYSTATIN 100000 [USP'U]/G
POWDER TOPICAL 4 TIMES DAILY
Qty: 60 G | Refills: 3 | Status: SHIPPED | OUTPATIENT
Start: 2024-05-03

## 2024-05-03 RX ORDER — LORAZEPAM 0.5 MG/1
0.5 TABLET ORAL EVERY 8 HOURS PRN
Qty: 45 TABLET | Refills: 2 | Status: SHIPPED | OUTPATIENT
Start: 2024-05-03

## 2024-05-03 NOTE — PROGRESS NOTES
"                      Established Patient        Chief Complaint:   Chief Complaint   Patient presents with    GI Problem     Pt is here for a hospital follow up for gastro issues.          History of Present Illness:    Marielena Moeller is a 78 y.o. female who presents today for concern of lymphadenopathy of bilateral elbows and knees. Patient had US right AC 1 year ago, unremarkable, negative for lymphadenopathy.     Patient reports decreased appetite     Yeast under folds, under breasts    Persistent BLE edema. Tolerating Lasix, denies AE    Anxiety--Intermittent use of xanax PRN, denies AE, denies SI/HI    Continues metoprolol for control of Afib--tolerating well--denies chest pain  Subjective     The following portions of the patient's history were reviewed and updated as appropriate: allergies, current medications, past family history, past medical history, past social history, past surgical history and problem list.    ALLERGIES  Allergies   Allergen Reactions    Diltiazem Other (See Comments)     nausea    Spironolactone Other (See Comments)     Dropped magnesium      Amlodipine Swelling     Leg edema     Doxycycline Nausea And Vomiting     \"Pain worse than pancreatitis\"    Sotalol Other (See Comments)     nausea    Warfarin Other (See Comments)     Nose bleed    Ciprofloxacin Hives    Contrast Dye (Echo Or Unknown Ct/Mr) Rash     Breaks her out    Erythromycin Hives and Rash    Hydrochlorothiazide Other (See Comments)        pt developed pancreastitis    Levaquin [Levofloxacin] Swelling    Prednisone Mental Status Change     ITCHING AND NERVOUS       ROS  Review of Systems   Constitutional:  Positive for appetite change. Negative for diaphoresis and fever.   HENT:  Positive for congestion.    Respiratory:  Positive for shortness of breath. Negative for wheezing.    Cardiovascular:  Negative for chest pain and palpitations.   Gastrointestinal:  Negative for diarrhea, nausea and vomiting.   Genitourinary:  " "Positive for dysuria.   Skin:  Positive for color change and rash.       Objective     Vital Signs:   /82   Pulse 84   Temp 96.5 °F (35.8 °C)   Resp 16   Ht 152.4 cm (60\")   Wt 75.5 kg (166 lb 6.4 oz)   LMP  (LMP Unknown)   SpO2 93%   BMI 32.50 kg/m²                Physical Exam   Physical Exam  Vitals and nursing note reviewed.   Cardiovascular:      Rate and Rhythm: Normal rate.   Pulmonary:      Effort: Pulmonary effort is normal. Respiratory distress present.      Breath sounds: No stridor. No wheezing or rhonchi.   Musculoskeletal:      Right lower le+ Pitting Edema present.      Left lower le+ Pitting Edema present.   Skin:     Findings: Erythema present.             Comments: Moist, excoriated, red, localized   Neurological:      Mental Status: She is alert and oriented to person, place, and time.   Psychiatric:         Mood and Affect: Mood normal.         Behavior: Behavior normal.         Assessment and Plan      Assessment/Plan:   Diagnoses and all orders for this visit:    1. Candidal intertrigo (Primary)  -     nystatin (MYCOSTATIN) 325257 UNIT/GM powder; Apply  topically to the appropriate area as directed 4 (Four) Times a Day.  Dispense: 60 g; Refill: 3    2. Arthralgia, unspecified joint  -     CBC w AUTO Differential    3. Acquired hypothyroidism  -     levothyroxine (SYNTHROID, LEVOTHROID) 112 MCG tablet; Take 1 tablet by mouth Daily.  Dispense: 90 tablet; Refill: 1    4. 2+ pitting edema  -     furosemide (Lasix) 20 MG tablet; 1 po daily as needed for swelling  Dispense: 30 tablet; Refill: 2    5. Anxiety  -     LORazepam (ATIVAN) 0.5 MG tablet; Take 1 tablet by mouth Every 8 (Eight) Hours As Needed for Anxiety.  Dispense: 45 tablet; Refill: 2    6. Longstanding persistent atrial fibrillation  -     metoprolol tartrate (LOPRESSOR) 25 MG tablet; Take 0.5 tablets by mouth Daily.  Dispense: 90 tablet; Refill: 0        Discussion Summary:  Discussed plan of care in detail with pt " today; pt verb understanding and agrees.            I have reviewed and updated all copied forward information, as appropriate.  I attest to the accuracy and relevance of any unchanged information.      Follow up:  Return in about 3 months (around 8/3/2024).     Patient Education:  There are no Patient Instructions on file for this visit.    LETICIA Moreira  05/20/24  22:42 EDT          Please note that portions of this note may have been completed with a voice recognition program.

## 2024-05-15 ENCOUNTER — APPOINTMENT (OUTPATIENT)
Dept: CT IMAGING | Facility: HOSPITAL | Age: 79
End: 2024-05-15
Payer: MEDICARE

## 2024-05-15 ENCOUNTER — HOSPITAL ENCOUNTER (EMERGENCY)
Facility: HOSPITAL | Age: 79
Discharge: HOME | End: 2024-05-15
Attending: EMERGENCY MEDICINE
Payer: MEDICARE

## 2024-05-15 VITALS
TEMPERATURE: 98 F | DIASTOLIC BLOOD PRESSURE: 110 MMHG | RESPIRATION RATE: 18 BRPM | SYSTOLIC BLOOD PRESSURE: 152 MMHG | BODY MASS INDEX: 32.68 KG/M2 | HEART RATE: 77 BPM | OXYGEN SATURATION: 94 % | WEIGHT: 166.45 LBS | HEIGHT: 60 IN

## 2024-05-15 DIAGNOSIS — N39.0 URINARY TRACT INFECTION WITHOUT HEMATURIA, SITE UNSPECIFIED: ICD-10-CM

## 2024-05-15 DIAGNOSIS — R53.1 GENERALIZED WEAKNESS: Primary | ICD-10-CM

## 2024-05-15 LAB
ALBUMIN SERPL-MCNC: 3.6 G/DL (ref 3.5–5.2)
ALBUMIN/GLOB SERPL: 1.3 G/DL
ALP SERPL-CCNC: 82 U/L (ref 39–117)
ALT SERPL W P-5'-P-CCNC: 12 U/L (ref 1–33)
ANION GAP SERPL CALCULATED.3IONS-SCNC: 5.6 MMOL/L (ref 5–15)
AST SERPL-CCNC: 16 U/L (ref 1–32)
BACTERIA UR QL AUTO: ABNORMAL /HPF
BASOPHILS # BLD AUTO: 0.05 10*3/MM3 (ref 0–0.2)
BASOPHILS NFR BLD AUTO: 0.7 % (ref 0–1.5)
BILIRUB SERPL-MCNC: 0.2 MG/DL (ref 0–1.2)
BILIRUB UR QL STRIP: NEGATIVE
BUN SERPL-MCNC: 12 MG/DL (ref 8–23)
BUN/CREAT SERPL: 20.3 (ref 7–25)
CALCIUM SPEC-SCNC: 9.5 MG/DL (ref 8.6–10.5)
CHLORIDE SERPL-SCNC: 99 MMOL/L (ref 98–107)
CLARITY UR: CLEAR
CO2 SERPL-SCNC: 33.4 MMOL/L (ref 22–29)
COLOR UR: YELLOW
CREAT SERPL-MCNC: 0.59 MG/DL (ref 0.57–1)
DEPRECATED RDW RBC AUTO: 44.3 FL (ref 37–54)
EGFRCR SERPLBLD CKD-EPI 2021: 93.5 ML/MIN/1.73
EOSINOPHIL # BLD AUTO: 0.22 10*3/MM3 (ref 0–0.4)
EOSINOPHIL NFR BLD AUTO: 3 % (ref 0.3–6.2)
ERYTHROCYTE [DISTWIDTH] IN BLOOD BY AUTOMATED COUNT: 12.5 % (ref 12.3–15.4)
GLOBULIN UR ELPH-MCNC: 2.7 GM/DL
GLUCOSE SERPL-MCNC: 273 MG/DL (ref 65–99)
GLUCOSE UR STRIP-MCNC: ABNORMAL MG/DL
HCT VFR BLD AUTO: 38.3 % (ref 34–46.6)
HGB BLD-MCNC: 12.8 G/DL (ref 12–15.9)
HGB UR QL STRIP.AUTO: NEGATIVE
HOLD SPECIMEN: NORMAL
HOLD SPECIMEN: NORMAL
HYALINE CASTS UR QL AUTO: ABNORMAL /LPF
IMM GRANULOCYTES # BLD AUTO: 0.06 10*3/MM3 (ref 0–0.05)
IMM GRANULOCYTES NFR BLD AUTO: 0.8 % (ref 0–0.5)
KETONES UR QL STRIP: NEGATIVE
LEUKOCYTE ESTERASE UR QL STRIP.AUTO: NEGATIVE
LYMPHOCYTES # BLD AUTO: 1.13 10*3/MM3 (ref 0.7–3.1)
LYMPHOCYTES NFR BLD AUTO: 15.5 % (ref 19.6–45.3)
MAGNESIUM SERPL-MCNC: 1.6 MG/DL (ref 1.6–2.4)
MCH RBC QN AUTO: 31.9 PG (ref 26.6–33)
MCHC RBC AUTO-ENTMCNC: 33.4 G/DL (ref 31.5–35.7)
MCV RBC AUTO: 95.5 FL (ref 79–97)
MONOCYTES # BLD AUTO: 0.46 10*3/MM3 (ref 0.1–0.9)
MONOCYTES NFR BLD AUTO: 6.3 % (ref 5–12)
NEUTROPHILS NFR BLD AUTO: 5.39 10*3/MM3 (ref 1.7–7)
NEUTROPHILS NFR BLD AUTO: 73.7 % (ref 42.7–76)
NITRITE UR QL STRIP: NEGATIVE
NRBC BLD AUTO-RTO: 0 /100 WBC (ref 0–0.2)
PH UR STRIP.AUTO: 7 [PH] (ref 5–8)
PLATELET # BLD AUTO: 258 10*3/MM3 (ref 140–450)
PMV BLD AUTO: 10 FL (ref 6–12)
POTASSIUM SERPL-SCNC: 5 MMOL/L (ref 3.5–5.2)
PROT SERPL-MCNC: 6.3 G/DL (ref 6–8.5)
PROT UR QL STRIP: ABNORMAL
RBC # BLD AUTO: 4.01 10*6/MM3 (ref 3.77–5.28)
RBC # UR STRIP: ABNORMAL /HPF
REF LAB TEST METHOD: ABNORMAL
SODIUM SERPL-SCNC: 138 MMOL/L (ref 136–145)
SP GR UR STRIP: 1.01 (ref 1–1.03)
SQUAMOUS #/AREA URNS HPF: ABNORMAL /HPF
UROBILINOGEN UR QL STRIP: ABNORMAL
WBC # UR STRIP: ABNORMAL /HPF
WBC NRBC COR # BLD AUTO: 7.31 10*3/MM3 (ref 3.4–10.8)
WHOLE BLOOD HOLD COAG: NORMAL
WHOLE BLOOD HOLD SPECIMEN: NORMAL

## 2024-05-15 PROCEDURE — 80053 COMPREHEN METABOLIC PANEL: CPT | Performed by: PHYSICIAN ASSISTANT

## 2024-05-15 PROCEDURE — 81001 URINALYSIS AUTO W/SCOPE: CPT | Performed by: PHYSICIAN ASSISTANT

## 2024-05-15 PROCEDURE — 70450 CT HEAD/BRAIN W/O DYE: CPT

## 2024-05-15 PROCEDURE — 93005 ELECTROCARDIOGRAM TRACING: CPT | Performed by: PHYSICIAN ASSISTANT

## 2024-05-15 PROCEDURE — 85025 COMPLETE CBC W/AUTO DIFF WBC: CPT | Performed by: PHYSICIAN ASSISTANT

## 2024-05-15 PROCEDURE — 99284 EMERGENCY DEPT VISIT MOD MDM: CPT

## 2024-05-15 PROCEDURE — 83735 ASSAY OF MAGNESIUM: CPT | Performed by: PHYSICIAN ASSISTANT

## 2024-05-15 RX ORDER — SODIUM CHLORIDE 0.9 % (FLUSH) 0.9 %
10 SYRINGE (ML) INJECTION AS NEEDED
Status: DISCONTINUED | OUTPATIENT
Start: 2024-05-15 | End: 2024-05-15 | Stop reason: HOSPADM

## 2024-05-15 RX ORDER — MECLIZINE HYDROCHLORIDE 25 MG/1
25 TABLET ORAL ONCE
Status: COMPLETED | OUTPATIENT
Start: 2024-05-15 | End: 2024-05-15

## 2024-05-15 RX ORDER — CEPHALEXIN 500 MG/1
500 CAPSULE ORAL 3 TIMES DAILY
Qty: 21 CAPSULE | Refills: 0 | Status: SHIPPED | OUTPATIENT
Start: 2024-05-15 | End: 2024-05-22

## 2024-05-15 RX ORDER — MECLIZINE HYDROCHLORIDE 25 MG/1
25 TABLET ORAL EVERY 6 HOURS PRN
Qty: 20 TABLET | Refills: 0 | Status: SHIPPED | OUTPATIENT
Start: 2024-05-15

## 2024-05-15 RX ADMIN — MECLIZINE HYDROCHLORIDE 25 MG: 25 TABLET ORAL at 08:52

## 2024-05-15 NOTE — ED PROVIDER NOTES
Subjective  History of Present Illness:    Chief Complaint:   Chief Complaint   Patient presents with    Weakness - Generalized     Pt states she slid out of the bed, did not hit head. Does not c/o any extremity pain. Pt states she sat on her bottom and did not feel strong enough to get up.      History of Present Illness: Marielena Moeller is a 78 y.o. female who presents to the emergency department complaining of generalized weakness and sensation of off balance.  Patient has a history of A-fib with Eliquis discontinued due to recent diverticular bleeding, diabetes, hyperlipidemia, hypertension, oxygen dependence.  States she woke this morning at 5 or 6 AM try to get out of bed and felt generally weak and was unable to stand.  She slid down to the floor did not strike her head or injure herself but states due to sensation of being off balance and being generally weak could not stand.  She states she has a history of being off balance and it just seems worse today.  Was admitted at this facility a little over a month ago for diverticular bleeding, colonoscopy showed resolution of bleeding, discharged home and was recommended to not take her Eliquis any longer.  She denies headache, chest pain, shortness of breath, numbness tingling or weakness of any extremity.  Denies any blood in her stool currently.  Has been compliant with her medications and is due for her morning doses.   Onset: 5 to 6 AM  Duration: Ongoing  Exacerbating / Alleviating factors: Worse with attempted ambulation  Associated symptoms: None      Nurses Notes reviewed and agree, including vitals, allergies, social history and prior medical history.     Review of Systems   Constitutional: Negative.    HENT: Negative.     Eyes: Negative.    Respiratory: Negative.     Cardiovascular: Negative.    Gastrointestinal: Negative.    Genitourinary: Negative.    Musculoskeletal: Negative.    Skin: Negative.    Neurological:  Positive for dizziness.    Psychiatric/Behavioral: Negative.         Past Medical History:   Diagnosis Date    Abdominal hernia     Arthritis     Asthma     Blood in stool     Body piercing     EARS ONLY    Bronchitis     Bulging of lumbar intervertebral disc     Colon polyp     COVID-19 2022    Deaf, left     Diabetes mellitus     Diverticulitis     GERD (gastroesophageal reflux disease)     Heart murmur     Hyperlipidemia     Hypertension     Impaired functional mobility, balance, gait, and endurance     Leaky heart valve     Lung nodules     Osteoporosis     Palpitations     Pancreatitis     Supplemental oxygen dependent     NIGHTLY AND WHEN EXERTED    Thyroid disease     LEFT SIDE REMOVED    Tinnitus, left        Allergies:    Diltiazem, Spironolactone, Amlodipine, Doxycycline, Sotalol, Warfarin, Ciprofloxacin, Contrast dye (echo or unknown ct/mr), Erythromycin, Hydrochlorothiazide, Levaquin [levofloxacin], and Prednisone      Past Surgical History:   Procedure Laterality Date    APPENDECTOMY      CARDIAC ELECTROPHYSIOLOGY PROCEDURE N/A 2023    Procedure: Device Implant PPM;  Surgeon: Brice Arriaga MD;  Location: [facility] INVASIVE LOC[facility];  Service: Cardiology;  Laterality: N/A;     SECTION      CHOLECYSTECTOMY      COLONOSCOPY N/A 8/10/2018    Procedure: COLONOSCOPY WITH HOT SNARE POLYPECTOMY X 3; COLD SNARE POLYPECTOMY, COLD BIOPSY POLYPECTOMY X 4; CLIP PLACEMENT X 1;  Surgeon: Glenn Modi MD;  Location: [facility] ENDOSCOPY;  Service: Gastroenterology    COLONOSCOPY N/A 2024    Procedure: COLONOSCOPY with polypectomy and clipping;  Surgeon: Rodriguez Hernandez MD;  Location: [facility] ENDOSCOPY;  Service: Gastroenterology;  Laterality: N/A;    THYROIDECTOMY, PARTIAL Left 2011    TUBAL ABDOMINAL LIGATION           Social History     Socioeconomic History    Marital status:    Tobacco Use    Smoking status: Former     Current packs/day: 0.00     Average packs/day: 2.0 packs/day for 15.0 years (30.0  "ttl pk-yrs)     Types: Cigarettes     Start date:      Quit date:      Years since quittin.4     Passive exposure: Past    Smokeless tobacco: Never   Vaping Use    Vaping status: Never Used   Substance and Sexual Activity    Alcohol use: Not Currently    Drug use: Never    Sexual activity: Defer         Family History   Problem Relation Age of Onset    Arthritis Mother     Diabetes Mother     Osteoporosis Mother     Arthritis Father     Diabetes Father     Heart attack Father     Hyperlipidemia Father     Diabetes Sister     Thyroid disease Sister     Diabetes Brother     Diabetes Maternal Grandmother     Diabetes Maternal Grandfather     Diabetes Paternal Grandmother     Diabetes Paternal Grandfather        Objective  Physical Exam:  BP (!) 152/110   Pulse 77   Temp 98 °F (36.7 °C) (Oral)   Resp 18   Ht 152.4 cm (60\")   Wt 75.5 kg (166 lb 7.2 oz)   LMP  (LMP Unknown)   SpO2 94%   BMI 32.51 kg/m²      Physical Exam  Vitals and nursing note reviewed.   Constitutional:       General: She is not in acute distress.     Appearance: Normal appearance. She is obese. She is not ill-appearing, toxic-appearing or diaphoretic.   HENT:      Head: Normocephalic and atraumatic.      Nose: Nose normal.   Eyes:      Extraocular Movements: Extraocular movements intact.   Cardiovascular:      Rate and Rhythm: Normal rate and regular rhythm.      Heart sounds: Normal heart sounds.   Pulmonary:      Effort: Pulmonary effort is normal.   Abdominal:      General: Abdomen is flat. There is no distension.      Palpations: Abdomen is soft.      Tenderness: There is no abdominal tenderness. There is no guarding or rebound.   Musculoskeletal:         General: Normal range of motion.      Cervical back: Normal range of motion.   Skin:     General: Skin is warm and dry.   Neurological:      General: No focal deficit present.      Mental Status: She is alert. Mental status is at baseline.      Cranial Nerves: No cranial " nerve deficit.      Motor: No weakness.      Coordination: Coordination normal. Finger-Nose-Finger Test normal.           Procedures    ED Course:    ED Course as of 05/15/24 1008   Wed May 15, 2024   0834 EKG: I reviewed and independently interpreted the EKG as:  Sinus rhythm at 70 bpm, normal axis, normal intervals, no ST elevation, no T wave inversions [CS]   0856 Hemoglobin: 12.8 [TM]   0856 Hematocrit: 38.3 [TM]   0856 WBC: 7.31 [TM]   0956 WBC, UA(!): 3-5 [TM]   0956 Bacteria, UA(!): Trace [TM]   0956 Nitrite, UA: Negative [TM]   0956 Leukocytes, UA: Negative [TM]      ED Course User Index  [CS] Adriano Duncan MD  [TM] Joseph Velasco PA-C       Lab Results (last 24 hours)       Procedure Component Value Units Date/Time    CBC & Differential [[id number]]  (Abnormal) Collected: 05/15/24 0829    Specimen: Blood Updated: 05/15/24 0852    Narrative:      The following orders were created for panel order CBC & Differential.  Procedure                               Abnormality         Status                     ---------                               -----------         ------                     CBC Auto Differential[[id number]]        Abnormal            Final result                 Please view results for these tests on the individual orders.    Comprehensive Metabolic Panel [[id number]]  (Abnormal) Collected: 05/15/24 0829    Specimen: Blood Updated: 05/15/24 0857     Glucose 273 mg/dL      Comment: Glucose >180, Hemoglobin A1C recommended.        BUN 12 mg/dL      Creatinine 0.59 mg/dL      Sodium 138 mmol/L      Potassium 5.0 mmol/L      Chloride 99 mmol/L      CO2 33.4 mmol/L      Calcium 9.5 mg/dL      Total Protein 6.3 g/dL      Albumin 3.6 g/dL      ALT (SGPT) 12 U/L      AST (SGOT) 16 U/L      Alkaline Phosphatase 82 U/L      Total Bilirubin 0.2 mg/dL      Globulin 2.7 gm/dL      A/G Ratio 1.3 g/dL      BUN/Creatinine Ratio 20.3     Anion Gap 5.6 mmol/L      eGFR 93.5 mL/min/1.73      Narrative:      GFR Normal >60  Chronic Kidney Disease <60  Kidney Failure <15    The GFR formula is only valid for adults with stable renal function between ages 18 and 70.    Magnesium [[id number]]  (Normal) Collected: 05/15/24 0829    Specimen: Blood Updated: 05/15/24 0857     Magnesium 1.6 mg/dL     CBC Auto Differential [[id number]]  (Abnormal) Collected: 05/15/24 0829    Specimen: Blood Updated: 05/15/24 0852     WBC 7.31 10*3/mm3      RBC 4.01 10*6/mm3      Hemoglobin 12.8 g/dL      Hematocrit 38.3 %      MCV 95.5 fL      MCH 31.9 pg      MCHC 33.4 g/dL      RDW 12.5 %      RDW-SD 44.3 fl      MPV 10.0 fL      Platelets 258 10*3/mm3      Neutrophil % 73.7 %      Lymphocyte % 15.5 %      Monocyte % 6.3 %      Eosinophil % 3.0 %      Basophil % 0.7 %      Immature Grans % 0.8 %      Neutrophils, Absolute 5.39 10*3/mm3      Lymphocytes, Absolute 1.13 10*3/mm3      Monocytes, Absolute 0.46 10*3/mm3      Eosinophils, Absolute 0.22 10*3/mm3      Basophils, Absolute 0.05 10*3/mm3      Immature Grans, Absolute 0.06 10*3/mm3      nRBC 0.0 /100 WBC     Urinalysis With Culture If Indicated - Urine, Clean Catch [[id number]]  (Abnormal) Collected: 05/15/24 0907    Specimen: Urine, Clean Catch Updated: 05/15/24 0938     Color, UA Yellow     Appearance, UA Clear     pH, UA 7.0     Specific Gravity, UA 1.011     Glucose, UA >=1000 mg/dL (3+)     Ketones, UA Negative     Bilirubin, UA Negative     Blood, UA Negative     Protein,  mg/dL (2+)     Leuk Esterase, UA Negative     Nitrite, UA Negative     Urobilinogen, UA 0.2 E.U./dL    Narrative:      In absence of clinical symptoms, the presence of pyuria, bacteria, and/or nitrites on the urinalysis result does not correlate with infection.    Urinalysis, Microscopic Only - Urine, Clean Catch [[id number]]  (Abnormal) Collected: 05/15/24 0907    Specimen: Urine, Clean Catch Updated: 05/15/24 0956     RBC, UA 0-2 /HPF      WBC, UA 3-5 /HPF      Comment: Urine culture not  indicated.        Bacteria, UA Trace /HPF      Squamous Epithelial Cells, UA 0-2 /HPF      Hyaline Casts, UA None Seen /LPF      Methodology Manual Light Microscopy             CT Head Without Contrast    Result Date: 5/15/2024  PROCEDURE: CT HEAD WO CONTRAST-  HISTORY: dizziness  COMPARISON: July 2023.  TECHNIQUE: Multiple axial CT images were performed from the foramen magnum to the vertex without enhancement.  FINDINGS: There is no CT evidence of acute intracranial hemorrhage. There is no mass, mass effect or midline shift.  There is no hydrocephalus. The paranasal sinuses are clear. Bone windows reveal no acute osseous abnormalities.      Impression: No acute intracranial process.     DLP: 529.57 mGy.cm CTDI: 30.34 mGy   This study was performed with techniques to keep radiation doses as low as reasonably achievable (ALARA). Individualized dose reduction techniques using automated exposure control or adjustment of mA and/or kV according to the patient size were employed.     Images were reviewed, interpreted, and dictated by Dr. Ed Reardon MD Transcribed by Brissa Topete PA-C.  This report was signed and finalized on 5/15/2024 9:43 AM by Ed Reardon MD.                             Medical Decision Making  Amount and/or Complexity of Data Reviewed  Labs: ordered. Decision-making details documented in ED Course.  Radiology: ordered.  ECG/medicine tests: ordered.    Risk  Prescription drug management.              Marielena Moeller is a 78 y.o. female who presents to the emergency department for evaluation of generalized weakness and sensation of off balance    Differential diagnosis includes electrolyte abnormality, UTI, anemia, dehydration among other etiologies.    CBC, CMP, urinalysis, magnesium, CT scan of the head, ordered for further evaluation of the patient's presentation.    Chart review if available included outside testing, previous visits, prior labs, prior imaging, available notes from prior  evaluations or visits with specialists, medication list, allergies, past medical history, past surgical history when applicable.    Patient was treated with   Medications   sodium chloride 0.9 % flush 10 mL (has no administration in time range)   meclizine (ANTIVERT) tablet 25 mg (25 mg Oral Given 5/15/24 0852)       Patient states this off-balance sensation has been going on for years just seems to be gradually worsening.  Offered admission but she declined she wishes to go home and have family care for her and follow-up with PCP.  Do note some leukocytes in urine with trace bacteria will treat    Patient did raise concern about a rash she had under her abdominal folds in the past, on examination no evidence of rash or candidal infection.    Plan for disposition is discharged home.  Patient/family comfortable with and understanding of the plan.      Final diagnoses:   Generalized weakness   Urinary tract infection without hematuria, site unspecified          Joseph Velasco PA-C  05/15/24 1000

## 2024-05-24 ENCOUNTER — OFFICE VISIT (OUTPATIENT)
Dept: FAMILY MEDICINE CLINIC | Facility: CLINIC | Age: 79
End: 2024-05-24
Payer: MEDICARE

## 2024-05-24 VITALS
DIASTOLIC BLOOD PRESSURE: 80 MMHG | TEMPERATURE: 97.5 F | WEIGHT: 164 LBS | OXYGEN SATURATION: 95 % | HEIGHT: 60 IN | SYSTOLIC BLOOD PRESSURE: 158 MMHG | BODY MASS INDEX: 32.2 KG/M2 | HEART RATE: 73 BPM

## 2024-05-24 DIAGNOSIS — Z91.89 AT RISK FOR DEHYDRATION: ICD-10-CM

## 2024-05-24 DIAGNOSIS — R53.83 OTHER FATIGUE: ICD-10-CM

## 2024-05-24 DIAGNOSIS — R30.0 BURNING WITH URINATION: Primary | ICD-10-CM

## 2024-05-24 DIAGNOSIS — N76.0 ACUTE VAGINITIS: ICD-10-CM

## 2024-05-24 DIAGNOSIS — R42 DIZZINESS: ICD-10-CM

## 2024-05-24 LAB
BILIRUB BLD-MCNC: NEGATIVE MG/DL
CLARITY, POC: CLEAR
COLOR UR: YELLOW
EXPIRATION DATE: ABNORMAL
GLUCOSE UR STRIP-MCNC: NEGATIVE MG/DL
KETONES UR QL: NEGATIVE
LEUKOCYTE EST, POC: NEGATIVE
Lab: ABNORMAL
NITRITE UR-MCNC: NEGATIVE MG/ML
PH UR: 6 [PH] (ref 5–8)
PROT UR STRIP-MCNC: ABNORMAL MG/DL
RBC # UR STRIP: ABNORMAL /UL
SP GR UR: 1.02 (ref 1–1.03)
UROBILINOGEN UR QL: NORMAL

## 2024-05-24 PROCEDURE — 1160F RVW MEDS BY RX/DR IN RCRD: CPT | Performed by: NURSE PRACTITIONER

## 2024-05-24 PROCEDURE — 3077F SYST BP >= 140 MM HG: CPT | Performed by: NURSE PRACTITIONER

## 2024-05-24 PROCEDURE — 81003 URINALYSIS AUTO W/O SCOPE: CPT | Performed by: NURSE PRACTITIONER

## 2024-05-24 PROCEDURE — 1125F AMNT PAIN NOTED PAIN PRSNT: CPT | Performed by: NURSE PRACTITIONER

## 2024-05-24 PROCEDURE — 99214 OFFICE O/P EST MOD 30 MIN: CPT | Performed by: NURSE PRACTITIONER

## 2024-05-24 PROCEDURE — 3079F DIAST BP 80-89 MM HG: CPT | Performed by: NURSE PRACTITIONER

## 2024-05-24 PROCEDURE — 1159F MED LIST DOCD IN RCRD: CPT | Performed by: NURSE PRACTITIONER

## 2024-05-24 RX ORDER — FLUCONAZOLE 150 MG/1
150 TABLET ORAL
Qty: 3 TABLET | Refills: 0 | Status: SHIPPED | OUTPATIENT
Start: 2024-05-24

## 2024-05-24 NOTE — PROGRESS NOTES
"                      Established Patient        Chief Complaint:   Chief Complaint   Patient presents with    buring urination    Dizziness    Fatigue         History of Present Illness:    Marielena Moeller is a 78 y.o. female who presents today for complaints of dysuria, dizziness, fatigue, vaginal irritation, itching.    Reports that she has been dizzy and light-headed when she stands. Drinks minimal water throughout the day. Denies fever, N/V/D. Denies syncope, visual changes.    Subjective     The following portions of the patient's history were reviewed and updated as appropriate: allergies, current medications, past family history, past medical history, past social history, past surgical history and problem list.    ALLERGIES  Allergies   Allergen Reactions    Diltiazem Other (See Comments)     nausea    Spironolactone Other (See Comments)     Dropped magnesium      Amlodipine Swelling     Leg edema     Doxycycline Nausea And Vomiting     \"Pain worse than pancreatitis\"    Sotalol Other (See Comments)     nausea    Warfarin Other (See Comments)     Nose bleed    Ciprofloxacin Hives    Contrast Dye (Echo Or Unknown Ct/Mr) Rash     Breaks her out    Erythromycin Hives and Rash    Hydrochlorothiazide Other (See Comments)        pt developed pancreastitis    Levaquin [Levofloxacin] Swelling    Prednisone Mental Status Change     ITCHING AND NERVOUS       ROS  Review of Systems   Eyes:  Negative for visual disturbance.   Genitourinary:  Positive for dysuria and frequency. Negative for difficulty urinating and flank pain.   Neurological:  Positive for dizziness and light-headedness. Negative for syncope and weakness.       Objective     Vital Signs:   /80 (BP Location: Right arm)   Pulse 73   Temp 97.5 °F (36.4 °C) (Temporal)   Ht 152.4 cm (60\")   Wt 74.4 kg (164 lb)   LMP  (LMP Unknown)   SpO2 95%   BMI 32.03 kg/m²                Physical Exam   Physical Exam  Vitals and nursing note reviewed. "   Constitutional:       Appearance: She is obese.   Neck:      Thyroid: No thyroid mass, thyromegaly or thyroid tenderness.      Vascular: Normal carotid pulses. No carotid bruit or JVD.   Cardiovascular:      Rate and Rhythm: Normal rate and regular rhythm.      Pulses: Normal pulses.      Heart sounds: Normal heart sounds.   Pulmonary:      Effort: Pulmonary effort is normal.      Breath sounds: Normal breath sounds.   Skin:     General: Skin is dry.   Neurological:      Mental Status: She is alert and oriented to person, place, and time. Mental status is at baseline.   Psychiatric:         Mood and Affect: Mood normal.         Behavior: Behavior normal.         Assessment and Plan      Assessment/Plan:   Diagnoses and all orders for this visit:    1. Burning with urination (Primary)  -     POCT urinalysis dipstick, automated  -     fluconazole (DIFLUCAN) 150 MG tablet; Take 1 tablet by mouth Every 3 (Three) Days.  Dispense: 3 tablet; Refill: 0  -     Urine Culture - Urine, Urine, Clean Catch    2. Dizziness  -     POCT urinalysis dipstick, automated  -     Urine Culture - Urine, Urine, Clean Catch    3. Other fatigue  -     POCT urinalysis dipstick, automated  -     Urine Culture - Urine, Urine, Clean Catch    4. Acute vaginitis  -     fluconazole (DIFLUCAN) 150 MG tablet; Take 1 tablet by mouth Every 3 (Three) Days.  Dispense: 3 tablet; Refill: 0    5. At risk for dehydration    Encouraged increased fluid intake throughout the day. Patient to avoid caffeinated beverages.    Risks, benefits, and potential side effects of current/new medications reviewed with patient.  Patient voiced understanding and wished to proceed with treatment.    I will contact patient regarding test results and provide instructions regarding any necessary changes in plan of care.    Patient was encouraged to keep me informed of any acute changes, lack of improvement, or any new concerning symptoms.    Discussion Summary:  Discussed plan  of care in detail with pt today; pt verb understanding and agrees.      I have reviewed and updated all copied forward information, as appropriate.  I attest to the accuracy and relevance of any unchanged information.      Follow up:  Return for Next scheduled follow up.     Patient Education:  Patient Instructions   Drink more fluids.  Drink an oral rehydration solution (ORS). This drink restores fluids, salts, and minerals in the blood (electrolytes).  Stop any activities that caused dehydration, such as exercise.  Cool off with cool compresses, cool mist, or cool fluids, if heat or too much sweat caused your condition.    LETICIA Moreira  06/11/24  22:15 EDT          Please note that portions of this note may have been completed with a voice recognition program.

## 2024-05-28 ENCOUNTER — TELEPHONE (OUTPATIENT)
Dept: FAMILY MEDICINE CLINIC | Facility: CLINIC | Age: 79
End: 2024-05-28
Payer: MEDICARE

## 2024-05-28 DIAGNOSIS — E11.65 TYPE 2 DIABETES MELLITUS WITH HYPERGLYCEMIA, WITH LONG-TERM CURRENT USE OF INSULIN: ICD-10-CM

## 2024-05-28 DIAGNOSIS — Z79.4 TYPE 2 DIABETES MELLITUS WITH HYPERGLYCEMIA, WITH LONG-TERM CURRENT USE OF INSULIN: ICD-10-CM

## 2024-05-28 RX ORDER — SYRING-NEEDL,DISP,INSUL,0.3 ML 31GX15/64"
SYRINGE, EMPTY DISPOSABLE MISCELLANEOUS
Qty: 300 EACH | Refills: 2 | Status: SHIPPED | OUTPATIENT
Start: 2024-05-28

## 2024-05-28 NOTE — TELEPHONE ENCOUNTER
Caller: Marielena Moeller    Relationship to patient: Self    Best call back number: [phone]     PATIENT STATES THAT WHEN SHE GOT HER MEDICATIONS REFILLED, FANNYS DID NOT GET THE BD SYRINGES AND IT IS FOR 90 DAYS.

## 2024-05-29 ENCOUNTER — TELEPHONE (OUTPATIENT)
Dept: CARDIOLOGY | Facility: CLINIC | Age: 79
End: 2024-05-29
Payer: MEDICARE

## 2024-05-29 NOTE — TELEPHONE ENCOUNTER
Name: Marielena Moeller    Relationship: Self    Best Callback Number: [phone]    Incoming call to the Hub, requesting to  Reschedule their Device Check appointment on 6-4-24.     Per Hub workflow, further review is needed before the task can be completed.    Result of Call: Please reach out to the patient to reschedule

## 2024-05-31 NOTE — TELEPHONE ENCOUNTER
I spoke with Ms Sajan to reassure her that her pacemaker is working properly and she is having only 2% AF. She was satisfied with answer and I encouraged her to call us back if she continues to have issues. She states she is feeling an extra beat sometimes.

## 2024-06-12 NOTE — PATIENT INSTRUCTIONS
Drink more fluids.  Drink an oral rehydration solution (ORS). This drink restores fluids, salts, and minerals in the blood (electrolytes).  Stop any activities that caused dehydration, such as exercise.  Cool off with cool compresses, cool mist, or cool fluids, if heat or too much sweat caused your condition.

## 2024-06-13 ENCOUNTER — TELEPHONE (OUTPATIENT)
Dept: FAMILY MEDICINE CLINIC | Facility: CLINIC | Age: 79
End: 2024-06-13

## 2024-06-13 NOTE — TELEPHONE ENCOUNTER
Caller: Maryam Zheng    Relationship: Emergency Contact    Best call back number: [phone]    What is the best time to reach you: ANYTIME    Who are you requesting to speak with (clinical staff, provider,  specific staff member): NURSE    What was the call regarding: DAUGHTER HAS A QUESTION REGARDING OXYGEN REEVALUATION AND IF PATIENT IS ABLE TO HAVE A TELEHEALTH APPOINTMENT. PLEASE ADVISE    Is it okay if the provider responds through MyChart: NO

## 2024-07-08 DIAGNOSIS — E03.9 ACQUIRED HYPOTHYROIDISM: ICD-10-CM

## 2024-07-08 RX ORDER — LEVOTHYROXINE SODIUM 112 UG/1
112 TABLET ORAL DAILY
Qty: 90 TABLET | Refills: 1 | Status: SHIPPED | OUTPATIENT
Start: 2024-07-08

## 2024-07-12 ENCOUNTER — OFFICE VISIT (OUTPATIENT)
Dept: CARDIOLOGY | Facility: CLINIC | Age: 79
End: 2024-07-12
Payer: MEDICARE

## 2024-07-12 VITALS
HEIGHT: 60 IN | OXYGEN SATURATION: 93 % | SYSTOLIC BLOOD PRESSURE: 140 MMHG | WEIGHT: 164 LBS | HEART RATE: 78 BPM | BODY MASS INDEX: 32.2 KG/M2 | DIASTOLIC BLOOD PRESSURE: 90 MMHG

## 2024-07-12 DIAGNOSIS — I48.0 PAROXYSMAL ATRIAL FIBRILLATION: Primary | ICD-10-CM

## 2024-07-12 DIAGNOSIS — I49.5 TACHY-BRADY SYNDROME: ICD-10-CM

## 2024-07-12 DIAGNOSIS — K57.91 GASTROINTESTINAL HEMORRHAGE ASSOCIATED WITH INTESTINAL DIVERTICULOSIS: ICD-10-CM

## 2024-07-12 DIAGNOSIS — Z95.0 PRESENCE OF CARDIAC PACEMAKER: ICD-10-CM

## 2024-07-12 PROCEDURE — 3077F SYST BP >= 140 MM HG: CPT

## 2024-07-12 PROCEDURE — 93280 PM DEVICE PROGR EVAL DUAL: CPT

## 2024-07-12 PROCEDURE — 99213 OFFICE O/P EST LOW 20 MIN: CPT

## 2024-07-12 PROCEDURE — 1159F MED LIST DOCD IN RCRD: CPT

## 2024-07-12 PROCEDURE — 1160F RVW MEDS BY RX/DR IN RCRD: CPT

## 2024-07-12 PROCEDURE — 3080F DIAST BP >= 90 MM HG: CPT

## 2024-07-12 RX ORDER — ASPIRIN 81 MG/1
81 TABLET ORAL EVERY OTHER DAY
COMMUNITY

## 2024-07-12 NOTE — PROGRESS NOTES
"       Cardiac Electrophysiology Outpatient Note  [facility] at [facility]    Office Visit     Marielena Moeller  [id number]  07/12/2024    Primary Care Physician: Marychuy Atwood APRN    Referred By: No ref. provider found    Subjective     Chief Complaint   Patient presents with    PAF with RVR this admission     Problem List:  1.  Paroxysmal atrial fibrillation/tachycardia-bradycardia syndrome  -Confirmed on previous Holter monitor with a burden of 1.3%  -TTE 2019 [place] LVEF 55%, normal RV, no VHD  -Status Biotronik post pacemaker placement May 2023  2.  COPD/pulmonary fibrosis  3.  Hyperlipidemia  4.  Hypertension  5.  Hypothyroid  6.  Ventral hernia  7.  Diverticulosis    History of Present Illness:   Marielena Moeller is a 78 y.o. female who presents to my electrophysiology clinic for follow up of  paroxysmal atrial fibrillation, tachybradycardia syndrome, status post dual-chamber pacemaker. She did have an episode of GI bleeding for which she was admitted from diverticulitis.  The patient additionally has nosebleeds.  She was told to hold her blood thinner for 2 weeks while waiting to for follow-up with her cardiologist.  Ultimately patient decided to hold off on Eliquis to prevent bleeding with understanding of the risks.  She is tolerating baby aspirin.  She reports continued \"fluttering sensations in her chest\" almost daily although her device shows no arrhythmia today.  These episodes are tolerable however.  She experiences chronic shortness of breath from her COPD for which she uses supplemental oxygen.    Past Medical History:   Diagnosis Date    Abdominal hernia     Arthritis     Asthma     Blood in stool     Body piercing     EARS ONLY    Bronchitis     Bulging of lumbar intervertebral disc     Colon polyp     COVID-19 07/2022    Deaf, left     Diabetes mellitus     Diverticulitis     GERD (gastroesophageal reflux disease)     Heart murmur     Hyperlipidemia     Hypertension     " Impaired functional mobility, balance, gait, and endurance     Leaky heart valve     Lung nodules     Osteoporosis     Palpitations     Pancreatitis     Supplemental oxygen dependent     NIGHTLY AND WHEN EXERTED    Thyroid disease     LEFT SIDE REMOVED    Tinnitus, left        Past Surgical History:   Procedure Laterality Date    APPENDECTOMY      CARDIAC ELECTROPHYSIOLOGY PROCEDURE N/A 2023    Procedure: Device Implant PPM;  Surgeon: Brice Arriaga MD;  Location:  [facility];  Service: Cardiology;  Laterality: N/A;     SECTION      CHOLECYSTECTOMY      COLONOSCOPY N/A 8/10/2018    Procedure: COLONOSCOPY WITH HOT SNARE POLYPECTOMY X 3; COLD SNARE POLYPECTOMY, COLD BIOPSY POLYPECTOMY X 4; CLIP PLACEMENT X 1;  Surgeon: Glenn Modi MD;  Location: [facility];  Service: Gastroenterology    COLONOSCOPY N/A 2024    Procedure: COLONOSCOPY with polypectomy and clipping;  Surgeon: Rodriguez Hernandez MD;  Location: [facility];  Service: Gastroenterology;  Laterality: N/A;    THYROIDECTOMY, PARTIAL Left 2011    TUBAL ABDOMINAL LIGATION         Family History   Problem Relation Age of Onset    Arthritis Mother     Diabetes Mother     Osteoporosis Mother     Arthritis Father     Diabetes Father     Heart attack Father     Hyperlipidemia Father     Diabetes Sister     Thyroid disease Sister     Diabetes Brother     Diabetes Maternal Grandmother     Diabetes Maternal Grandfather     Diabetes Paternal Grandmother     Diabetes Paternal Grandfather        Social History     Socioeconomic History    Marital status:    Tobacco Use    Smoking status: Former     Current packs/day: 0.00     Average packs/day: 2.0 packs/day for 15.0 years (30.0 ttl pk-yrs)     Types: Cigarettes     Start date:      Quit date:      Years since quittin.5     Passive exposure: Past    Smokeless tobacco: Never   Vaping Use    Vaping status: Never Used   Substance and Sexual Activity  "   Alcohol use: Not Currently    Drug use: Never    Sexual activity: Defer         Current Outpatient Medications:     albuterol (ACCUNEB) 0.63 MG/3ML nebulizer solution, Take 3 mL by nebulization Every 6 (Six) Hours As Needed for Wheezing., Disp: 60 each, Rfl: 11    albuterol sulfate HFA (Ventolin HFA) 108 (90 Base) MCG/ACT inhaler, Inhale 2 puffs Every 6 (Six) Hours As Needed for Wheezing or Shortness of Air., Disp: 54 g, Rfl: 3    aspirin 81 MG EC tablet, Take 1 tablet by mouth Every Other Day., Disp: , Rfl:     Blood Glucose Monitoring Suppl (ACCU-CHEK CHIO PLUS) w/Device kit, USE AS DIRECTED TO CHECK GLUCOSE 3 TIMES DAILY FOR DM E11.65, Disp: 1 kit, Rfl: 0    cholecalciferol (VITAMIN D3) 25 MCG (1000 UT) tablet, Take 1 tablet by mouth Daily., Disp: , Rfl:     Continuous Blood Gluc  (Dexcom G6 ) device, 1 each Continuous., Disp: 1 each, Rfl: 0    Continuous Blood Gluc Sensor (Dexcom G6 Sensor), Every 10 (Ten) Days. Apply as directed, Disp: 3 each, Rfl: 11    furosemide (Lasix) 20 MG tablet, 1 po daily as needed for swelling, Disp: 30 tablet, Rfl: 2    glucose blood (Accu-Chek Chio Plus) test strip, USE AS DIRECTED THREE TIMES DAILY, Disp: 300 each, Rfl: 3    insulin aspart prot-insulin aspart (novoLOG 70/30) (70-30) 100 UNIT/ML injection, Inject 50 Units under the skin into the appropriate area as directed 3 (Three) Times a Day., Disp: 300 mL, Rfl: 1    Insulin Syringe 31G X 5/16\" 1 ML misc, 3 (Three) Times a Day., Disp: , Rfl:     Insulin Syringe-Needle U-100 (BD Veo Insulin Syringe U/F) 31G X 15/64\" 0.5 ML misc, as directed, Disp: 300 each, Rfl: 2    ketoconazole (NIZORAL) 2 % cream, Apply 1 application  topically to the appropriate area as directed Daily. Apply under folds, Disp: 30 g, Rfl: 1    levothyroxine (SYNTHROID, LEVOTHROID) 112 MCG tablet, Take 1 tablet by mouth Daily., Disp: 90 tablet, Rfl: 1    LORazepam (ATIVAN) 0.5 MG tablet, Take 1 tablet by mouth Every 8 (Eight) Hours As " "Needed for Anxiety., Disp: 45 tablet, Rfl: 2    meclizine (ANTIVERT) 25 MG tablet, Take 1 tablet by mouth Every 6 (Six) Hours As Needed for Dizziness., Disp: 20 tablet, Rfl: 0    metoprolol tartrate (LOPRESSOR) 25 MG tablet, Take 0.5 tablets by mouth Daily., Disp: 90 tablet, Rfl: 0    nystatin (MYCOSTATIN) 008266 UNIT/GM powder, Apply  topically to the appropriate area as directed 4 (Four) Times a Day., Disp: 60 g, Rfl: 3    O2 (OXYGEN), Inhale 2 L/min Continuous., Disp: , Rfl:     Allergies:   Allergies   Allergen Reactions    Diltiazem Other (See Comments)     nausea    Spironolactone Other (See Comments)     Dropped magnesium      Amlodipine Swelling     Leg edema     Doxycycline Nausea And Vomiting     \"Pain worse than pancreatitis\"    Sotalol Other (See Comments)     nausea    Warfarin Other (See Comments)     Nose bleed    Ciprofloxacin Hives    Contrast Dye (Echo Or Unknown Ct/Mr) Rash     Breaks her out    Erythromycin Hives and Rash    Hydrochlorothiazide Other (See Comments)        pt developed pancreastitis    Levaquin [Levofloxacin] Swelling    Prednisone Mental Status Change     ITCHING AND NERVOUS       Objective   Vital Signs: Blood pressure 140/90, pulse 78, height 152.4 cm (60\"), weight 74.4 kg (164 lb), SpO2 93%.    PHYSICAL EXAM  General appearance: Awake, alert, cooperative, chronically ill-appearing  Head: Normocephalic, without obvious abnormality, atraumatic  Lungs: Clear to ascultation bilaterally with prolonged expiratory phase, nasal cannula supplemental oxygen chronically  Heart: Regular rate and rhythm, no murmurs appreciated,  no lower extremity swelling  Skin: Skin color, turgor normal, no rashes or lesions  Neurologic: Grossly normal     Lab Results   Component Value Date    GLUCOSE 273 (H) 05/15/2024    CALCIUM 9.5 05/15/2024     05/15/2024    K 5.0 05/15/2024    CO2 33.4 (H) 05/15/2024    CL 99 05/15/2024    BUN 12 05/15/2024    CREATININE 0.59 05/15/2024    EGFRIFAFRI 104 " 12/29/2021    EGFRIFNONA 90 12/29/2021    BCR 20.3 05/15/2024    ANIONGAP 5.6 05/15/2024     Lab Results   Component Value Date    WBC 7.31 05/15/2024    HGB 12.8 05/15/2024    HCT 38.3 05/15/2024    MCV 95.5 05/15/2024     05/15/2024     Lab Results   Component Value Date    INR 1.00 04/04/2024    INR 0.92 11/11/2022    INR 1.4 (A) 02/05/2021    PROTIME 13.7 04/04/2024    PROTIME 9.3 11/11/2022    PROTIME 19.5 (H) 02/12/2020     Lab Results   Component Value Date    TSH 0.826 04/19/2024    THYROIDAB <9 04/19/2024          Results for orders placed during the hospital encounter of 05/05/23    Adult Transthoracic Echo Complete W/ Cont if Necessary Per Protocol    Interpretation Summary    Left ventricular systolic function is normal. Calculated left ventricular EF = 55.1% Left ventricular ejection fraction appears to be 56 - 60%.    Left ventricular diastolic function was indeterminate.    Estimated right ventricular systolic pressure from tricuspid regurgitation is normal (<35 mmHg).         I personally viewed and interpreted the patient's EKG/Telemetry/lab data    Procedures EKG from May 15, 2024 demonstrates sinus rhythm    Marielena Moeller  reports that she quit smoking about 49 years ago. Her smoking use included cigarettes. She started smoking about 64 years ago. She has a 30 pack-year smoking history. She has been exposed to tobacco smoke. She has never used smokeless tobacco.        Advance Care Planning   Advance Care Planning: ACP discussion was declined by the patient. Patient has an advance directive in EMR which is still valid.      Assessment & Plan    1. Paroxysmal atrial fibrillation  Currently maintaining sinus rhythm without the use of antiarrhythmic medical therapy.  Continue low-dose Lopressor 12.5 mg twice daily for palpitation suppression.  Since we last saw the patient, she has had an admission for GI bleeding, although her H/H stayed stable throughout.  Additionally, she reports  nosebleeds intermittently.  We discussed stroke prophylaxis options in detail today.  To prevent a stroke she will either need to take chronic anticoagulation or elect for Watchman device implantation.  She is currently on baby aspirin only.  She reports that she understands the risk of stroke without pursuing NOAC or Watchman, and elects to stay on baby aspirin alone anyway.  We will respect this decision.  Additionally, she has had no atrial fibrillation since her last device check.    2. Tachy-simón syndrome/Presence of cardiac pacemaker  S/p Biotronik dual-chamber pacemaker with acceptable device functioning on check today demonstrating 7.5 years left on the battery, 42% atrial pacing, 0% ventricular pacing, acceptable threshold impedance values, not dependent in sinus rhythm underlying today, with no events or reprogramming.    3. Gastrointestinal hemorrhage associated with intestinal diverticulosis  Discussed benefits, risks alternatives to Watchman device implantation.  Patient elects to forego procedure or chronic anticoagulation. See above under (1)       Follow Up:  Return in about 6 months (around 1/12/2025).      Thank you for allowing me to participate in the care of your patient. Please do not hesitate to contact me with additional questions or concerns.      Tevin Denis PA-C  Cardiac Electrophysiology  [facility] / [facility]

## 2024-07-13 PROBLEM — Z95.0 PRESENCE OF CARDIAC PACEMAKER: Status: ACTIVE | Noted: 2024-07-13

## 2024-07-13 PROBLEM — I49.5 TACHY-BRADY SYNDROME: Status: ACTIVE | Noted: 2024-07-13

## 2024-07-13 PROBLEM — Z79.01 WARFARIN ANTICOAGULATION: Status: RESOLVED | Noted: 2020-08-10 | Resolved: 2024-07-13

## 2024-07-15 ENCOUNTER — TELEPHONE (OUTPATIENT)
Dept: FAMILY MEDICINE CLINIC | Facility: CLINIC | Age: 79
End: 2024-07-15

## 2024-07-15 DIAGNOSIS — Z99.81 REQUIRES SUPPLEMENTAL OXYGEN: Primary | ICD-10-CM

## 2024-07-15 DIAGNOSIS — I48.11 LONGSTANDING PERSISTENT ATRIAL FIBRILLATION: ICD-10-CM

## 2024-07-15 NOTE — TELEPHONE ENCOUNTER
Caller: Marielena Moeller    Relationship to patient: Self    Best call back number: [phone]    Chief complaint: NEEDS AN OXYGEN TEST FOR MEDICARE TO CONTINUE PAYING FOR HER OXYGEN     Type of visit: OFFICE     Requested date: AS SOON AS POSSIBLE IN THE AFTERNOON       Additional notes: THE PATIENT STATES THAT SHE NEEDS TO DO AN OXYGEN WALK TEST SO THAT INSURANCE WILL PAY FOR HER OXYGEN THE PATIENT NEEDS THIS DONE BEFORE 08/01/2024 PLEASE CALL PATIENT TO LET HER KNOW IF THAT CAN BE DONE

## 2024-07-16 ENCOUNTER — CLINICAL SUPPORT (OUTPATIENT)
Dept: FAMILY MEDICINE CLINIC | Facility: CLINIC | Age: 79
End: 2024-07-16
Payer: MEDICARE

## 2024-07-16 DIAGNOSIS — I48.11 LONGSTANDING PERSISTENT ATRIAL FIBRILLATION: ICD-10-CM

## 2024-07-16 DIAGNOSIS — Z99.81 REQUIRES SUPPLEMENTAL OXYGEN: ICD-10-CM

## 2024-07-24 ENCOUNTER — TELEPHONE (OUTPATIENT)
Dept: FAMILY MEDICINE CLINIC | Facility: CLINIC | Age: 79
End: 2024-07-24

## 2024-07-24 NOTE — TELEPHONE ENCOUNTER
PATIENT CALLED IN SAID SHE DID A WALK TEST AND WANTS TO KNOW IF PAPERWORK WAS FAXED IN AND WHEN I DON'T SEE IT IN THE MEDIA. SHE WOULD LIKE A CALL BACK ABOUT THIS TODAY IF POSSIBLE.

## 2024-07-25 NOTE — TELEPHONE ENCOUNTER
Patient called again.  said they have not received the fax we sent. She got a letter that her O2 would be discontinued on August 1st if this wasn't completed. Please call her back today.

## 2024-07-26 NOTE — TELEPHONE ENCOUNTER
Caller: Marielena Moeller    Relationship: Self    Best call back number: [phone]    What is the best time to reach you: ANYTIME    Who are you requesting to speak with (clinical staff, provider,  specific staff member): MENDEZVA     What was the call regarding: PATIENT STATES THAT SHE IS STILL WAITING ON A CALL TO DISCUSS PAPERWORK FOR OXYGEN. PLEASE ADVISE    Is it okay if the provider responds through MyChart: NO

## 2024-07-29 NOTE — TELEPHONE ENCOUNTER
Patient called again today. Message was sent to MA on 7/25/24. Patient needs a call back today. O2 company states they have not gotten notes or the order signed and we will need to send again.

## 2024-07-30 NOTE — TELEPHONE ENCOUNTER
PT is now wanting a portable oxygen tank and it has to be documented on the paperwork so she can get the portable instead of the one that she was originally getting. Do we still have a copy of the original copy? Please call pt to talk to her about the details.     They got the original paperwork. Now they need a permission for a POC order. The dme company needs it before 3 days or she wont be eligible for it.     Oxygen place:[facility]   Fax [phone]  [facility]   [phone]

## 2024-08-09 ENCOUNTER — OFFICE VISIT (OUTPATIENT)
Dept: FAMILY MEDICINE CLINIC | Facility: CLINIC | Age: 79
End: 2024-08-09
Payer: MEDICARE

## 2024-08-09 VITALS
WEIGHT: 164 LBS | HEIGHT: 60 IN | SYSTOLIC BLOOD PRESSURE: 122 MMHG | OXYGEN SATURATION: 96 % | BODY MASS INDEX: 32.2 KG/M2 | DIASTOLIC BLOOD PRESSURE: 86 MMHG | HEART RATE: 78 BPM

## 2024-08-09 DIAGNOSIS — R59.0 CERVICAL LYMPHADENOPATHY: ICD-10-CM

## 2024-08-09 DIAGNOSIS — E03.9 ACQUIRED HYPOTHYROIDISM: ICD-10-CM

## 2024-08-09 DIAGNOSIS — R59.0 AXILLARY LYMPHADENOPATHY: ICD-10-CM

## 2024-08-09 DIAGNOSIS — M25.649 STIFFNESS OF HAND JOINT, UNSPECIFIED LATERALITY: ICD-10-CM

## 2024-08-09 DIAGNOSIS — E53.8 VITAMIN B12 DEFICIENCY: ICD-10-CM

## 2024-08-09 DIAGNOSIS — K04.7 DENTAL ABSCESS: ICD-10-CM

## 2024-08-09 DIAGNOSIS — E55.9 VITAMIN D DEFICIENCY: ICD-10-CM

## 2024-08-09 DIAGNOSIS — R00.2 PALPITATIONS: Primary | ICD-10-CM

## 2024-08-09 DIAGNOSIS — E61.1 IRON DEFICIENCY: ICD-10-CM

## 2024-08-09 DIAGNOSIS — M79.661 BILATERAL CALF PAIN: ICD-10-CM

## 2024-08-09 DIAGNOSIS — M79.662 BILATERAL CALF PAIN: ICD-10-CM

## 2024-08-09 PROCEDURE — 1160F RVW MEDS BY RX/DR IN RCRD: CPT | Performed by: NURSE PRACTITIONER

## 2024-08-09 PROCEDURE — 1159F MED LIST DOCD IN RCRD: CPT | Performed by: NURSE PRACTITIONER

## 2024-08-09 PROCEDURE — 3079F DIAST BP 80-89 MM HG: CPT | Performed by: NURSE PRACTITIONER

## 2024-08-09 PROCEDURE — 99214 OFFICE O/P EST MOD 30 MIN: CPT | Performed by: NURSE PRACTITIONER

## 2024-08-09 PROCEDURE — 3074F SYST BP LT 130 MM HG: CPT | Performed by: NURSE PRACTITIONER

## 2024-08-09 PROCEDURE — G2211 COMPLEX E/M VISIT ADD ON: HCPCS | Performed by: NURSE PRACTITIONER

## 2024-08-09 PROCEDURE — 1125F AMNT PAIN NOTED PAIN PRSNT: CPT | Performed by: NURSE PRACTITIONER

## 2024-08-09 RX ORDER — AMOXICILLIN 500 MG/1
1000 CAPSULE ORAL 2 TIMES DAILY
Qty: 28 CAPSULE | Refills: 0 | Status: SHIPPED | OUTPATIENT
Start: 2024-08-09 | End: 2024-08-16

## 2024-08-09 RX ORDER — ZINC PICOLINATE
0.5 POWDER (GRAM) MISCELLANEOUS
COMMUNITY
End: 2024-08-09

## 2024-08-09 NOTE — PROGRESS NOTES
"                      Established Patient        Chief Complaint:   Chief Complaint   Patient presents with    Palpitations     Pt states palpitations have been getting worse.          History of Present Illness:    Marielena Moeller is a 78 y.o. female     Presents today with daughter.    Reports that she just \"feels bad\". Persistent enlargement of lymph nodes in axilla, bend of elbow, neck.    Hands are \"drawing up\" and calf muscles tighten up intermittently.   Palpitations. Patient has pacemaker. Seen by cardiology on 7/12. Device check completed at that time. Reported \"fluttering sensation in her chest\" at cardiology appointment as well at that time.     Patient does not take eliquis, understanding of risks.     Drinks 16-20 oz water daily. Has had diarrhea x 2 days    Cervical, RUE, bilateral axillary lymphadenopathy.  Subjective     The following portions of the patient's history were reviewed and updated as appropriate: allergies, current medications, past family history, past medical history, past social history, past surgical history and problem list.    ALLERGIES  Allergies   Allergen Reactions    Diltiazem Other (See Comments)     nausea    Spironolactone Other (See Comments)     Dropped magnesium      Amlodipine Swelling     Leg edema     Doxycycline Nausea And Vomiting     \"Pain worse than pancreatitis\"    Sotalol Other (See Comments)     nausea    Warfarin Other (See Comments)     Nose bleed    Ciprofloxacin Hives    Contrast Dye (Echo Or Unknown Ct/Mr) Rash     Breaks her out    Erythromycin Hives and Rash    Hydrochlorothiazide Other (See Comments)        pt developed pancreastitis    Levaquin [Levofloxacin] Swelling    Prednisone Mental Status Change     ITCHING AND NERVOUS       ROS  Review of Systems   Constitutional:  Positive for fatigue.   HENT:  Positive for dental problem and facial swelling.    Respiratory:  Negative for shortness of breath.    Cardiovascular:  Positive for palpitations. " "Negative for chest pain.   Musculoskeletal:  Positive for arthralgias and myalgias.   Neurological:  Negative for dizziness and headaches.   Hematological:  Positive for adenopathy.       Objective     Vital Signs:   /86   Pulse 78   Ht 152.4 cm (60\")   Wt 74.4 kg (164 lb)   LMP  (LMP Unknown)   SpO2 96%   BMI 32.03 kg/m²                Physical Exam   Physical Exam  Vitals and nursing note reviewed.   HENT:      Mouth/Throat:      Dentition: Abnormal dentition. Dental tenderness, dental caries and dental abscesses present.   Cardiovascular:      Rate and Rhythm: Normal rate and regular rhythm.      Comments: Paced  Pulmonary:      Effort: Pulmonary effort is normal.      Breath sounds: Normal breath sounds.   Lymphadenopathy:      Cervical: Cervical adenopathy present.      Upper Body:      Right upper body: Axillary adenopathy present.      Left upper body: Axillary adenopathy present.   Neurological:      Mental Status: She is alert and oriented to person, place, and time.   Psychiatric:         Mood and Affect: Mood normal.         Behavior: Behavior normal.         Assessment and Plan      Assessment/Plan:   Diagnoses and all orders for this visit:    1. Palpitations (Primary)  -     CBC w AUTO Differential  -     Comprehensive metabolic panel  -     Magnesium    2. Vitamin D deficiency  -     Vitamin D 25 hydroxy    3. Vitamin B12 deficiency  -     Vitamin B12    4. Iron deficiency  -     Iron and TIBC  -     Ferritin    5. Stiffness of hand joint, unspecified laterality  -     Magnesium    6. Bilateral calf pain  -     Magnesium    7. Acquired hypothyroidism  -     TSH  -     T4, free    8. Axillary lymphadenopathy  -     US Axilla Bilateral; Future    9. Cervical lymphadenopathy    10. Dental abscess  -     amoxicillin (AMOXIL) 500 MG capsule; Take 2 capsules by mouth 2 (Two) Times a Day for 7 days.  Dispense: 28 capsule; Refill: 0    Risks, benefits, and potential side effects of current/new " medications reviewed with patient.  Patient voiced understanding and wished to proceed with treatment.    I will contact patient regarding test results and provide instructions regarding any necessary changes in plan of care.    Due to presence of pacemaker, patient to follow up with cardiology for worsening palpitations.    Patient was encouraged to keep me informed of any acute changes, lack of improvement, or any new concerning symptoms.      Discussion Summary:  Discussed plan of care in detail with pt today; pt verb understanding and agrees.        I have reviewed and updated all copied forward information, as appropriate.  I attest to the accuracy and relevance of any unchanged information.      Follow up:  No follow-ups on file.     Patient Education:  There are no Patient Instructions on file for this visit.    LETICIA Moreira  08/25/24  12:53 EDT          Please note that portions of this note may have been completed with a voice recognition program.

## 2024-08-10 LAB
25(OH)D3+25(OH)D2 SERPL-MCNC: 32.2 NG/ML (ref 30–100)
ALBUMIN SERPL-MCNC: 3.6 G/DL (ref 3.8–4.8)
ALP SERPL-CCNC: 86 IU/L (ref 44–121)
ALT SERPL-CCNC: 10 IU/L (ref 0–32)
AST SERPL-CCNC: 13 IU/L (ref 0–40)
BASOPHILS # BLD AUTO: 0.1 X10E3/UL (ref 0–0.2)
BASOPHILS NFR BLD AUTO: 1 %
BILIRUB SERPL-MCNC: 0.2 MG/DL (ref 0–1.2)
BUN SERPL-MCNC: 11 MG/DL (ref 8–27)
BUN/CREAT SERPL: 14 (ref 12–28)
CALCIUM SERPL-MCNC: 9.3 MG/DL (ref 8.7–10.3)
CHLORIDE SERPL-SCNC: 97 MMOL/L (ref 96–106)
CO2 SERPL-SCNC: 29 MMOL/L (ref 20–29)
CREAT SERPL-MCNC: 0.77 MG/DL (ref 0.57–1)
EGFRCR SERPLBLD CKD-EPI 2021: 80 ML/MIN/1.73
EOSINOPHIL # BLD AUTO: 0.2 X10E3/UL (ref 0–0.4)
EOSINOPHIL NFR BLD AUTO: 3 %
ERYTHROCYTE [DISTWIDTH] IN BLOOD BY AUTOMATED COUNT: 12.6 % (ref 11.7–15.4)
FERRITIN SERPL-MCNC: 82 NG/ML (ref 15–150)
GLOBULIN SER CALC-MCNC: 2.4 G/DL (ref 1.5–4.5)
GLUCOSE SERPL-MCNC: 222 MG/DL (ref 70–99)
HCT VFR BLD AUTO: 43.9 % (ref 34–46.6)
HGB BLD-MCNC: 13.9 G/DL (ref 11.1–15.9)
IMM GRANULOCYTES # BLD AUTO: 0 X10E3/UL (ref 0–0.1)
IMM GRANULOCYTES NFR BLD AUTO: 0 %
IRON SATN MFR SERPL: 30 % (ref 15–55)
IRON SERPL-MCNC: 77 UG/DL (ref 27–139)
LYMPHOCYTES # BLD AUTO: 1.2 X10E3/UL (ref 0.7–3.1)
LYMPHOCYTES NFR BLD AUTO: 16 %
MAGNESIUM SERPL-MCNC: 1.7 MG/DL (ref 1.6–2.3)
MCH RBC QN AUTO: 30.6 PG (ref 26.6–33)
MCHC RBC AUTO-ENTMCNC: 31.7 G/DL (ref 31.5–35.7)
MCV RBC AUTO: 97 FL (ref 79–97)
MONOCYTES # BLD AUTO: 0.5 X10E3/UL (ref 0.1–0.9)
MONOCYTES NFR BLD AUTO: 6 %
NEUTROPHILS # BLD AUTO: 5.6 X10E3/UL (ref 1.4–7)
NEUTROPHILS NFR BLD AUTO: 74 %
PLATELET # BLD AUTO: 250 X10E3/UL (ref 150–450)
POTASSIUM SERPL-SCNC: 4.5 MMOL/L (ref 3.5–5.2)
PROT SERPL-MCNC: 6 G/DL (ref 6–8.5)
RBC # BLD AUTO: 4.54 X10E6/UL (ref 3.77–5.28)
SODIUM SERPL-SCNC: 139 MMOL/L (ref 134–144)
T4 FREE SERPL-MCNC: 1.54 NG/DL (ref 0.82–1.77)
TIBC SERPL-MCNC: 255 UG/DL (ref 250–450)
TSH SERPL DL<=0.005 MIU/L-ACNC: 2.15 UIU/ML (ref 0.45–4.5)
UIBC SERPL-MCNC: 178 UG/DL (ref 118–369)
VIT B12 SERPL-MCNC: 647 PG/ML (ref 232–1245)
WBC # BLD AUTO: 7.5 X10E3/UL (ref 3.4–10.8)

## 2024-10-17 ENCOUNTER — HOSPITAL ENCOUNTER (OUTPATIENT)
Dept: ULTRASOUND IMAGING | Facility: HOSPITAL | Age: 79
Discharge: HOME | End: 2024-10-17
Admitting: NURSE PRACTITIONER
Payer: MEDICARE

## 2024-10-17 DIAGNOSIS — R59.0 AXILLARY LYMPHADENOPATHY: ICD-10-CM

## 2024-10-17 PROCEDURE — 76882 US LMTD JT/FCL EVL NVASC XTR: CPT

## 2024-10-18 DIAGNOSIS — R59.0 AXILLARY LYMPHADENOPATHY: Primary | ICD-10-CM

## 2024-10-18 DIAGNOSIS — R59.0 CERVICAL LYMPHADENOPATHY: ICD-10-CM

## 2024-12-11 ENCOUNTER — PREP FOR SURGERY (OUTPATIENT)
Dept: OTHER | Facility: HOSPITAL | Age: 79
End: 2024-12-11
Payer: MEDICARE

## 2024-12-11 DIAGNOSIS — H25.11 NUCLEAR SCLEROTIC CATARACT OF RIGHT EYE: Primary | ICD-10-CM

## 2024-12-11 RX ORDER — CYCLOPENT/TROPIC/PHEN/KETR/WAT 1%-1%-2.5%
1 DROPS (EA) OPHTHALMIC (EYE)
Status: CANCELLED | OUTPATIENT
Start: 2024-12-11 | End: 2024-12-11

## 2024-12-11 RX ORDER — SODIUM CHLORIDE 0.9 % (FLUSH) 0.9 %
10 SYRINGE (ML) INJECTION AS NEEDED
Status: CANCELLED | OUTPATIENT
Start: 2024-12-11

## 2024-12-11 RX ORDER — PREDNISOLONE ACETATE 10 MG/ML
1 SUSPENSION/ DROPS OPHTHALMIC SEE ADMIN INSTRUCTIONS
Status: CANCELLED | OUTPATIENT
Start: 2024-12-11

## 2024-12-11 RX ORDER — SODIUM CHLORIDE 0.9 % (FLUSH) 0.9 %
10 SYRINGE (ML) INJECTION EVERY 12 HOURS SCHEDULED
Status: CANCELLED | OUTPATIENT
Start: 2024-12-11

## 2024-12-11 RX ORDER — TETRACAINE HYDROCHLORIDE 5 MG/ML
1 SOLUTION OPHTHALMIC SEE ADMIN INSTRUCTIONS
Status: CANCELLED | OUTPATIENT
Start: 2024-12-11

## 2024-12-18 NOTE — PRE-PROCEDURE INSTRUCTIONS
PAT phone history completed with patient for upcoming procedure on 12/20/24 with Dr. Elder.    PAT PASS reviewed with patient and they verbalize understanding of the following:     Do not eat or drink anything after midnight the night before procedure unless otherwise instructed by physician/surgeon's office, this includes no gum, candy, mints, tobacco products or e-cigarettes.  Do not shave the area to be operated on at least 48 hours prior to procedure.  Do not wear makeup, lotion, hair products, or nail polish.  Do not wear any jewelry and remove all piercings.  Do not wear any adhesive if you wear dentures.  Do not wear contacts; bring in glasses if needed.  Only take medications on the morning of procedure as instructed by PAT nurse per anesthesia guidelines or as instructed by physician's office.  If you are on any blood thinners reach out to the physician/surgeon's office for instructions on when/if they will need to be stopped prior to procedure.  Bring in picture ID and insurance card, advanced directive copies if applicable, CPAP/BIPAP/Inhalers if indicated morning of procedure, leave any other valuables at home.  Ensure you have arranged for someone to drive you home the day of your procedure and someone to care for you at home afterwards. It is recommended that you do not drive, drink alcohol, or make any major legal decisions for at least 24 hours after your procedure is complete.  ERAS instructions given unless otherwise instructed per surgeon's orders.    Instructions given on hospital entrance and registration location.

## 2024-12-20 ENCOUNTER — ANESTHESIA EVENT (OUTPATIENT)
Dept: PERIOP | Facility: HOSPITAL | Age: 79
End: 2024-12-20
Payer: MEDICARE

## 2024-12-20 ENCOUNTER — HOSPITAL ENCOUNTER (OUTPATIENT)
Facility: HOSPITAL | Age: 79
Setting detail: HOSPITAL OUTPATIENT SURGERY
Discharge: HOME | End: 2024-12-20
Attending: OPHTHALMOLOGY | Admitting: OPHTHALMOLOGY
Payer: MEDICARE

## 2024-12-20 ENCOUNTER — ANESTHESIA (OUTPATIENT)
Dept: PERIOP | Facility: HOSPITAL | Age: 79
End: 2024-12-20
Payer: MEDICARE

## 2024-12-20 VITALS
BODY MASS INDEX: 31.41 KG/M2 | RESPIRATION RATE: 18 BRPM | HEART RATE: 86 BPM | OXYGEN SATURATION: 98 % | DIASTOLIC BLOOD PRESSURE: 51 MMHG | SYSTOLIC BLOOD PRESSURE: 151 MMHG | HEIGHT: 60 IN | TEMPERATURE: 99.3 F | WEIGHT: 160 LBS

## 2024-12-20 DIAGNOSIS — H25.11 NUCLEAR SCLEROTIC CATARACT OF RIGHT EYE: ICD-10-CM

## 2024-12-20 LAB — GLUCOSE BLDC GLUCOMTR-MCNC: 153 MG/DL (ref 70–130)

## 2024-12-20 PROCEDURE — 25010000002 CEFUROXIME 3 MG/0.3 ML SOLUTION 0.3 ML SYRINGE: Performed by: OPHTHALMOLOGY

## 2024-12-20 PROCEDURE — V2632 POST CHMBR INTRAOCULAR LENS: HCPCS | Performed by: OPHTHALMOLOGY

## 2024-12-20 PROCEDURE — 25010000002 FENTANYL CITRATE (PF) 50 MCG/ML SOLUTION: Performed by: NURSE ANESTHETIST, CERTIFIED REGISTERED

## 2024-12-20 PROCEDURE — 25010000002 HYDRALAZINE PER 20 MG: Performed by: NURSE ANESTHETIST, CERTIFIED REGISTERED

## 2024-12-20 PROCEDURE — 82948 REAGENT STRIP/BLOOD GLUCOSE: CPT | Performed by: OPHTHALMOLOGY

## 2024-12-20 PROCEDURE — 25010000002 LIDOCAINE HCL (PF) 4 % SOLUTION: Performed by: OPHTHALMOLOGY

## 2024-12-20 PROCEDURE — 25010000002 MIDAZOLAM PER 1MG: Performed by: NURSE ANESTHETIST, CERTIFIED REGISTERED

## 2024-12-20 DEVICE — LENS MONOFOCAL IQ CC60WF200: Type: IMPLANTABLE DEVICE | Site: POSTERIOR CHAMBER | Status: FUNCTIONAL

## 2024-12-20 RX ORDER — POVIDONE-IODINE 5 %
SOLUTION, NON-ORAL OPHTHALMIC (EYE) AS NEEDED
Status: DISCONTINUED | OUTPATIENT
Start: 2024-12-20 | End: 2024-12-20 | Stop reason: HOSPADM

## 2024-12-20 RX ORDER — PREDNISOLONE ACETATE 10 MG/ML
1 SUSPENSION/ DROPS OPHTHALMIC SEE ADMIN INSTRUCTIONS
Status: DISCONTINUED | OUTPATIENT
Start: 2024-12-20 | End: 2024-12-20 | Stop reason: HOSPADM

## 2024-12-20 RX ORDER — SODIUM CHLORIDE 0.9 % (FLUSH) 0.9 %
10 SYRINGE (ML) INJECTION AS NEEDED
Status: DISCONTINUED | OUTPATIENT
Start: 2024-12-20 | End: 2024-12-20 | Stop reason: HOSPADM

## 2024-12-20 RX ORDER — LIDOCAINE HYDROCHLORIDE 40 MG/ML
INJECTION, SOLUTION RETROBULBAR AS NEEDED
Status: DISCONTINUED | OUTPATIENT
Start: 2024-12-20 | End: 2024-12-20 | Stop reason: HOSPADM

## 2024-12-20 RX ORDER — CYCLOPENT/TROPIC/PHEN/KETR/WAT 1%-1%-2.5%
1 DROPS (EA) OPHTHALMIC (EYE)
Status: COMPLETED | OUTPATIENT
Start: 2024-12-20 | End: 2024-12-20

## 2024-12-20 RX ORDER — HYDRALAZINE HYDROCHLORIDE 20 MG/ML
10 INJECTION INTRAMUSCULAR; INTRAVENOUS ONCE
Status: COMPLETED | OUTPATIENT
Start: 2024-12-20 | End: 2024-12-20

## 2024-12-20 RX ORDER — FENTANYL CITRATE 50 UG/ML
INJECTION, SOLUTION INTRAMUSCULAR; INTRAVENOUS AS NEEDED
Status: DISCONTINUED | OUTPATIENT
Start: 2024-12-20 | End: 2024-12-20 | Stop reason: SURG

## 2024-12-20 RX ORDER — SODIUM CHLORIDE 0.9 % (FLUSH) 0.9 %
10 SYRINGE (ML) INJECTION EVERY 12 HOURS SCHEDULED
Status: DISCONTINUED | OUTPATIENT
Start: 2024-12-20 | End: 2024-12-20 | Stop reason: HOSPADM

## 2024-12-20 RX ORDER — MIDAZOLAM HYDROCHLORIDE 2 MG/2ML
INJECTION, SOLUTION INTRAMUSCULAR; INTRAVENOUS AS NEEDED
Status: DISCONTINUED | OUTPATIENT
Start: 2024-12-20 | End: 2024-12-20 | Stop reason: SURG

## 2024-12-20 RX ORDER — BALANCED SALT SOLUTION 6.4; .75; .48; .3; 3.9; 1.7 MG/ML; MG/ML; MG/ML; MG/ML; MG/ML; MG/ML
SOLUTION OPHTHALMIC AS NEEDED
Status: DISCONTINUED | OUTPATIENT
Start: 2024-12-20 | End: 2024-12-20 | Stop reason: HOSPADM

## 2024-12-20 RX ORDER — HYDRALAZINE HYDROCHLORIDE 20 MG/ML
INJECTION INTRAMUSCULAR; INTRAVENOUS AS NEEDED
Status: DISCONTINUED | OUTPATIENT
Start: 2024-12-20 | End: 2024-12-20 | Stop reason: SURG

## 2024-12-20 RX ORDER — TETRACAINE HYDROCHLORIDE 5 MG/ML
1 SOLUTION OPHTHALMIC SEE ADMIN INSTRUCTIONS
Status: DISCONTINUED | OUTPATIENT
Start: 2024-12-20 | End: 2024-12-20 | Stop reason: HOSPADM

## 2024-12-20 RX ORDER — PREDNISOLONE ACETATE 10 MG/ML
SUSPENSION/ DROPS OPHTHALMIC AS NEEDED
Status: DISCONTINUED | OUTPATIENT
Start: 2024-12-20 | End: 2024-12-20 | Stop reason: HOSPADM

## 2024-12-20 RX ORDER — TETRACAINE HYDROCHLORIDE 5 MG/ML
SOLUTION OPHTHALMIC AS NEEDED
Status: DISCONTINUED | OUTPATIENT
Start: 2024-12-20 | End: 2024-12-20 | Stop reason: HOSPADM

## 2024-12-20 RX ADMIN — TETRACAINE HYDROCHLORIDE 1 DROP: 5 SOLUTION OPHTHALMIC at 08:24

## 2024-12-20 RX ADMIN — Medication 1 DROP: at 08:25

## 2024-12-20 RX ADMIN — FENTANYL CITRATE 25 MCG: 50 INJECTION, SOLUTION INTRAMUSCULAR; INTRAVENOUS at 09:14

## 2024-12-20 RX ADMIN — TETRACAINE HYDROCHLORIDE 1 DROP: 5 SOLUTION OPHTHALMIC at 08:23

## 2024-12-20 RX ADMIN — HYDRALAZINE HYDROCHLORIDE 5 MG: 20 INJECTION INTRAMUSCULAR; INTRAVENOUS at 09:20

## 2024-12-20 RX ADMIN — FENTANYL CITRATE 25 MCG: 50 INJECTION, SOLUTION INTRAMUSCULAR; INTRAVENOUS at 09:07

## 2024-12-20 RX ADMIN — MIDAZOLAM HYDROCHLORIDE 1 MG: 1 INJECTION, SOLUTION INTRAMUSCULAR; INTRAVENOUS at 09:05

## 2024-12-20 RX ADMIN — Medication 1 DROP: at 08:35

## 2024-12-20 RX ADMIN — MIDAZOLAM HYDROCHLORIDE 1 MG: 1 INJECTION, SOLUTION INTRAMUSCULAR; INTRAVENOUS at 09:00

## 2024-12-20 RX ADMIN — HYDRALAZINE HYDROCHLORIDE 10 MG: 20 INJECTION, SOLUTION INTRAMUSCULAR; INTRAVENOUS at 08:38

## 2024-12-20 RX ADMIN — Medication 1 DROP: at 08:30

## 2024-12-20 NOTE — OP NOTE
OPERATIVE NOTE    Date of Procedure: 12/20/2024  Patient Name: Marielena Moeller  Patient MRN: [id number]  YOB: 1945     Preoperative Diagnosis: Right nuclear sclerotic cataract.     Postoperative Diagnosis: Right nuclear sclerotic cataract.     Procedure Performed: Phacoemulsification with implantation of a  foldable posterior chamber intraocular lens, Right eye.     Surgeon: Larry Elder MD     Anesthesia:  Monitored Anesthesia Care (MAC)      Brief History and Indication: The patient presents with a history of past progressive loss of vision.  Patient was diagnosed with cataract and requests removal for increased ability to read and see.     Operation Description: The patient was taken to the OR and prepped and draped in the usual sterile ophthalmic fashion. A lid speculum was placed in the eye.  A 0.8 mm blade was then used to make a stab incision two o’clock hours from the intended temporal clear cornea groove. The anterior chamber was then inflated with a Viscoelastic. A metal microkeratome blade was then used to enter the anterior chamber at the temporal clear cornea site. A three level tunnel incision was made. A curvilinear capsulorrhexis was then performed with a bent cystotome needle and capsulorrhexis forceps.  BSS on a 30 gauge bent cannula was used to hydro-dissect the lens. Good fluid waves were noted. Phacoemulsification was then used to remove nuclear material without complications. The residual cortical and lenticular material was then removed with irrigation and aspiration. Viscoelastics were then used to inflate the bag in a soft shell technique. A PCIOL was injected into the bag. Post-implantation, there were no rents or tears in the bag and the lens was noted to be stable and centered. The residual Viscoelastic was then removed with irrigation and aspiration.  The wound was checked and found to be without leaks. One drop of a Prednisilone was placed in the eye.     Very  dense cataract.      Implant Information:   Implant Name Type Inv. Item Serial No.  Lot No. LRB No. Used Action   LENS MONOFOCAL IQ [id number] - [id number] - [id number] Implant LENS MONOFOCAL IQ [id number] [employer]ART[employer]  Right 1 Implanted       Complications: None    Estimated Blood Loss:  Less than 1 cc.      Discharge and Condition  The patient was transported to same day surgery in excellent condition and scheduled for follow-up appointment. The patient was given instructions on use of eye drops for the operative eye and was specifically instructed to call for any concerns.    Larry Elder MD  12/20/2024

## 2024-12-20 NOTE — ANESTHESIA PREPROCEDURE EVALUATION
Anesthesia Evaluation     Patient summary reviewed and Nursing notes reviewed   history of anesthetic complications:   NPO Solid Status: > 8 hours  NPO Liquid Status: > 8 hours           Airway   Mallampati: III  TM distance: >3 FB  Neck ROM: full  no difficulty expected  Dental - normal exam     Pulmonary    (+) COPD, asthma,home oxygen, shortness of breath, decreased breath sounds  Cardiovascular - normal exam  Exercise tolerance: poor (<4 METS)    PT is on anticoagulation therapy  Patient on routine beta blocker    (+) hypertension, valvular problems/murmurs, dysrhythmias, WOOD, PVD, hyperlipidemia      Neuro/Psych  (+) headaches, syncope, psychiatric history  GI/Hepatic/Renal/Endo    (+) obesity, morbid obesity, GERD, GI bleeding , diabetes mellitus type 2, thyroid problem hypothyroidism    Musculoskeletal     (+) arthralgias, back pain, chronic pain, myalgias  Abdominal   (+) obese   Substance History - negative use     OB/GYN negative ob/gyn ROS         Other   arthritis,                 Anesthesia Plan    ASA 4     MAC     (Risks and benefits discussed including risk of aspiration, recall and dental damage. Discussed risks with pt., pt increased  intraop and postop risks for cv, resp, and neuro events,All patient questions answered.    Will continue with plan of care.)  intravenous induction     Anesthetic plan, risks, benefits, and alternatives have been provided, discussed and informed consent has been obtained with: patient.  Pre-procedure education provided    CODE STATUS:

## 2024-12-20 NOTE — H&P
[facility]         History and Physical    Patient Name: Marielena Moeller  MRN: [id number]  : 1945  Gender: female     HPI: Patient complaint of PPLOV Right eye diagnosed with cataract. Patient requests PHACO PCIOL for Increase of VA/ADL.    History:    Past Medical History:   Diagnosis Date    Abdominal hernia     Arthritis     Asthma     Atrial fibrillation     Blood in stool     Body piercing     EARS ONLY    Bronchitis     Bulging of lumbar intervertebral disc     Colon polyp     COVID-19 2022    Deaf, left     Deaf, left     Diabetes mellitus     Diverticulitis     Diverticulitis     GERD (gastroesophageal reflux disease)     Heart murmur     History of exercise stress test     Hyperlipidemia     Hypertension     Impaired functional mobility, balance, gait, and endurance     Leaky heart valve     Lung nodules     Osteoporosis     Pacemaker     Palpitations     Pancreatitis     Spinal headache     Supplemental oxygen dependent     2.5 L/NC continuous    Thyroid disease     LEFT SIDE REMOVED    Tinnitus, left        Past Surgical History:   Procedure Laterality Date    APPENDECTOMY      CARDIAC ELECTROPHYSIOLOGY PROCEDURE N/A 2023    Procedure: Device Implant PPM;  Surgeon: Brice Arriaga MD;  Location: [facility] INVASIVE LOCATION;  Service: Cardiology;  Laterality: N/A;     SECTION      CHOLECYSTECTOMY      COLONOSCOPY N/A 08/10/2018    Procedure: COLONOSCOPY WITH HOT SNARE POLYPECTOMY X 3; COLD SNARE POLYPECTOMY, COLD BIOPSY POLYPECTOMY X 4; CLIP PLACEMENT X 1;  Surgeon: Glenn Modi MD;  Location: [facility] ENDOSCOPY;  Service: Gastroenterology    COLONOSCOPY N/A 2024    Procedure: COLONOSCOPY with polypectomy and clipping;  Surgeon: Rodriguez Hernandez MD;  Location: [facility] ENDOSCOPY;  Service: Gastroenterology;  Laterality: N/A;    ENDOSCOPY      THYROIDECTOMY, PARTIAL Left 2011    TUBAL ABDOMINAL LIGATION         Social History      Socioeconomic History    Marital status:    Tobacco Use    Smoking status: Former     Current packs/day: 0.00     Average packs/day: 2.0 packs/day for 15.0 years (30.0 ttl pk-yrs)     Types: Cigarettes     Start date:      Quit date:      Years since quittin.0     Passive exposure: Past    Smokeless tobacco: Never   Vaping Use    Vaping status: Never Used   Substance and Sexual Activity    Alcohol use: Not Currently    Drug use: Never    Sexual activity: Defer       Family History   Problem Relation Age of Onset    Arthritis Mother     Diabetes Mother     Osteoporosis Mother     Arthritis Father     Diabetes Father     Heart attack Father     Hyperlipidemia Father     Diabetes Sister     Thyroid disease Sister     Diabetes Brother     Diabetes Maternal Grandmother     Diabetes Maternal Grandfather     Diabetes Paternal Grandmother     Diabetes Paternal Grandfather        Prior to Admission Medications:  Medications Prior to Admission   Medication Sig Dispense Refill Last Dose/Taking    albuterol (ACCUNEB) 0.63 MG/3ML nebulizer solution Take 3 mL by nebulization Every 6 (Six) Hours As Needed for Wheezing. 60 each 11 2024    albuterol sulfate HFA (Ventolin HFA) 108 (90 Base) MCG/ACT inhaler Inhale 2 puffs Every 6 (Six) Hours As Needed for Wheezing or Shortness of Air. 54 g 3 2024    aspirin 81 MG EC tablet Take 1 tablet by mouth Every Other Day.   2024    cholecalciferol (VITAMIN D3) 25 MCG (1000 UT) tablet Take 1 tablet by mouth Daily.   2024    furosemide (Lasix) 20 MG tablet 1 po daily as needed for swelling 30 tablet 2 2024    insulin aspart prot-insulin aspart (novoLOG 70/30) (70-30) 100 UNIT/ML injection Inject 50 Units under the skin into the appropriate area as directed 3 (Three) Times a Day. 300 mL 1 2024    levothyroxine (SYNTHROID, LEVOTHROID) 112 MCG tablet Take 1 tablet by mouth Daily. 90 tablet 1 2024    LORazepam (ATIVAN) 0.5 MG tablet  "Take 1 tablet by mouth Every 8 (Eight) Hours As Needed for Anxiety. 45 tablet 2 12/19/2024    metoprolol tartrate (LOPRESSOR) 25 MG tablet Take 0.5 tablets by mouth Daily. 90 tablet 0 12/20/2024 Morning    O2 (OXYGEN) Inhale Continuous. 2.5L/NC   12/20/2024 Morning    Blood Glucose Monitoring Suppl (ACCU-CHEK CHIO PLUS) w/Device kit USE AS DIRECTED TO CHECK GLUCOSE 3 TIMES DAILY FOR DM E11.65 1 kit 0 Unknown    Continuous Blood Gluc  (Dexcom G6 ) device 1 each Continuous. 1 each 0 Unknown    Continuous Blood Gluc Sensor (Dexcom G6 Sensor) Every 10 (Ten) Days. Apply as directed 3 each 11 Unknown    glucose blood (Accu-Chek Chio Plus) test strip USE AS DIRECTED THREE TIMES DAILY 300 each 3 Unknown    Insulin Syringe 31G X 5/16\" 1 ML misc 3 (Three) Times a Day.   Unknown    Insulin Syringe-Needle U-100 (BD Veo Insulin Syringe U/F) 31G X 15/64\" 0.5 ML misc as directed 300 each 2 Unknown    ketoconazole (NIZORAL) 2 % cream Apply 1 application  topically to the appropriate area as directed Daily. Apply under folds 30 g 1 Unknown    meclizine (ANTIVERT) 25 MG tablet Take 1 tablet by mouth Every 6 (Six) Hours As Needed for Dizziness. 20 tablet 0 More than a month    nystatin (MYCOSTATIN) 914960 UNIT/GM powder Apply  topically to the appropriate area as directed 4 (Four) Times a Day. 60 g 3 More than a month       Allergies:  Allergies   Allergen Reactions    Amlodipine Swelling     Leg edema     Doxycycline Nausea And Vomiting     \"Pain worse than pancreatitis\"    Sotalol Other (See Comments)     nausea    Spironolactone Other (See Comments)     Dropped magnesium      Warfarin Other (See Comments)     Nose bleed    Ciprofloxacin Hives    Contrast Dye (Echo Or Unknown Ct/Mr) Rash     Breaks her out    Diltiazem Nausea Only     nausea    Erythromycin Hives and Rash    Hydrochlorothiazide Other (See Comments)        pt developed pancreastitis    Levaquin [Levofloxacin] Swelling    Prednisone Mental Status " Change     ITCHING AND NERVOUS        Vitals: Temp:  [98.1 °F (36.7 °C)] 98.1 °F (36.7 °C)  Heart Rate:  [79] 79  Resp:  [18] 18  BP: (217)/(83) 217/83    Review of Systems:   Within Normal Limits Abnormal   HEENT [x]    []     Cardiovascular [x]   []     Gastrointestinal [x]   []     Genitourinary [x]   []     Neurologic [x]   []     Pulmonary [x]   []       Physical Exam:   Within Normal Limits Abnormal   HEENT [x]    []     Heart [x]   []     Lungs [x]   []     Abdomen [x]   []     Extremities [x]   []       Impression: Right nuclear sclerotic cataract.     Plan: CATARACT PHACO EXTRACTION WITH INTRAOCULAR LENS IMPLANT RIGHT (Right)     Larry Elder MD  12/20/2024

## 2024-12-20 NOTE — ANESTHESIA POSTPROCEDURE EVALUATION
Patient: Marielena Moeller    Procedure Summary       Date: 12/20/24 Room / Location: [facility] OR  / [facility] OR    Anesthesia Start: 0904 Anesthesia Stop: 0931    Procedure: CATARACT PHACO EXTRACTION WITH INTRAOCULAR LENS IMPLANT RIGHT (Right: Eye) Diagnosis:       Nuclear sclerotic cataract of right eye      (Nuclear sclerotic cataract of right eye [H25.11])    Surgeons: Larry Elder MD Provider: Shaan Dempsey CRNA    Anesthesia Type: MAC ASA Status: 4            Anesthesia Type: MAC    Vitals  No vitals data found for the desired time range.          Post Anesthesia Care and Evaluation    Patient location during evaluation: PHASE II  Patient participation: complete - patient participated  Level of consciousness: awake  Pain score: 0  Pain management: adequate    Airway patency: patent  Anesthetic complications: No anesthetic complications  PONV Status: none  Cardiovascular status: acceptable  Respiratory status: acceptable  Hydration status: acceptable    Comments: vsss resp spont, reflexes intact, responsive, report given to pacu nurse

## 2024-12-20 NOTE — DISCHARGE INSTRUCTIONS
Formerly [facility], [facility]  [address] [place] [address]  (P): [phone]           (F): [phone]    Marielena Moeller  PATIENT NAME:__________________________________    Right Eye    POST OPERATIVE INSTRUCTIONS    You have received anesthesia today. DO NOT drive, drink alcohol, sign legal documents.   After surgery, your eye will not hurt. It may feel scratchy, sticky or uncomfortable. Your eye will be sensitive to light.  Most people see better 1-3 days after the procedure, but it could take 3 weeks to get the full benefits and reach your visual potential. If your vision is blurry for a few days it is normal, and means you may have swelling outside or inside the eye. For some patients, a bubble is placed and there will be blurriness.   You should receive a post-op kit with tape and an eye shield. Wear the shield for the first 3 nights after surgery to keep you from rubbing the eye.  Most people are able to return to their normal routine 1-3 days after surgery, however, due to the brain adjusting to your new vision you may have trouble judging distances and want to be careful when driving and going up and downstairs.   You can shower and wash your hair the day after surgery. Keep water, shampoo, hair spray and shaving lotion out of the eye, especially for the first week.  During the first week, you should AVOID:   Rubbing or putting pressure on your eye.  Eye make-up, face cream or lotion, hair coloring or perms  Strenuous activities, such as running or lifting weights, as to avoid sweat from getting in the eye. Avoid swimming, hot tubs, fumes or vanessa conditions.   Keep your head above your heart (no hanging the head down for periods of time).  Some discomfort and blurred vision after surgery are normal, but if you have any unusual pain, swelling, bleeding or sudden decrease in vision, contact our office immediately. Emergency assistance is available at any time by calling:    Dr. James Zamora  [facility]    [phone]    If unable to reach call [facility] @ [phone]      POST OPERATIVE DROP INSTRUCTIONS  You have been prescribed eye drops to use after surgery, please follow these instructions:  PLACE A LACEY IN THE DAY COLUMN EACH TIME YOU USE TO KEEP ON SCHEDULE. WAIT 5 MINUTES IN BETWEEN DROPS              Prednisolone (PINK TOP) SHAKE WELL BEFORE USE   GIVEN TO YOU BY THE HOSPITAL    WEEK 1-USE 4  (FOUR) TIMES A DAY DAY 1   DAY 2 DAY 3 DAY 4 DAY 5 DAY 6 DAY 7     WEEK 2-USE 3 (THREE)  TIMES A DAY DAY 1 DAY 2 DAY 3 DAY 4 DAY 5 DAY 6 DAY 7     WEEK 3-USE 2 (TWO) TIMES A DAY DAY 1 DAY 2 DAY 3 DAY 4 DAY 5 DAY 6 DAY 7     WEEK 4-USE 1 (ONE)TIME A DAY DAY 1 DAY 2 DAY 3 DAY 4 DAY 5 DAY 6 DAY 7         Ketorolac (GRAY TOP) PRESCRIBED TO YOUR PHARMACY    WEEK 1-USE 4  (FOUR) TIMES A DAY DAY 1   DAY 2 DAY 3 DAY 4 DAY 5 DAY 6 DAY 7     WEEK 2-USE 3 (THREE)  TIMES A DAY DAY 1 DAY 2 DAY 3 DAY 4 DAY 5 DAY 6 DAY 7     WEEK 3-USE 2 (TWO) TIMES A DAY DAY 1 DAY 2 DAY 3 DAY 4 DAY 5 DAY 6 DAY 7     WEEK 4-USE 1 (ONE)TIME A DAY DAY 1 DAY 2 DAY 3 DAY 4 DAY 5 DAY 6 DAY 7       Moxifloxacin (TAN TOP) PRESCRIBED TO YOUR PHARMACY    WEEK 1-USE 4  (FOUR) TIMES A DAY DAY 1   DAY 2 DAY 3 DAY 4 DAY 5 DAY 6 DAY 7       No pushing, pulling, tugging,  heavy lifting, or strenuous activity.  No major decision making, driving, or drinking alcoholic beverages for 24 hours. ( due to the medications you have  received)  Always use good hand hygiene/washing techniques.  NO driving while taking pain medications.    * if you have an incision:  Check your incision area every day for signs of infection.   Check for:  * more redness, swelling, or pain  *more fluid or blood  *warmth  *pus or bad smell    To assist you in voiding:  Drink plenty of fluids  Listen to running water while attempting to void.    If you are unable to urinate and you have an uncomfortable urge to void or it  has been   6 hours since you were discharged, return to the Emergency Room

## 2024-12-27 ENCOUNTER — OFFICE VISIT (OUTPATIENT)
Dept: FAMILY MEDICINE CLINIC | Facility: CLINIC | Age: 79
End: 2024-12-27
Payer: MEDICARE

## 2024-12-27 VITALS
OXYGEN SATURATION: 97 % | WEIGHT: 160 LBS | SYSTOLIC BLOOD PRESSURE: 132 MMHG | DIASTOLIC BLOOD PRESSURE: 78 MMHG | HEART RATE: 78 BPM | BODY MASS INDEX: 31.41 KG/M2 | RESPIRATION RATE: 18 BRPM | HEIGHT: 60 IN

## 2024-12-27 DIAGNOSIS — M16.12 OSTEOARTHRITIS OF LEFT HIP, UNSPECIFIED OSTEOARTHRITIS TYPE: ICD-10-CM

## 2024-12-27 DIAGNOSIS — I48.0 PAROXYSMAL ATRIAL FIBRILLATION: ICD-10-CM

## 2024-12-27 DIAGNOSIS — E78.00 HIGH CHOLESTEROL: ICD-10-CM

## 2024-12-27 DIAGNOSIS — K21.9 GASTROESOPHAGEAL REFLUX DISEASE, UNSPECIFIED WHETHER ESOPHAGITIS PRESENT: ICD-10-CM

## 2024-12-27 DIAGNOSIS — E03.9 ACQUIRED HYPOTHYROIDISM: ICD-10-CM

## 2024-12-27 DIAGNOSIS — E11.59 HYPERTENSION ASSOCIATED WITH DIABETES: ICD-10-CM

## 2024-12-27 DIAGNOSIS — I15.2 HYPERTENSION ASSOCIATED WITH DIABETES: ICD-10-CM

## 2024-12-27 DIAGNOSIS — E66.812 CLASS 2 SEVERE OBESITY DUE TO EXCESS CALORIES WITH SERIOUS COMORBIDITY AND BODY MASS INDEX (BMI) OF 35.0 TO 35.9 IN ADULT: ICD-10-CM

## 2024-12-27 DIAGNOSIS — E11.65 TYPE 2 DIABETES MELLITUS WITH HYPERGLYCEMIA, WITH LONG-TERM CURRENT USE OF INSULIN: Primary | ICD-10-CM

## 2024-12-27 DIAGNOSIS — J84.9 INTERSTITIAL LUNG DISEASE: ICD-10-CM

## 2024-12-27 DIAGNOSIS — R60.9 2+ PITTING EDEMA: ICD-10-CM

## 2024-12-27 DIAGNOSIS — E66.01 CLASS 2 SEVERE OBESITY DUE TO EXCESS CALORIES WITH SERIOUS COMORBIDITY AND BODY MASS INDEX (BMI) OF 35.0 TO 35.9 IN ADULT: ICD-10-CM

## 2024-12-27 DIAGNOSIS — I48.11 LONGSTANDING PERSISTENT ATRIAL FIBRILLATION: ICD-10-CM

## 2024-12-27 DIAGNOSIS — Z79.4 TYPE 2 DIABETES MELLITUS WITH HYPERGLYCEMIA, WITH LONG-TERM CURRENT USE OF INSULIN: Primary | ICD-10-CM

## 2024-12-27 DIAGNOSIS — M15.9 OSTEOARTHRITIS OF MULTIPLE JOINTS, UNSPECIFIED OSTEOARTHRITIS TYPE: ICD-10-CM

## 2024-12-27 DIAGNOSIS — J44.9 CHRONIC OBSTRUCTIVE PULMONARY DISEASE, UNSPECIFIED COPD TYPE: ICD-10-CM

## 2024-12-27 DIAGNOSIS — N39.46 MIXED STRESS AND URGE URINARY INCONTINENCE: ICD-10-CM

## 2024-12-27 DIAGNOSIS — F41.9 ANXIETY: ICD-10-CM

## 2024-12-27 DIAGNOSIS — E55.9 VITAMIN D DEFICIENCY: ICD-10-CM

## 2024-12-27 DIAGNOSIS — Z95.0 PRESENCE OF CARDIAC PACEMAKER: ICD-10-CM

## 2024-12-27 LAB
EXPIRATION DATE: ABNORMAL
HBA1C MFR BLD: 9.4 % (ref 4.5–5.7)
Lab: ABNORMAL

## 2024-12-27 PROCEDURE — 99214 OFFICE O/P EST MOD 30 MIN: CPT | Performed by: NURSE PRACTITIONER

## 2024-12-27 PROCEDURE — 3046F HEMOGLOBIN A1C LEVEL >9.0%: CPT | Performed by: NURSE PRACTITIONER

## 2024-12-27 PROCEDURE — 1125F AMNT PAIN NOTED PAIN PRSNT: CPT | Performed by: NURSE PRACTITIONER

## 2024-12-27 PROCEDURE — 1160F RVW MEDS BY RX/DR IN RCRD: CPT | Performed by: NURSE PRACTITIONER

## 2024-12-27 PROCEDURE — 83036 HEMOGLOBIN GLYCOSYLATED A1C: CPT | Performed by: NURSE PRACTITIONER

## 2024-12-27 PROCEDURE — 3078F DIAST BP <80 MM HG: CPT | Performed by: NURSE PRACTITIONER

## 2024-12-27 PROCEDURE — 3075F SYST BP GE 130 - 139MM HG: CPT | Performed by: NURSE PRACTITIONER

## 2024-12-27 PROCEDURE — 1159F MED LIST DOCD IN RCRD: CPT | Performed by: NURSE PRACTITIONER

## 2024-12-27 RX ORDER — LEVOTHYROXINE SODIUM 112 UG/1
112 TABLET ORAL DAILY
Qty: 90 TABLET | Refills: 1 | Status: SHIPPED | OUTPATIENT
Start: 2024-12-27

## 2024-12-27 RX ORDER — MOXIFLOXACIN 5 MG/ML
SOLUTION/ DROPS OPHTHALMIC
COMMUNITY
Start: 2024-12-23

## 2024-12-27 RX ORDER — KETOROLAC TROMETHAMINE 5 MG/ML
SOLUTION OPHTHALMIC
COMMUNITY
Start: 2024-12-23

## 2024-12-27 RX ORDER — LORAZEPAM 0.5 MG/1
0.5 TABLET ORAL EVERY 8 HOURS PRN
Qty: 45 TABLET | Refills: 2 | Status: SHIPPED | OUTPATIENT
Start: 2024-12-27

## 2024-12-27 RX ORDER — CHOLECALCIFEROL (VITAMIN D3) 25 MCG
1000 TABLET ORAL DAILY
Qty: 90 TABLET | Refills: 1 | Status: SHIPPED | OUTPATIENT
Start: 2024-12-27

## 2024-12-27 RX ORDER — INSULIN ASPART 100 [IU]/ML
INJECTION, SUSPENSION SUBCUTANEOUS
Qty: 300 ML | Refills: 3 | Status: SHIPPED | OUTPATIENT
Start: 2024-12-27

## 2024-12-27 RX ORDER — METOPROLOL TARTRATE 25 MG/1
12.5 TABLET, FILM COATED ORAL DAILY
Qty: 90 TABLET | Refills: 1 | Status: SHIPPED | OUTPATIENT
Start: 2024-12-27

## 2024-12-27 RX ORDER — FUROSEMIDE 20 MG/1
TABLET ORAL
Qty: 30 TABLET | Refills: 2 | Status: SHIPPED | OUTPATIENT
Start: 2024-12-27

## 2024-12-27 NOTE — PROGRESS NOTES
"                      Established Patient        Chief Complaint:   Chief Complaint   Patient presents with    Palpitations    Hypertension    Atrial Fibrillation    Diabetes         History of Present Illness:    Marielena Moeller is a 79 y.o. female     Presents today with daughter.    Follow up of chronic medical conditions.     Would like referral to rheum for chronic OA and RA pains.    Urinary incontinence--would like aerocare briefs ordered    Glucose level >200 in the morning.  Does not take 70/30 at bedtime because she is home alone and it drops her glucose at night. Eating snack before bed.     States that she does not want lantus, did not work for her. Does not like change.     Takes ativan when she is in afib    Subjective     The following portions of the patient's history were reviewed and updated as appropriate: allergies, current medications, past family history, past medical history, past social history, past surgical history and problem list.    ALLERGIES  Allergies   Allergen Reactions    Amlodipine Swelling     Leg edema     Doxycycline Nausea And Vomiting     \"Pain worse than pancreatitis\"    Sotalol Other (See Comments)     nausea    Spironolactone Other (See Comments)     Dropped magnesium      Warfarin Other (See Comments)     Nose bleed    Ciprofloxacin Hives    Contrast Dye (Echo Or Unknown Ct/Mr) Rash     Breaks her out    Diltiazem Nausea Only     nausea    Erythromycin Hives and Rash    Hydrochlorothiazide Other (See Comments)        pt developed pancreastitis    Levaquin [Levofloxacin] Swelling    Prednisone Mental Status Change     ITCHING AND NERVOUS       ROS  Review of Systems   Constitutional:  Positive for fatigue.   Respiratory:  Positive for shortness of breath. Negative for cough and chest tightness.    Cardiovascular:  Positive for palpitations. Negative for chest pain.   Gastrointestinal:  Negative for blood in stool.   Genitourinary:  Negative for difficulty urinating.        " "Urinary incontinence   Musculoskeletal:  Positive for arthralgias and myalgias.   Neurological:  Positive for dizziness.   Psychiatric/Behavioral:  Negative for agitation, dysphoric mood, sleep disturbance and suicidal ideas. The patient is nervous/anxious.        Objective     Vital Signs:   /78   Pulse 78   Resp 18   Ht 152.4 cm (60\")   Wt 72.6 kg (160 lb)   LMP  (LMP Unknown)   SpO2 97%   BMI 31.25 kg/m²                Physical Exam   Physical Exam  Vitals and nursing note reviewed.   HENT:      Mouth/Throat:      Dentition: Abnormal dentition. Dental tenderness, dental caries and dental abscesses present.   Cardiovascular:      Rate and Rhythm: Normal rate and regular rhythm.      Comments: Paced  Pulmonary:      Effort: Pulmonary effort is normal.      Breath sounds: Normal breath sounds.   Lymphadenopathy:      Cervical: Cervical adenopathy present.      Upper Body:      Right upper body: Axillary adenopathy present.      Left upper body: Axillary adenopathy present.   Neurological:      Mental Status: She is alert and oriented to person, place, and time.   Psychiatric:         Mood and Affect: Mood normal.         Behavior: Behavior normal.         Assessment and Plan      Assessment/Plan:   Diagnoses and all orders for this visit:    1. Type 2 diabetes mellitus with hyperglycemia, with long-term current use of insulin (Primary)  -     POC Glycosylated Hemoglobin (Hb A1C)  -     insulin aspart prot-insulin aspart (novoLOG 70/30) (70-30) 100 UNIT/ML injection; Inject 50 Units under the skin into the appropriate area as directed 3 (Three) Times a Day With Meals AND 20 Units Every Night. Name brand Novolog only please  Dispense: 300 mL; Refill: 3    2. Hypertension associated with diabetes    3. Class 2 severe obesity due to excess calories with serious comorbidity and body mass index (BMI) of 35.0 to 35.9 in adult    4. Acquired hypothyroidism  -     levothyroxine (SYNTHROID, LEVOTHROID) 112 MCG " tablet; Take 1 tablet by mouth Daily.  Dispense: 90 tablet; Refill: 1    5. Chronic obstructive pulmonary disease, unspecified COPD type    6. Interstitial lung disease    7. Paroxysmal atrial fibrillation    8. Hyperlipidemia    9. Gastroesophageal reflux disease, unspecified whether esophagitis present    10. Presence of cardiac pacemaker    11. Osteoarthritis of multiple joints, unspecified osteoarthritis type    12. Osteoarthritis of left hip, unspecified osteoarthritis type  -     Ambulatory Referral to Rheumatology    13. Longstanding persistent atrial fibrillation  -     metoprolol tartrate (LOPRESSOR) 25 MG tablet; Take 0.5 tablets by mouth Daily.  Dispense: 90 tablet; Refill: 1    14. 2+ pitting edema  -     furosemide (Lasix) 20 MG tablet; 1 po daily as needed for swelling  Dispense: 30 tablet; Refill: 2    15. Anxiety  -     LORazepam (ATIVAN) 0.5 MG tablet; Take 1 tablet by mouth Every 8 (Eight) Hours As Needed for Anxiety.  Dispense: 45 tablet; Refill: 2    16. Vitamin D deficiency  -     cholecalciferol (VITAMIN D3) 25 MCG (1000 UT) tablet; Take 1 tablet by mouth Daily.  Dispense: 90 tablet; Refill: 1    17. Mixed stress and urge urinary incontinence    Risks, benefits, and potential side effects of current/new medications reviewed with patient.  Patient voiced understanding and wished to proceed with treatment.    I will contact patient regarding test results and provide instructions regarding any necessary changes in plan of care.    Due to presence of pacemaker, patient to follow up with cardiology for worsening palpitations.    Patient was encouraged to keep me informed of any acute changes, lack of improvement, or any new concerning symptoms.      Discussion Summary:  Discussed plan of care in detail with pt today; pt verb understanding and agrees.        I have reviewed and updated all copied forward information, as appropriate.  I attest to the accuracy and relevance of any unchanged  information.      Follow up:  No follow-ups on file.     Patient Education:  There are no Patient Instructions on file for this visit.    LETICIA Moreira  01/15/25  08:17 EST          Please note that portions of this note may have been completed with a voice recognition program.

## 2025-01-10 ENCOUNTER — PREP FOR SURGERY (OUTPATIENT)
Dept: OTHER | Facility: HOSPITAL | Age: 80
End: 2025-01-10
Payer: MEDICARE

## 2025-01-10 DIAGNOSIS — H25.12 NUCLEAR SCLEROTIC CATARACT OF LEFT EYE: Primary | ICD-10-CM

## 2025-01-10 RX ORDER — SODIUM CHLORIDE 0.9 % (FLUSH) 0.9 %
10 SYRINGE (ML) INJECTION AS NEEDED
Status: CANCELLED | OUTPATIENT
Start: 2025-01-10

## 2025-01-10 RX ORDER — SODIUM CHLORIDE 0.9 % (FLUSH) 0.9 %
10 SYRINGE (ML) INJECTION EVERY 12 HOURS SCHEDULED
Status: CANCELLED | OUTPATIENT
Start: 2025-01-10

## 2025-01-10 RX ORDER — CYCLOPENT/TROPIC/PHEN/KETR/WAT 1%-1%-2.5%
1 DROPS (EA) OPHTHALMIC (EYE)
Status: CANCELLED | OUTPATIENT
Start: 2025-01-10 | End: 2025-01-10

## 2025-01-10 RX ORDER — PREDNISOLONE ACETATE 10 MG/ML
1 SUSPENSION/ DROPS OPHTHALMIC SEE ADMIN INSTRUCTIONS
Status: CANCELLED | OUTPATIENT
Start: 2025-01-10

## 2025-01-10 RX ORDER — TETRACAINE HYDROCHLORIDE 5 MG/ML
1 SOLUTION OPHTHALMIC SEE ADMIN INSTRUCTIONS
Status: CANCELLED | OUTPATIENT
Start: 2025-01-10

## 2025-01-16 ENCOUNTER — ANESTHESIA EVENT (OUTPATIENT)
Dept: PERIOP | Facility: HOSPITAL | Age: 80
End: 2025-01-16
Payer: MEDICARE

## 2025-01-17 ENCOUNTER — ANESTHESIA (OUTPATIENT)
Dept: PERIOP | Facility: HOSPITAL | Age: 80
End: 2025-01-17
Payer: MEDICARE

## 2025-01-17 ENCOUNTER — HOSPITAL ENCOUNTER (OUTPATIENT)
Facility: HOSPITAL | Age: 80
Setting detail: HOSPITAL OUTPATIENT SURGERY
Discharge: HOME | End: 2025-01-17
Attending: OPHTHALMOLOGY | Admitting: OPHTHALMOLOGY
Payer: MEDICARE

## 2025-01-17 VITALS
RESPIRATION RATE: 18 BRPM | DIASTOLIC BLOOD PRESSURE: 77 MMHG | OXYGEN SATURATION: 98 % | SYSTOLIC BLOOD PRESSURE: 163 MMHG | TEMPERATURE: 98.5 F | WEIGHT: 160 LBS | BODY MASS INDEX: 31.41 KG/M2 | HEIGHT: 60 IN | HEART RATE: 84 BPM

## 2025-01-17 DIAGNOSIS — H25.12 NUCLEAR SCLEROTIC CATARACT OF LEFT EYE: ICD-10-CM

## 2025-01-17 LAB — GLUCOSE BLDC GLUCOMTR-MCNC: 213 MG/DL (ref 70–130)

## 2025-01-17 PROCEDURE — 82948 REAGENT STRIP/BLOOD GLUCOSE: CPT | Performed by: OPHTHALMOLOGY

## 2025-01-17 PROCEDURE — 25010000002 LIDOCAINE HCL (PF) 4 % SOLUTION: Performed by: OPHTHALMOLOGY

## 2025-01-17 PROCEDURE — 25010000002 FENTANYL CITRATE (PF) 50 MCG/ML SOLUTION PREFILLED SYRINGE: Performed by: NURSE ANESTHETIST, CERTIFIED REGISTERED

## 2025-01-17 PROCEDURE — V2632 POST CHMBR INTRAOCULAR LENS: HCPCS | Performed by: OPHTHALMOLOGY

## 2025-01-17 PROCEDURE — 25010000002 MIDAZOLAM PER 1MG: Performed by: NURSE ANESTHETIST, CERTIFIED REGISTERED

## 2025-01-17 PROCEDURE — 25010000002 CEFUROXIME 3 MG/0.3 ML SOLUTION 0.3 ML SYRINGE: Performed by: OPHTHALMOLOGY

## 2025-01-17 DEVICE — LENS MONOFOCAL IQ CC60WF205: Type: IMPLANTABLE DEVICE | Site: POSTERIOR CHAMBER | Status: FUNCTIONAL

## 2025-01-17 RX ORDER — BALANCED SALT SOLUTION 6.4; .75; .48; .3; 3.9; 1.7 MG/ML; MG/ML; MG/ML; MG/ML; MG/ML; MG/ML
SOLUTION OPHTHALMIC AS NEEDED
Status: DISCONTINUED | OUTPATIENT
Start: 2025-01-17 | End: 2025-01-17 | Stop reason: HOSPADM

## 2025-01-17 RX ORDER — MIDAZOLAM HYDROCHLORIDE 2 MG/2ML
INJECTION, SOLUTION INTRAMUSCULAR; INTRAVENOUS AS NEEDED
Status: DISCONTINUED | OUTPATIENT
Start: 2025-01-17 | End: 2025-01-17 | Stop reason: SURG

## 2025-01-17 RX ORDER — FENTANYL CITRATE 50 UG/ML
INJECTION, SOLUTION INTRAMUSCULAR; INTRAVENOUS AS NEEDED
Status: DISCONTINUED | OUTPATIENT
Start: 2025-01-17 | End: 2025-01-17 | Stop reason: SURG

## 2025-01-17 RX ORDER — LIDOCAINE HYDROCHLORIDE 40 MG/ML
INJECTION, SOLUTION RETROBULBAR AS NEEDED
Status: DISCONTINUED | OUTPATIENT
Start: 2025-01-17 | End: 2025-01-17 | Stop reason: HOSPADM

## 2025-01-17 RX ORDER — SODIUM CHLORIDE 0.9 % (FLUSH) 0.9 %
10 SYRINGE (ML) INJECTION AS NEEDED
Status: DISCONTINUED | OUTPATIENT
Start: 2025-01-17 | End: 2025-01-17 | Stop reason: HOSPADM

## 2025-01-17 RX ORDER — CYCLOPENT/TROPIC/PHEN/KETR/WAT 1%-1%-2.5%
1 DROPS (EA) OPHTHALMIC (EYE)
Status: COMPLETED | OUTPATIENT
Start: 2025-01-17 | End: 2025-01-17

## 2025-01-17 RX ORDER — PREDNISOLONE ACETATE 10 MG/ML
SUSPENSION/ DROPS OPHTHALMIC AS NEEDED
Status: DISCONTINUED | OUTPATIENT
Start: 2025-01-17 | End: 2025-01-17 | Stop reason: HOSPADM

## 2025-01-17 RX ORDER — TETRACAINE HYDROCHLORIDE 5 MG/ML
SOLUTION OPHTHALMIC AS NEEDED
Status: DISCONTINUED | OUTPATIENT
Start: 2025-01-17 | End: 2025-01-17 | Stop reason: HOSPADM

## 2025-01-17 RX ORDER — POVIDONE-IODINE 5 %
SOLUTION, NON-ORAL OPHTHALMIC (EYE) AS NEEDED
Status: DISCONTINUED | OUTPATIENT
Start: 2025-01-17 | End: 2025-01-17 | Stop reason: HOSPADM

## 2025-01-17 RX ORDER — TETRACAINE HYDROCHLORIDE 5 MG/ML
1 SOLUTION OPHTHALMIC SEE ADMIN INSTRUCTIONS
Status: COMPLETED | OUTPATIENT
Start: 2025-01-17 | End: 2025-01-17

## 2025-01-17 RX ORDER — SODIUM CHLORIDE 0.9 % (FLUSH) 0.9 %
10 SYRINGE (ML) INJECTION EVERY 12 HOURS SCHEDULED
Status: DISCONTINUED | OUTPATIENT
Start: 2025-01-17 | End: 2025-01-17 | Stop reason: HOSPADM

## 2025-01-17 RX ORDER — IPRATROPIUM BROMIDE AND ALBUTEROL SULFATE 2.5; .5 MG/3ML; MG/3ML
3 SOLUTION RESPIRATORY (INHALATION) ONCE AS NEEDED
Status: DISCONTINUED | OUTPATIENT
Start: 2025-01-17 | End: 2025-01-17 | Stop reason: HOSPADM

## 2025-01-17 RX ORDER — PREDNISOLONE ACETATE 10 MG/ML
1 SUSPENSION/ DROPS OPHTHALMIC SEE ADMIN INSTRUCTIONS
Status: DISCONTINUED | OUTPATIENT
Start: 2025-01-17 | End: 2025-01-17 | Stop reason: HOSPADM

## 2025-01-17 RX ADMIN — Medication 1 DROP: at 07:37

## 2025-01-17 RX ADMIN — MIDAZOLAM HYDROCHLORIDE 1 MG: 1 INJECTION, SOLUTION INTRAMUSCULAR; INTRAVENOUS at 08:27

## 2025-01-17 RX ADMIN — Medication 1 DROP: at 07:32

## 2025-01-17 RX ADMIN — FENTANYL CITRATE 50 MCG: 50 INJECTION, SOLUTION INTRAMUSCULAR; INTRAVENOUS at 08:27

## 2025-01-17 RX ADMIN — Medication 1 DROP: at 07:27

## 2025-01-17 RX ADMIN — TETRACAINE HYDROCHLORIDE 1 DROP: 5 SOLUTION OPHTHALMIC at 07:25

## 2025-01-17 RX ADMIN — MIDAZOLAM HYDROCHLORIDE 1 MG: 1 INJECTION, SOLUTION INTRAMUSCULAR; INTRAVENOUS at 08:30

## 2025-01-17 RX ADMIN — TETRACAINE HYDROCHLORIDE 1 DROP: 5 SOLUTION OPHTHALMIC at 07:26

## 2025-01-17 NOTE — H&P
[facility]         History and Physical    Patient Name: Marielena Moeller  MRN: [id number]  : 1945  Gender: female     HPI: Patient complaint of PPLOV Left eye diagnosed with cataract. Patient requests PHACO PCIOL for Increase of VA/ADL.    History:    Past Medical History:   Diagnosis Date    Abdominal hernia     Arthritis     Asthma     Atrial fibrillation     Blood in stool     Body piercing     EARS ONLY    Bronchitis     Bulging of lumbar intervertebral disc     Colon polyp     COVID-19 2022    Deaf, left     Deaf, left     Diabetes mellitus     Diverticulitis     Diverticulitis     GERD (gastroesophageal reflux disease)     Heart murmur     History of exercise stress test     Hyperlipidemia     Hypertension     Impaired functional mobility, balance, gait, and endurance     Leaky heart valve     Lung nodules     Osteoporosis     Pacemaker     Palpitations     Pancreatitis     Spinal headache     Supplemental oxygen dependent     2.5 L/NC continuous    Thyroid disease     LEFT SIDE REMOVED    Tinnitus, left        Past Surgical History:   Procedure Laterality Date    APPENDECTOMY      CARDIAC ELECTROPHYSIOLOGY PROCEDURE N/A 2023    Procedure: Device Implant PPM;  Surgeon: Brice Arriaga MD;  Location: [facility] INVASIVE LOCATION;  Service: Cardiology;  Laterality: N/A;    CATARACT EXTRACTION W/ INTRAOCULAR LENS IMPLANT Right 2024    Procedure: CATARACT PHACO EXTRACTION WITH INTRAOCULAR LENS IMPLANT RIGHT;  Surgeon: Larry Elder MD;  Location: [facility] OR;  Service: Ophthalmology;  Laterality: Right;     SECTION      CHOLECYSTECTOMY      COLONOSCOPY N/A 08/10/2018    Procedure: COLONOSCOPY WITH HOT SNARE POLYPECTOMY X 3; COLD SNARE POLYPECTOMY, COLD BIOPSY POLYPECTOMY X 4; CLIP PLACEMENT X 1;  Surgeon: Glenn Modi MD;  Location: [facility] ENDOSCOPY;  Service: Gastroenterology    COLONOSCOPY N/A 2024    Procedure: COLONOSCOPY with  polypectomy and clipping;  Surgeon: Rodriguez Hernandez MD;  Location: [facility] ENDOSCOPY;  Service: Gastroenterology;  Laterality: N/A;    ENDOSCOPY      THYROIDECTOMY, PARTIAL Left 2011    TUBAL ABDOMINAL LIGATION         Social History     Socioeconomic History    Marital status:    Tobacco Use    Smoking status: Former     Current packs/day: 0.00     Average packs/day: 2.0 packs/day for 15.0 years (30.0 ttl pk-yrs)     Types: Cigarettes     Start date:      Quit date:      Years since quittin.0     Passive exposure: Past    Smokeless tobacco: Never   Vaping Use    Vaping status: Never Used   Substance and Sexual Activity    Alcohol use: Not Currently    Drug use: Never    Sexual activity: Defer       Family History   Problem Relation Age of Onset    Arthritis Mother     Diabetes Mother     Osteoporosis Mother     Arthritis Father     Diabetes Father     Heart attack Father     Hyperlipidemia Father     Diabetes Sister     Thyroid disease Sister     Diabetes Brother     Diabetes Maternal Grandmother     Diabetes Maternal Grandfather     Diabetes Paternal Grandmother     Diabetes Paternal Grandfather        Prior to Admission Medications:  Medications Prior to Admission   Medication Sig Dispense Refill Last Dose/Taking    albuterol (ACCUNEB) 0.63 MG/3ML nebulizer solution Take 3 mL by nebulization Every 6 (Six) Hours As Needed for Wheezing. 60 each 11 Past Month    albuterol sulfate HFA (Ventolin HFA) 108 (90 Base) MCG/ACT inhaler Inhale 2 puffs Every 6 (Six) Hours As Needed for Wheezing or Shortness of Air. 54 g 3 Past Month    aspirin 81 MG EC tablet Take 1 tablet by mouth Every Other Day.   2025 Morning    Blood Glucose Monitoring Suppl (ACCU-CHEK CHIO PLUS) w/Device kit USE AS DIRECTED TO CHECK GLUCOSE 3 TIMES DAILY FOR DM E11.65 1 kit 0 2025    cholecalciferol (VITAMIN D3) 25 MCG (1000 UT) tablet Take 1 tablet by mouth Daily. 90 tablet 1 2025    Continuous Blood Gluc  " (Dexcom G6 ) device 1 each Continuous. 1 each 0 1/16/2025    Continuous Blood Gluc Sensor (Dexcom G6 Sensor) Every 10 (Ten) Days. Apply as directed 3 each 11 1/16/2025    furosemide (Lasix) 20 MG tablet 1 po daily as needed for swelling 30 tablet 2 1/16/2025    glucose blood (Accu-Chek Anne Plus) test strip USE AS DIRECTED THREE TIMES DAILY 300 each 3 1/16/2025    insulin aspart prot-insulin aspart (novoLOG 70/30) (70-30) 100 UNIT/ML injection Inject 50 Units under the skin into the appropriate area as directed 3 (Three) Times a Day With Meals AND 20 Units Every Night. Name brand Novolog only please 300 mL 3 1/16/2025    Insulin Syringe 31G X 5/16\" 1 ML misc 3 (Three) Times a Day.   1/16/2025    Insulin Syringe-Needle U-100 (BD Veo Insulin Syringe U/F) 31G X 15/64\" 0.5 ML misc as directed 300 each 2 1/16/2025    ketoconazole (NIZORAL) 2 % cream Apply 1 application  topically to the appropriate area as directed Daily. Apply under folds 30 g 1 1/16/2025    ketorolac (ACULAR) 0.5 % ophthalmic solution    1/16/2025    levothyroxine (SYNTHROID, LEVOTHROID) 112 MCG tablet Take 1 tablet by mouth Daily. 90 tablet 1 1/16/2025    LORazepam (ATIVAN) 0.5 MG tablet Take 1 tablet by mouth Every 8 (Eight) Hours As Needed for Anxiety. 45 tablet 2 1/16/2025    meclizine (ANTIVERT) 25 MG tablet Take 1 tablet by mouth Every 6 (Six) Hours As Needed for Dizziness. 20 tablet 0 1/16/2025    metoprolol tartrate (LOPRESSOR) 25 MG tablet Take 0.5 tablets by mouth Daily. 90 tablet 1 1/16/2025    moxifloxacin (VIGAMOX) 0.5 % ophthalmic solution    1/16/2025    nystatin (MYCOSTATIN) 264198 UNIT/GM powder Apply  topically to the appropriate area as directed 4 (Four) Times a Day. 60 g 3 1/16/2025    O2 (OXYGEN) Inhale Continuous. 2.5L/NC   1/17/2025 Morning       Allergies:  Allergies   Allergen Reactions    Amlodipine Swelling     Leg edema     Doxycycline Nausea And Vomiting     \"Pain worse than pancreatitis\"    Sotalol Other " (See Comments)     nausea    Spironolactone Other (See Comments)     Dropped magnesium      Warfarin Other (See Comments)     Nose bleed    Ciprofloxacin Hives    Contrast Dye (Echo Or Unknown Ct/Mr) Rash     Breaks her out    Diltiazem Nausea Only     nausea    Erythromycin Hives and Rash    Hydrochlorothiazide Other (See Comments)        pt developed pancreastitis    Levaquin [Levofloxacin] Swelling    Prednisone Mental Status Change     ITCHING AND NERVOUS        Vitals: Temp:  [98.1 °F (36.7 °C)] 98.1 °F (36.7 °C)  Heart Rate:  [76] 76  Resp:  [18] 18  BP: (182)/(84) 182/84    Review of Systems:   Within Normal Limits Abnormal   HEENT [x]    []     Cardiovascular [x]   []     Gastrointestinal [x]   []     Genitourinary [x]   []     Neurologic [x]   []     Pulmonary [x]   []       Physical Exam:   Within Normal Limits Abnormal   HEENT [x]    []     Heart [x]   []     Lungs [x]   []     Abdomen [x]   []     Extremities [x]   []       Impression: Left nuclear sclerotic cataract.     Plan: CATARACT PHACO EXTRACTION WITH INTRAOCULAR LENS IMPLANT LEFT (Left)     Larry Elder MD  1/17/2025

## 2025-01-17 NOTE — ANESTHESIA PREPROCEDURE EVALUATION
Anesthesia Evaluation     Patient summary reviewed and Nursing notes reviewed   history of anesthetic complications (spinal headache):   NPO Solid Status: > 8 hours  NPO Liquid Status: > 2 hours           Airway   Mallampati: II  TM distance: >3 FB  Neck ROM: full  Possible difficult intubation  Dental - normal exam     Pulmonary    (+) a smoker (30 pack year) Former, Abstained day of surgery, cigarettes, COPD severe, asthma,home oxygen (2.5 l)    ROS comment: Hx bronchitis  Cardiovascular - normal exam    ECG reviewed  PT is on anticoagulation therapy  Rhythm: regular  Rate: normal    (+) pacemaker pacemaker interrogated <1 month ago, hypertension well controlled 2 medications or greater, valvular problems/murmurs murmur, dysrhythmias (tachy-simón syndrome  SSS palpitations) Paroxysmal Atrial Fib, PVD, hyperlipidemia    ROS comment: 05/15/2024 EKG NSR septal infarct nonspecific t wave changes ·  Left ventricular systolic function is normal.       5/2023 ECHO  Calculated left ventricular EF = 55.1% Left ventricular ejection fraction appears to be 56 - 60%.  ·  Left ventricular diastolic function was indeterminate.  ·  Estimated right ventricular systolic pressure from tricuspid regurgitation is normal (<35 mmHg).               Neuro/Psych  (+) headaches, syncope, numbness (peripheral neuropathy), psychiatric history Anxiety  GI/Hepatic/Renal/Endo    (+) obesity (BMI 31.25), GERD, GI bleeding , diabetes mellitus type 2 poorly controlled using insulin, thyroid problem hypothyroidism    ROS Comment: Hx pancreatitis    Musculoskeletal     (+) back pain, gait problem, myalgias      ROS comment: osteoporosis  Abdominal  - normal exam   Substance History      OB/GYN          Other   arthritis,                   Anesthesia Plan    ASA 3     MAC     (Risks and benefits discussed including risk of aspiration, recall and dental damage. All patient questions answered.    Will continue with plan of care.)  intravenous induction      Anesthetic plan, risks, benefits, and alternatives have been provided, discussed and informed consent has been obtained with: patient.  Pre-procedure education provided    CODE STATUS:

## 2025-01-17 NOTE — OP NOTE
OPERATIVE NOTE    Date of Procedure: 1/17/2025  Patient Name: Marielena Moeller  Patient MRN: [id number]  YOB: 1945     Preoperative Diagnosis: Left nuclear sclerotic cataract.     Postoperative Diagnosis: Left nuclear sclerotic cataract.     Procedure Performed: Phacoemulsification with implantation of a  foldable posterior chamber intraocular lens, Left eye.     Surgeon: Larry Elder MD     Anesthesia:  Monitored Anesthesia Care (MAC)      Brief History and Indication: The patient presents with a history of past progressive loss of vision.  Patient was diagnosed with cataract and requests removal for increased ability to read and see.     Operation Description: The patient was taken to the OR and prepped and draped in the usual sterile ophthalmic fashion. A lid speculum was placed in the eye.  A 0.8 mm blade was then used to make a stab incision two o’clock hours from the intended temporal clear cornea groove. The anterior chamber was then inflated with a Viscoelastic. A metal microkeratome blade was then used to enter the anterior chamber at the temporal clear cornea site. A three level tunnel incision was made. A curvilinear capsulorrhexis was then performed with a bent cystotome needle and capsulorrhexis forceps.  BSS on a 30 gauge bent cannula was used to hydro-dissect the lens. Good fluid waves were noted. Phacoemulsification was then used to remove nuclear material without complications. The residual cortical and lenticular material was then removed with irrigation and aspiration. Viscoelastics were then used to inflate the bag in a soft shell technique. A PCIOL was injected into the bag. Post-implantation, there were no rents or tears in the bag and the lens was noted to be stable and centered. The residual Viscoelastic was then removed with irrigation and aspiration.  The wound was checked and found to be without leaks. One drop of a Prednisilone was placed in the eye.     Implant  Information:   Implant Name Type Inv. Item Serial No.  Lot No. LRB No. Used Action   LENS MONOFOCAL IQ [id number] - [id number] - [id number] Implant LENS MONOFOCAL IQ [id number] [employer]  Left 1 Implanted       Complications: None    Estimated Blood Loss:  Less than 1 cc.      Discharge and Condition  The patient was transported to same day surgery in excellent condition and scheduled for follow-up appointment. The patient was given instructions on use of eye drops for the operative eye and was specifically instructed to call for any concerns.    Larry Elder MD  1/17/2025

## 2025-01-17 NOTE — ANESTHESIA POSTPROCEDURE EVALUATION
Patient: Marielena Moeller    Procedure Summary       Date: 01/17/25 Room / Location: [facility] OR 3 / [facility] OR    Anesthesia Start: 0823 Anesthesia Stop: 0843    Procedure: CATARACT PHACO EXTRACTION WITH INTRAOCULAR LENS IMPLANT LEFT (Left: Eye) Diagnosis:       Nuclear sclerotic cataract of left eye      (Nuclear sclerotic cataract of left eye [H25.12])    Surgeons: Larry Elder MD Provider: Mindy Dc CRNA    Anesthesia Type: MAC ASA Status: 3            Anesthesia Type: MAC    Vitals  Vitals Value Taken Time   /77 01/17/25 0917   Temp 98.5 °F (36.9 °C) 01/17/25 0847   Pulse 84 01/17/25 0917   Resp 18 01/17/25 0917   SpO2 98 % 01/17/25 0917           Post Anesthesia Care and Evaluation    Patient location during evaluation: bedside  Patient participation: complete - patient participated  Level of consciousness: awake and alert  Pain score: 0  Pain management: satisfactory to patient    Airway patency: patent  Anesthetic complications: No anesthetic complications  PONV Status: none  Cardiovascular status: acceptable and stable  Respiratory status: acceptable  Hydration status: acceptable    Comments: Vitals signs as noted in nursing documentation as per protocol.

## 2025-01-17 NOTE — DISCHARGE INSTRUCTIONS
[facility]  [address] [place] [address]  (P): [phone]           (F): [phone]    Marielena Moeller  PATIENT NAME:__________________________________    Left Eye    POST OPERATIVE INSTRUCTIONS    You have received anesthesia today. DO NOT drive, drink alcohol, sign legal documents.   After surgery, your eye will not hurt. It may feel scratchy, sticky or uncomfortable. Your eye will be sensitive to light.  Most people see better 1-3 days after the procedure, but it could take 3 weeks to get the full benefits and reach your visual potential. If your vision is blurry for a few days it is normal, and means you may have swelling outside or inside the eye. For some patients, a bubble is placed and there will be blurriness.   You should receive a post-op kit with tape and an eye shield. Wear the shield for the first 3 nights after surgery to keep you from rubbing the eye.  Most people are able to return to their normal routine 1-3 days after surgery, however, due to the brain adjusting to your new vision you may have trouble judging distances and want to be careful when driving and going up and downstairs.   You can shower and wash your hair the day after surgery. Keep water, shampoo, hair spray and shaving lotion out of the eye, especially for the first week.  During the first week, you should AVOID:   Rubbing or putting pressure on your eye.  Eye make-up, face cream or lotion, hair coloring or perms  Strenuous activities, such as running or lifting weights, as to avoid sweat from getting in the eye. Avoid swimming, hot tubs, fumes or vanessa conditions.   Keep your head above your heart (no hanging the head down for periods of time).  Some discomfort and blurred vision after surgery are normal, but if you have any unusual pain, swelling, bleeding or sudden decrease in vision, contact our office immediately. Emergency assistance is available at any time by calling:    Dr. James Zamora  [facility]    [phone]    If unable to reach call [facility] @ [phone]      POST OPERATIVE DROP INSTRUCTIONS  You have been prescribed eye drops to use after surgery, please follow these instructions:  PLACE A LACEY IN THE DAY COLUMN EACH TIME YOU USE TO KEEP ON SCHEDULE. WAIT 5 MINUTES IN BETWEEN DROPS              Prednisolone (PINK TOP) SHAKE WELL BEFORE USE   GIVEN TO YOU BY THE HOSPITAL    WEEK 1-USE 4  (FOUR) TIMES A DAY DAY 1   DAY 2 DAY 3 DAY 4 DAY 5 DAY 6 DAY 7     WEEK 2-USE 3 (THREE)  TIMES A DAY DAY 1 DAY 2 DAY 3 DAY 4 DAY 5 DAY 6 DAY 7     WEEK 3-USE 2 (TWO) TIMES A DAY DAY 1 DAY 2 DAY 3 DAY 4 DAY 5 DAY 6 DAY 7     WEEK 4-USE 1 (ONE)TIME A DAY DAY 1 DAY 2 DAY 3 DAY 4 DAY 5 DAY 6 DAY 7         Ketorolac (GRAY TOP) PRESCRIBED TO YOUR PHARMACY    WEEK 1-USE 4  (FOUR) TIMES A DAY DAY 1   DAY 2 DAY 3 DAY 4 DAY 5 DAY 6 DAY 7     WEEK 2-USE 3 (THREE)  TIMES A DAY DAY 1 DAY 2 DAY 3 DAY 4 DAY 5 DAY 6 DAY 7     WEEK 3-USE 2 (TWO) TIMES A DAY DAY 1 DAY 2 DAY 3 DAY 4 DAY 5 DAY 6 DAY 7     WEEK 4-USE 1 (ONE)TIME A DAY DAY 1 DAY 2 DAY 3 DAY 4 DAY 5 DAY 6 DAY 7       Moxifloxacin (TAN TOP) PRESCRIBED TO YOUR PHARMACY    WEEK 1-USE 4  (FOUR) TIMES A DAY DAY 1   DAY 2 DAY 3 DAY 4 DAY 5 DAY 6 DAY 7       No pushing, pulling, tugging,  heavy lifting, or strenuous activity.  No major decision making, driving, or drinking alcoholic beverages for 24 hours. ( due to the medications you have  received)  Always use good hand hygiene/washing techniques.  NO driving while taking pain medications.    * if you have an incision:  Check your incision area every day for signs of infection.   Check for:  * more redness, swelling, or pain  *more fluid or blood  *warmth  *pus or bad smell    To assist you in voiding:  Drink plenty of fluids  Listen to running water while attempting to void.    If you are unable to urinate and you have an uncomfortable urge to void or it  has been   6 hours since you were discharged, return to the Emergency Room

## 2025-03-17 ENCOUNTER — TELEPHONE (OUTPATIENT)
Dept: FAMILY MEDICINE CLINIC | Facility: CLINIC | Age: 80
End: 2025-03-17
Payer: MEDICARE

## 2025-03-28 ENCOUNTER — OFFICE VISIT (OUTPATIENT)
Dept: FAMILY MEDICINE CLINIC | Facility: CLINIC | Age: 80
End: 2025-03-28
Payer: MEDICARE

## 2025-03-28 VITALS
HEART RATE: 89 BPM | BODY MASS INDEX: 32.39 KG/M2 | WEIGHT: 165 LBS | OXYGEN SATURATION: 96 % | SYSTOLIC BLOOD PRESSURE: 136 MMHG | DIASTOLIC BLOOD PRESSURE: 82 MMHG | HEIGHT: 60 IN | RESPIRATION RATE: 16 BRPM

## 2025-03-28 DIAGNOSIS — Z74.09 IMPAIRED MOBILITY AND ADLS: ICD-10-CM

## 2025-03-28 DIAGNOSIS — Z78.9 IMPAIRED MOBILITY AND ADLS: ICD-10-CM

## 2025-03-28 DIAGNOSIS — R59.0 AXILLARY LYMPHADENOPATHY: ICD-10-CM

## 2025-03-28 DIAGNOSIS — N39.46 MIXED STRESS AND URGE URINARY INCONTINENCE: ICD-10-CM

## 2025-03-28 DIAGNOSIS — R11.0 CHRONIC NAUSEA: ICD-10-CM

## 2025-03-28 DIAGNOSIS — E11.69 TYPE 2 DIABETES MELLITUS WITH OTHER SPECIFIED COMPLICATION, UNSPECIFIED WHETHER LONG TERM INSULIN USE: Primary | ICD-10-CM

## 2025-03-28 DIAGNOSIS — R59.1 GENERALIZED LYMPHADENOPATHY: ICD-10-CM

## 2025-03-28 LAB
EXPIRATION DATE: ABNORMAL
EXPIRATION DATE: NORMAL
HBA1C MFR BLD: 9.6 % (ref 4.5–5.7)
Lab: ABNORMAL
Lab: NORMAL
POC ALBUMIN, URINE: 150 MG/L
POC CREATININE, URINE: 10 MG/DL
POC URINE ALB/CREA RATIO: >300

## 2025-03-28 RX ORDER — DULAGLUTIDE 0.75 MG/.5ML
0.75 INJECTION, SOLUTION SUBCUTANEOUS WEEKLY
Qty: 2 ML | Refills: 1 | Status: SHIPPED | OUTPATIENT
Start: 2025-03-28

## 2025-03-28 NOTE — PROGRESS NOTES
"                      Established Patient        Chief Complaint:   Chief Complaint   Patient presents with   • Diabetes     Pt is here for 3 month follow up          History of Present Illness:    Marielena Moeller is a 79 y.o. female who presents today for 3-month follow up for diabetes.     Novolog 70/30 50units TID with meals and 20units nightly     A1c 9.6% today, up from 9.4% in December     Has refused Lantus in the past, states that it does not work for her.     Patient needs incontinence supplies from [employer].     Requesting someone to come help her bathe once per week.    Persistent complaints of lymphadenopathy of axillary nodes and neck. Intermittent lymphadenopathy of multiple joints.     Left hip pain, chronic, worsening     Rash in groin and under folds, red, tender, itching at times    Subjective     The following portions of the patient's history were reviewed and updated as appropriate: allergies, current medications, past family history, past medical history, past social history, past surgical history and problem list.    ALLERGIES  Allergies   Allergen Reactions   • Amlodipine Swelling     Leg edema    • Doxycycline Nausea And Vomiting     \"Pain worse than pancreatitis\"   • Sotalol Other (See Comments)     nausea   • Spironolactone Other (See Comments)     Dropped magnesium     • Warfarin Other (See Comments)     Nose bleed   • Ciprofloxacin Hives   • Contrast Dye (Echo Or Unknown Ct/Mr) Rash     Breaks her out   • Diltiazem Nausea Only     nausea   • Erythromycin Hives and Rash   • Hydrochlorothiazide Other (See Comments)        pt developed pancreastitis   • Levaquin [Levofloxacin] Swelling   • Prednisone Mental Status Change     ITCHING AND NERVOUS       Review of Systems  As above    Objective     Vital Signs:   /82   Pulse 89   Resp 16   Ht 152.4 cm (60\")   Wt 74.8 kg (165 lb)   LMP  (LMP Unknown)   SpO2 96%   BMI 32.22 kg/m²                Physical Exam   Physical Exam  Vitals " and nursing note reviewed.   HENT:      Mouth/Throat:      Lips: Pink.   Eyes:      General: Lids are normal.   Cardiovascular:      Rate and Rhythm: Normal rate and regular rhythm.   Pulmonary:      Effort: Pulmonary effort is normal. No respiratory distress.      Breath sounds: Wheezing present. No rhonchi.   Lymphadenopathy:      Upper Body:      Right upper body: Axillary adenopathy present.      Left upper body: Axillary adenopathy present.      Lower Body: No right inguinal adenopathy. No left inguinal adenopathy.   Neurological:      Mental Status: She is alert and oriented to person, place, and time.      Gait: Gait is intact.   Psychiatric:         Attention and Perception: Attention normal.         Mood and Affect: Mood and affect normal.         Speech: Speech normal.         Behavior: Behavior normal. Behavior is cooperative.         Assessment and Plan      Assessment/Plan:   Diagnoses and all orders for this visit:    1. Type 2 diabetes mellitus with other specified complication, unspecified whether long term insulin use (Primary)  -     POC Glycosylated Hemoglobin (Hb A1C)  -     POC Albumin/Creatinine Ratio Urine  -     Dulaglutide (Trulicity) 0.75 MG/0.5ML solution auto-injector; Inject 0.75 mg under the skin into the appropriate area as directed 1 (One) Time Per Week.  Dispense: 2 mL; Refill: 1    2. Impaired mobility and ADLs  -     Ambulatory Referral to Home Health    3. Axillary lymphadenopathy  -     CT Chest Without Contrast; Future  -     Magnesium  -     Comprehensive Metabolic Panel  -     LIONEL With / DsDNA, RNP, Sjogrens A / B, Willis  -     CBC & Differential  -     C-reactive Protein  -     Sedimentation Rate  -     Celiac Disease Panel    4. Generalized lymphadenopathy  -     LIONEL With / DsDNA, RNP, Sjogrens A / B, Smith  -     C-reactive Protein  -     Sedimentation Rate  -     Celiac Disease Panel    5. Mixed stress and urge urinary incontinence    6. Chronic nausea  -     Celiac  Disease Panel    Trial Trulicity 0.75mg weekly     Risks, benefits, and potential side effects of current/new medications reviewed with patient.  Patient voiced understanding and wished to proceed with treatment.    I will contact patient regarding test results and provide instructions regarding any necessary changes in plan of care.    Encouraged ambulatory glucose monitoring. Patient to call office or RTC for glucose levels consistently greater than 300 or poorly controlled with current medication regimen.     Patient was encouraged to keep me informed of any acute changes, lack of improvement, or any new concerning symptoms.    Discussion Summary:  Discussed plan of care in detail with pt today; pt verb understanding and agrees.        I have reviewed and updated all copied forward information, as appropriate.  I attest to the accuracy and relevance of any unchanged information.      Follow up:  Return in about 3 months (around 6/28/2025) for Medicare Wellness.     Patient Education:  There are no Patient Instructions on file for this visit.    LETICIA Moreira  04/08/25  09:56 EDT          Please note that portions of this note may have been completed with a voice recognition program.

## 2025-03-31 LAB
ALBUMIN SERPL-MCNC: 3.8 G/DL (ref 3.8–4.8)
ALP SERPL-CCNC: 95 IU/L (ref 44–121)
ALT SERPL-CCNC: 11 IU/L (ref 0–32)
ANA SER QL: POSITIVE
AST SERPL-CCNC: 16 IU/L (ref 0–40)
BASOPHILS # BLD AUTO: 0.1 X10E3/UL (ref 0–0.2)
BASOPHILS NFR BLD AUTO: 1 %
BILIRUB SERPL-MCNC: 0.2 MG/DL (ref 0–1.2)
BUN SERPL-MCNC: 14 MG/DL (ref 8–27)
BUN/CREAT SERPL: 21 (ref 12–28)
CALCIUM SERPL-MCNC: 9.3 MG/DL (ref 8.7–10.3)
CENTROMERE B AB SER-ACNC: <0.2 AI (ref 0–0.9)
CHLORIDE SERPL-SCNC: 99 MMOL/L (ref 96–106)
CHROMATIN AB SERPL-ACNC: <0.2 AI (ref 0–0.9)
CO2 SERPL-SCNC: 31 MMOL/L (ref 20–29)
CREAT SERPL-MCNC: 0.66 MG/DL (ref 0.57–1)
CRP SERPL-MCNC: 4 MG/L (ref 0–10)
DSDNA AB SER-ACNC: <1 IU/ML (ref 0–9)
EGFRCR SERPLBLD CKD-EPI 2021: 90 ML/MIN/1.73
ENA JO1 AB SER-ACNC: <0.2 AI (ref 0–0.9)
ENA RNP AB SER-ACNC: <0.2 AI (ref 0–0.9)
ENA SCL70 AB SER-ACNC: <0.2 AI (ref 0–0.9)
ENA SM AB SER-ACNC: <0.2 AI (ref 0–0.9)
ENA SS-A AB SER-ACNC: 1.2 AI (ref 0–0.9)
ENA SS-B AB SER-ACNC: <0.2 AI (ref 0–0.9)
EOSINOPHIL # BLD AUTO: 0.2 X10E3/UL (ref 0–0.4)
EOSINOPHIL NFR BLD AUTO: 2 %
ERYTHROCYTE [DISTWIDTH] IN BLOOD BY AUTOMATED COUNT: 11.9 % (ref 11.7–15.4)
ERYTHROCYTE [SEDIMENTATION RATE] IN BLOOD BY WESTERGREN METHOD: 39 MM/HR (ref 0–40)
GLOBULIN SER CALC-MCNC: 2.2 G/DL (ref 1.5–4.5)
GLUCOSE SERPL-MCNC: 144 MG/DL (ref 70–99)
HCT VFR BLD AUTO: 39.1 % (ref 34–46.6)
HGB BLD-MCNC: 13 G/DL (ref 11.1–15.9)
IMM GRANULOCYTES # BLD AUTO: 0 X10E3/UL (ref 0–0.1)
IMM GRANULOCYTES NFR BLD AUTO: 0 %
LYMPHOCYTES # BLD AUTO: 1.4 X10E3/UL (ref 0.7–3.1)
LYMPHOCYTES NFR BLD AUTO: 18 %
Lab: ABNORMAL
MAGNESIUM SERPL-MCNC: 1.8 MG/DL (ref 1.6–2.3)
MCH RBC QN AUTO: 31.5 PG (ref 26.6–33)
MCHC RBC AUTO-ENTMCNC: 33.2 G/DL (ref 31.5–35.7)
MCV RBC AUTO: 95 FL (ref 79–97)
MONOCYTES # BLD AUTO: 0.5 X10E3/UL (ref 0.1–0.9)
MONOCYTES NFR BLD AUTO: 7 %
NEUTROPHILS # BLD AUTO: 5.5 X10E3/UL (ref 1.4–7)
NEUTROPHILS NFR BLD AUTO: 72 %
PLATELET # BLD AUTO: 223 X10E3/UL (ref 150–450)
POTASSIUM SERPL-SCNC: 4.2 MMOL/L (ref 3.5–5.2)
PROT SERPL-MCNC: 6 G/DL (ref 6–8.5)
RBC # BLD AUTO: 4.13 X10E6/UL (ref 3.77–5.28)
SODIUM SERPL-SCNC: 141 MMOL/L (ref 134–144)
WBC # BLD AUTO: 7.7 X10E3/UL (ref 3.4–10.8)

## 2025-04-01 LAB
BACTERIA UR CULT: ABNORMAL
ENDOMYSIUM IGA SER QL: NEGATIVE
IGA SERPL-MCNC: 306 MG/DL (ref 64–422)
OTHER ANTIBIOTIC SUSC ISLT: ABNORMAL
TTG IGA SER-ACNC: <2 U/ML (ref 0–3)

## 2025-04-02 ENCOUNTER — RESULTS FOLLOW-UP (OUTPATIENT)
Dept: FAMILY MEDICINE CLINIC | Facility: CLINIC | Age: 80
End: 2025-04-02

## 2025-04-02 DIAGNOSIS — R93.89 ABNORMAL CT SCAN, CHEST: ICD-10-CM

## 2025-04-02 DIAGNOSIS — R91.8 PULMONARY NODULES: Primary | ICD-10-CM

## 2025-04-02 DIAGNOSIS — N64.89 BREAST ASYMMETRY IN FEMALE: ICD-10-CM

## 2025-04-02 RX ORDER — PREDNISONE 10 MG/1
10 TABLET ORAL DAILY
Qty: 7 TABLET | Refills: 0 | Status: SHIPPED | OUTPATIENT
Start: 2025-04-02 | End: 2025-04-09

## 2025-04-07 ENCOUNTER — TELEPHONE (OUTPATIENT)
Dept: FAMILY MEDICINE CLINIC | Facility: CLINIC | Age: 80
End: 2025-04-07

## 2025-04-07 DIAGNOSIS — E11.51 TYPE 2 DIABETES MELLITUS WITH DIABETIC PERIPHERAL ANGIOPATHY WITHOUT GANGRENE, WITH LONG-TERM CURRENT USE OF INSULIN: Primary | ICD-10-CM

## 2025-04-07 DIAGNOSIS — N30.00 ACUTE CYSTITIS WITHOUT HEMATURIA: ICD-10-CM

## 2025-04-07 DIAGNOSIS — Z79.4 TYPE 2 DIABETES MELLITUS WITH DIABETIC PERIPHERAL ANGIOPATHY WITHOUT GANGRENE, WITH LONG-TERM CURRENT USE OF INSULIN: Primary | ICD-10-CM

## 2025-04-07 RX ORDER — SULFAMETHOXAZOLE AND TRIMETHOPRIM 800; 160 MG/1; MG/1
1 TABLET ORAL 2 TIMES DAILY
Qty: 14 TABLET | Refills: 0 | Status: SHIPPED | OUTPATIENT
Start: 2025-04-07 | End: 2025-04-14

## 2025-04-07 NOTE — TELEPHONE ENCOUNTER
Pen needles sent in.    Looks like she has had 5 days of abx if she was able to keep them down. Culture shows that macrobid would not be effective. New abx sent to pharmacy.

## 2025-04-07 NOTE — TELEPHONE ENCOUNTER
Caller: Marielena Moeller    Relationship: Self    Best call back number: [phone]     Which medication are you concerned about:nitrofurantoin, macrocrystal-monohydrate, (Macrobid) 100 MG capsule     AND INSULIN SYRINGE PEN NEEDLES     Who prescribed you this medication: LETICIA CLEMENT     When did you start taking this medication: MACROBID, STARTED 04/02/2025     REPORTS HAS USED THE PEN NEEDLES FOR YEARS.     What are your concerns: THE PATIENT REPORTS MACROBID IS MAKING HER NAUSEATED AND HURTING HER STOMACH AND STATED SHE EVEN ATE FOOD AND DRANK MILK WITH IT AND STILL CANNOT TOLERATE THE MEDICATION.    THE PATIENT STATED THE PEN NEEDLES GIVEN TO HER BY THE PHARMACY ARE DIFFERENT THIS TIME AND ARE TOO  LONG, MAKE HER BLEED AND GOES IN TOO FAR. AND IS NEEDING THE PEN NEEDLES TO BE BD 1/2 ML LENGTH OF 6 MM, 31G ULTRA FINE (PLEASE PUT DO NOT SUBSTITUTE)    How long have you had these concerns: FOR DAYS    THE PATIENT IS REQUESTING A CALL BACK TO DISCUSS.

## 2025-04-11 ENCOUNTER — APPOINTMENT (OUTPATIENT)
Dept: CT IMAGING | Facility: HOSPITAL | Age: 80
End: 2025-04-11
Payer: MEDICARE

## 2025-04-11 ENCOUNTER — TELEPHONE (OUTPATIENT)
Dept: FAMILY MEDICINE CLINIC | Facility: CLINIC | Age: 80
End: 2025-04-11
Payer: MEDICARE

## 2025-04-11 ENCOUNTER — HOSPITAL ENCOUNTER (EMERGENCY)
Facility: HOSPITAL | Age: 80
Discharge: HOME | End: 2025-04-12
Attending: STUDENT IN AN ORGANIZED HEALTH CARE EDUCATION/TRAINING PROGRAM
Payer: MEDICARE

## 2025-04-11 ENCOUNTER — APPOINTMENT (OUTPATIENT)
Dept: GENERAL RADIOLOGY | Facility: HOSPITAL | Age: 80
End: 2025-04-11
Payer: MEDICARE

## 2025-04-11 DIAGNOSIS — R29.90 STROKE-LIKE SYMPTOMS: Primary | ICD-10-CM

## 2025-04-11 PROCEDURE — 96375 TX/PRO/DX INJ NEW DRUG ADDON: CPT

## 2025-04-11 PROCEDURE — 71045 X-RAY EXAM CHEST 1 VIEW: CPT

## 2025-04-11 PROCEDURE — 70450 CT HEAD/BRAIN W/O DYE: CPT

## 2025-04-11 PROCEDURE — 70498 CT ANGIOGRAPHY NECK: CPT

## 2025-04-11 PROCEDURE — 93005 ELECTROCARDIOGRAM TRACING: CPT | Performed by: STUDENT IN AN ORGANIZED HEALTH CARE EDUCATION/TRAINING PROGRAM

## 2025-04-11 PROCEDURE — 99285 EMERGENCY DEPT VISIT HI MDM: CPT | Performed by: STUDENT IN AN ORGANIZED HEALTH CARE EDUCATION/TRAINING PROGRAM

## 2025-04-11 PROCEDURE — 25010000002 DIPHENHYDRAMINE PER 50 MG: Performed by: STUDENT IN AN ORGANIZED HEALTH CARE EDUCATION/TRAINING PROGRAM

## 2025-04-11 PROCEDURE — 70496 CT ANGIOGRAPHY HEAD: CPT

## 2025-04-11 PROCEDURE — 96374 THER/PROPH/DIAG INJ IV PUSH: CPT

## 2025-04-11 PROCEDURE — 25010000002 METHYLPREDNISOLONE PER 40 MG: Performed by: STUDENT IN AN ORGANIZED HEALTH CARE EDUCATION/TRAINING PROGRAM

## 2025-04-11 PROCEDURE — 25510000001 IOPAMIDOL 61 % SOLUTION: Performed by: STUDENT IN AN ORGANIZED HEALTH CARE EDUCATION/TRAINING PROGRAM

## 2025-04-11 PROCEDURE — 0042T HC CT CEREBRAL PERFUSION W/WO CONTRAST: CPT

## 2025-04-11 RX ORDER — DIPHENHYDRAMINE HYDROCHLORIDE 50 MG/ML
50 INJECTION, SOLUTION INTRAMUSCULAR; INTRAVENOUS
Status: COMPLETED | OUTPATIENT
Start: 2025-04-11 | End: 2025-04-11

## 2025-04-11 RX ORDER — SODIUM CHLORIDE 0.9 % (FLUSH) 0.9 %
10 SYRINGE (ML) INJECTION AS NEEDED
Status: DISCONTINUED | OUTPATIENT
Start: 2025-04-11 | End: 2025-04-12 | Stop reason: HOSPADM

## 2025-04-11 RX ORDER — IOPAMIDOL 612 MG/ML
100 INJECTION, SOLUTION INTRAVASCULAR
Status: COMPLETED | OUTPATIENT
Start: 2025-04-12 | End: 2025-04-11

## 2025-04-11 RX ORDER — METHYLPREDNISOLONE SODIUM SUCCINATE 40 MG/ML
40 INJECTION, POWDER, LYOPHILIZED, FOR SOLUTION INTRAMUSCULAR; INTRAVENOUS EVERY 4 HOURS
Status: DISCONTINUED | OUTPATIENT
Start: 2025-04-11 | End: 2025-04-12 | Stop reason: HOSPADM

## 2025-04-11 RX ADMIN — DIPHENHYDRAMINE HYDROCHLORIDE 50 MG: 50 INJECTION, SOLUTION INTRAMUSCULAR; INTRAVENOUS at 23:19

## 2025-04-11 RX ADMIN — IOPAMIDOL 100 ML: 612 INJECTION, SOLUTION INTRAVENOUS at 23:49

## 2025-04-11 RX ADMIN — METHYLPREDNISOLONE SODIUM SUCCINATE 40 MG: 40 INJECTION, POWDER, LYOPHILIZED, FOR SOLUTION INTRAMUSCULAR; INTRAVENOUS at 23:19

## 2025-04-11 RX ADMIN — IOPAMIDOL 40 ML: 612 INJECTION, SOLUTION INTRAVENOUS at 23:49

## 2025-04-11 NOTE — TELEPHONE ENCOUNTER
Per call from Emiliana at [facility], they can open patient for services - but need a new order submitted to them that is for both PT and OT. She states that OT cannot stand alone for Home Health and must be accompanied by PT.  Let me know once new order has been entered and I will fax to them.

## 2025-04-11 NOTE — Clinical Note
[facility] EMERGENCY DEPARTMENT  [address] [address]  Phone: [phone]    Marielena Moeller was seen and treated in our emergency department on 4/11/2025.  She may return to work on 04/15/2025.         Thank you for choosing [facility].    James Varela MD

## 2025-04-12 VITALS
SYSTOLIC BLOOD PRESSURE: 175 MMHG | BODY MASS INDEX: 33.38 KG/M2 | HEIGHT: 60 IN | HEART RATE: 78 BPM | OXYGEN SATURATION: 98 % | RESPIRATION RATE: 18 BRPM | DIASTOLIC BLOOD PRESSURE: 81 MMHG | TEMPERATURE: 97.6 F | WEIGHT: 170 LBS

## 2025-04-12 LAB
ALBUMIN SERPL-MCNC: 3.7 G/DL (ref 3.5–5.2)
ALBUMIN/GLOB SERPL: 1.4 G/DL
ALP SERPL-CCNC: 89 U/L (ref 39–117)
ALT SERPL W P-5'-P-CCNC: 11 U/L (ref 1–33)
ANION GAP SERPL CALCULATED.3IONS-SCNC: 7.3 MMOL/L (ref 5–15)
AST SERPL-CCNC: 17 U/L (ref 1–32)
BACTERIA UR QL AUTO: NORMAL /HPF
BASOPHILS # BLD AUTO: 0.04 10*3/MM3 (ref 0–0.2)
BASOPHILS NFR BLD AUTO: 0.6 % (ref 0–1.5)
BILIRUB SERPL-MCNC: 0.3 MG/DL (ref 0–1.2)
BILIRUB UR QL STRIP: NEGATIVE
BUN SERPL-MCNC: 15 MG/DL (ref 8–23)
BUN/CREAT SERPL: 20 (ref 7–25)
CALCIUM SPEC-SCNC: 9.2 MG/DL (ref 8.6–10.5)
CHLORIDE SERPL-SCNC: 96 MMOL/L (ref 98–107)
CK SERPL-CCNC: 88 U/L (ref 20–180)
CLARITY UR: CLEAR
CO2 SERPL-SCNC: 32.7 MMOL/L (ref 22–29)
COLOR UR: YELLOW
CREAT SERPL-MCNC: 0.75 MG/DL (ref 0.57–1)
CRP SERPL-MCNC: 0.58 MG/DL (ref 0–0.5)
D-LACTATE SERPL-SCNC: 1.5 MMOL/L (ref 0.5–2)
DEPRECATED RDW RBC AUTO: 41.3 FL (ref 37–54)
EGFRCR SERPLBLD CKD-EPI 2021: 82.1 ML/MIN/1.73
EOSINOPHIL # BLD AUTO: 0.24 10*3/MM3 (ref 0–0.4)
EOSINOPHIL NFR BLD AUTO: 3.4 % (ref 0.3–6.2)
ERYTHROCYTE [DISTWIDTH] IN BLOOD BY AUTOMATED COUNT: 11.9 % (ref 12.3–15.4)
FLUAV SUBTYP SPEC NAA+PROBE: NOT DETECTED
FLUBV RNA ISLT QL NAA+PROBE: NOT DETECTED
GEN 5 1HR TROPONIN T REFLEX: 21 NG/L
GLOBULIN UR ELPH-MCNC: 2.7 GM/DL
GLUCOSE SERPL-MCNC: 251 MG/DL (ref 65–99)
GLUCOSE UR STRIP-MCNC: ABNORMAL MG/DL
HCT VFR BLD AUTO: 38.6 % (ref 34–46.6)
HGB BLD-MCNC: 12.7 G/DL (ref 12–15.9)
HGB UR QL STRIP.AUTO: ABNORMAL
HOLD SPECIMEN: NORMAL
HOLD SPECIMEN: NORMAL
HYALINE CASTS UR QL AUTO: NORMAL /LPF
IMM GRANULOCYTES # BLD AUTO: 0.02 10*3/MM3 (ref 0–0.05)
IMM GRANULOCYTES NFR BLD AUTO: 0.3 % (ref 0–0.5)
INR PPP: 0.9 (ref 0.9–1.1)
KETONES UR QL STRIP: NEGATIVE
LEUKOCYTE ESTERASE UR QL STRIP.AUTO: NEGATIVE
LIPASE SERPL-CCNC: 33 U/L (ref 13–60)
LYMPHOCYTES # BLD AUTO: 1.53 10*3/MM3 (ref 0.7–3.1)
LYMPHOCYTES NFR BLD AUTO: 21.5 % (ref 19.6–45.3)
MAGNESIUM SERPL-MCNC: 1.8 MG/DL (ref 1.6–2.4)
MCH RBC QN AUTO: 30.9 PG (ref 26.6–33)
MCHC RBC AUTO-ENTMCNC: 32.9 G/DL (ref 31.5–35.7)
MCV RBC AUTO: 93.9 FL (ref 79–97)
MONOCYTES # BLD AUTO: 0.52 10*3/MM3 (ref 0.1–0.9)
MONOCYTES NFR BLD AUTO: 7.3 % (ref 5–12)
NEUTROPHILS NFR BLD AUTO: 4.75 10*3/MM3 (ref 1.7–7)
NEUTROPHILS NFR BLD AUTO: 66.9 % (ref 42.7–76)
NITRITE UR QL STRIP: NEGATIVE
NRBC BLD AUTO-RTO: 0 /100 WBC (ref 0–0.2)
NT-PROBNP SERPL-MCNC: 242.2 PG/ML (ref 0–1800)
PH UR STRIP.AUTO: 7.5 [PH] (ref 5–8)
PLATELET # BLD AUTO: 206 10*3/MM3 (ref 140–450)
PMV BLD AUTO: 9.4 FL (ref 6–12)
POTASSIUM SERPL-SCNC: 4.1 MMOL/L (ref 3.5–5.2)
PROCALCITONIN SERPL-MCNC: 0.16 NG/ML (ref 0–0.25)
PROT SERPL-MCNC: 6.4 G/DL (ref 6–8.5)
PROT UR QL STRIP: ABNORMAL
PROTHROMBIN TIME: 12.7 SECONDS (ref 12.3–15.1)
RBC # BLD AUTO: 4.11 10*6/MM3 (ref 3.77–5.28)
RBC # UR STRIP: NORMAL /HPF
REF LAB TEST METHOD: NORMAL
SARS-COV-2 RNA RESP QL NAA+PROBE: NOT DETECTED
SODIUM SERPL-SCNC: 136 MMOL/L (ref 136–145)
SP GR UR STRIP: 1.03 (ref 1–1.03)
SQUAMOUS #/AREA URNS HPF: NORMAL /HPF
TROPONIN T % DELTA: 5
TROPONIN T NUMERIC DELTA: 1 NG/L
TROPONIN T SERPL HS-MCNC: 20 NG/L
UROBILINOGEN UR QL STRIP: ABNORMAL
WBC # UR STRIP: NORMAL /HPF
WBC NRBC COR # BLD AUTO: 7.1 10*3/MM3 (ref 3.4–10.8)
WHOLE BLOOD HOLD COAG: NORMAL
WHOLE BLOOD HOLD SPECIMEN: NORMAL

## 2025-04-12 PROCEDURE — 82550 ASSAY OF CK (CPK): CPT | Performed by: STUDENT IN AN ORGANIZED HEALTH CARE EDUCATION/TRAINING PROGRAM

## 2025-04-12 PROCEDURE — 84484 ASSAY OF TROPONIN QUANT: CPT | Performed by: STUDENT IN AN ORGANIZED HEALTH CARE EDUCATION/TRAINING PROGRAM

## 2025-04-12 PROCEDURE — 85025 COMPLETE CBC W/AUTO DIFF WBC: CPT | Performed by: STUDENT IN AN ORGANIZED HEALTH CARE EDUCATION/TRAINING PROGRAM

## 2025-04-12 PROCEDURE — 83605 ASSAY OF LACTIC ACID: CPT | Performed by: STUDENT IN AN ORGANIZED HEALTH CARE EDUCATION/TRAINING PROGRAM

## 2025-04-12 PROCEDURE — 83690 ASSAY OF LIPASE: CPT | Performed by: STUDENT IN AN ORGANIZED HEALTH CARE EDUCATION/TRAINING PROGRAM

## 2025-04-12 PROCEDURE — 99204 OFFICE O/P NEW MOD 45 MIN: CPT | Performed by: STUDENT IN AN ORGANIZED HEALTH CARE EDUCATION/TRAINING PROGRAM

## 2025-04-12 PROCEDURE — 81001 URINALYSIS AUTO W/SCOPE: CPT | Performed by: STUDENT IN AN ORGANIZED HEALTH CARE EDUCATION/TRAINING PROGRAM

## 2025-04-12 PROCEDURE — 80053 COMPREHEN METABOLIC PANEL: CPT | Performed by: STUDENT IN AN ORGANIZED HEALTH CARE EDUCATION/TRAINING PROGRAM

## 2025-04-12 PROCEDURE — 83880 ASSAY OF NATRIURETIC PEPTIDE: CPT | Performed by: STUDENT IN AN ORGANIZED HEALTH CARE EDUCATION/TRAINING PROGRAM

## 2025-04-12 PROCEDURE — 83735 ASSAY OF MAGNESIUM: CPT | Performed by: STUDENT IN AN ORGANIZED HEALTH CARE EDUCATION/TRAINING PROGRAM

## 2025-04-12 PROCEDURE — 87636 SARSCOV2 & INF A&B AMP PRB: CPT | Performed by: STUDENT IN AN ORGANIZED HEALTH CARE EDUCATION/TRAINING PROGRAM

## 2025-04-12 PROCEDURE — 36415 COLL VENOUS BLD VENIPUNCTURE: CPT

## 2025-04-12 PROCEDURE — 84145 PROCALCITONIN (PCT): CPT | Performed by: STUDENT IN AN ORGANIZED HEALTH CARE EDUCATION/TRAINING PROGRAM

## 2025-04-12 PROCEDURE — 85610 PROTHROMBIN TIME: CPT | Performed by: STUDENT IN AN ORGANIZED HEALTH CARE EDUCATION/TRAINING PROGRAM

## 2025-04-12 PROCEDURE — 86140 C-REACTIVE PROTEIN: CPT | Performed by: STUDENT IN AN ORGANIZED HEALTH CARE EDUCATION/TRAINING PROGRAM

## 2025-04-12 NOTE — DISCHARGE INSTRUCTIONS
You were evaluated due to concerns for stroke.  We got lab work and a CT scan which showed no obvious stroke however, given your findings, we wanted to admit you for further observation.  You have declined this and preferred discharge.  We are unable to order an MRI given your pacemaker.  Given this, you have requested discharge.  We have referred you to our neurology group for further evaluation of your symptoms and to assess if any further imaging may be needed.  If you have any new neurologic symptoms please come back immediately to the emergency department for further evaluation.  Do not soak discharge.

## 2025-04-12 NOTE — ED PROVIDER NOTES
Subjective:  History of Present Illness:    Patient is a 79-year-old female with history of obesity, atrial fibrillation only on aspirin, diabetes mellitus, GERD, hypertension, hyperlipidemia, pancreatitis who presents today with altered mental status and left leg weakness.  Reports that her symptoms started this morning at 3 AM.  Reports that she is currently undergoing treatment for urinary tract infection but has been unable to tolerate her home antibiotics.  Reports that she was unable to move her left leg and had paresthesias to the area.  Daughter is now arrived from out of town and directed her to come to our emergency department for evaluation.  Reports that the weakness in the left leg has improved throughout the day.  Had an isolated episode of slurred speech which is also resolved.  She denies any headaches.  Denies any fevers.  No chest pain or shortness of breath.  No abdominal pain nausea vomiting or diarrhea.      Nurses Notes reviewed and agree, including vitals, allergies, social history and prior medical history.     REVIEW OF SYSTEMS: All systems reviewed and not pertinent unless noted.  Review of Systems   Constitutional:  Positive for activity change. Negative for appetite change, chills, fatigue and fever.   HENT:  Negative for rhinorrhea, sinus pressure and sinus pain.    Eyes:  Negative for discharge and itching.   Respiratory:  Negative for cough and shortness of breath.    Cardiovascular:  Negative for chest pain and leg swelling.   Gastrointestinal:  Negative for abdominal distention, abdominal pain, nausea and vomiting.   Endocrine: Negative for cold intolerance and heat intolerance.   Genitourinary:  Negative for decreased urine volume, difficulty urinating, flank pain, frequency, urgency, vaginal bleeding, vaginal discharge and vaginal pain.   Musculoskeletal:  Negative for gait problem, neck pain and neck stiffness.   Skin:  Negative for color change.   Allergic/Immunologic: Negative  for environmental allergies.   Neurological:  Positive for weakness, numbness and headaches. Negative for seizures, syncope, facial asymmetry and speech difficulty.   Psychiatric/Behavioral:  Negative for self-injury and suicidal ideas.        Past Medical History:   Diagnosis Date    Abdominal hernia     Arthritis     Asthma     Atrial fibrillation     Blood in stool     Body piercing     EARS ONLY    Bronchitis     Bulging of lumbar intervertebral disc     Colon polyp     COVID-19 07/2022    Deaf, left     Deaf, left     Diabetes mellitus     Diverticulitis     Diverticulitis     GERD (gastroesophageal reflux disease)     Heart murmur     History of exercise stress test     Hyperlipidemia     Hypertension     Impaired functional mobility, balance, gait, and endurance     Leaky heart valve     Lung nodules     Osteoporosis     Pacemaker     Palpitations     Pancreatitis     Spinal headache     Supplemental oxygen dependent     2.5 L/NC continuous    Thyroid disease     LEFT SIDE REMOVED    Tinnitus, left        Allergies:    Amlodipine, Doxycycline, Sotalol, Spironolactone, Warfarin, Ciprofloxacin, Contrast dye (echo or unknown ct/mr), Diltiazem, Erythromycin, Hydrochlorothiazide, Levaquin [levofloxacin], and Prednisone      Past Surgical History:   Procedure Laterality Date    APPENDECTOMY  1966    CARDIAC ELECTROPHYSIOLOGY PROCEDURE N/A 05/08/2023    Procedure: Device Implant PPM;  Surgeon: Brice Arriaga MD;  Location: [facility] INVASIVE LOCATION;  Service: Cardiology;  Laterality: N/A;    CATARACT EXTRACTION W/ INTRAOCULAR LENS IMPLANT Right 12/20/2024    Procedure: CATARACT PHACO EXTRACTION WITH INTRAOCULAR LENS IMPLANT RIGHT;  Surgeon: Larry Elder MD;  Location: [facility] OR;  Service: Ophthalmology;  Laterality: Right;    CATARACT EXTRACTION W/ INTRAOCULAR LENS IMPLANT Left 1/17/2025    Procedure: CATARACT PHACO EXTRACTION WITH INTRAOCULAR LENS IMPLANT LEFT;  Surgeon: Larry Elder MD;   "Location: [facility] OR;  Service: Ophthalmology;  Laterality: Left;     SECTION      CHOLECYSTECTOMY      COLONOSCOPY N/A 08/10/2018    Procedure: COLONOSCOPY WITH HOT SNARE POLYPECTOMY X 3; COLD SNARE POLYPECTOMY, COLD BIOPSY POLYPECTOMY X 4; CLIP PLACEMENT X 1;  Surgeon: Glenn Modi MD;  Location: [facility] ENDOSCOPY;  Service: Gastroenterology    COLONOSCOPY N/A 2024    Procedure: COLONOSCOPY with polypectomy and clipping;  Surgeon: Rodriguez Hernandez MD;  Location: [facility] ENDOSCOPY;  Service: Gastroenterology;  Laterality: N/A;    ENDOSCOPY      THYROIDECTOMY, PARTIAL Left     TUBAL ABDOMINAL LIGATION           Social History     Socioeconomic History    Marital status:    Tobacco Use    Smoking status: Former     Current packs/day: 0.00     Average packs/day: 2.0 packs/day for 15.0 years (30.0 ttl pk-yrs)     Types: Cigarettes     Start date:      Quit date:      Years since quittin.3     Passive exposure: Past    Smokeless tobacco: Never   Vaping Use    Vaping status: Never Used   Substance and Sexual Activity    Alcohol use: Not Currently    Drug use: Never    Sexual activity: Defer         Family History   Problem Relation Age of Onset    Arthritis Mother     Diabetes Mother     Osteoporosis Mother     Arthritis Father     Diabetes Father     Heart attack Father     Hyperlipidemia Father     Diabetes Sister     Thyroid disease Sister     Diabetes Brother     Diabetes Maternal Grandmother     Diabetes Maternal Grandfather     Diabetes Paternal Grandmother     Diabetes Paternal Grandfather        Objective  Physical Exam:  /81   Pulse 78   Temp 97.6 °F (36.4 °C) (Oral)   Resp 18   Ht 152.4 cm (60\")   Wt 77.1 kg (170 lb) Comment: not able to stand on scale  LMP  (LMP Unknown)   SpO2 98%   BMI 33.20 kg/m²      Physical Exam  Constitutional:       General: She is not in acute distress.     Appearance: Normal appearance. She is obese. She is not " ill-appearing.   HENT:      Head: Normocephalic and atraumatic.      Nose: Nose normal. No congestion or rhinorrhea.      Mouth/Throat:      Mouth: Mucous membranes are dry.      Pharynx: Oropharynx is clear. No oropharyngeal exudate or posterior oropharyngeal erythema.   Eyes:      Extraocular Movements: Extraocular movements intact.      Conjunctiva/sclera: Conjunctivae normal.      Pupils: Pupils are equal, round, and reactive to light.   Cardiovascular:      Rate and Rhythm: Normal rate and regular rhythm.      Pulses: Normal pulses.      Heart sounds: Normal heart sounds.   Pulmonary:      Effort: Pulmonary effort is normal. No respiratory distress.      Breath sounds: Normal breath sounds.   Abdominal:      General: Abdomen is flat. Bowel sounds are normal. There is no distension.      Palpations: Abdomen is soft.      Tenderness: There is no abdominal tenderness. There is no right CVA tenderness, left CVA tenderness, guarding or rebound.   Musculoskeletal:         General: No swelling or tenderness. Normal range of motion.      Cervical back: Normal range of motion and neck supple.   Skin:     General: Skin is warm and dry.      Capillary Refill: Capillary refill takes less than 2 seconds.   Neurological:      General: No focal deficit present.      Mental Status: She is alert and oriented to person, place, and time. Mental status is at baseline.      Cranial Nerves: No cranial nerve deficit.      Sensory: No sensory deficit.      Motor: Weakness present.      Coordination: Coordination normal.      Comments: Left leg weaker than the right on exam but still with good range of motion   Psychiatric:         Mood and Affect: Mood normal.         Behavior: Behavior normal.         Thought Content: Thought content normal.         Judgment: Judgment normal.         Procedures    ED Course:    ED Course as of 04/12/25 0410   Fri Apr 11, 2025   9143 Spoke with teleneurology regarding patient strokelike symptoms,  subacute presentation, not a candidate for TNK.  Briefly describe the patient they agree with management, have TNK candidate's with info pending and they will call back for further discussion.  I feels that this is appropriate given patient is out of the window. [JE]   Sat Apr 12, 2025   0000 EKG interpreted by me, normal sinus rhythm with no concerning ST changes noted, first-degree AV block, rate of 82, atrial pacemaker noted, abnormal EKG [JE]   0032 Chest x-ray independently interpreted by me showed no acute process [JE]      ED Course User Index  [JE] James Varela MD       Lab Results (last 24 hours)       Procedure Component Value Units Date/Time    CBC & Differential [[id number]]  (Abnormal) Collected: 04/12/25 0003    Specimen: Blood Updated: 04/12/25 0016    Narrative:      The following orders were created for panel order CBC & Differential.  Procedure                               Abnormality         Status                     ---------                               -----------         ------                     CBC Auto Differential[[id number]]        Abnormal            Final result                 Please view results for these tests on the individual orders.    Comprehensive Metabolic Panel [[id number]]  (Abnormal) Collected: 04/12/25 0003    Specimen: Blood Updated: 04/12/25 0104     Glucose 251 mg/dL      Comment: Glucose >180, Hemoglobin A1C recommended.        BUN 15 mg/dL      Creatinine 0.75 mg/dL      Sodium 136 mmol/L      Potassium 4.1 mmol/L      Chloride 96 mmol/L      CO2 32.7 mmol/L      Calcium 9.2 mg/dL      Total Protein 6.4 g/dL      Albumin 3.7 g/dL      ALT (SGPT) 11 U/L      AST (SGOT) 17 U/L      Alkaline Phosphatase 89 U/L      Total Bilirubin 0.3 mg/dL      Globulin 2.7 gm/dL      A/G Ratio 1.4 g/dL      BUN/Creatinine Ratio 20.0     Anion Gap 7.3 mmol/L      eGFR 82.1 mL/min/1.73     Narrative:      GFR Categories in Chronic Kidney Disease (CKD)      GFR Category           GFR (mL/min/1.73)    Interpretation  G1                     90 or greater         Normal or high (1)  G2                      60-89                Mild decrease (1)  G3a                   45-59                Mild to moderate decrease  G3b                   30-44                Moderate to severe decrease  G4                    15-29                Severe decrease  G5                    14 or less           Kidney failure          (1)In the absence of evidence of kidney disease, neither GFR category G1 or G2 fulfill the criteria for CKD.    eGFR calculation 2021 CKD-EPI creatinine equation, which does not include race as a factor    Lipase [[id number]]  (Normal) Collected: 04/12/25 0003    Specimen: Blood Updated: 04/12/25 0104     Lipase 33 U/L     High Sensitivity Troponin T [[id number]]  (Abnormal) Collected: 04/12/25 0003    Specimen: Blood Updated: 04/12/25 0104     HS Troponin T 20 ng/L     Narrative:      High Sensitive Troponin T Reference Range:  <14.0 ng/L- Negative Female for AMI  <22.0 ng/L- Negative Male for AMI  >=14 - Abnormal Female indicating possible myocardial injury.  >=22 - Abnormal Male indicating possible myocardial injury.   Clinicians would have to utilize clinical acumen, EKG, Troponin, and serial changes to determine if it is an Acute Myocardial Infarction or myocardial injury due to an underlying chronic condition.         BNP [[id number]]  (Normal) Collected: 04/12/25 0003    Specimen: Blood Updated: 04/12/25 0102     proBNP 242.2 pg/mL     Narrative:      This assay is used as an aid in the diagnosis of individuals suspected of having heart failure. It can be used as an aid in the diagnosis of acute decompensated heart failure (ADHF) in patients presenting with signs and symptoms of ADHF to the emergency department (ED). In addition, NT-proBNP of <300 pg/mL indicates ADHF is not likely.    Age Range Result Interpretation  NT-proBNP Concentration (pg/mL:      <50             Positive         "    >450                   Gray                 300-450                    Negative             <300    50-75           Positive            >900                  Gray                300-900                  Negative            <300      >75             Positive            >1800                  Gray                300-1800                  Negative            <300    C-reactive Protein [[id number]]  (Abnormal) Collected: 04/12/25 0003    Specimen: Blood Updated: 04/12/25 0104     C-Reactive Protein 0.58 mg/dL     Procalcitonin [[id number]]  (Normal) Collected: 04/12/25 0003    Specimen: Blood Updated: 04/12/25 0112     Procalcitonin 0.16 ng/mL     Narrative:      As a Marker for Sepsis (Non-Neonates):    1. <0.5 ng/mL represents a low risk of severe sepsis and/or septic shock.  2. >2 ng/mL represents a high risk of severe sepsis and/or septic shock.    As a Marker for Lower Respiratory Tract Infections that require antibiotic therapy:    PCT on Admission    Antibiotic Therapy       6-12 Hrs later    >0.5                Strongly Recommended  >0.25 - <0.5        Recommended   0.1 - 0.25          Discouraged              Remeasure/reassess PCT  <0.1                Strongly Discouraged     Remeasure/reassess PCT    As 28 day mortality risk marker: \"Change in Procalcitonin Result\" (>80% or <=80%) if Day 0 (or Day 1) and Day 4 values are available. Refer to [email or web address]    Change in PCT <=80%  A decrease of PCT levels below or equal to 80% defines a positive change in PCT test result representing a higher risk for 28-day all-cause mortality of patients diagnosed with severe sepsis for septic shock.    Change in PCT >80%  A decrease of PCT levels of more than 80% defines a negative change in PCT result representing a lower risk for 28-day all-cause mortality of patients diagnosed with severe sepsis or septic shock.       Lactic Acid, Plasma [[id number]]  (Normal) Collected: 04/12/25 0003    Specimen: " Blood Updated: 04/12/25 0100     Lactate 1.5 mmol/L     Magnesium [[id number]]  (Normal) Collected: 04/12/25 0003    Specimen: Blood Updated: 04/12/25 0104     Magnesium 1.8 mg/dL     CK [[id number]]  (Normal) Collected: 04/12/25 0003    Specimen: Blood Updated: 04/12/25 0104     Creatine Kinase 88 U/L     CBC Auto Differential [[id number]]  (Abnormal) Collected: 04/12/25 0003    Specimen: Blood Updated: 04/12/25 0016     WBC 7.10 10*3/mm3      RBC 4.11 10*6/mm3      Hemoglobin 12.7 g/dL      Hematocrit 38.6 %      MCV 93.9 fL      MCH 30.9 pg      MCHC 32.9 g/dL      RDW 11.9 %      RDW-SD 41.3 fl      MPV 9.4 fL      Platelets 206 10*3/mm3      Neutrophil % 66.9 %      Lymphocyte % 21.5 %      Monocyte % 7.3 %      Eosinophil % 3.4 %      Basophil % 0.6 %      Immature Grans % 0.3 %      Neutrophils, Absolute 4.75 10*3/mm3      Lymphocytes, Absolute 1.53 10*3/mm3      Monocytes, Absolute 0.52 10*3/mm3      Eosinophils, Absolute 0.24 10*3/mm3      Basophils, Absolute 0.04 10*3/mm3      Immature Grans, Absolute 0.02 10*3/mm3      nRBC 0.0 /100 WBC     Protime-INR [[id number]]  (Normal) Collected: 04/12/25 0003    Specimen: Blood Updated: 04/12/25 0041     Protime 12.7 Seconds      INR 0.90    Narrative:      Suggested INR therapeutic range for stable oral anticoagulant therapy:    Low Intensity therapy:   1.5-2.0  Moderate Intensity therapy:   2.0-3.0  High Intensity therapy:   2.5-4.0    COVID-19 and FLU A/B PCR, 1 HR TAT - Swab, Nasopharynx [[id number]]  (Normal) Collected: 04/12/25 0004    Specimen: Swab from Nasopharynx Updated: 04/12/25 0040     COVID19 Not Detected     Influenza A PCR Not Detected     Influenza B PCR Not Detected    Narrative:      Fact sheet for providers: https://www.fda.gov/media/808358/download    Fact sheet for patients: https://www.fda.gov/media/114368/download    Test performed by PCR.    Urinalysis With Culture If Indicated - Urine, Clean Catch [[id number]]  (Abnormal) Collected: 04/12/25  0021    Specimen: Urine, Clean Catch Updated: 04/12/25 0053     Color, UA Yellow     Appearance, UA Clear     pH, UA 7.5     Specific Gravity, UA 1.028     Glucose,  mg/dL (1+)     Ketones, UA Negative     Bilirubin, UA Negative     Blood, UA Trace     Protein,  mg/dL (2+)     Leuk Esterase, UA Negative     Nitrite, UA Negative     Urobilinogen, UA 0.2 E.U./dL    Narrative:      In absence of clinical symptoms, the presence of pyuria, bacteria, and/or nitrites on the urinalysis result does not correlate with infection.    Urinalysis, Microscopic Only - Urine, Clean Catch [[id number]] Collected: 04/12/25 0021    Specimen: Urine, Clean Catch Updated: 04/12/25 0100     RBC, UA None Seen /HPF      WBC, UA 0-2 /HPF      Comment: Urine culture not indicated.        Bacteria, UA None Seen /HPF      Squamous Epithelial Cells, UA 0-2 /HPF      Hyaline Casts, UA None Seen /LPF      Methodology Manual Light Microscopy    High Sensitivity Troponin T 1Hr [[id number]]  (Abnormal) Collected: 04/12/25 0110    Specimen: Blood Updated: 04/12/25 0150     HS Troponin T 21 ng/L      Troponin T Numeric Delta 1 ng/L      Troponin T % Delta 5    Narrative:      High Sensitive Troponin T Reference Range:  <14.0 ng/L- Negative Female for AMI  <22.0 ng/L- Negative Male for AMI  >=14 - Abnormal Female indicating possible myocardial injury.  >=22 - Abnormal Male indicating possible myocardial injury.   Clinicians would have to utilize clinical acumen, EKG, Troponin, and serial changes to determine if it is an Acute Myocardial Infarction or myocardial injury due to an underlying chronic condition.                  CT Angiogram Neck  Result Date: 4/12/2025  FINAL REPORT TECHNIQUE: null CLINICAL HISTORY: Stroke, follow up COMPARISON: null FINDINGS: CT Angiography Neck W Contrast 3D Postprocessing COMPARISON: None FINDINGS: No occlusion, hemodynamically significant stenosis, or dissection in the bilateral common carotid arteries. No  occlusion, hemodynamically significant stenosis, or dissection in the bilateral internal carotid arteries (0-49 percent). No occlusion, hemodynamically significant stenosis, or dissection in the bilateral vertebral arteries. No acute fracture. Left-sided pacemaker in place.     Impression: IMPRESSION: No occlusion or hemodynamically significant stenosis in the carotid or vertebral arteries. No dissection. Authenticated and Electronically Signed by Randall Coronel MD on 04/12/2025 12:15:25 AM    CT Angiogram Head w AI Analysis of LVO  Result Date: 4/12/2025  FINAL REPORT TECHNIQUE: null CLINICAL HISTORY: Neuro deficit, acute, stroke suspected COMPARISON: null FINDINGS: CT Angiography Head W Contrast 3D Postprocessing COMPARISON: CT/SR - CT HEAD WO CONTRAST STROKE PROTOCOL - 4/11/25 23:17 EDT FINDINGS: No occlusion or hemodynamically significant stenosis in the internal carotid arteries. No occlusion or hemodynamically significant stenosis in the middle cerebral arteries. No occlusion or hemodynamically significant stenosis in the anterior cerebral arteries. No occlusion or hemodynamically significant stenosis in the bilateral intracranial vertebral arteries. The V4 segment of the left vertebral artery is hypoplastic and terminates in the left PICA, a normal variant. No occlusion or hemodynamically significant stenosis in the basilar artery. Normal variant fetal origin of the bilateral PCAs, which appear patent. No aneurysm.     Impression: IMPRESSION: No intracranial large artery occlusion or hemodynamically significant stenosis. Authenticated and Electronically Signed by Randall Coronel MD on 04/12/2025 12:15:12 AM    CT CEREBRAL PERFUSION WITH & WITHOUT CONTRAST  Result Date: 4/12/2025  FINAL REPORT TECHNIQUE: null CLINICAL HISTORY: Neuro deficit, acute, stroke suspected COMPARISON: null FINDINGS: CT Brain Perfusion W Contrast COMPARISON: CT/SR - CT HEAD WO CONTRAST STROKE PROTOCOL - 4/11/25 23:17 EDT FINDINGS: Ischemic  tissue volume (Tmax > 6s): 0 mL. Infarct core volume (CBF < 30%): 0 mL. Mismatch (penumbra) volume: 0 mL, ratio: None. Volume of poor collateral perfusion (Tmax > 10s): 0 mL. Hypoperfusion index (Tmax >10s / Tmax >6s): N/A. CBV Index: N/A. Normal MTT, T-max, CBF, and CBV maps.     Impression: IMPRESSION: No evidence of acute infarction or ischemia. Authenticated and Electronically Signed by Randall Coronel MD on 04/12/2025 12:06:15 AM    CT Head Without Contrast Stroke Protocol  Addendum Date: 4/11/2025  ADDENDUM REPORT ADDENDUM: This report was discussed with Letitia Srivastava RN on Apr 11, 2025 23:36:00 EDT. Authenticated and Electronically Signed by Randall Coronel MD on 04/11/2025 11:37:04 PM    Result Date: 4/11/2025  FINAL REPORT TECHNIQUE: null CLINICAL HISTORY: Stroke. CONFUSION EPISODE. COMPARISON: null FINDINGS: CT Head WO Contrast COMPARISON: None FINDINGS: No acute intracranial hemorrhage. No evidence of acute infarction. Diffuse cortical volume loss. Nonspecific white matter hypodensities, most commonly associated with chronic microangiopathic changes. No mass-effect or midline shift. No hydrocephalus. Visualized orbits are normal. Clear paranasal sinuses. Clear mastoid air cells. No acute fracture. Unremarkable soft tissues.     Impression: IMPRESSION: No acute intracranial findings. Nonemergent/incidental findings in the report. Authenticated and Electronically Signed by Randall Coronel MD on 04/11/2025 11:33:01 PM         MDM     Amount and/or Complexity of Data Reviewed  Independent visualization of images, tracings, or specimens: yes        Initial impression of presenting illness: Altered mental status, slurred speech, left leg weakness and numbness    DDX: includes but is not limited to: Intracranial hemorrhage, CVA, sepsis, recrudescence, urinary tract infection, bacterial pneumonia    Patient arrives stable with vitals interpreted by myself.     Pertinent features from physical exam: Clear to auscultation,  regular rate and rhythm, no murmur, nontender to abdominal palpation, patient with left leg weakness on exam.    Initial diagnostic plan: CBC, CMP, troponin, UA, EKG, chest x-ray, CRP, Pro-Siva, magnesium, CT head, CTA head neck, CT perfusion    Results from initial plan were reviewed and interpreted by me revealing patient with no concern for LVO or intracranial hemorrhage on CT imaging per my depend interpretation    Diagnostic information from other sources: Discussed with patient's daughter at bedside reviewed past medical records    Interventions / Re-evaluation: [facility] emergency department for 3 hours no change in vital signs    Results/clinical rationale were discussed with patient at bedside    Consultations/Discussion of results with other physicians: Given my concern for possible CVA given patient's persistent lower extremity weakness I discussed with teleneurology.  They see the patient recommend inpatient admission for continued restratification and observation however, patient declines this.  States that she is unable to take any other antiplatelet given complications in the past and is unable to tolerate statins.  Given this, she would like to refuse admission at this time and request discharge.  Patient is able to elucidate the risks of this and has capacity to refuse.  Given this, have referred patient to the neurology outpatient clinic and recommended strict and proper turn precaution to emergency department for any new neurologic symptoms    Disposition plan: Discharge  -----        Final diagnoses:   Stroke-like symptoms          James Varela MD  04/12/25 2146

## 2025-04-12 NOTE — CONSULTS
[facility]   Teleneurology Note    Patient Name: Marielena Moeller  : 1945  MRN: [id number]  Primary Care Physician: Marychuy Atwood APRN  Referring Site: [place]  Location of Neurologist: [place]    Subjective   Teleneurology Initial Data           Neurologist Evaluation Date: 25 Neurologist Evaluation Time: 0109   Date Last Known Well: 25 Time Last Known Well: 0300     History      Marielena Moeller is a 79-year-old female PMH atrial fibrillation not on anticoagulation, DMT2, SSS s/p PM, Sjogren syndrome, HTN, HLD, hypothyroidism, Hx hernia, arthritis, diverticulitis who presented to [facility] on 2025 with acute onset speech disturbance and left hemiparesis.  The patient's daughter is present for the encounter and helps to provide some of the history.  She reports that the patient has left leg weakness normally but it was worse than usual today and the patient had slurred speech.  The patient's speech deficits resolved at the time of my evaluation but she is unable to lift her left leg off of the bed.    CT head personally reviewed unremarkable.  Patient was not considered a candidate for IV thrombolytics given presentation outside of time windows.  CT angiogram head and neck unremarkable.  CT perfusion personally reviewed and unremarkable.  She is taking aspirin 81 mg daily for treatment of her atrial fibrillation.  Prior attempts to increase to full dose aspirin or Eliquis resulted in GI bleeding.  Most recent bleed was less than 1 year ago per her report.  She is not taking statin due to reported allergy (unspecified).  Patient tells me her pacemaker is not MRI compatible.  I requested patient to stay for 24-hour observation given risk of stroke is highest within first 24 hours, however she prefers to return home and says she can get a TTE as outpatient with her established cardiologist.  She declines increase in aspirin given prior side effects of bleeding.  She understands her  recurrent stroke risk is moderate with atrial fibrillation not on anticoagulation.  I reviewed the common stroke symptoms with the patient and her daughter and reasons to return to the emergency department for further evaluation.  At baseline, patient uses a walking device and requires help with bathing, driving, and finances.  mRS 3.      Stroke Risk Factors/ Pertinent Data     Stroke risk factors: arrhythmia, diabetes, dyslipidemia, hypertension, obesity/ physical inactivity  Anticoagulants prior to arrival: none  Antiplatelets prior to arrival: aspirin  Statins prior to arrival: none     Scoring Scales     Modified Island Scale  Pre-Stroke Modified Island Scale: 3 - Moderate disability.  Requiring some help, but able to walk without assistance.  Intracerebral Hemmorhage (ICH) Score  Age>=80: no    NIH Stroke Scale           Interval: baseline  1a. Level of Consciousness: 0-->Alert, keenly responsive  1b. LOC Questions: 1-->Answers one question correctly  1c. LOC Commands: 0-->Performs both tasks correctly  2. Best Gaze: 0-->Normal  3. Visual: 0-->No visual loss  4. Facial Palsy: 0-->Normal symmetrical movements  5a. Motor Arm, Left: 0-->No drift, limb holds 90 (or 45) degrees for full 10 secs  5b. Motor Arm, Right: 0-->No drift, limb holds 90 (or 45) degrees for full 10 secs  6a. Motor Leg, Left: 3-->No effort against gravity, leg falls to bed immediately  6b. Motor Leg, Right: 0-->No drift, leg holds 30 degree position for full 5 secs  7. Limb Ataxia: 0-->Absent  8. Sensory: 0-->Normal, no sensory loss  9. Best Language: 0-->No aphasia, normal  10. Dysarthria: 0-->Normal  11. Extinction and Inattention (formerly Neglect): 0-->No abnormality  Total (NIH Stroke Scale): 4     Review of Systems     Review of Systems  ROS reviewed and negative except as above  Objective   Exam     Exam performed with the help of support staff from the referring site  Neurological Exam  NIHSS as above.  Result Review    Results           Personal review of CNS imaging:(Official report by radiologist pending)  Imaging  CT Imaging Review: CT Imaging reviewed, NO acute infarct/ hemorrhage seen  CTA Imaging Review: CTA Imaging reviewed, NO large vessel occlusion or severe stenosis seen  CT Perfusion Review: CT perfusion reviewed, NORMAL    Thrombolytic   Thrombolytics: thrombolytic not given  Thrombolytic Exclusion Criteria: Onset unknown or GREATER than 4.5 hours     Assessment & Plan   Assessment/ Plan     Assessment:  Acute Stroke Evaluation: Stroke diagnosis uncertain- the risks of IV thrombolytic outweigh the benefits of treatment       Plan:  -Patient unable to obtain MRI due to pacemaker  - Continue home aspirin 81 mg daily.  Patient declined increased due to prior GI bleed with increase.  She has atrial fibrillation but prior trials of Eliquis have caused GI bleed.  - Patient reports allergy to statin and therefore declines trial  - I recommend patient stay for TTE to rule out cardiac source of emboli.  However, she tells me she outpatient TTE with her cardiologist and even if a clot was found she would not agree to anticoagulation as it has previously caused GI bleed.  - I recommend patient stay inpatient for PT OT consult but she declines.    - Continue outpatient PT OT  - I reviewed with the patient and her daughter common stroke symptoms and reasons to return to the emergency department including vision loss, speech deficits, weakness, numbness, or tingling on one side of the body, dizziness, incoordination, nausea, vomiting, or headache.  The patient and her daughter expressed agreement and understanding.    Patient strongly prefers to return home.  I did request she stay for 24-hour observation and repeat CT head, PT OT consult, and TTE as above.  However, this may not  so is reasonable to return home if she and her daughter feel safe to do so.  Patient is aware that risk of repeat stroke is highest within first 24  hours.    Discussed with Dr. Varela, ED physician.  Thank you for this consult.  Please call questions.         Disposition     Disposition: The patient will remain at the referring institution for further evaluation and management    Medical Decision Making  Medical Data Reviewed: Data reviewed including: clinical labs, radiology and/or medical tests, Independent review of CNS images  Length of visit: 60 minutes    ATTILA, Maite Reyes MD, saw the patient on 04/12/25 at 0109 for an initial in-patient or emergency room telememedicine face to face consult using interactive technology for 60 minutes. The location of the patient was [place]. I was located at [place].    I have proceeded with this evaluation at the request of the referring practitioner as it is felt to be an emergency setting and no appropriate specialist is available to perform this evaluation. The originating hospital has reported that this is the correct patient and has obtained consent from the patient/surrogate to perform this telemedicine evaluation(including obtaining history, performing examination and reviewing data provided by the patient an/or originating site of care provider)    I have introduced myself to the patient, provided my credentials, disclosed my location, and determined that, based on review of the patient's chart and discussion with the patient's primary team, telemedicine via a HIPAA compliant, real-time, face-to-face two-way, interactive audio and video platform is an appropriate and effective means of providing the service.    The patient/surrogate has a right to refuse this evaluation as they have been explained risks including potential loss of confidentiality, benefits, alternatives, and the potential need for subsequent face-to-face care. In this evaluation, we will be providing recommendations only.  The ultimate decision to follow or not to follow these recommendations will be left to the bedside treating/requesting  practitioner.    The patient/surrogate has been notified that other healthcare professionals including technical person may be involved in this A/V evaluation.  All laws concerning confidentiality and patient access to medical records and copies of medical records apply to telemedicine.  The patient/surrogate has received the originating site's Health Notice of Privacy Practices.    Maite Reyes MD

## 2025-04-14 ENCOUNTER — TELEPHONE (OUTPATIENT)
Dept: EMERGENCY DEPT | Facility: HOSPITAL | Age: 80
End: 2025-04-14
Payer: MEDICARE

## 2025-04-14 RX ORDER — AZITHROMYCIN 250 MG/1
TABLET, FILM COATED ORAL
Qty: 6 TABLET | Refills: 0 | Status: SHIPPED | OUTPATIENT
Start: 2025-04-14

## 2025-04-14 NOTE — TELEPHONE ENCOUNTER
Received a call from patient's daughter at this time, this is a daughter who brought her into the emergency department.  Advised of potential pneumonia seen on chest x-ray overread, will send Augmentin Z-Jhonathan, patient to follow-up with primary care.

## 2025-04-14 NOTE — TELEPHONE ENCOUNTER
Attempted to call the patient and discuss x-ray overread from radiology, voicemail left on cell phone, home phone rings and unable to leave a voicemail.  I initially called the patient's alternate/emergency contact and voicemail was left with no answer.

## 2025-04-16 ENCOUNTER — TELEPHONE (OUTPATIENT)
Dept: FAMILY MEDICINE CLINIC | Facility: CLINIC | Age: 80
End: 2025-04-16

## 2025-04-16 NOTE — TELEPHONE ENCOUNTER
Caller: [facility]    Relationship:     Best call back number: [phone]    What is the best time to reach you: ANYTIME     Who are you requesting to speak with (clinical staff, provider,  specific staff member): CLINICAL STAFF     HOME HEALTH WENT TO PATIENTS HOME TO EVALUATE HER. PATIENT IS NOT INTERESTED IN PHYSICAL THERAPY OR ANY THERAPY AT ALL. SHE IS ONLY INTERESTED IN A BATH AIDE. PLEASE CALL TO DISCUSS. [facility] DOES NOT HAVE A BATH AIDE AT THIS TIME.

## 2025-04-17 DIAGNOSIS — E11.51 TYPE 2 DIABETES MELLITUS WITH DIABETIC PERIPHERAL ANGIOPATHY WITHOUT GANGRENE, WITH LONG-TERM CURRENT USE OF INSULIN: ICD-10-CM

## 2025-04-17 DIAGNOSIS — Z79.4 TYPE 2 DIABETES MELLITUS WITH DIABETIC PERIPHERAL ANGIOPATHY WITHOUT GANGRENE, WITH LONG-TERM CURRENT USE OF INSULIN: ICD-10-CM

## 2025-04-17 DIAGNOSIS — Z74.09 IMPAIRED MOBILITY AND ADLS: Primary | ICD-10-CM

## 2025-04-17 DIAGNOSIS — J84.9 INTERSTITIAL LUNG DISEASE: ICD-10-CM

## 2025-04-17 DIAGNOSIS — J44.9 CHRONIC OBSTRUCTIVE PULMONARY DISEASE, UNSPECIFIED COPD TYPE: ICD-10-CM

## 2025-04-17 DIAGNOSIS — N39.46 MIXED STRESS AND URGE URINARY INCONTINENCE: ICD-10-CM

## 2025-04-17 DIAGNOSIS — Z78.9 IMPAIRED MOBILITY AND ADLS: Primary | ICD-10-CM

## 2025-04-18 NOTE — TELEPHONE ENCOUNTER
Spoke stephanie Goddard at [facility]. Notified her that new order has been faxed today and to call with any additional questions or concerns.

## 2025-05-07 NOTE — PROGRESS NOTES
[facility] Cardiology  Follow Up Visit  Marielena Moeller  1945    VISIT DATE:  05/07/25    PCP:   Marychuy Atwood, APRN  [address] [place] [address]          CC:  No chief complaint on file.      Problem List:  1.  Paroxysmal atrial fibrillation/tachycardia-bradycardia syndrome  -Confirmed on previous Holter monitor with a burden of 1.3%  -Status Biotronik post pacemaker placement May 2023           2.  COPD/pulmonary fibrosis  3.  Hyperlipidemia  4.  Hypertension  5.  Hypothyroid  6.  Ventral hernia  7.  Diverticulosis     Echo 2023:    Left ventricular systolic function is normal. Calculated left ventricular EF = 55.1% Left ventricular ejection fraction appears to be 56 - 60%.    Left ventricular diastolic function was indeterminate.    Estimated right ventricular systolic pressure from tricuspid regurgitation is normal (<35 mmHg).    History of Present Illness:  Marielena Moeller  Is a 79 y.o. female with pertinent cardiac history detailed above.  Patient admitted in April with speech disturbance and left-sided hemiparesis.  Negative CT perfusion scan.  CTA head and neck showed no occlusion or hemodynamically significant stenosis.  Patient does have a history of A-fib.  Marenisco has been low less than 1% on her device check today.  She used to take Eliquis but stopped due to previous GI bleeding.  At previous visits we had discussed option of left atrial appendage occlusion evaluation but she wanted to defer.  Also not amenable to adding medication for control of her blood pressure or lipids.  She does take aspirin      Patient Active Problem List    Diagnosis Date Noted    Nuclear sclerotic cataract of left eye 01/10/2025    Nuclear sclerotic cataract of right eye 12/11/2024    Tachy-simón syndrome 07/13/2024    Presence of cardiac pacemaker 07/13/2024    GI bleed 04/05/2024    Chronic respiratory failure with hypercapnia 05/06/2023    Syncope, unspecified syncope type 05/06/2023     Sick sinus syndrome (tachybradycardia) with pauses and syncope 05/06/2023    Hyperlipidemia 11/11/2022    Influenza vaccination declined 12/29/2021    Type 2 diabetes mellitus with diabetic peripheral angiopathy without gangrene, with long-term current use of insulin 11/20/2020    Gastroesophageal reflux disease 02/24/2020    Hypertension 02/24/2020    Interstitial lung disease 11/26/2019    Myalgia 11/26/2019     Note Last Updated: 11/26/2019     Recurrent muscle pains, often attributed to medicines. Unclear etiology.      Paroxysmal atrial fibrillation 09/13/2019    Type 2 diabetes mellitus with circulatory disorder, with long-term current use of insulin 05/13/2019     Note Last Updated: 5/13/2019     Associated with hypertension       Anxiety 05/08/2019    COPD on home oxygen 11/08/2018    Pneumococcal vaccination declined by patient 11/08/2018    History of colon polyps 07/27/2018     Note Last Updated: 7/27/2018     Added automatically from request for surgery [id number]      Acquired hypothyroidism 07/24/2018    Vitamin D deficiency 07/24/2018    Class 2 obesity with body mass index (BMI) of 35.0 to 35.9 in adult 04/21/2018     Note Last Updated: 11/26/2019     Associated with diabetes mellitus, hypertension       Type 2 diabetes mellitus with hyperglycemia, with long-term current use of insulin 09/19/2017     Note Last Updated: 11/8/2018     History of pancreatitis      Hypertension associated with diabetes 09/19/2017     Note Last Updated: 4/21/2018     No HCTZ due to pancreatitis early 2018.      Osteoporosis 09/19/2017    Type 2 diabetes mellitus 09/19/2017     Note Last Updated: 4/21/2023     Formatting of this note might be different from the original.  Formatting of this note might be different from the original.  · Associated with hypertension     Last Assessment & Plan:   Formatting of this note might be different from the original.  Diabetes is uncontrolled..   Patient wishes to continue current  "medicines. Declines addition of Jardiance, etc.  Diabetes will be reassessed in 3 months.  Formatting of this note might be different from the original.  · History of pancreatitis    Last Assessment & Plan:   Formatting of this note might be different from the original.  Diabetes is not controlled.   Work on diet, insulin dosing, goal under 200 on glucose levels  Diabetes will be reassessed in 3 months.         Allergies   Allergen Reactions    Amlodipine Swelling     Leg edema     Doxycycline Nausea And Vomiting     \"Pain worse than pancreatitis\"    Sotalol Other (See Comments)     nausea    Spironolactone Other (See Comments)     Dropped magnesium      Warfarin Other (See Comments)     Nose bleed    Ciprofloxacin Hives    Contrast Dye (Echo Or Unknown Ct/Mr) Rash     Breaks her out    Diltiazem Nausea Only     nausea    Erythromycin Hives and Rash    Hydrochlorothiazide Other (See Comments)        pt developed pancreastitis    Levaquin [Levofloxacin] Swelling    Prednisone Mental Status Change     ITCHING AND NERVOUS       Social History     Socioeconomic History    Marital status:    Tobacco Use    Smoking status: Former     Current packs/day: 0.00     Average packs/day: 2.0 packs/day for 15.0 years (30.0 ttl pk-yrs)     Types: Cigarettes     Start date:      Quit date:      Years since quittin.3     Passive exposure: Past    Smokeless tobacco: Never   Vaping Use    Vaping status: Never Used   Substance and Sexual Activity    Alcohol use: Not Currently    Drug use: Never    Sexual activity: Defer       Family History   Problem Relation Age of Onset    Arthritis Mother     Diabetes Mother     Osteoporosis Mother     Arthritis Father     Diabetes Father     Heart attack Father     Hyperlipidemia Father     Diabetes Sister     Thyroid disease Sister     Diabetes Brother     Diabetes Maternal Grandmother     Diabetes Maternal Grandfather     Diabetes Paternal Grandmother     Diabetes Paternal " "Grandfather        Current Medications:    Current Outpatient Medications:     albuterol (ACCUNEB) 0.63 MG/3ML nebulizer solution, Take 3 mL by nebulization Every 6 (Six) Hours As Needed for Wheezing., Disp: 60 each, Rfl: 11    albuterol sulfate HFA (Ventolin HFA) 108 (90 Base) MCG/ACT inhaler, Inhale 2 puffs Every 6 (Six) Hours As Needed for Wheezing or Shortness of Air., Disp: 54 g, Rfl: 3    aspirin 81 MG EC tablet, Take 1 tablet by mouth Every Other Day., Disp: , Rfl:     azithromycin (Zithromax Z-Jhonathan) 250 MG tablet, Take 2 tablets by mouth on day 1, then 1 tablet daily on days 2-5, Disp: 6 tablet, Rfl: 0    Blood Glucose Monitoring Suppl (ACCU-CHEK CHIO PLUS) w/Device kit, USE AS DIRECTED TO CHECK GLUCOSE 3 TIMES DAILY FOR DM E11.65, Disp: 1 kit, Rfl: 0    cholecalciferol (VITAMIN D3) 25 MCG (1000 UT) tablet, Take 1 tablet by mouth Daily., Disp: 90 tablet, Rfl: 1    Continuous Blood Gluc  (Dexcom G6 ) device, 1 each Continuous., Disp: 1 each, Rfl: 0    Continuous Blood Gluc Sensor (Dexcom G6 Sensor), Every 10 (Ten) Days. Apply as directed, Disp: 3 each, Rfl: 11    Dulaglutide (Trulicity) 0.75 MG/0.5ML solution auto-injector, Inject 0.75 mg under the skin into the appropriate area as directed 1 (One) Time Per Week., Disp: 2 mL, Rfl: 1    furosemide (Lasix) 20 MG tablet, 1 po daily as needed for swelling, Disp: 30 tablet, Rfl: 2    glucose blood (Accu-Chek Chio Plus) test strip, USE AS DIRECTED THREE TIMES DAILY, Disp: 300 each, Rfl: 3    insulin aspart prot-insulin aspart (novoLOG 70/30) (70-30) 100 UNIT/ML injection, Inject 50 Units under the skin into the appropriate area as directed 3 (Three) Times a Day With Meals AND 20 Units Every Night. Name brand Novolog only please, Disp: 300 mL, Rfl: 3    Insulin Pen Needle 31G X 5 MM misc, Use tid with SQ insulin, Disp: 90 each, Rfl: 11    Insulin Syringe 31G X 5/16\" 1 ML misc, 3 (Three) Times a Day., Disp: , Rfl:     Insulin Syringe-Needle U-100 (BD " "Veo Insulin Syringe U/F) 31G X 15/64\" 0.5 ML misc, as directed, Disp: 300 each, Rfl: 2    ketoconazole (NIZORAL) 2 % cream, Apply 1 application  topically to the appropriate area as directed Daily. Apply under folds, Disp: 30 g, Rfl: 1    ketorolac (ACULAR) 0.5 % ophthalmic solution, , Disp: , Rfl:     levothyroxine (SYNTHROID, LEVOTHROID) 112 MCG tablet, Take 1 tablet by mouth Daily., Disp: 90 tablet, Rfl: 1    LORazepam (ATIVAN) 0.5 MG tablet, Take 1 tablet by mouth Every 8 (Eight) Hours As Needed for Anxiety., Disp: 45 tablet, Rfl: 2    meclizine (ANTIVERT) 25 MG tablet, Take 1 tablet by mouth Every 6 (Six) Hours As Needed for Dizziness., Disp: 20 tablet, Rfl: 0    metoprolol tartrate (LOPRESSOR) 25 MG tablet, Take 0.5 tablets by mouth Daily., Disp: 90 tablet, Rfl: 1    moxifloxacin (VIGAMOX) 0.5 % ophthalmic solution, , Disp: , Rfl:     nystatin (MYCOSTATIN) 156920 UNIT/GM powder, Apply  topically to the appropriate area as directed 4 (Four) Times a Day., Disp: 60 g, Rfl: 3    O2 (OXYGEN), Inhale Continuous. 2.5L/NC, Disp: , Rfl:      ROS    There were no vitals filed for this visit.    Physical Exam  Constitutional:       Appearance: Normal appearance.   Cardiovascular:      Rate and Rhythm: Normal rate and regular rhythm.      Pulses: Normal pulses.      Heart sounds: Normal heart sounds.   Pulmonary:      Effort: Pulmonary effort is normal.      Comments: On supplementary O2  Musculoskeletal:      Right lower leg: No edema.      Left lower leg: No edema.   Neurological:      Mental Status: She is alert.         Diagnostic Data:  Procedures  Lab Results   Component Value Date    CHLPL 284 (H) 04/19/2024    TRIG 119 04/19/2024    HDL 58 04/19/2024     Lab Results   Component Value Date    GLUCOSE 251 (H) 04/12/2025    BUN 15 04/12/2025    CREATININE 0.75 04/12/2025     04/12/2025    K 4.1 04/12/2025    CL 96 (L) 04/12/2025    CO2 32.7 (H) 04/12/2025     Lab Results   Component Value Date    HGBA1C 9.6 (A) " 03/28/2025     Lab Results   Component Value Date    WBC 7.10 04/12/2025    HGB 12.7 04/12/2025    HCT 38.6 04/12/2025     04/12/2025     Biotronik device check  Irena MURRAY T  Mode DDD-LLS with lower rate of 60  Right atrial lead: 33% paced, P wave amplitude 2.1 mV, threshold 0.8 V at 0.4 ms, impedance 526 ohms  RV lead: 1% paced, R wave amplitude 16.4 mV, threshold 0.8 V at 0.4 ms, impedance 585 ohms  Battery voltage 7.5 years  Events 1% A-fib with a maximum of 10 hours  Reprogramming: Decreased A and V to 2.0 V chronic    Assessment:  No diagnosis found.    Plan:    1.  Paroxysmal A. Fib/tachycardia-bradycardia syndrome  -Biotronik pacemaker in place  -stopped eliquis due to GI bleeding  - On metoprolol  -Offered evaluation for Watchman device.       With previous GI bleeding we discussed Watchman device placement.   Our coordinator will reach out to her.     2.  Hypertension  -on metoprolol only  -With her diabetes recommend an ACE inhibitor but she declines.  -reports intolerances to multiple meds not wanting to add anything     3.  DM  -Per her primary  -A1c 9.6      4.  HLD  -Total cholesterol 284,  triglycerides 119  -she declined statin therapy, or alternatives     5.  Hypothyroid  -Last TSH was normal     6.  Hypoxic respiratory failure on chronic 2 L O2      Patient reluctant to pursue multiple offered intervention which increases risk for morbidity and mortality      ACP discussion was held with the patient during this visit. Patient does not have an advance directive, declines further assistance.      Wu Pathak MD [facility]

## 2025-05-08 ENCOUNTER — HOSPITAL ENCOUNTER (OUTPATIENT)
Dept: CT IMAGING | Facility: HOSPITAL | Age: 80
Discharge: HOME | End: 2025-05-08
Admitting: NURSE PRACTITIONER
Payer: MEDICARE

## 2025-05-08 ENCOUNTER — OFFICE VISIT (OUTPATIENT)
Dept: CARDIOLOGY | Facility: CLINIC | Age: 80
End: 2025-05-08
Payer: MEDICARE

## 2025-05-08 VITALS
HEART RATE: 83 BPM | OXYGEN SATURATION: 95 % | HEIGHT: 60 IN | BODY MASS INDEX: 33.96 KG/M2 | WEIGHT: 173 LBS | DIASTOLIC BLOOD PRESSURE: 76 MMHG | SYSTOLIC BLOOD PRESSURE: 182 MMHG

## 2025-05-08 DIAGNOSIS — R59.0 AXILLARY LYMPHADENOPATHY: ICD-10-CM

## 2025-05-08 DIAGNOSIS — E78.00 HIGH CHOLESTEROL: ICD-10-CM

## 2025-05-08 DIAGNOSIS — I15.2 HYPERTENSION ASSOCIATED WITH DIABETES: Primary | ICD-10-CM

## 2025-05-08 DIAGNOSIS — E11.59 HYPERTENSION ASSOCIATED WITH DIABETES: Primary | ICD-10-CM

## 2025-05-08 DIAGNOSIS — I48.0 PAROXYSMAL ATRIAL FIBRILLATION: ICD-10-CM

## 2025-05-08 PROCEDURE — 71250 CT THORAX DX C-: CPT

## 2025-05-09 ENCOUNTER — HOSPITAL ENCOUNTER (OUTPATIENT)
Facility: HOSPITAL | Age: 80
Discharge: HOME | End: 2025-05-09
Payer: MEDICARE

## 2025-05-09 ENCOUNTER — LAB (OUTPATIENT)
Facility: HOSPITAL | Age: 80
End: 2025-05-09
Payer: MEDICARE

## 2025-05-09 ENCOUNTER — OFFICE VISIT (OUTPATIENT)
Age: 80
End: 2025-05-09
Payer: MEDICARE

## 2025-05-09 VITALS
SYSTOLIC BLOOD PRESSURE: 144 MMHG | HEIGHT: 60 IN | TEMPERATURE: 98 F | BODY MASS INDEX: 34.4 KG/M2 | DIASTOLIC BLOOD PRESSURE: 72 MMHG | WEIGHT: 175.2 LBS

## 2025-05-09 DIAGNOSIS — M15.0 PRIMARY OSTEOARTHRITIS INVOLVING MULTIPLE JOINTS: Chronic | ICD-10-CM

## 2025-05-09 DIAGNOSIS — M25.50 ARTHRALGIA, UNSPECIFIED JOINT: ICD-10-CM

## 2025-05-09 DIAGNOSIS — R53.83 FATIGUE, UNSPECIFIED TYPE: ICD-10-CM

## 2025-05-09 DIAGNOSIS — M25.50 ARTHRALGIA, UNSPECIFIED JOINT: Primary | ICD-10-CM

## 2025-05-09 DIAGNOSIS — M15.0 PRIMARY OSTEOARTHRITIS INVOLVING MULTIPLE JOINTS: ICD-10-CM

## 2025-05-09 DIAGNOSIS — R89.9 ABNORMAL LABORATORY TEST: ICD-10-CM

## 2025-05-09 DIAGNOSIS — R76.8 ANA POSITIVE: ICD-10-CM

## 2025-05-09 PROCEDURE — 83520 IMMUNOASSAY QUANT NOS NONAB: CPT

## 2025-05-09 PROCEDURE — 36415 COLL VENOUS BLD VENIPUNCTURE: CPT

## 2025-05-09 PROCEDURE — 80053 COMPREHEN METABOLIC PANEL: CPT

## 2025-05-09 PROCEDURE — 86431 RHEUMATOID FACTOR QUANT: CPT

## 2025-05-09 PROCEDURE — 85652 RBC SED RATE AUTOMATED: CPT

## 2025-05-09 PROCEDURE — 86038 ANTINUCLEAR ANTIBODIES: CPT

## 2025-05-09 PROCEDURE — 80074 ACUTE HEPATITIS PANEL: CPT

## 2025-05-09 PROCEDURE — 73630 X-RAY EXAM OF FOOT: CPT

## 2025-05-09 PROCEDURE — 85025 COMPLETE CBC W/AUTO DIFF WBC: CPT

## 2025-05-09 PROCEDURE — 86480 TB TEST CELL IMMUN MEASURE: CPT

## 2025-05-09 PROCEDURE — 73130 X-RAY EXAM OF HAND: CPT

## 2025-05-09 PROCEDURE — 86140 C-REACTIVE PROTEIN: CPT

## 2025-05-09 NOTE — ASSESSMENT & PLAN NOTE
Tylenol PRN is ok as directed  She has tried oral NSAIDS like meloxicam and Relafen   Orders:    XR Hand 2 View Bilateral; Future    XR Foot 3+ View Bilateral; Future    QuantiFERON-TB Gold Plus; Future    Hepatitis Panel, Acute; Future    14.3.3 ETA, Rheum. Arthritis; Future    CBC Auto Differential; Future    Comprehensive Metabolic Panel; Future    C-reactive Protein; Future    Cyclic Citrul Peptide Antibody, IgG / IgA; Future    RF Isotypes, IgG, IgA, IgM EIA; Future    Sedimentation Rate; Future    LIONEL 12 Plus Profile (RDL); Future    LIONEL by IFA, Reflex to Titer and Pattern; Future

## 2025-05-09 NOTE — PROGRESS NOTES
Office Visit       Date: 05/09/2025   Patient Name: Marielena Moeller  MRN: [id number]  YOB: 1945    Referring Physician: Marychuy Atwood APRN     Chief Complaint   Patient presents with    Joint Pain    Osteoarthritis    Abnormal Lab       History of Present Illness: Marielena Moeller is a 79 y.o. female who is here today at the request of Marychuy KELLY. The referral indicates that she has osteoarthritis.     She recently had a +LIONEL and a test for Sjogren's.     No recent serious injuries or infections. She has fever. She is fatigued. No history of uveitis, iritis, or scleritis. No oral, nasal, or genital ulcers. No history of gout, psoriasis, or Raynaud's.     She rates her pain as 7/10 in severity. She has 4 hours/day of morning stiffness. No red or hot joints. Her joints swell. She has muscle pain and weakness. Her back and neck hurt. She has trouble walking.     No sicca symptoms. She is short of breath. No chest pain. She has some nausea. No  issues. No rash. She has dry skin. No hair loss. No abnormal bruising/bleeding. She reports having enlarged lymph nodes. No headaches or paresthesias.       Medication/treatment/interventions tried include: Meloxicam, Relafen, She saw Dr. Renita Barrientos (Rheumatologist) in [id number], Naproxen, prednisone (allergic/intolerant), she previously saw [facility]   Studies reviewed:   3/28/25: LIONEL Direct positive, RNP normal, DS DNA normal, Willis normal, SCL 70 normal, SSA 1.2, SSB negative, chromatin negative, Marta 1 negative, Centromere negative.       CT Angiogram Head w AI Analysis of LVO [[id number]] (Order [id number])  Order  Status: Final result     Appointment Information    PACS Images     Radiology Images  Study Result    Narrative & Impression   FINAL REPORT     TECHNIQUE:  null     CLINICAL HISTORY:  Neuro deficit, acute, stroke suspected     COMPARISON:  null     FINDINGS:  CT Angiography Head W Contrast     3D  Postprocessing     COMPARISON: CT/SR - CT HEAD WO CONTRAST STROKE PROTOCOL - 4/11/25 23:17 EDT     FINDINGS:     No occlusion or hemodynamically significant stenosis in the internal carotid arteries.     No occlusion or hemodynamically significant stenosis in the middle cerebral arteries.     No occlusion or hemodynamically significant stenosis in the anterior cerebral arteries.     No occlusion or hemodynamically significant stenosis in the bilateral intracranial vertebral arteries. The V4 segment of the left vertebral artery is hypoplastic and terminates in the left PICA, a normal variant.     No occlusion or hemodynamically significant stenosis in the basilar artery.     Normal variant fetal origin of the bilateral PCAs, which appear patent.     No aneurysm.     IMPRESSION:  IMPRESSION:     No intracranial large artery occlusion or hemodynamically significant stenosis.     Authenticated and Electronically Signed by Randall Coronel MD on  04/12/2025 12:15:12 AM     Narrative & Impression   FINAL REPORT     TECHNIQUE:  null     CLINICAL HISTORY:  Stroke, follow up     COMPARISON:  null     FINDINGS:  CT Angiography Neck W Contrast     3D Postprocessing     COMPARISON: None     FINDINGS:     No occlusion, hemodynamically significant stenosis, or dissection in the bilateral common carotid arteries.     No occlusion, hemodynamically significant stenosis, or dissection in the bilateral internal carotid arteries (0-49 percent).     No occlusion, hemodynamically significant stenosis, or dissection in the bilateral vertebral arteries.     No acute fracture.     Left-sided pacemaker in place.     IMPRESSION:  IMPRESSION:     No occlusion or hemodynamically significant stenosis in the carotid or vertebral arteries. No dissection.     Authenticated and Electronically Signed by Randall Coronel MD on  04/12/2025 12:15:25 AM       Subjective     Review of Systems   Constitutional:  Positive for activity change, fatigue and fever.    HENT:  Positive for dental problem, hearing loss, swollen glands, tinnitus and trouble swallowing.    Eyes: Negative.    Respiratory:  Positive for cough, choking and shortness of breath.    Cardiovascular:  Positive for palpitations and leg swelling.   Gastrointestinal:  Positive for nausea and indigestion.   Endocrine: Positive for cold intolerance.   Genitourinary: Negative.    Musculoskeletal:  Positive for arthralgias, back pain, gait problem, joint swelling, myalgias and neck pain.   Skin:  Positive for color change and dry skin.   Allergic/Immunologic: Negative.    Neurological:  Positive for dizziness and weakness.   Hematological:  Positive for adenopathy.   Psychiatric/Behavioral: Negative.     All other systems reviewed and are negative.       Past Medical History:   Diagnosis Date    Abdominal hernia     Arthritis     Asthma     Atrial fibrillation     Blood in stool     Body piercing     EARS ONLY    Bronchitis     Bulging of lumbar intervertebral disc     Colon polyp     COPD (chronic obstructive pulmonary disease)     COVID-19 07/2022    Deaf, left     Deaf, left     Diabetes mellitus     Diverticulitis     Diverticulitis     GERD (gastroesophageal reflux disease)     Heart murmur     History of exercise stress test     Hyperlipidemia     Hypertension     Impaired functional mobility, balance, gait, and endurance     Leaky heart valve     Lung nodules     Migraine headache     Osteoporosis     Pacemaker     Palpitations     Pancreatitis     Spinal headache     Supplemental oxygen dependent     2.5 L/NC continuous    Thyroid disease     LEFT SIDE REMOVED    Tinnitus, left        Past Surgical History:   Procedure Laterality Date    APPENDECTOMY  1966    CARDIAC ELECTROPHYSIOLOGY PROCEDURE N/A 05/08/2023    Procedure: Device Implant PPM;  Surgeon: Brice Arriaga MD;  Location: [facility] INVASIVE LOCATION;  Service: Cardiology;  Laterality: N/A;    CATARACT EXTRACTION W/ INTRAOCULAR LENS IMPLANT Right  2024    Procedure: CATARACT PHACO EXTRACTION WITH INTRAOCULAR LENS IMPLANT RIGHT;  Surgeon: Larry Elder MD;  Location: [facility] OR;  Service: Ophthalmology;  Laterality: Right;    CATARACT EXTRACTION W/ INTRAOCULAR LENS IMPLANT Left 2025    Procedure: CATARACT PHACO EXTRACTION WITH INTRAOCULAR LENS IMPLANT LEFT;  Surgeon: Larry Elder MD;  Location: [facility] OR;  Service: Ophthalmology;  Laterality: Left;     SECTION      CHOLECYSTECTOMY  2004    COLONOSCOPY N/A 08/10/2018    Procedure: COLONOSCOPY WITH HOT SNARE POLYPECTOMY X 3; COLD SNARE POLYPECTOMY, COLD BIOPSY POLYPECTOMY X 4; CLIP PLACEMENT X 1;  Surgeon: Glenn Modi MD;  Location: [facility] ENDOSCOPY;  Service: Gastroenterology    COLONOSCOPY N/A 2024    Procedure: COLONOSCOPY with polypectomy and clipping;  Surgeon: Rodriguez Hernandez MD;  Location: [facility] ENDOSCOPY;  Service: Gastroenterology;  Laterality: N/A;    ENDOSCOPY      THYROIDECTOMY, PARTIAL Left 2011    TUBAL ABDOMINAL LIGATION         Family History   Problem Relation Age of Onset    Arthritis Mother     Diabetes Mother     Osteoporosis Mother     LYNNE disease Mother     Hiatal hernia Mother     Arthritis Father     Diabetes Father     Heart attack Father     Hyperlipidemia Father     Diabetes Sister     Thyroid disease Sister     Diabetes Brother     Diabetes Maternal Grandmother     Diabetes Maternal Grandfather     Diabetes Paternal Grandmother     Diabetes Paternal Grandfather        Social History     Socioeconomic History    Marital status:    Tobacco Use    Smoking status: Former     Current packs/day: 0.00     Average packs/day: 2.0 packs/day for 15.0 years (30.0 ttl pk-yrs)     Types: Cigarettes     Start date:      Quit date:      Years since quittin.3     Passive exposure: Past    Smokeless tobacco: Never   Vaping Use    Vaping status: Never Used   Substance and Sexual Activity    Alcohol use: Not Currently    Drug use:  "Never    Sexual activity: Defer         Current Outpatient Medications:     albuterol (ACCUNEB) 0.63 MG/3ML nebulizer solution, Take 3 mL by nebulization Every 6 (Six) Hours As Needed for Wheezing., Disp: 60 each, Rfl: 11    albuterol sulfate HFA (Ventolin HFA) 108 (90 Base) MCG/ACT inhaler, Inhale 2 puffs Every 6 (Six) Hours As Needed for Wheezing or Shortness of Air., Disp: 54 g, Rfl: 3    aspirin 81 MG EC tablet, Take 1 tablet by mouth Every Other Day., Disp: , Rfl:     azithromycin (Zithromax Z-Jhonathan) 250 MG tablet, Take 2 tablets by mouth on day 1, then 1 tablet daily on days 2-5, Disp: 6 tablet, Rfl: 0    Blood Glucose Monitoring Suppl (ACCU-CHEK CHIO PLUS) w/Device kit, USE AS DIRECTED TO CHECK GLUCOSE 3 TIMES DAILY FOR DM E11.65, Disp: 1 kit, Rfl: 0    cholecalciferol (VITAMIN D3) 25 MCG (1000 UT) tablet, Take 1 tablet by mouth Daily., Disp: 90 tablet, Rfl: 1    Continuous Blood Gluc  (Dexcom G6 ) device, 1 each Continuous., Disp: 1 each, Rfl: 0    Continuous Blood Gluc Sensor (Dexcom G6 Sensor), Every 10 (Ten) Days. Apply as directed, Disp: 3 each, Rfl: 11    Dulaglutide (Trulicity) 0.75 MG/0.5ML solution auto-injector, Inject 0.75 mg under the skin into the appropriate area as directed 1 (One) Time Per Week., Disp: 2 mL, Rfl: 1    furosemide (Lasix) 20 MG tablet, 1 po daily as needed for swelling, Disp: 30 tablet, Rfl: 2    glucose blood (Accu-Chek Chio Plus) test strip, USE AS DIRECTED THREE TIMES DAILY, Disp: 300 each, Rfl: 3    insulin aspart prot-insulin aspart (novoLOG 70/30) (70-30) 100 UNIT/ML injection, Inject 50 Units under the skin into the appropriate area as directed 3 (Three) Times a Day With Meals AND 20 Units Every Night. Name brand Novolog only please, Disp: 300 mL, Rfl: 3    Insulin Pen Needle 31G X 5 MM misc, Use tid with SQ insulin, Disp: 90 each, Rfl: 11    Insulin Syringe 31G X 5/16\" 1 ML misc, 3 (Three) Times a Day., Disp: , Rfl:     Insulin Syringe-Needle U-100 (BD " "Veo Insulin Syringe U/F) 31G X 15/64\" 0.5 ML misc, as directed, Disp: 300 each, Rfl: 2    ketoconazole (NIZORAL) 2 % cream, Apply 1 application  topically to the appropriate area as directed Daily. Apply under folds, Disp: 30 g, Rfl: 1    ketorolac (ACULAR) 0.5 % ophthalmic solution, , Disp: , Rfl:     levothyroxine (SYNTHROID, LEVOTHROID) 112 MCG tablet, Take 1 tablet by mouth Daily., Disp: 90 tablet, Rfl: 1    LORazepam (ATIVAN) 0.5 MG tablet, Take 1 tablet by mouth Every 8 (Eight) Hours As Needed for Anxiety., Disp: 45 tablet, Rfl: 2    meclizine (ANTIVERT) 25 MG tablet, Take 1 tablet by mouth Every 6 (Six) Hours As Needed for Dizziness., Disp: 20 tablet, Rfl: 0    metoprolol tartrate (LOPRESSOR) 25 MG tablet, Take 0.5 tablets by mouth Daily., Disp: 90 tablet, Rfl: 1    moxifloxacin (VIGAMOX) 0.5 % ophthalmic solution, , Disp: , Rfl:     nystatin (MYCOSTATIN) 414309 UNIT/GM powder, Apply  topically to the appropriate area as directed 4 (Four) Times a Day., Disp: 60 g, Rfl: 3    O2 (OXYGEN), Inhale Continuous. 2.5L/NC, Disp: , Rfl:     Allergies   Allergen Reactions    Amlodipine Swelling     Leg edema     Doxycycline Nausea And Vomiting     \"Pain worse than pancreatitis\"    Sotalol Other (See Comments)     nausea    Spironolactone Other (See Comments)     Dropped magnesium      Warfarin Other (See Comments)     Nose bleed    Clonidine Derivatives Other (See Comments)     GI    Losartan Other (See Comments)     GI upset    Ciprofloxacin Hives    Contrast Dye (Echo Or Unknown Ct/Mr) Rash     Breaks her out    Diltiazem Nausea Only     nausea    Erythromycin Hives and Rash    Hydrochlorothiazide Other (See Comments)        pt developed pancreastitis    Levaquin [Levofloxacin] Swelling    Prednisone Mental Status Change     ITCHING AND NERVOUS       I reviewed the patient's chief complaint, history of present illness, review of systems, past medical history, surgical history, family history, social history, " "medications and allergy list.     Objective      Vitals:    05/09/25 1524   BP: 144/72   BP Location: Right arm   Patient Position: Sitting   Cuff Size: Adult   Temp: 98 °F (36.7 °C)   Weight: 79.5 kg (175 lb 3.2 oz)   Height: 152.4 cm (60\")   PainSc: 7      Body mass index is 34.22 kg/m².       Physical Exam       General: Well appearing 79 year old  female. Not in distress. She is in a wheel chair.   SKIN: No rashes. No alopecia. +  lipomas in her arms. No digital pits or ulcers. No sclerodactyly.   HEENT: NCAT. Conjunctiva clear, no photophobia. No oral or nasal ulcers. Hearing intact.    Pulmonary: Clear to auscultation bilaterally. No wheezing, rales, or rhonchi.  CV: Regular rate and rhythm. No murmurs, rubs, or gallops.   Psych: Normal mood and affect. Alert and oriented x 3.   Extremities: No cyanosis or edema.   Musculoskeletal: No joint swelling.  + myofascial tenderness. No warmth or erythema. Normal range of motion of the wrists, ankles, elbows, and knees.   Lymph: No palpable cervical adenopathy      Procedures    Assessment / Plan      Assessment & Plan  Arthralgia, unspecified joint  Medication/treatment/interventions tried include: Meloxicam, Relafen, She saw Dr. Renita Barrientos (Rheumatologist) in 4620-2122, Naproxen, prednisone (allergic/intolerant), she previously saw [facility], Tylenol   3/28/25: LIONEL Direct positive, RNP normal, DS DNA normal, Willis normal, SCL 70 normal, SSA 1.2, SSB negative, chromatin negative, Marta 1 negative, Centromere negative.   She has a history of osteoarthritis. See below.   We will evaluate for autoimmune disease/inflammatory types of arthritis  We gave her a handout on OA to review  Follow up in 3 months   She has myofascial tenderness.   She very well may have fibromyalgia.   She is scared of medication   She lists numerous allergies/intolerances.   Orders:    XR Hand 2 View Bilateral; Future    XR Foot 3+ View Bilateral; Future    " QuantiFERON-TB Gold Plus; Future    Hepatitis Panel, Acute; Future    14.3.3 ETA, Rheum. Arthritis; Future    CBC Auto Differential; Future    Comprehensive Metabolic Panel; Future    C-reactive Protein; Future    Cyclic Citrul Peptide Antibody, IgG / IgA; Future    RF Isotypes, IgG, IgA, IgM EIA; Future    Sedimentation Rate; Future    LIONEL 12 Plus Profile (RDL); Future    LIONEL by IFA, Reflex to Titer and Pattern; Future    Fatigue, unspecified type    Labs and x-rays to evaluate for autoimmune conditions. We usually contact patients with these results within 10-14 business days    Orders:    XR Hand 2 View Bilateral; Future    XR Foot 3+ View Bilateral; Future    QuantiFERON-TB Gold Plus; Future    Hepatitis Panel, Acute; Future    14.3.3 ETA, Rheum. Arthritis; Future    CBC Auto Differential; Future    Comprehensive Metabolic Panel; Future    C-reactive Protein; Future    Cyclic Citrul Peptide Antibody, IgG / IgA; Future    RF Isotypes, IgG, IgA, IgM EIA; Future    Sedimentation Rate; Future    LIONEL 12 Plus Profile (RDL); Future    LIONEL by IFA, Reflex to Titer and Pattern; Future    Primary osteoarthritis involving multiple joints  Tylenol PRN is ok as directed  She has tried oral NSAIDS like meloxicam and Relafen   Orders:    XR Hand 2 View Bilateral; Future    XR Foot 3+ View Bilateral; Future    QuantiFERON-TB Gold Plus; Future    Hepatitis Panel, Acute; Future    14.3.3 ETA, Rheum. Arthritis; Future    CBC Auto Differential; Future    Comprehensive Metabolic Panel; Future    C-reactive Protein; Future    Cyclic Citrul Peptide Antibody, IgG / IgA; Future    RF Isotypes, IgG, IgA, IgM EIA; Future    Sedimentation Rate; Future    LIONEL 12 Plus Profile (RDL); Future    LIONEL by IFA, Reflex to Titer and Pattern; Future    LIONEL positive  We reviewed the outside labs and discussed them at length. All of the patients questions were answered.   Handout on + LIONEL tests was given to the patient to take home.   An LIONEL test is a  non specific test. While it certainly can be positive in conditions like SLE, scleroderma, myositis, Sjögren's, etc.. It can also be positive in patients with thyroid disease, type 1 diabetes, psoriasis, Celiacs disease, inflammatory bowel disease, etc.. There are also reports of normal/apparently healthy individuals who have been incidentally found to have a +LIONEL test. You can also sometimes get a false positive LIONEL test.   Labs today to try and determine the significance of this +LIONEL test. Patient will be contacted once these test results are available.          Abnormal laboratory test  She was SSA positive.   She denies having any sicca symptoms today.   The SSA test is most commonly associated with Sjogren's   We talked about this.   She has concerns about taking medication.   Typically we would start someone on hydroxychloroquine for this.   She needs to have regular dental and ophthalmic examinations   Will recheck labs today.          Follow Up:   Return in about 3 months (around 8/9/2025).    Brendan Corley,   [facility] [place]

## 2025-05-10 LAB
ALBUMIN SERPL-MCNC: 3.6 G/DL (ref 3.5–5.2)
ALBUMIN/GLOB SERPL: 1.3 G/DL
ALP SERPL-CCNC: 89 U/L (ref 39–117)
ALT SERPL W P-5'-P-CCNC: 11 U/L (ref 1–33)
ANION GAP SERPL CALCULATED.3IONS-SCNC: 6.3 MMOL/L (ref 5–15)
AST SERPL-CCNC: 19 U/L (ref 1–32)
BASOPHILS # BLD AUTO: 0.04 10*3/MM3 (ref 0–0.2)
BASOPHILS NFR BLD AUTO: 0.6 % (ref 0–1.5)
BILIRUB SERPL-MCNC: <0.2 MG/DL (ref 0–1.2)
BUN SERPL-MCNC: 13 MG/DL (ref 8–23)
BUN/CREAT SERPL: 19.1 (ref 7–25)
CALCIUM SPEC-SCNC: 9.3 MG/DL (ref 8.6–10.5)
CHLORIDE SERPL-SCNC: 101 MMOL/L (ref 98–107)
CO2 SERPL-SCNC: 35.7 MMOL/L (ref 22–29)
CREAT SERPL-MCNC: 0.68 MG/DL (ref 0.57–1)
CRP SERPL-MCNC: 0.6 MG/DL (ref 0–0.5)
DEPRECATED RDW RBC AUTO: 40.6 FL (ref 37–54)
EGFRCR SERPLBLD CKD-EPI 2021: 89.8 ML/MIN/1.73
EOSINOPHIL # BLD AUTO: 0.38 10*3/MM3 (ref 0–0.4)
EOSINOPHIL NFR BLD AUTO: 5.3 % (ref 0.3–6.2)
ERYTHROCYTE [DISTWIDTH] IN BLOOD BY AUTOMATED COUNT: 11.9 % (ref 12.3–15.4)
ERYTHROCYTE [SEDIMENTATION RATE] IN BLOOD: 19 MM/HR (ref 0–30)
GLOBULIN UR ELPH-MCNC: 2.8 GM/DL
GLUCOSE SERPL-MCNC: 99 MG/DL (ref 65–99)
HAV IGM SERPL QL IA: NORMAL
HBV CORE IGM SERPL QL IA: NORMAL
HBV SURFACE AG SERPL QL IA: NORMAL
HCT VFR BLD AUTO: 35.4 % (ref 34–46.6)
HCV AB SER QL: NORMAL
HGB BLD-MCNC: 11.9 G/DL (ref 12–15.9)
IMM GRANULOCYTES # BLD AUTO: 0.03 10*3/MM3 (ref 0–0.05)
IMM GRANULOCYTES NFR BLD AUTO: 0.4 % (ref 0–0.5)
LYMPHOCYTES # BLD AUTO: 1.67 10*3/MM3 (ref 0.7–3.1)
LYMPHOCYTES NFR BLD AUTO: 23.4 % (ref 19.6–45.3)
MCH RBC QN AUTO: 31.8 PG (ref 26.6–33)
MCHC RBC AUTO-ENTMCNC: 33.6 G/DL (ref 31.5–35.7)
MCV RBC AUTO: 94.7 FL (ref 79–97)
MONOCYTES # BLD AUTO: 0.67 10*3/MM3 (ref 0.1–0.9)
MONOCYTES NFR BLD AUTO: 9.4 % (ref 5–12)
NEUTROPHILS NFR BLD AUTO: 4.36 10*3/MM3 (ref 1.7–7)
NEUTROPHILS NFR BLD AUTO: 60.9 % (ref 42.7–76)
NRBC BLD AUTO-RTO: 0 /100 WBC (ref 0–0.2)
PLATELET # BLD AUTO: 217 10*3/MM3 (ref 140–450)
PMV BLD AUTO: 9.7 FL (ref 6–12)
POTASSIUM SERPL-SCNC: 4 MMOL/L (ref 3.5–5.2)
PROT SERPL-MCNC: 6.4 G/DL (ref 6–8.5)
RBC # BLD AUTO: 3.74 10*6/MM3 (ref 3.77–5.28)
SODIUM SERPL-SCNC: 143 MMOL/L (ref 136–145)
WBC NRBC COR # BLD AUTO: 7.15 10*3/MM3 (ref 3.4–10.8)

## 2025-05-12 ENCOUNTER — TELEPHONE (OUTPATIENT)
Dept: CARDIOLOGY | Facility: CLINIC | Age: 80
End: 2025-05-12
Payer: MEDICARE

## 2025-05-12 ENCOUNTER — RESULTS FOLLOW-UP (OUTPATIENT)
Age: 80
End: 2025-05-12
Payer: MEDICARE

## 2025-05-12 NOTE — TELEPHONE ENCOUNTER
Called and discussed Watchman procedure per Dr. Pathak's request. Watchman folder and SDM book mailed to pt. She will call with any questions but is on the fence about surgery. She will keep her appt with Dr. Arriaga.  Madisyn White RN

## 2025-05-13 ENCOUNTER — TELEPHONE (OUTPATIENT)
Dept: FAMILY MEDICINE CLINIC | Facility: CLINIC | Age: 80
End: 2025-05-13
Payer: MEDICARE

## 2025-05-13 DIAGNOSIS — R91.8 OTHER NONSPECIFIC ABNORMAL FINDING OF LUNG FIELD: ICD-10-CM

## 2025-05-13 DIAGNOSIS — R93.89 ABNORMAL CT OF THE CHEST: Primary | ICD-10-CM

## 2025-05-13 DIAGNOSIS — R76.12 POSITIVE QUANTIFERON-TB GOLD TEST: Primary | ICD-10-CM

## 2025-05-13 LAB
GAMMA INTERFERON BACKGROUND BLD IA-ACNC: 0.1 IU/ML
M TB IFN-G BLD-IMP: POSITIVE
M TB IFN-G CD4+ BCKGRND COR BLD-ACNC: 1 IU/ML
M TB IFN-G CD4+CD8+ BCKGRND COR BLD-ACNC: 0.75 IU/ML
MITOGEN IGNF BCKGRD COR BLD-ACNC: >10 IU/ML
QUANTIFERON INCUBATION: ABNORMAL
SERVICE CMNT-IMP: NORMAL

## 2025-05-13 NOTE — TELEPHONE ENCOUNTER
Per discussion with patient daughter - she states that all of this testing at once is too much for her mother and she does not want to schedule her PET scan, CT or the Pulmonary Nodule Clinic appointment. She states they want the appointments that have been made for these to be canceled. She said that her mother cannot do closed in spaces and is afraid to complete the PET scan, so they have talked with Dr Corley;s office and want to wait until she sees Infectious Disease to make any decisions about further testing or the lung nodule clinic. She said that her mother has had the lung nodules for many years and her pulmonary doctor is not concerned. She said that if they decide they do want the PET scan and CT scan in the future, they will let us know. She has requested me to cancel all the appointments that have been made.

## 2025-05-13 NOTE — TELEPHONE ENCOUNTER
Per patient daughter's request, I have called and canceled her PET scan and CT scan. I have also left a message with new patient scheduling at [facility] to cancel her PFT and consult on the 20th. I did call patient daughter back to confirm that they wanted both the PFT and the consult canceled and she confirmed that she wanted all appointments canceled and wanted to wait until after she sees Infectious Disease to make any decisions about further testing or referrals.

## 2025-05-13 NOTE — TELEPHONE ENCOUNTER
Spoke with patient daughter again regarding Diagnostic Mammogram. Patient daughter requested for order to be sent to [facility]. I faxed order and [facility] said that patient would need to call to schedule her appointment. I advised patient daughter of this and she said that she was going to discuss it with her mother and they would call  and schedule the mammogram when she was ready to complete it.

## 2025-05-13 NOTE — TELEPHONE ENCOUNTER
I have called daughter of pt and went over the results and the notes Marychuy put in, she verbalized her understanding.

## 2025-05-14 ENCOUNTER — TELEPHONE (OUTPATIENT)
Dept: FAMILY MEDICINE CLINIC | Facility: CLINIC | Age: 80
End: 2025-05-14
Payer: MEDICARE

## 2025-05-14 NOTE — TELEPHONE ENCOUNTER
Patient's daughter called and spoke with Marychuy. Per daughter, patient recently tested positive for TB and is schedule to see Inf Disease soon, they will call [facility] to schedule diag mamm, and patient doesn't want PET Scan or pulmonology referral.     I called and spoke with patient. Patient gave verbal okay to call daughter first for everything, sticky note placed in chart. WK

## 2025-05-14 NOTE — TELEPHONE ENCOUNTER
I spoke with pt and she verbalized her understanding. Pt stated that she will talk to Dr. Corley first.

## 2025-05-14 NOTE — TELEPHONE ENCOUNTER
Patients daughter called asking if her moms Nodules from 2018 are the same as the ones from recently, size and area, so they can move forward with treating them how they would like.

## 2025-05-17 LAB
14-3-3 ETA AG SER IA-MCNC: <0.2 NG/ML
RF IGA SER-ACNC: <7 U
RF IGG SER-ACNC: <7 U
RF IGM SER IA-ACNC: <7 U

## 2025-05-22 LAB
ANA HOMOGEN TITR SER: ABNORMAL {TITER}
ANA PLUS 12 INTERPRETATION: ABNORMAL
ANA SER QL IF: POSITIVE
ANA SPECKLED TITR SER: ABNORMAL {TITER}
C3 SERPL-MCNC: 176 MG/DL (ref 90–180)
C4 SERPL-MCNC: 34 MG/DL (ref 10–40)
CARDIOLIPIN IGA SER IA-ACNC: <12 APL U/ML
CARDIOLIPIN IGG SER IA-ACNC: <15 GPL U/ML
CARDIOLIPIN IGM SER IA-ACNC: <13 MPL U/ML
CCP IGA+IGG SERPL IA-ACNC: <20 UNITS
CENTROMERE AB TITR SER IF: ABNORMAL {TITER}
CHROMATIN IGG SERPL-ACNC: <20 UNITS
DSDNA AB SER FARR-ACNC: <8 IU/ML
ENA SCL70 AB SER IA-ACNC: <20 UNITS
ENA SM AB SER-ACNC: <20 UNITS
ENA SS-A AB SER IA-ACNC: <20 UNITS
ENA SS-B AB SER IA-ACNC: <20 UNITS
LABORATORY COMMENT REPORT: ABNORMAL
RHEUMATOID FACT SERPL-ACNC: <14 IU/ML
THYROPEROXIDASE AB SERPL-ACNC: <9 IU/ML
U1 SNRNP AB SER IA-ACNC: <20 UNITS

## 2025-06-16 ENCOUNTER — TELEPHONE (OUTPATIENT)
Dept: FAMILY MEDICINE CLINIC | Facility: CLINIC | Age: 80
End: 2025-06-16

## 2025-06-16 RX ORDER — KETOCONAZOLE 20 MG/G
1 CREAM TOPICAL DAILY
Qty: 30 G | Refills: 1 | Status: SHIPPED | OUTPATIENT
Start: 2025-06-16

## 2025-06-16 NOTE — TELEPHONE ENCOUNTER
Caller: Marielena Moeller    Relationship: Self    Best call back number: [phone]    Which medication are you concerned about: PT STATED THAT THE POWDER THAT PCP PRESCRIBED HER IS NOT WORKING TO RASH PT WILL LIKE TO KNOW IF PCP CAN SEND  IN THE CREAM, PHARMACY HAS NOT RECEIVED IT YET.    [facility] #[id number] - [place], [place] - [address] - [phone]  - [phone] FX

## 2025-06-19 ENCOUNTER — PATIENT OUTREACH (OUTPATIENT)
Dept: ONCOLOGY | Facility: CLINIC | Age: 80
End: 2025-06-19
Payer: MEDICARE

## 2025-06-30 ENCOUNTER — TELEPHONE (OUTPATIENT)
Dept: FAMILY MEDICINE CLINIC | Facility: CLINIC | Age: 80
End: 2025-06-30

## 2025-06-30 RX ORDER — ACYCLOVIR 400 MG/1
1 TABLET ORAL DAILY
Qty: 1 EACH | Refills: 0 | Status: SHIPPED | OUTPATIENT
Start: 2025-06-30 | End: 2025-07-02 | Stop reason: SDUPTHER

## 2025-06-30 RX ORDER — ACYCLOVIR 400 MG/1
1 TABLET ORAL
Qty: 9 EACH | Refills: 3 | Status: SHIPPED | OUTPATIENT
Start: 2025-06-30

## 2025-06-30 NOTE — TELEPHONE ENCOUNTER
Caller: Marielena Moeller    Relationship: Self    Best call back number: [phone]     What medication are you requesting: DEXCOM G7    What are your current symptoms: DIABETES    How long have you been experiencing symptoms: 25 YEARS    Have you had these symptoms before:    [x] Yes  [] No    Have you been treated for these symptoms before:   [x] Yes  [] No    If a prescription is needed, what is your preferred pharmacy and phone number: [id number] - [place], [place] - [address] - [phone] [facility] [phone] FX     Additional notes: PT WAS NOT SURE WHERE SHE GOT THE DEXCOM G7 THE LAST TIME. PT NEEDS A NEW RX FOR THE UNIT. PT IS OUT OF DEVICES. PT TYPICALLY GETS A 90 DAY SUPPLY.

## 2025-07-02 LAB
MDC_IDC_MSMT_BATTERY_REMAINING_PERCENTAGE: 80 %
MDC_IDC_MSMT_BATTERY_STATUS: NORMAL
MDC_IDC_MSMT_LEADCHNL_RA_DTM: NORMAL
MDC_IDC_MSMT_LEADCHNL_RA_IMPEDANCE_VALUE: 527
MDC_IDC_MSMT_LEADCHNL_RA_PACING_THRESHOLD_AMPLITUDE: 0.7
MDC_IDC_MSMT_LEADCHNL_RA_PACING_THRESHOLD_POLARITY: NORMAL
MDC_IDC_MSMT_LEADCHNL_RA_PACING_THRESHOLD_PULSEWIDTH: 0.4
MDC_IDC_MSMT_LEADCHNL_RA_SENSING_INTR_AMPL: 2.6
MDC_IDC_MSMT_LEADCHNL_RV_DTM: NORMAL
MDC_IDC_MSMT_LEADCHNL_RV_IMPEDANCE_VALUE: 585
MDC_IDC_MSMT_LEADCHNL_RV_PACING_THRESHOLD_AMPLITUDE: 0.8
MDC_IDC_MSMT_LEADCHNL_RV_PACING_THRESHOLD_POLARITY: NORMAL
MDC_IDC_MSMT_LEADCHNL_RV_PACING_THRESHOLD_PULSEWIDTH: 0.4
MDC_IDC_MSMT_LEADCHNL_RV_SENSING_INTR_AMPL: 17.2
MDC_IDC_PG_IMPLANT_DTM: NORMAL
MDC_IDC_PG_MFG: NORMAL
MDC_IDC_PG_MODEL: NORMAL
MDC_IDC_PG_SERIAL: NORMAL
MDC_IDC_PG_TYPE: NORMAL
MDC_IDC_SESS_DTM: NORMAL
MDC_IDC_SESS_TYPE: NORMAL
MDC_IDC_SET_BRADY_AT_MODE_SWITCH_RATE: 160
MDC_IDC_SET_BRADY_LOWRATE: 60
MDC_IDC_SET_BRADY_MAX_SENSOR_RATE: 120
MDC_IDC_SET_BRADY_MAX_TRACKING_RATE: 130
MDC_IDC_SET_BRADY_MODE: NORMAL
MDC_IDC_SET_BRADY_PAV_DELAY: 210
MDC_IDC_SET_BRADY_SAV_DELAY: 180
MDC_IDC_SET_LEADCHNL_RA_PACING_AMPLITUDE: 2
MDC_IDC_SET_LEADCHNL_RA_PACING_POLARITY: NORMAL
MDC_IDC_SET_LEADCHNL_RA_PACING_PULSEWIDTH: 0.4
MDC_IDC_SET_LEADCHNL_RA_SENSING_POLARITY: NORMAL
MDC_IDC_SET_LEADCHNL_RV_PACING_AMPLITUDE: 2
MDC_IDC_SET_LEADCHNL_RV_PACING_POLARITY: NORMAL
MDC_IDC_SET_LEADCHNL_RV_PACING_PULSEWIDTH: 0.4
MDC_IDC_SET_LEADCHNL_RV_SENSING_POLARITY: NORMAL
MDC_IDC_STAT_AT_BURDEN_PERCENT: 2
MDC_IDC_STAT_BRADY_RA_PERCENT_PACED: 28
MDC_IDC_STAT_BRADY_RV_PERCENT_PACED: 1

## 2025-07-02 RX ORDER — ACYCLOVIR 400 MG/1
1 TABLET ORAL DAILY
Qty: 9 EACH | Refills: 3 | Status: SHIPPED | OUTPATIENT
Start: 2025-07-02

## 2025-07-02 NOTE — TELEPHONE ENCOUNTER
Caller: Sajan Marielena TRAMMELL    Relationship: Self    Best call back number: [phone]     Requested Prescriptions:   Requested Prescriptions     Pending Prescriptions Disp Refills    Continuous Glucose Sensor (Dexcom G7 Sensor) misc 1 each 0     Sig: Use 1 each Daily.        Pharmacy where request should be sent: [facility] #[id number] - [place], [place] - [address] AT [address] - [phone]  - [phone] FX     Last office visit with prescribing clinician: 3/28/2025   Last telemedicine visit with prescribing clinician: Visit date not found   Next office visit with prescribing clinician: Visit date not found     Additional details provided by patient: PATIENT MEDICATION WAS CALLED INTO THE PHARMACY WRONG SHE GOT ONLY ONE SENSOR WITH NO REFILL'S AND HER PRESCRIPTION FOR THE  WAS CALLED INTO THE PHARMACY FOR 9 DISPENSED AND 3 REFILL'S     THE MEDICATION'S WHERE WRITTEN OUT FOR THE WRONG DISPENSE AND PATIENT IS NEEDING THE SENSOR TO BE CALLED INTO THE PHARMACY FOR 9 DISPENSED AND 3 REFILL'S     Does the patient have less than a 3 day supply:  [x] Yes  [] No    Would you like a call back once the refill request has been completed: [] Yes [x] No    If the office needs to give you a call back, can they leave a voicemail: [] Yes [x] No    Ines Pardo   07/02/25 14:40 EDT

## 2025-07-11 ENCOUNTER — TELEPHONE (OUTPATIENT)
Dept: FAMILY MEDICINE CLINIC | Facility: CLINIC | Age: 80
End: 2025-07-11
Payer: MEDICARE

## 2025-07-11 ENCOUNTER — TELEPHONE (OUTPATIENT)
Dept: ENDOCRINOLOGY | Facility: CLINIC | Age: 80
End: 2025-07-11

## 2025-07-11 NOTE — TELEPHONE ENCOUNTER
Caller: Marielena Moeller    Relationship: Self    Best call back number: [phone]     What orders are you requesting (i.e. lab or imaging): DEXCOM DEVICE (SENSOR, , ETC)    Additional notes: PATIENT STATES THAT THEY TALKED TO [employer] AND THEY WERE TOLD TO USE [employer] FOR THIS EQUIPMENT    PLEASE ADVISE

## 2025-07-17 NOTE — TELEPHONE ENCOUNTER
Prior Authorization has been started on Cover My Meds for Dexcom G7     Waiting on response from insurance       Key:[id number]

## 2025-07-17 NOTE — TELEPHONE ENCOUNTER
Authorization already on file for this request. Authorization starting on 07/03/2025 and ending on 12/31/2025.      Patient pharmacy notified and confirmed that they have the active PA on file

## 2025-08-15 LAB
MDC_IDC_MSMT_BATTERY_REMAINING_PERCENTAGE: 80 %
MDC_IDC_MSMT_BATTERY_STATUS: NORMAL
MDC_IDC_MSMT_LEADCHNL_RA_DTM: NORMAL
MDC_IDC_MSMT_LEADCHNL_RA_IMPEDANCE_VALUE: 468
MDC_IDC_MSMT_LEADCHNL_RA_PACING_THRESHOLD_AMPLITUDE: 0.6
MDC_IDC_MSMT_LEADCHNL_RA_PACING_THRESHOLD_POLARITY: NORMAL
MDC_IDC_MSMT_LEADCHNL_RA_PACING_THRESHOLD_PULSEWIDTH: 0.4
MDC_IDC_MSMT_LEADCHNL_RA_SENSING_INTR_AMPL: 1
MDC_IDC_MSMT_LEADCHNL_RV_DTM: NORMAL
MDC_IDC_MSMT_LEADCHNL_RV_IMPEDANCE_VALUE: 546
MDC_IDC_MSMT_LEADCHNL_RV_PACING_THRESHOLD_AMPLITUDE: 0.9
MDC_IDC_MSMT_LEADCHNL_RV_PACING_THRESHOLD_POLARITY: NORMAL
MDC_IDC_MSMT_LEADCHNL_RV_PACING_THRESHOLD_PULSEWIDTH: 0.4
MDC_IDC_MSMT_LEADCHNL_RV_SENSING_INTR_AMPL: 15.7
MDC_IDC_PG_IMPLANT_DTM: NORMAL
MDC_IDC_PG_MFG: NORMAL
MDC_IDC_PG_MODEL: NORMAL
MDC_IDC_PG_SERIAL: NORMAL
MDC_IDC_PG_TYPE: NORMAL
MDC_IDC_SESS_DTM: NORMAL
MDC_IDC_SESS_TYPE: NORMAL
MDC_IDC_SET_BRADY_AT_MODE_SWITCH_RATE: 160
MDC_IDC_SET_BRADY_LOWRATE: 60
MDC_IDC_SET_BRADY_MAX_SENSOR_RATE: 120
MDC_IDC_SET_BRADY_MAX_TRACKING_RATE: 130
MDC_IDC_SET_BRADY_MODE: NORMAL
MDC_IDC_SET_BRADY_PAV_DELAY: 210
MDC_IDC_SET_BRADY_SAV_DELAY: 180
MDC_IDC_SET_LEADCHNL_RA_PACING_AMPLITUDE: 2
MDC_IDC_SET_LEADCHNL_RA_PACING_POLARITY: NORMAL
MDC_IDC_SET_LEADCHNL_RA_PACING_PULSEWIDTH: 0.4
MDC_IDC_SET_LEADCHNL_RA_SENSING_POLARITY: NORMAL
MDC_IDC_SET_LEADCHNL_RV_PACING_AMPLITUDE: 2
MDC_IDC_SET_LEADCHNL_RV_PACING_POLARITY: NORMAL
MDC_IDC_SET_LEADCHNL_RV_PACING_PULSEWIDTH: 0.4
MDC_IDC_SET_LEADCHNL_RV_SENSING_POLARITY: NORMAL
MDC_IDC_STAT_AT_BURDEN_PERCENT: 2
MDC_IDC_STAT_BRADY_RA_PERCENT_PACED: 30
MDC_IDC_STAT_BRADY_RV_PERCENT_PACED: 2

## 2025-08-18 ENCOUNTER — APPOINTMENT (OUTPATIENT)
Dept: CT IMAGING | Facility: HOSPITAL | Age: 80
End: 2025-08-18
Payer: MEDICARE

## 2025-08-18 ENCOUNTER — APPOINTMENT (OUTPATIENT)
Dept: CARDIOLOGY | Facility: HOSPITAL | Age: 80
End: 2025-08-18
Payer: MEDICARE

## 2025-08-18 ENCOUNTER — HOSPITAL ENCOUNTER (OUTPATIENT)
Facility: HOSPITAL | Age: 80
Setting detail: OBSERVATION
Discharge: HOME HEALTH | End: 2025-08-20
Attending: STUDENT IN AN ORGANIZED HEALTH CARE EDUCATION/TRAINING PROGRAM | Admitting: FAMILY MEDICINE
Payer: MEDICARE

## 2025-08-18 PROBLEM — R07.9 CHEST PAIN: Status: ACTIVE | Noted: 2025-08-18

## 2025-08-19 ENCOUNTER — APPOINTMENT (OUTPATIENT)
Dept: CARDIOLOGY | Facility: HOSPITAL | Age: 80
End: 2025-08-19
Payer: MEDICARE

## 2025-08-19 PROBLEM — I16.0 HYPERTENSIVE URGENCY: Status: ACTIVE | Noted: 2025-08-19

## 2025-08-20 ENCOUNTER — READMISSION MANAGEMENT (OUTPATIENT)
Dept: CALL CENTER | Facility: HOSPITAL | Age: 80
End: 2025-08-20
Payer: MEDICARE

## 2025-08-21 ENCOUNTER — TELEPHONE (OUTPATIENT)
Dept: CARDIOLOGY | Facility: CLINIC | Age: 80
End: 2025-08-21
Payer: MEDICARE

## 2025-08-21 ENCOUNTER — TRANSITIONAL CARE MANAGEMENT TELEPHONE ENCOUNTER (OUTPATIENT)
Dept: CALL CENTER | Facility: HOSPITAL | Age: 80
End: 2025-08-21
Payer: MEDICARE

## 2025-08-22 ENCOUNTER — TELEPHONE (OUTPATIENT)
Dept: PULMONOLOGY | Facility: CLINIC | Age: 80
End: 2025-08-22
Payer: MEDICARE

## 2025-08-29 ENCOUNTER — READMISSION MANAGEMENT (OUTPATIENT)
Dept: CALL CENTER | Facility: HOSPITAL | Age: 80
End: 2025-08-29
Payer: MEDICARE

## (undated) DEVICE — CANN HYDRODISSECTION

## (undated) DEVICE — ENDOSCOPY PORT CONNECTOR FOR OLYMPUS® SCOPES: Brand: ERBE

## (undated) DEVICE — CANN IRR/INJ AIR ANT CHAMBER 6MM BEND 27G

## (undated) DEVICE — Device

## (undated) DEVICE — ENDOGATOR AUXILIARY WATER JET CONNECTOR: Brand: ENDOGATOR

## (undated) DEVICE — MEDI-VAC YANKAUER SUCTION HANDLE W/BULBOUS TIP: Brand: CARDINAL HEALTH

## (undated) DEVICE — 0.8MM CLEARPORT PARA KNIFE: Brand: SHARPOINT

## (undated) DEVICE — TUBING, SUCTION, 1/4" X 10', STRAIGHT: Brand: MEDLINE

## (undated) DEVICE — MICROSURGICAL INSTRUMENT IRR CYSTITOME 27GA FORMED-BAFFLE CUTTING: Brand: ALCON

## (undated) DEVICE — PK CATH CARD 10

## (undated) DEVICE — SYR LL W/SCALE/MARK 3ML STRL

## (undated) DEVICE — SOL IRRIG H2O 1000ML STRL

## (undated) DEVICE — HP CONCL INTREPID COAX I/A CRV .3MM

## (undated) DEVICE — NDL FLTR2 THN 19G 1IN

## (undated) DEVICE — SINGLE-USE POLYPECTOMY SNARE: Brand: CAPTIVATOR II

## (undated) DEVICE — IRRIGATOR BULB ASEPTO 60CC STRL

## (undated) DEVICE — ADULT, W/LG. BACK PAD, RADIOTRANSPARENT ELEMENT AND LEAD WIRE COMPATIBLE W/: Brand: DEFIBRILLATION ELECTRODES

## (undated) DEVICE — SET PRIMARY GRVTY 10DP MALE LL 104IN

## (undated) DEVICE — CANN NASL CO2 DIVIDED A/

## (undated) DEVICE — JELLY,LUBE,STERILE,FLIP TOP,TUBE,2-OZ: Brand: MEDLINE

## (undated) DEVICE — GLV SURG SENSICARE W/ALOE PF LF 7.5 STRL

## (undated) DEVICE — VLV SXN AIR/H2O ORCAPOD3 1P/U STRL

## (undated) DEVICE — CLEARCUT® SLIT KNIFE INTREPID MICRO-COAXIAL SYSTEM 2.4 SB: Brand: CLEARCUT®; INTREPID

## (undated) DEVICE — NDL PERC 1PART ECHOTIP WO/BASEPLT 18G 7CM

## (undated) DEVICE — TRAP FLD MINIVAC MEGADYNE 100ML

## (undated) DEVICE — PAD GRND REM POLYHESIVE A/ DISP

## (undated) DEVICE — ST INF PRI SMRTSTE 20DRP 2VLV 24ML 117

## (undated) DEVICE — ELECTRD RETRN/GRND MEGADYNE SGL/PLT W/CORD 9FT DISP

## (undated) DEVICE — THE MONARCH® "D" CARTRIDGE IS A SINGLE-USE POLYPROPYLENE CARTRIDGE FOR POSTERIOR CHAMBER IOL DELIVERY: Brand: MONARCH® III

## (undated) DEVICE — QUICK CATCH IN-LINE SUCTION POLYP TRAP IS USED FOR SUCTION RETRIEVAL OF ENDOSCOPICALLY REMOVED POLYPS.

## (undated) DEVICE — DECANT BG O JET

## (undated) DEVICE — TBG PENCL TELESCP MEGADYNE SMOKE EVAC 10FT

## (undated) DEVICE — SOL NACL 0.9PCT 1000ML

## (undated) DEVICE — INTRO TEAR AWAY/LVD W/SD PRT 6F 13CM

## (undated) DEVICE — CAUTERY TIP POLISHER: Brand: DEVON

## (undated) DEVICE — FRCP BIOP COLD ENDOJAW ALLGTR W/NDL 2.8X2300MM BLU

## (undated) DEVICE — HYBRID CO2 TUBING/CAP SET FOR OLYMPUS® SCOPES & CO2 SOURCE: Brand: ERBE

## (undated) DEVICE — SNAR POLYP SENSATION STDOVL 27 240 BX40

## (undated) DEVICE — DRSNG SURG AQUACEL AG/ADVNTGE 9X15CM 3.5X6IN

## (undated) DEVICE — KT ORCA VLV SXN AIR/H2O W/SEAL 1P/U STRL

## (undated) DEVICE — LIMB HOLDER, WRIST/ANKLE: Brand: DEROYAL

## (undated) DEVICE — ST EXT IV SMRTSTE 2VLV FIX M LL 6ML 41